# Patient Record
Sex: MALE | Race: BLACK OR AFRICAN AMERICAN | NOT HISPANIC OR LATINO | Employment: UNEMPLOYED | URBAN - METROPOLITAN AREA
[De-identification: names, ages, dates, MRNs, and addresses within clinical notes are randomized per-mention and may not be internally consistent; named-entity substitution may affect disease eponyms.]

---

## 2017-04-17 ENCOUNTER — OFFICE VISIT (OUTPATIENT)
Dept: FAMILY MEDICINE | Facility: CLINIC | Age: 37
End: 2017-04-17
Payer: COMMERCIAL

## 2017-04-17 VITALS
BODY MASS INDEX: 29.57 KG/M2 | HEIGHT: 71 IN | HEART RATE: 90 BPM | TEMPERATURE: 98.2 F | DIASTOLIC BLOOD PRESSURE: 72 MMHG | SYSTOLIC BLOOD PRESSURE: 116 MMHG | WEIGHT: 211.25 LBS

## 2017-04-17 DIAGNOSIS — G89.29 CHRONIC BILATERAL LOW BACK PAIN WITH LEFT-SIDED SCIATICA: ICD-10-CM

## 2017-04-17 DIAGNOSIS — Z00.00 ROUTINE GENERAL MEDICAL EXAMINATION AT A HEALTH CARE FACILITY: Primary | ICD-10-CM

## 2017-04-17 DIAGNOSIS — M54.6 MIDLINE THORACIC BACK PAIN, UNSPECIFIED CHRONICITY: ICD-10-CM

## 2017-04-17 DIAGNOSIS — M54.42 CHRONIC BILATERAL LOW BACK PAIN WITH LEFT-SIDED SCIATICA: ICD-10-CM

## 2017-04-17 LAB
ERYTHROCYTE [DISTWIDTH] IN BLOOD BY AUTOMATED COUNT: 16.5 % (ref 10–15)
ERYTHROCYTE [SEDIMENTATION RATE] IN BLOOD BY WESTERGREN METHOD: 4 MM/H (ref 0–15)
HCT VFR BLD AUTO: 42.8 % (ref 40–53)
HGB BLD-MCNC: 14.5 G/DL (ref 13.3–17.7)
MCH RBC QN AUTO: 25.4 PG (ref 26.5–33)
MCHC RBC AUTO-ENTMCNC: 33.9 G/DL (ref 31.5–36.5)
MCV RBC AUTO: 75 FL (ref 78–100)
PLATELET # BLD AUTO: 226 10E9/L (ref 150–450)
RBC # BLD AUTO: 5.7 10E12/L (ref 4.4–5.9)
WBC # BLD AUTO: 6.2 10E9/L (ref 4–11)

## 2017-04-17 PROCEDURE — 82306 VITAMIN D 25 HYDROXY: CPT | Performed by: FAMILY MEDICINE

## 2017-04-17 PROCEDURE — 36415 COLL VENOUS BLD VENIPUNCTURE: CPT | Performed by: FAMILY MEDICINE

## 2017-04-17 PROCEDURE — 99213 OFFICE O/P EST LOW 20 MIN: CPT | Mod: 25 | Performed by: FAMILY MEDICINE

## 2017-04-17 PROCEDURE — 85652 RBC SED RATE AUTOMATED: CPT | Performed by: FAMILY MEDICINE

## 2017-04-17 PROCEDURE — 99395 PREV VISIT EST AGE 18-39: CPT | Performed by: FAMILY MEDICINE

## 2017-04-17 PROCEDURE — 85027 COMPLETE CBC AUTOMATED: CPT | Performed by: FAMILY MEDICINE

## 2017-04-17 PROCEDURE — 80053 COMPREHEN METABOLIC PANEL: CPT | Performed by: FAMILY MEDICINE

## 2017-04-17 RX ORDER — DICLOFENAC SODIUM 75 MG/1
75 TABLET, DELAYED RELEASE ORAL 2 TIMES DAILY PRN
Qty: 60 TABLET | Refills: 1 | Status: SHIPPED | OUTPATIENT
Start: 2017-04-17 | End: 2017-05-01

## 2017-04-17 NOTE — NURSING NOTE
"Chief Complaint   Patient presents with     Physical       Initial /72 (BP Location: Right arm, Cuff Size: Adult Large)  Pulse 90  Temp 98.2  F (36.8  C) (Oral)  Ht 5' 10.5\" (1.791 m)  Wt 211 lb 4 oz (95.8 kg)  BMI 29.88 kg/m2 Estimated body mass index is 29.88 kg/(m^2) as calculated from the following:    Height as of this encounter: 5' 10.5\" (1.791 m).    Weight as of this encounter: 211 lb 4 oz (95.8 kg).  Medication Reconciliation: monica NIX, Certified Medical Assistant (AAMA)April 17, 2017 1:15 PM      "

## 2017-04-17 NOTE — PATIENT INSTRUCTIONS
Preventive Health Recommendations  Male Ages 26 - 39    Yearly exam:             See your health care provider every year in order to  o   Review health changes.   o   Discuss preventive care.    o   Review your medicines if your doctor has prescribed any.    You should be tested each year for STDs (sexually transmitted diseases), if you re at risk.     After age 35, talk to your provider about cholesterol testing. If you are at risk for heart disease, have your cholesterol tested at least every 5 years.     If you are at risk for diabetes, you should have a diabetes test (fasting glucose).  Shots: Get a flu shot each year. Get a tetanus shot every 10 years.     Nutrition:    Eat at least 5 servings of fruits and vegetables daily.     Eat whole-grain bread, whole-wheat pasta and brown rice instead of white grains and rice.     Talk to your provider about Calcium and Vitamin D.     Lifestyle    Exercise for at least 150 minutes a week (30 minutes a day, 5 days a week). This will help you control your weight and prevent disease.     Limit alcohol to one drink per day.     No smoking.     Wear sunscreen to prevent skin cancer.     See your dentist every six months for an exam and cleaning.     [facility]   Discharged by : Megha WOLFF MA  If you have any questions regarding your visit please contact your care team:     [facility] Clinic Hours Telephone Number   [address] Dr., Dr.   7am-7pm Monday - Thursday   7am-5pm Fridays  [phone]   (Appointment scheduling available 24/7)   RN Line   [phone] option 2       For a Price Quote for your services, please call our Consumer Price Line at [phone].     What options do I have for visits at the clinic other than the traditional office visit?     To expand how we care for you, many of our providers are utilizing electronic visits (e-visits) and telephone visits,  when medically appropriate, for interactions with their patients rather than a visit in the clinic. We also offer nurse visits for many medical concerns. Just like any other service, we will bill your insurance company for this type of visit based on time spent on the phone with your provider. Not all insurance companies cover these visits. Please check with your medical insurance if this type of visit is covered. You will be responsible for any charges that are not paid by your insurance.   E-visits via Convozinet: generally incur a $35.00 fee.     Telephone visits:   Time spent on the phone: *charged based on time that is spent on the phone in increments of 10 minutes. Estimated cost:   5-10 mins $30.00   11-20 mins. $59.00   21-30 mins. $85.00     Use ViaCLIX (secure email communication and access to your chart) to send your primary care provider a message or make an appointment. Ask someone on your Team how to sign up for ViaCLIX.     As always, Thank you for trusting us with your health care needs!      [facility]:    Scheduling Appointments  [place]  Call: [phone]    [facility]  Call: [phone]    [facility]  Call: [phone]      WHERE TO GO FOR CARE?    Clinic    Make an appointment if you:       Are sick (cold, cough, flu, sore throat, earache or in pain).       Have a small injury (sprain, small cut, burn or broken bone).       Need a physical exam, Pap smear, vaccine or prescription refill.       Have questions about your health or medicines.    To reach us:      Call [phone] ([phone]). Open 24 hours every day. (For counseling services, call [phone].)    Log into ViaCLIX at [employer].org. (Visit "[email or web address] to create an account.) Hospital emergency room    An emergency is a serious or life- threatening problem that must be treated right away.    Call 150 or get to  the hospital if you have:      Very bad or sudden:            - Chest pain or pressure         - Bleeding         - Head or belly pain         - Dizziness or trouble seeing, walking or                          Speaking      Problems breathing      Blood in your vomit or you are coughing up blood      A major injury (knocked out, loss of a finger or limb, rape, broken bone protruding from skin)    A mental health crisis. (Or call the Mental Health Crisis line at 1-544.753.5754 or Suicide Prevention Hotline at 1-960.823.4020.)    Open 24 hours every day. You don't need an appointment.     Urgent care    Visit urgent care for sickness or small injuries when the clinic is closed. You don't need an appointment. To check hours or find an urgent care near you, visit [email or web address]. Online care    Get online care from [employer] for more than 70 common problems, like colds, allergies and infections. Open 24 hours every day at: [email or web address]   Need help deciding?    For advice about where to be seen, you may call your clinic and ask to speak with a nurse. We're here for you 24 hours every day.         If you are deaf or hard of hearing, please let us know. We provide many free services including sign language interpreters, oral interpreters, TTYs, telephone amplifiers, note takers and written materials.

## 2017-04-17 NOTE — MR AVS SNAPSHOT
After Visit Summary   4/17/2017    Tonio Alarcon    MRN: [id number]           Patient Information     Date Of Birth          1980        Visit Information        Provider Department      4/17/2017 1:00 PM Christiano Palacios MD [facility]        Today's Diagnoses     Routine general medical examination at a health care facility    -  1    Midline thoracic back pain, unspecified chronicity        Chronic bilateral low back pain with left-sided sciatica          Care Instructions      Preventive Health Recommendations  Male Ages 26 - 39    Yearly exam:             See your health care provider every year in order to  o   Review health changes.   o   Discuss preventive care.    o   Review your medicines if your doctor has prescribed any.    You should be tested each year for STDs (sexually transmitted diseases), if you re at risk.     After age 35, talk to your provider about cholesterol testing. If you are at risk for heart disease, have your cholesterol tested at least every 5 years.     If you are at risk for diabetes, you should have a diabetes test (fasting glucose).  Shots: Get a flu shot each year. Get a tetanus shot every 10 years.     Nutrition:    Eat at least 5 servings of fruits and vegetables daily.     Eat whole-grain bread, whole-wheat pasta and brown rice instead of white grains and rice.     Talk to your provider about Calcium and Vitamin D.     Lifestyle    Exercise for at least 150 minutes a week (30 minutes a day, 5 days a week). This will help you control your weight and prevent disease.     Limit alcohol to one drink per day.     No smoking.     Wear sunscreen to prevent skin cancer.     See your dentist every six months for an exam and cleaning.     [facility]   Discharged by : Megha WOLFF MA  If you have any questions regarding your visit please contact your care team:     [facility] Clinic Hours Telephone Number   Dr. Mcpherson  [address] Dr., Dr.   7am-7pm Monday - Thursday   7am-5pm Fridays  [phone]   (Appointment scheduling available 24/7)   RN Line   [phone] option 2       For a Price Quote for your services, please call our Consumer Price Line at [phone].     What options do I have for visits at the clinic other than the traditional office visit?     To expand how we care for you, many of our providers are utilizing electronic visits (e-visits) and telephone visits, when medically appropriate, for interactions with their patients rather than a visit in the clinic. We also offer nurse visits for many medical concerns. Just like any other service, we will bill your insurance company for this type of visit based on time spent on the phone with your provider. Not all insurance companies cover these visits. Please check with your medical insurance if this type of visit is covered. You will be responsible for any charges that are not paid by your insurance.   E-visits via Harry's: generally incur a $35.00 fee.     Telephone visits:   Time spent on the phone: *charged based on time that is spent on the phone in increments of 10 minutes. Estimated cost:   5-10 mins $30.00   11-20 mins. $59.00   21-30 mins. $85.00     Use Itarohart (secure email communication and access to your chart) to send your primary care provider a message or make an appointment. Ask someone on your Team how to sign up for Promocot.     As always, Thank you for trusting us with your health care needs!      [facility]:    Scheduling Appointments  [facility]  Call: [phone]    [facility]  Call: [phone]    [facility]  Call: [phone]      WHERE TO GO FOR CARE?    Clinic    Make an appointment if you:       Are sick (cold, cough, flu, sore throat, earache or in pain).       Have a  small injury (sprain, small cut, burn or broken bone).       Need a physical exam, Pap smear, vaccine or prescription refill.       Have questions about your health or medicines.    To reach us:      Call [phone] ([phone]). Open 24 hours every day. (For counseling services, call [phone].)    Log into [employer] at P[employer]Duty. (Visit [email or web address] to create an account.) Hospital emergency room    An emergency is a serious or life- threatening problem that must be treated right away.    Call 911 or get to the hospital if you have:      Very bad or sudden:            - Chest pain or pressure         - Bleeding         - Head or belly pain         - Dizziness or trouble seeing, walking or                          Speaking      Problems breathing      Blood in your vomit or you are coughing up blood      A major injury (knocked out, loss of a finger or limb, rape, broken bone protruding from skin)    A mental health crisis. (Or call the Mental Health Crisis line at 1-944.544.2216 or Suicide Prevention Hotline at 1-150.428.1412.)    Open 24 hours every day. You don't need an appointment.     Urgent care    Visit urgent care for sickness or small injuries when the clinic is closed. You don't need an appointment. To check hours or find an urgent care near you, visit [email or web address]. Online care    Get online care from Cl[employer] for more than 70 common problems, like colds, allergies and infections. Open 24 hours every day at: [email or web address]   Need help deciding?    For advice about where to be seen, you may call your clinic and ask to speak with a nurse. We're here for you 24 hours every day.         If you are deaf or hard of hearing, please let us know. We provide many free services including sign language interpreters, oral interpreters, TTYs, telephone amplifiers, note takers and written materials.                       Follow-ups after your visit        Future tests  "that were ordered for you today     Open Future Orders        Priority Expected Expires Ordered    CT Abdomen Pelvis w Contrast Routine  2018    CT Chest w Contrast Routine  2018            Who to contact     If you have questions or need follow up information about today's clinic visit or your schedule please contact [facility] directly at [phone].  Normal or non-critical lab and imaging results will be communicated to you by [email or web address], letter or phone within 4 business days after the clinic has received the results. If you do not hear from us within 7 days, please contact the clinic through [email or web address] or phone. If you have a critical or abnormal lab result, we will notify you by phone as soon as possible.  Submit refill requests through Correlor or call your pharmacy and they will forward the refill request to us. Please allow 3 business days for your refill to be completed.          Additional Information About Your Visit        [email or web address] Climate Information     Correlor lets you send messages to your doctor, view your test results, renew your prescriptions, schedule appointments and more. To sign up, go to [email or web address] . Click on \"Log in\" on the left side of the screen, which will take you to the Welcome page. Then click on \"Sign up Now\" on the right side of the page.     You will be asked to enter the access code listed below, as well as some personal information. Please follow the directions to create your username and password.     Your access code is: [email or web address]  Expires: 2017  1:51 PM     Your access code will  in 90 days. If you need help or a new code, please call your [facility] or [phone].        Care EveryWhere ID     This is your Care EveryWhere ID. This could be used by other organizations to access your [place] medical records  [id number]        Your Vitals Were     Pulse Temperature Height BMI (Body Mass Index)          90 " "98.2  F (36.8  C) (Oral) 5' 10.5\" (1.791 m) 29.88 kg/m2         Blood Pressure from Last 3 Encounters:   04/17/17 116/72   04/08/16 142/87   07/10/15 112/60    Weight from Last 3 Encounters:   04/17/17 211 lb 4 oz (95.8 kg)   04/08/16 199 lb (90.3 kg)   07/10/15 197 lb (89.4 kg)              We Performed the Following     CBC with platelets     Comprehensive metabolic panel (BMP + Alb, Alk Phos, ALT, AST, Total. Bili, TP)     ESR: Erythrocyte sedimentation rate     Vitamin D Deficiency          Today's Medication Changes          These changes are accurate as of: 4/17/17  1:51 PM.  If you have any questions, ask your nurse or doctor.               Start taking these medicines.        Dose/Directions    diclofenac 75 MG EC tablet   Commonly known as:  VOLTAREN   Used for:  Midline thoracic back pain, unspecified chronicity   Started by:  Christiano Palacios MD        Dose:  75 mg   Take 1 tablet (75 mg) by mouth 2 times daily as needed for moderate pain   Quantity:  60 tablet   Refills:  1         Stop taking these medicines if you haven't already. Please contact your care team if you have questions.     naproxen 500 MG tablet   Commonly known as:  NAPROSYN   Stopped by:  Christiano Palacios MD                Where to get your medicines      These medications were sent to [facility] [address] [facility] - [place], [place] - [address]  [address] [address]     Phone:  [phone]     diclofenac 75 MG EC tablet                Primary Care Provider Office Phone # Fax #    [facility] [phone]       [address] [address]        Thank you!     Thank you for choosing [facility]  for your care. Our goal is always to provide you with excellent care. Hearing back from our patients is one way we can continue to improve our services. Please take a few minutes to complete the written survey that you may receive in " the mail after your visit with us. Thank you!             Your Updated Medication List - Protect others around you: Learn how to safely use, store and throw away your medicines at www.disposemymeds.org.          This list is accurate as of: 4/17/17  1:51 PM.  Always use your most recent med list.                   Brand Name Dispense Instructions for use    diclofenac 75 MG EC tablet    VOLTAREN    60 tablet    Take 1 tablet (75 mg) by mouth 2 times daily as needed for moderate pain

## 2017-04-17 NOTE — PROGRESS NOTES
SUBJECTIVE:     CC: Tonio Alarcon is an 36 year old male who presents for preventative health visit.     Healthy Habits:    Do you get at least three servings of calcium containing foods daily (dairy, green leafy vegetables, etc.)? no    Amount of exercise or daily activities, outside of work: 0    Problems taking medications regularly not applicable    Medication side effects: No    Have you had an eye exam in the past two years? yes    Do you see a dentist twice per year? no    Do you have sleep apnea, excessive snoring or daytime drowsiness?no        Pain in back (he thinks in lung area) for last 4 months. Non injury related.  He is here with his wife today.  Has more chronic low back pain that is stable.  No specific injury recalled.  When bad, has symptoms down the left leg.  Previous MRI results from 2015 report were reviewed.  Has taken naproxen in the past and not helpful.  Admits to daily marijuana use for the pain.  The mid back pain has been present for a few months without injury.  Feels it is separate from the low back pain.  He is concerned about his lungs due to his heavy smoking.  Pain does not wake him up.  No rash, no change with eating or drinking.  No change with urination or bowel movements.  Has occasional LLQ and noted some mild abnormalities on CT that were not followed up but these symptoms have largely improved.  He has not really used any meds, done any type of physical therapy or chiro for the mid back pain.  He wonders if he can get some type of medication for it today.    Today's PHQ-2 Score: 0  PHQ-2 ( 1999 [employer]) 4/17/2017 4/6/2015   Q1: Little interest or pleasure in doing things 0 1   Q2: Feeling down, depressed or hopeless 0 0   PHQ-2 Score 0 1       Abuse: Current or Past(Physical, Sexual or Emotional)- No  Do you feel safe in your environment - Yes    Social History   Substance Use Topics     Smoking status: Current Every Day Smoker     Packs/day: 1.00     Smokeless  tobacco: Never Used     Alcohol use Yes      Comment: variable use on weekends     The patient does not drink >3 drinks per day nor >7 drinks per week.    Last PSA: No results found for: PSA    Recent Labs   Lab Test  04/06/15   1156   CHOL  170   HDL  46   LDL  105   TRIG  95   CHOLHDLRATIO  3.7       Reviewed orders with patient. Reviewed health maintenance and updated orders accordingly - Yes    Reviewed and updated as needed this visit by clinical staff  Tobacco  Allergies  Problems  Med Hx  Surg Hx  Fam Hx  Soc Hx        Reviewed and updated as needed this visit by Provider  Tobacco  Problems  Surg Hx  Fam Hx  Soc Hx         History reviewed. No pertinent past medical history.   Past Surgical History:   Procedure Laterality Date     HEMORRHOID SURGERY  2015       ROS:  C: NEGATIVE for fever, chills, change in weight  I: NEGATIVE for worrisome rashes, moles or lesions  E: NEGATIVE for vision changes or irritation  ENT: NEGATIVE for ear, mouth and throat problems  R: NEGATIVE for significant cough or SOB  CV: NEGATIVE for chest pain, palpitations or peripheral edema  GI: NEGATIVE for nausea, abdominal pain, heartburn, or change in bowel habits   male: negative for dysuria, hematuria, decreased urinary stream, erectile dysfunction, urethral discharge  M: NEGATIVE for significant arthralgias or myalgia  N: NEGATIVE for weakness, dizziness or paresthesias  E: NEGATIVE for temperature intolerance, skin/hair changes  H: NEGATIVE for bleeding problems  P: NEGATIVE for changes in mood or affect    Problem list, Medication list, Allergies, and Medical/Social/Surgical histories reviewed in EPIC and updated as appropriate.  Patient Active Problem List   Diagnosis     CARDIOVASCULAR SCREENING; LDL GOAL LESS THAN 160     Chronic low back pain     Past Surgical History:   Procedure Laterality Date     HEMORRHOID SURGERY  2015       Social History   Substance Use Topics     Smoking status: Current Every Day Smoker  "    Packs/day: 1.00     Smokeless tobacco: Never Used     Alcohol use Yes      Comment: variable use on weekends     Family History   Problem Relation Age of Onset     DIABETES Mother      Breast Cancer Mother      Dx at 61 years aol     CANCER Father      Stomach ca - Dx at 57 years old and passed away from this     Cancer - colorectal Maternal Grandmother      DIABETES Brother          Current Outpatient Prescriptions   Medication Sig Dispense Refill     diclofenac (VOLTAREN) 75 MG EC tablet Take 1 tablet (75 mg) by mouth 2 times daily as needed for moderate pain 60 tablet 1     No Known Allergies  OBJECTIVE:     /72 (BP Location: Right arm, Cuff Size: Adult Large)  Pulse 90  Temp 98.2  F (36.8  C) (Oral)  Ht 5' 10.5\" (1.791 m)  Wt 211 lb 4 oz (95.8 kg)  BMI 29.88 kg/m2  EXAM:  GENERAL: healthy, alert and no distress  EYES: Eyes grossly normal to inspection, PERRL and conjunctivae and sclerae normal  HENT: ear canals and TM's normal, nose and mouth without ulcers or lesions  NECK: no adenopathy, no asymmetry, masses, or scars and thyroid normal to palpation  RESP: lungs clear to auscultation - no rales, rhonchi or wheezes  CV: regular rate and rhythm, normal S1 S2, no S3 or S4, no murmur, click or rub, no peripheral edema and peripheral pulses strong  ABDOMEN: soft, nontender, no hepatosplenomegaly, no masses and bowel sounds normal   (male): normal male genitalia without lesions or urethral discharge, no hernia  MS: no gross musculoskeletal defects noted, no edema  MS: normal muscle tone, normal range of motion, peripheral pulses normal, RLE exam shows normal strength and muscle mass, no evidence of joint effusion and ROM of all joints is normal, LLE exam shows normal strength and muscle mass, no evidence of joint effusion and ROM of all joints is normal and spine exam shows no scoliosis and ROM is normal  SKIN: no suspicious lesions or rashes  NEURO: Normal strength and tone, mentation intact and " "speech normal  NEURO: Normal strength and tone, sensory exam grossly normal, mentation intact, DTR's normal and symmetric 2+ and gait normal   PSYCH: mentation appears normal, affect normal/bright  LYMPH: no cervical, supraclavicular, axillary, or inguinal adenopathy    ASSESSMENT/PLAN:     1. Routine general medical examination at a health care facility  Routine health issues were reviewed at this time appropriate for age.  He was not fasting today.  Follow up age 39.    2. Midline thoracic back pain, unspecified chronicity  Etiology not clear, he is concerned about a possible source of referred pain from the lungs or abdomen.  Will check labs and imaging today for possible secondary cause.  voltaren for pain in the short term.  Follow up after completion of tests to review results.  - ESR: Erythrocyte sedimentation rate  - CBC with platelets  - Comprehensive metabolic panel (BMP + Alb, Alk Phos, ALT, AST, Total. Bili, TP)  - Vitamin D Deficiency  - CT Abdomen Pelvis w Contrast; Future  - CT Chest w Contrast; Future  - diclofenac (VOLTAREN) 75 MG EC tablet; Take 1 tablet (75 mg) by mouth 2 times daily as needed for moderate pain  Dispense: 60 tablet; Refill: 1    3. Chronic bilateral low back pain with left-sided sciatica  As above.  - CT Abdomen Pelvis w Contrast; Future    COUNSELING:  Reviewed preventive health counseling, as reflected in patient instructions       Regular exercise       Healthy diet/nutrition         reports that he has been smoking.  He has been smoking about 1.00 pack per day. He has never used smokeless tobacco.  Tobacco Cessation Action Plan: Self help information given to patient  Estimated body mass index is 29.88 kg/(m^2) as calculated from the following:    Height as of this encounter: 5' 10.5\" (1.791 m).    Weight as of this encounter: 211 lb 4 oz (95.8 kg).   Weight management plan: Discussed healthy diet and exercise guidelines and patient will follow up in 3 months in clinic to " re-evaluate.    Counseling Resources:  ATP IV Guidelines  Pooled Cohorts Equation Calculator  FRAX Risk Assessment  ICSI Preventive Guidelines  Dietary Guidelines for Americans, 2010  USDA's MyPlate  ASA Prophylaxis  Lung CA Screening    Christiano Palacios MD  [facility]

## 2017-04-18 LAB
ALBUMIN SERPL-MCNC: 3.9 G/DL (ref 3.4–5)
ALP SERPL-CCNC: 64 U/L (ref 40–150)
ALT SERPL W P-5'-P-CCNC: 41 U/L (ref 0–70)
ANION GAP SERPL CALCULATED.3IONS-SCNC: 7 MMOL/L (ref 3–14)
AST SERPL W P-5'-P-CCNC: 22 U/L (ref 0–45)
BILIRUB SERPL-MCNC: 0.4 MG/DL (ref 0.2–1.3)
BUN SERPL-MCNC: 16 MG/DL (ref 7–30)
CALCIUM SERPL-MCNC: 9.3 MG/DL (ref 8.5–10.1)
CHLORIDE SERPL-SCNC: 108 MMOL/L (ref 94–109)
CO2 SERPL-SCNC: 26 MMOL/L (ref 20–32)
CREAT SERPL-MCNC: 1.07 MG/DL (ref 0.66–1.25)
DEPRECATED CALCIDIOL+CALCIFEROL SERPL-MC: 5 UG/L (ref 20–75)
GFR SERPL CREATININE-BSD FRML MDRD: 78 ML/MIN/1.7M2
GLUCOSE SERPL-MCNC: 96 MG/DL (ref 70–99)
POTASSIUM SERPL-SCNC: 4.1 MMOL/L (ref 3.4–5.3)
PROT SERPL-MCNC: 7.1 G/DL (ref 6.8–8.8)
SODIUM SERPL-SCNC: 141 MMOL/L (ref 133–144)

## 2017-04-18 ASSESSMENT — ANXIETY QUESTIONNAIRES
5. BEING SO RESTLESS THAT IT IS HARD TO SIT STILL: NOT AT ALL
3. WORRYING TOO MUCH ABOUT DIFFERENT THINGS: SEVERAL DAYS
6. BECOMING EASILY ANNOYED OR IRRITABLE: SEVERAL DAYS
7. FEELING AFRAID AS IF SOMETHING AWFUL MIGHT HAPPEN: SEVERAL DAYS
GAD7 TOTAL SCORE: 4
2. NOT BEING ABLE TO STOP OR CONTROL WORRYING: NOT AT ALL
IF YOU CHECKED OFF ANY PROBLEMS ON THIS QUESTIONNAIRE, HOW DIFFICULT HAVE THESE PROBLEMS MADE IT FOR YOU TO DO YOUR WORK, TAKE CARE OF THINGS AT HOME, OR GET ALONG WITH OTHER PEOPLE: NOT DIFFICULT AT ALL
1. FEELING NERVOUS, ANXIOUS, OR ON EDGE: SEVERAL DAYS

## 2017-04-18 ASSESSMENT — PATIENT HEALTH QUESTIONNAIRE - PHQ9: 5. POOR APPETITE OR OVEREATING: NOT AT ALL

## 2017-04-19 ASSESSMENT — PATIENT HEALTH QUESTIONNAIRE - PHQ9: SUM OF ALL RESPONSES TO PHQ QUESTIONS 1-9: 3

## 2017-04-19 ASSESSMENT — ANXIETY QUESTIONNAIRES: GAD7 TOTAL SCORE: 4

## 2017-04-20 ENCOUNTER — TELEPHONE (OUTPATIENT)
Dept: FAMILY MEDICINE | Facility: CLINIC | Age: 37
End: 2017-04-20

## 2017-04-20 DIAGNOSIS — E55.9 VITAMIN D DEFICIENCY: Primary | ICD-10-CM

## 2017-04-20 RX ORDER — ERGOCALCIFEROL 1.25 MG/1
50000 CAPSULE, LIQUID FILLED ORAL
Qty: 8 CAPSULE | Refills: 0 | Status: SHIPPED | OUTPATIENT
Start: 2017-04-20 | End: 2017-06-09

## 2017-04-20 NOTE — TELEPHONE ENCOUNTER
Please call patient and let him know all the blood tests are good except he has a really low vitamin D level of 5, normal is 30-80.  This might play a role in some of his symptoms and he needs replacement at this time.  I will send a script to the pharmacy and the levels should be rechecked in 2 months.  Please remind him to follow up in the office after he completes the imaging tests that were ordered.  Thanks.    Christiano Palacios MD

## 2017-04-21 ENCOUNTER — HOSPITAL ENCOUNTER (OUTPATIENT)
Facility: CLINIC | Age: 37
Setting detail: OBSERVATION
Discharge: HOME | End: 2017-04-22
Attending: FAMILY MEDICINE | Admitting: INTERNAL MEDICINE
Payer: COMMERCIAL

## 2017-04-21 ENCOUNTER — APPOINTMENT (OUTPATIENT)
Dept: CT IMAGING | Facility: CLINIC | Age: 37
End: 2017-04-21
Attending: FAMILY MEDICINE
Payer: COMMERCIAL

## 2017-04-21 DIAGNOSIS — R07.81 CHEST PAIN, PLEURITIC: ICD-10-CM

## 2017-04-21 LAB
ALBUMIN SERPL-MCNC: 4.4 G/DL (ref 3.4–5)
ALP SERPL-CCNC: 77 U/L (ref 40–150)
ALT SERPL W P-5'-P-CCNC: 52 U/L (ref 0–70)
ANION GAP SERPL CALCULATED.3IONS-SCNC: 11 MMOL/L (ref 3–14)
AST SERPL W P-5'-P-CCNC: 43 U/L (ref 0–45)
BASOPHILS # BLD AUTO: 0 10E9/L (ref 0–0.2)
BASOPHILS NFR BLD AUTO: 0.3 %
BILIRUB SERPL-MCNC: 0.7 MG/DL (ref 0.2–1.3)
BUN SERPL-MCNC: 11 MG/DL (ref 7–30)
CALCIUM SERPL-MCNC: 9.1 MG/DL (ref 8.5–10.1)
CHLORIDE SERPL-SCNC: 105 MMOL/L (ref 94–109)
CO2 BLDCOV-SCNC: 25 MMOL/L (ref 21–28)
CO2 SERPL-SCNC: 27 MMOL/L (ref 20–32)
CREAT BLD-MCNC: 1.2 MG/DL (ref 0.66–1.25)
CREAT SERPL-MCNC: 1.09 MG/DL (ref 0.66–1.25)
D DIMER PPP FEU-MCNC: 0.4 UG/ML FEU (ref 0–0.5)
DIFFERENTIAL METHOD BLD: ABNORMAL
EOSINOPHIL # BLD AUTO: 0.1 10E9/L (ref 0–0.7)
EOSINOPHIL NFR BLD AUTO: 0.7 %
ERYTHROCYTE [DISTWIDTH] IN BLOOD BY AUTOMATED COUNT: 15.6 % (ref 10–15)
GFR SERPL CREATININE-BSD FRML MDRD: 68 ML/MIN/1.7M2
GFR SERPL CREATININE-BSD FRML MDRD: 76 ML/MIN/1.7M2
GLUCOSE SERPL-MCNC: 82 MG/DL (ref 70–99)
HCT VFR BLD AUTO: 46.4 % (ref 40–53)
HGB BLD-MCNC: 16 G/DL (ref 13.3–17.7)
IMM GRANULOCYTES # BLD: 0 10E9/L (ref 0–0.4)
IMM GRANULOCYTES NFR BLD: 0.1 %
LACTATE BLD-SCNC: 0.8 MMOL/L (ref 0.7–2.1)
LACTATE BLD-SCNC: 3 MMOL/L (ref 0.7–2.1)
LIPASE SERPL-CCNC: 136 U/L (ref 73–393)
LYMPHOCYTES # BLD AUTO: 2.6 10E9/L (ref 0.8–5.3)
LYMPHOCYTES NFR BLD AUTO: 38.1 %
MCH RBC QN AUTO: 26.1 PG (ref 26.5–33)
MCHC RBC AUTO-ENTMCNC: 34.5 G/DL (ref 31.5–36.5)
MCV RBC AUTO: 76 FL (ref 78–100)
MONOCYTES # BLD AUTO: 0.7 10E9/L (ref 0–1.3)
MONOCYTES NFR BLD AUTO: 10.8 %
NEUTROPHILS # BLD AUTO: 3.4 10E9/L (ref 1.6–8.3)
NEUTROPHILS NFR BLD AUTO: 50 %
NRBC # BLD AUTO: 0 10*3/UL
NRBC BLD AUTO-RTO: 0 /100
NT-PROBNP SERPL-MCNC: 10 PG/ML (ref 0–450)
PCO2 BLDV: 39 MM HG (ref 40–50)
PH BLDV: 7.41 PH (ref 7.32–7.43)
PLATELET # BLD AUTO: 248 10E9/L (ref 150–450)
PO2 BLDV: 23 MM HG (ref 25–47)
POTASSIUM SERPL-SCNC: 3.6 MMOL/L (ref 3.4–5.3)
PROT SERPL-MCNC: 8.1 G/DL (ref 6.8–8.8)
RBC # BLD AUTO: 6.14 10E12/L (ref 4.4–5.9)
SAO2 % BLDV FROM PO2: 41 %
SODIUM SERPL-SCNC: 143 MMOL/L (ref 133–144)
TROPONIN I BLD-MCNC: 0 UG/L (ref 0–0.1)
TROPONIN I SERPL-MCNC: NORMAL UG/L (ref 0–0.04)
WBC # BLD AUTO: 6.9 10E9/L (ref 4–11)

## 2017-04-21 PROCEDURE — 83880 ASSAY OF NATRIURETIC PEPTIDE: CPT | Performed by: FAMILY MEDICINE

## 2017-04-21 PROCEDURE — 99285 EMERGENCY DEPT VISIT HI MDM: CPT | Mod: 25 | Performed by: FAMILY MEDICINE

## 2017-04-21 PROCEDURE — 80053 COMPREHEN METABOLIC PANEL: CPT | Performed by: FAMILY MEDICINE

## 2017-04-21 PROCEDURE — 25800025 ZZH RX 258: Performed by: FAMILY MEDICINE

## 2017-04-21 PROCEDURE — 99220 ZZC INITIAL OBSERVATION CARE,LEVL III: CPT | Mod: Z6 | Performed by: PHYSICIAN ASSISTANT

## 2017-04-21 PROCEDURE — 25500064 ZZH RX 255 OP 636: Performed by: FAMILY MEDICINE

## 2017-04-21 PROCEDURE — 83605 ASSAY OF LACTIC ACID: CPT | Performed by: FAMILY MEDICINE

## 2017-04-21 PROCEDURE — 85379 FIBRIN DEGRADATION QUANT: CPT | Performed by: FAMILY MEDICINE

## 2017-04-21 PROCEDURE — 83690 ASSAY OF LIPASE: CPT | Performed by: FAMILY MEDICINE

## 2017-04-21 PROCEDURE — 84484 ASSAY OF TROPONIN QUANT: CPT

## 2017-04-21 PROCEDURE — 82565 ASSAY OF CREATININE: CPT

## 2017-04-21 PROCEDURE — 99207 ZZC APP CREDIT; MD BILLING SHARED VISIT: CPT | Mod: Z6 | Performed by: FAMILY MEDICINE

## 2017-04-21 PROCEDURE — 82803 BLOOD GASES ANY COMBINATION: CPT

## 2017-04-21 PROCEDURE — 93010 ELECTROCARDIOGRAM REPORT: CPT | Mod: Z6 | Performed by: FAMILY MEDICINE

## 2017-04-21 PROCEDURE — 93005 ELECTROCARDIOGRAM TRACING: CPT | Performed by: FAMILY MEDICINE

## 2017-04-21 PROCEDURE — 84484 ASSAY OF TROPONIN QUANT: CPT | Performed by: FAMILY MEDICINE

## 2017-04-21 PROCEDURE — 71260 CT THORAX DX C+: CPT

## 2017-04-21 PROCEDURE — 96360 HYDRATION IV INFUSION INIT: CPT

## 2017-04-21 PROCEDURE — 25000128 H RX IP 250 OP 636: Performed by: FAMILY MEDICINE

## 2017-04-21 PROCEDURE — 83605 ASSAY OF LACTIC ACID: CPT | Mod: 91

## 2017-04-21 PROCEDURE — 85025 COMPLETE CBC W/AUTO DIFF WBC: CPT | Performed by: FAMILY MEDICINE

## 2017-04-21 PROCEDURE — 25000125 ZZHC RX 250: Performed by: FAMILY MEDICINE

## 2017-04-21 RX ORDER — IOPAMIDOL 755 MG/ML
100 INJECTION, SOLUTION INTRAVASCULAR ONCE
Status: COMPLETED | OUTPATIENT
Start: 2017-04-21 | End: 2017-04-21

## 2017-04-21 RX ADMIN — SODIUM CHLORIDE, POTASSIUM CHLORIDE, SODIUM LACTATE AND CALCIUM CHLORIDE 1000 ML: 600; 310; 30; 20 INJECTION, SOLUTION INTRAVENOUS at 21:11

## 2017-04-21 RX ADMIN — IOPAMIDOL 64 ML: 755 INJECTION, SOLUTION INTRAVENOUS at 20:57

## 2017-04-21 RX ADMIN — SODIUM CHLORIDE 84 ML: 9 INJECTION, SOLUTION INTRAVENOUS at 20:58

## 2017-04-21 RX ADMIN — SODIUM CHLORIDE 1000 ML: 9 INJECTION, SOLUTION INTRAVENOUS at 20:07

## 2017-04-21 ASSESSMENT — ENCOUNTER SYMPTOMS
NECK STIFFNESS: 0
ARTHRALGIAS: 0
COLOR CHANGE: 0
HEADACHES: 0
BACK PAIN: 1
DIFFICULTY URINATING: 0
FEVER: 0
COUGH: 1
SHORTNESS OF BREATH: 1
ABDOMINAL PAIN: 0
CONFUSION: 0
EYE REDNESS: 0

## 2017-04-21 NOTE — IP AVS SNAPSHOT
[facility] [address] [facility] [address] [address]    Phone:  [phone]    Fax:  [phone]                                       After Visit Summary   4/21/2017    Tonio Alarcon    MRN: [id number]           After Visit Summary Signature Page     I have received my discharge instructions, and my questions have been answered. I have discussed any challenges I see with this plan with the nurse or doctor.    ..........................................................................................................................................  Patient/Patient Representative Signature      ..........................................................................................................................................  Patient Representative Print Name and Relationship to Patient    ..................................................               ................................................  Date                                            Time    ..........................................................................................................................................  Reviewed by Signature/Title    ...................................................              ..............................................  Date                                                            Time

## 2017-04-21 NOTE — IP AVS SNAPSHOT
MRN:[id number]                      After Visit Summary   4/21/2017    Tonio Alarcon    MRN: [id number]           Thank you!     Thank you for choosing [facility] for your care. Our goal is always to provide you with excellent care. Hearing back from our patients is one way we can continue to improve our services. Please take a few minutes to complete the written survey that you may receive in the mail after you visit with us. Thank you!        Patient Information     Date Of Birth          1980        Designated Caregiver       Most Recent Value    Caregiver    Will someone help with your care after discharge? yes    Name of designated caregiver Destiny Alarcon    Phone number of caregiver [phone]    Caregiver address [address] [place] [address]      About your hospital stay     You were admitted on:  April 22, 2017 You last received care in the:  Unit 6D Observation [facility]    You were discharged on:  April 22, 2017        Reason for your hospital stay       You were admitted to the observation unit for evaluation of your chest pain.  Your EKG was normal, labs checking for heart damage were negative, chest x-ray was normal.  You underwent a stress test and this was normal.    I recommend you work with your primary care doctor to quit smoking as well as drinking alcohol and smoking marijuana. I also recommend your follow-up with your primary care doctor to discuss possible pulmonary function testing given your shortness of breath.                  Who to Call     For medical emergencies, please call 911.  For non-urgent questions about your medical care, please call your primary care provider or clinic, [phone]          Attending Provider     Provider Specialty    Gregorio Raymundo MD Emergency Medicine    [facility]       Primary Care Provider Office Phone # Fax #    [facility] [phone]  [phone]       [address] [facility] [address]         When to contact your care team       Return to the ED with fever, uncontrolled nausea, vomiting, unrelieved pain, bleeding not relieved with pressure, dizziness, chest pain, shortness of breath, loss of consciousness, and any new or concerning symptoms.      Please go to your nearest emergency room If you were to have a change in the type of chest pain i.e more severe, lasting longer or radiating to your shoulder, arm, neck, jaw or back, shortness of breath or increased pain with breathing, coughing up blood, feel dizzy or lightheaded, or notice swelling in one leg.                  After Care Instructions     Activity       Your activity upon discharge: activity as tolerated and no driving for today            Diet       Follow this diet upon discharge: Orders Placed This Encounter      Regular Diet Adult                  Follow-up Appointments     Adult [facility] Follow-up and recommended labs and tests       Follow up with primary care provider, [facility], within 7 days for hospital follow- up.      Appointments on [place] and/or [facility] (with [facility] or [facility] provider or service). Call [phone] if you haven't heard regarding these appointments within 7 days of discharge.                  Pending Results     Date and Time Order Name Status Description    4/21/2017 1915 EKG 12-lead, tracing only Preliminary             Statement of Approval     Ordered          04/22/17 1334  I have reviewed and agree with all the recommendations and orders detailed in this document.  EFFECTIVE NOW     Approved and electronically signed by:  Madhuri Estrella APRN CNP             Admission Information     Date & Time Provider Department Dept. Phone    4/21/2017 Rudy Bhat MD Unit 6D Observation [facility] [place] [phone]      Your Vitals Were     Blood Pressure Pulse Temperature Respirations Weight Pulse Oximetry  "   132/93 (BP Location: Left arm) 109 98.1  F (36.7  C) (Oral) 16 92.2 kg (203 lb 4.8 oz) 100%    BMI (Body Mass Index)                   28.76 kg/m2           TrendKite Information     TrendKite lets you send messages to your doctor, view your test results, renew your prescriptions, schedule appointments and more. To sign up, go to [email or web address] . Click on \"Log in\" on the left side of the screen, which will take you to the Welcome page. Then click on \"Sign up Now\" on the right side of the page.     You will be asked to enter the access code listed below, as well as some personal information. Please follow the directions to create your username and password.     Your access code is: [email or web address]  Expires: 2017  1:51 PM     Your access code will  in 90 days. If you need help or a new code, please call your [place] clinic or [phone].        Care EveryWhere ID     This is your Care EveryWhere ID. This could be used by other organizations to access your [place] medical records  [id number]           Review of your medicines      CONTINUE these medicines which have NOT CHANGED        Dose / Directions    diclofenac 75 MG EC tablet   Commonly known as:  VOLTAREN   Used for:  Midline thoracic back pain, unspecified chronicity        Dose:  75 mg   Take 1 tablet (75 mg) by mouth 2 times daily as needed for moderate pain   Quantity:  60 tablet   Refills:  1       vitamin D 01040 UNIT capsule   Commonly known as:  ERGOCALCIFEROL   Used for:  Vitamin D deficiency        Dose:  50357 Units   Take 1 capsule (50,000 Units) by mouth every 7 days for 8 doses   Quantity:  8 capsule   Refills:  0                Protect others around you: Learn how to safely use, store and throw away your medicines at www.disposemymeds.org.             Medication List: This is a list of all your medications and when to take them. Check marks below indicate your daily home schedule. Keep this list as a reference.    "   Medications           Morning Afternoon Evening Bedtime As Needed    diclofenac 75 MG EC tablet   Commonly known as:  VOLTAREN   Take 1 tablet (75 mg) by mouth 2 times daily as needed for moderate pain                                vitamin D 64176 UNIT capsule   Commonly known as:  ERGOCALCIFEROL   Take 1 capsule (50,000 Units) by mouth every 7 days for 8 doses                                          More Information        How to Quit Smoking  Smoking is one of the hardest habits to break. About half of all people who have ever smoked have been able to quit. Most people who still smoke want to quit. Here are some of the best ways to stop smoking.    Keep trying  It takes most smokers about eight tries before they can quit entirely. It s important not to give up.  Go cold turkey  Most former smokers quit cold turkey (all at once). Trying to cut back gradually doesn't seem to work as well, perhaps because it continues the smoking habit. Also, it is possible to inhale more while smoking fewer cigarettes. This results in the same amount of nicotine in your body!  Get support  Support programs can be a big help, especially for heavy smokers. These groups offer lectures, ways to change behavior, and peer support. Here are some ways to find a support program:    Free national quitline: 800-QUIT-NOW (676-838-9515).    Hospital quit-smoking programs.    American Lung Association: (725.255.6359).    American Cancer Society (952-088-1730).  Support at home is important too. Nonsmokers can offer praise and encouragement. If the smoker in your life finds it hard to quit, encourage them to keep trying!  Over-the-counter medicines  Nicotine replacement therapy may make quitting easier. Certain aids, such as the nicotine patch, gum, and lozenges, are available without a prescription. It is best to use these under a doctor s care, though. The skin patch provides a steady supply of nicotine. Nicotine gum and lozenges  give temporary bursts of low levels of nicotine. Both methods reduce the craving for cigarettes. Warning: If you have nausea, vomiting, dizziness, weakness, or a fast heartbeat, stop using these products and see your doctor.  Prescription medicines  After reviewing your smoking patterns and prior attempts to quit, your doctor may offer a prescription medicine such as bupropion, varenicline, a nicotine inhaler, or nasal spray. Each has advantages and side effects. Your doctor can review these with you.  Health benefits of quitting  The benefits of quitting start right away and keep improving the longer you go without smoking. These benefits occur at any age.  So whether you are 17 or 70, quitting is a good decision. Some of the benefits include:    20 minutes: Blood pressure and pulse return to normal.    8 hours: Oxygen levels return to normal.    2 days: Ability to smell and taste begin to improve as damaged nerves regrow.    2 to 3 weeks: Circulation and lung function improve.    1 to 9 months: Coughing, congestion, and shortness of breath decrease; tiredness decreases.    1 year: Risk of heart attack decreases by half.    5 years: Risk of lung cancer decreases by half; risk of stroke becomes the same as a nonsmoker s.  For more on how to quit smoking, try these online resources:     Smokefree.gov http://smokefree.gov/    Clearing the Air  booklet from the National Cancer [place] http://smokefree.gov/sites/default/files/pdf/clearing-the-air-accessible.pdf    9519-7567 [employer] [address] [place] [address]. All rights reserved. This information is not intended as a substitute for professional medical care. Always follow your healthcare professional's instructions.                Alcohol Abuse  Alcoholic drinks are harmful when you have too many of them. There is no set number of drinks that defines too much. Drinking that disrupts your life or your health is called alcohol abuse.  "Alcohol abuse can hurt your relationships with others. You may lose friends, a spouse, or even your job. You may be abusing alcohol if any of the following are true for you:    Duties at home or with  suffer because of drinking.    Duties at work or in school suffer because of drinking.    You have missed work or school because of drinking.    You use alcohol while driving or operating machinery.    You have legal problems such as arrests due to drinking.    You keep drinking even though it causes serious problems in your life.  Health effects  Alcohol abuse causes health problems. Sometimes this can happen after only drinking a  little.\" There is no set number of drinks or amount of alcohol that defines too much. The more you drink at one time, and the more often you drink determine both the short-term and long-term health effects. It affects all parts of your body and your health, including your:    Brain. Alcohol is a central nervous system depressant. It can damage parts of the brain that affect your balance, memory, thinking, and emotions. It can cause memory loss, blackouts, depression, agitation, sleep cycle changes, and seizures. These changes may or may not be reversible.    Heart and vascular system. Alcohol affects multiple areas. It can damage heart muscle causing cardiomyopathy, which is a weakening and stretching of the heart muscle. This can lead to trouble breathing, an irregular heartbeat, atrial fibrillation, leg swelling, and heart failure. Alcohol use makes the blood vessels stiffen causing hypertension (high blood pressure). All of these problems increase your risk of having heart attacks or strokes.    Liver. Alcohol causes fat to build up in the liver, affecting its normal function. This increases the risk for hepatitis, leading to abdominal pain, appetite loss, jaundice, bleeding problems, liver fibrosis, and cirrhosis. This, in turn, can affect your ability to fight off infections, " and can cause diabetes. The liver changes prevent it from removing toxins in your blood that can cause encephalopathy which may show with confusion, altered level of consciousness, personality changes, memory loss, seizures, coma, and death.    Pancreas. Alcohol can cause inflammation of the pancreas, or pancreatitis. This can cause abdominal pain, fever, and diabetes.    Immune system. Alcohol weakens your immune system in a number of ways. It suppresses your immune system making it harder to fight infections and colds. It also increases the chance of getting pneumonia and tuberculosis.    Cancer. Alcohol is a risk factor for developing cancer of the mouth, esophagus, pharynx, larynx, liver, and breast.    Sexual function. Alcohol can lead to sexual problems.  Home care  The following guidelines will help you deal with alcohol abuse:    Admit you have a problem with alcohol.    Ask for help from your health care provider and trusted family members or close friends.    Get help from people trained in dealing with alcohol abuse. This may be individual counseling or group therapy, or it may be a supervised alcohol treatment program.    Join a self-help group for alcohol abuse such as Alcoholics Anonymous (AA).    Avoid people who abuse alcohol or tempt you to drink.  Follow-up care  Follow up as advised by the doctor or our staff. Contact these groups to get help:    Alcoholics Anonymous (AA): Go to www.aa.org or check the phone book for meetings near you.    National Alcohol and Substance Abuse Information Center (NASAIC): 748.335.6713 www.addictioncareoptSavor.teextee    National [place] on Alcoholism and Drug Dependence (NCADD): 533-IAJ-YPIM (838-4921) www.ncadd.org    Al-Anon: 894-4ZM-RYYC (888-7743) www.al-anon.org  Call 911  Call 911 if any of these occur:    Trouble breathing or slow irregular breathing    Chest pain    Sudden weakness on one side of your body or sudden trouble speaking    Heavy bleeding or vomiting  blood    Very drowsy or trouble awakening    Fainting or loss of consciousness    Rapid heart rate    Seizure  When to seek medical care  Get prompt medical attention if you have:    Confusion    Hallucinations (seeing, hearing, or feeling things that aren t there)    Pain in your upper abdomen that gets worse    Repeated vomiting or black or tarry stools    Severe shakiness    3515-3940 [employer]. [address] [place] [address]. All rights reserved. This information is not intended as a substitute for professional medical care. Always follow your healthcare professional's instructions.                Marijuana Abuse  Marijuana is the most widely used illegal drug in the [place]. It is called by various names such as pot, weed, blunts, grass, reefer, ganja, hash, or hashish. It is usually smoked but can be mixed with foods or brewed as a tea. It is sometimes sold with PCP (panchito dust) or amphetamine mixed in it. These drugs can cause other harmful side effects.  Marijuana can cause the following effects:    Changes in mood (stimulated, happy, drowsy, depressed, paranoid)    Hallucinations    Increased heart rate and blood pressure    Increased appetite    Time distortion, difficulty concentrating, impaired memory    Lung damage (similar to cigarettes with chronic cough, wheezing, frequent colds, and bronchitis)    Decreased sperm count    Dizziness, vertigo  You can become psychologically dependent on marijuana. That means the craving to use the drug is emotional or psychological rather than due to physical withdrawal.  Is marijuana running your life?  Here are some of the signs:    Relying on marijuana to feel good, forget problems, deal with stress or to relax    Wanting to be alone most of the time or only with others who use drugs    Losing interest in things that used to be important    Changes in school or job performance or attendance    Spending a lot of time thinking about  how to get marijuana    Stealing or selling your things so you can buy marijuana    Unable to stop using even though you may want to quit    Increasing anxiety, anger, or depression    Sleeping too much, changes in eating habits (weight loss or gain)    Needing to use more to get the same effect  Home care  The following suggestions will help you manage marijuana abuse:    Once you have become addicted to any drug, quitting is hard to do. Most people find they can't quit without help. So, don t try to do this alone. Talk to someone you trust who can support you. Seek professional help.    Avoid people and places where drugs are used. That only increases the temptation to use.  Follow-up care  Follow up with your doctor or as advised by our staff.  For more information or a referral to a treatment center in your area, contact:    Your local mental health center or the National Alcohol and Substance Abuse Information Center (328)-018-7388  www.addictioncareKaye Groupions.com    National [place] on Alcoholism and Drug Dependence 800-475-HOPE  www.ncadd.org    Marijuana Anonymous 118-600-7295  www.marijuana-anonymous.org  When to seek medical care  Get prompt medical attention if any of the following occur:    You feel extreme depression, fear, anxiety, or anger toward yourself or others.    You feel out of control.    You feel that you may try to harm yourself or another.    You experience chest pain and/or shortness of breath.    1616-5238 [employer]. [address] [place] [address], [place] [address]. All rights reserved. This information is not intended as a substitute for professional medical care. Always follow your healthcare professional's instructions.

## 2017-04-22 ENCOUNTER — APPOINTMENT (OUTPATIENT)
Dept: CARDIOLOGY | Facility: CLINIC | Age: 37
End: 2017-04-22
Attending: INTERNAL MEDICINE
Payer: COMMERCIAL

## 2017-04-22 VITALS
OXYGEN SATURATION: 100 % | SYSTOLIC BLOOD PRESSURE: 132 MMHG | RESPIRATION RATE: 16 BRPM | TEMPERATURE: 98.1 F | BODY MASS INDEX: 28.76 KG/M2 | HEART RATE: 109 BPM | DIASTOLIC BLOOD PRESSURE: 93 MMHG | WEIGHT: 203.3 LBS

## 2017-04-22 LAB
AMPHETAMINES UR QL SCN: ABNORMAL
ANION GAP SERPL CALCULATED.3IONS-SCNC: 8 MMOL/L (ref 3–14)
BARBITURATES UR QL: ABNORMAL
BASOPHILS # BLD AUTO: 0 10E9/L (ref 0–0.2)
BASOPHILS NFR BLD AUTO: 0.3 %
BENZODIAZ UR QL: ABNORMAL
BUN SERPL-MCNC: 13 MG/DL (ref 7–30)
CALCIUM SERPL-MCNC: 8.8 MG/DL (ref 8.5–10.1)
CANNABINOIDS UR QL SCN: ABNORMAL
CHLORIDE SERPL-SCNC: 106 MMOL/L (ref 94–109)
CHOLEST SERPL-MCNC: 189 MG/DL
CO2 SERPL-SCNC: 25 MMOL/L (ref 20–32)
COCAINE UR QL: ABNORMAL
CREAT SERPL-MCNC: 1.14 MG/DL (ref 0.66–1.25)
CRP SERPL-MCNC: <2.9 MG/L (ref 0–8)
DIFFERENTIAL METHOD BLD: ABNORMAL
EOSINOPHIL # BLD AUTO: 0.1 10E9/L (ref 0–0.7)
EOSINOPHIL NFR BLD AUTO: 2.4 %
ERYTHROCYTE [DISTWIDTH] IN BLOOD BY AUTOMATED COUNT: 16 % (ref 10–15)
ETHANOL UR QL SCN: ABNORMAL
GFR SERPL CREATININE-BSD FRML MDRD: 72 ML/MIN/1.7M2
GLUCOSE SERPL-MCNC: 84 MG/DL (ref 70–99)
HCT VFR BLD AUTO: 41.7 % (ref 40–53)
HDLC SERPL-MCNC: 55 MG/DL
HGB BLD-MCNC: 13.8 G/DL (ref 13.3–17.7)
IMM GRANULOCYTES # BLD: 0 10E9/L (ref 0–0.4)
IMM GRANULOCYTES NFR BLD: 0.2 %
INTERPRETATION ECG - MUSE: NORMAL
LACTATE BLD-SCNC: 0.9 MMOL/L (ref 0.7–2.1)
LDLC SERPL CALC-MCNC: 110 MG/DL
LYMPHOCYTES # BLD AUTO: 2.2 10E9/L (ref 0.8–5.3)
LYMPHOCYTES NFR BLD AUTO: 36.4 %
MAGNESIUM SERPL-MCNC: 2.1 MG/DL (ref 1.6–2.3)
MCH RBC QN AUTO: 25.7 PG (ref 26.5–33)
MCHC RBC AUTO-ENTMCNC: 33.1 G/DL (ref 31.5–36.5)
MCV RBC AUTO: 78 FL (ref 78–100)
MONOCYTES # BLD AUTO: 0.7 10E9/L (ref 0–1.3)
MONOCYTES NFR BLD AUTO: 12.2 %
NEUTROPHILS # BLD AUTO: 2.9 10E9/L (ref 1.6–8.3)
NEUTROPHILS NFR BLD AUTO: 48.5 %
NONHDLC SERPL-MCNC: 134 MG/DL
NRBC # BLD AUTO: 0 10*3/UL
NRBC BLD AUTO-RTO: 0 /100
OPIATES UR QL SCN: ABNORMAL
PHOSPHATE SERPL-MCNC: 3.1 MG/DL (ref 2.5–4.5)
PLATELET # BLD AUTO: 214 10E9/L (ref 150–450)
POTASSIUM SERPL-SCNC: 3.9 MMOL/L (ref 3.4–5.3)
RBC # BLD AUTO: 5.36 10E12/L (ref 4.4–5.9)
SODIUM SERPL-SCNC: 140 MMOL/L (ref 133–144)
TRIGL SERPL-MCNC: 120 MG/DL
TROPONIN I SERPL-MCNC: NORMAL UG/L (ref 0–0.04)
TROPONIN I SERPL-MCNC: NORMAL UG/L (ref 0–0.04)
TSH SERPL DL<=0.005 MIU/L-ACNC: 0.81 MU/L (ref 0.4–4)
WBC # BLD AUTO: 5.9 10E9/L (ref 4–11)

## 2017-04-22 PROCEDURE — 84100 ASSAY OF PHOSPHORUS: CPT | Performed by: PHYSICIAN ASSISTANT

## 2017-04-22 PROCEDURE — 99217 ZZC OBSERVATION CARE DISCHARGE: CPT | Mod: Z6 | Performed by: NURSE PRACTITIONER

## 2017-04-22 PROCEDURE — 25500064 ZZH RX 255 OP 636: Performed by: INTERNAL MEDICINE

## 2017-04-22 PROCEDURE — 93018 CV STRESS TEST I&R ONLY: CPT | Performed by: INTERNAL MEDICINE

## 2017-04-22 PROCEDURE — 80320 DRUG SCREEN QUANTALCOHOLS: CPT | Performed by: PHYSICIAN ASSISTANT

## 2017-04-22 PROCEDURE — 80307 DRUG TEST PRSMV CHEM ANLYZR: CPT | Performed by: PHYSICIAN ASSISTANT

## 2017-04-22 PROCEDURE — 80048 BASIC METABOLIC PNL TOTAL CA: CPT | Performed by: PHYSICIAN ASSISTANT

## 2017-04-22 PROCEDURE — 83605 ASSAY OF LACTIC ACID: CPT | Performed by: PHYSICIAN ASSISTANT

## 2017-04-22 PROCEDURE — G0378 HOSPITAL OBSERVATION PER HR: HCPCS

## 2017-04-22 PROCEDURE — 83735 ASSAY OF MAGNESIUM: CPT | Performed by: PHYSICIAN ASSISTANT

## 2017-04-22 PROCEDURE — 96361 HYDRATE IV INFUSION ADD-ON: CPT

## 2017-04-22 PROCEDURE — 93321 DOPPLER ECHO F-UP/LMTD STD: CPT | Mod: 26 | Performed by: INTERNAL MEDICINE

## 2017-04-22 PROCEDURE — 85025 COMPLETE CBC W/AUTO DIFF WBC: CPT | Performed by: PHYSICIAN ASSISTANT

## 2017-04-22 PROCEDURE — 93350 STRESS TTE ONLY: CPT | Mod: 26 | Performed by: INTERNAL MEDICINE

## 2017-04-22 PROCEDURE — 93016 CV STRESS TEST SUPVJ ONLY: CPT | Performed by: INTERNAL MEDICINE

## 2017-04-22 PROCEDURE — 84484 ASSAY OF TROPONIN QUANT: CPT | Performed by: PHYSICIAN ASSISTANT

## 2017-04-22 PROCEDURE — 80061 LIPID PANEL: CPT | Performed by: PHYSICIAN ASSISTANT

## 2017-04-22 PROCEDURE — 25000128 H RX IP 250 OP 636: Performed by: INTERNAL MEDICINE

## 2017-04-22 PROCEDURE — 40000264 ECHO DOBUTAMINE STRESS TEST WITH DEFINITY

## 2017-04-22 PROCEDURE — 36415 COLL VENOUS BLD VENIPUNCTURE: CPT | Performed by: PHYSICIAN ASSISTANT

## 2017-04-22 PROCEDURE — 25000125 ZZHC RX 250: Performed by: INTERNAL MEDICINE

## 2017-04-22 PROCEDURE — 84443 ASSAY THYROID STIM HORMONE: CPT | Performed by: PHYSICIAN ASSISTANT

## 2017-04-22 PROCEDURE — 86140 C-REACTIVE PROTEIN: CPT | Performed by: PHYSICIAN ASSISTANT

## 2017-04-22 PROCEDURE — 93325 DOPPLER ECHO COLOR FLOW MAPG: CPT | Mod: 26 | Performed by: INTERNAL MEDICINE

## 2017-04-22 PROCEDURE — 25000128 H RX IP 250 OP 636: Performed by: PHYSICIAN ASSISTANT

## 2017-04-22 RX ORDER — DOBUTAMINE HYDROCHLORIDE 200 MG/100ML
5-50 INJECTION INTRAVENOUS CONTINUOUS
Status: DISCONTINUED | OUTPATIENT
Start: 2017-04-22 | End: 2017-04-22 | Stop reason: HOSPADM

## 2017-04-22 RX ORDER — METOPROLOL TARTRATE 1 MG/ML
5-15 INJECTION, SOLUTION INTRAVENOUS ONCE
Status: COMPLETED | OUTPATIENT
Start: 2017-04-22 | End: 2017-04-22

## 2017-04-22 RX ORDER — ACETAMINOPHEN 500 MG
1000 TABLET ORAL EVERY 6 HOURS PRN
Status: DISCONTINUED | OUTPATIENT
Start: 2017-04-22 | End: 2017-04-22 | Stop reason: HOSPADM

## 2017-04-22 RX ORDER — ACETAMINOPHEN 650 MG/1
650 SUPPOSITORY RECTAL EVERY 4 HOURS PRN
Status: DISCONTINUED | OUTPATIENT
Start: 2017-04-22 | End: 2017-04-22

## 2017-04-22 RX ORDER — NITROGLYCERIN 0.4 MG/1
0.4 TABLET SUBLINGUAL EVERY 5 MIN PRN
Status: DISCONTINUED | OUTPATIENT
Start: 2017-04-22 | End: 2017-04-22 | Stop reason: HOSPADM

## 2017-04-22 RX ORDER — LORAZEPAM 1 MG/1
1-4 TABLET ORAL EVERY 30 MIN PRN
Status: DISCONTINUED | OUTPATIENT
Start: 2017-04-22 | End: 2017-04-22 | Stop reason: HOSPADM

## 2017-04-22 RX ORDER — NALOXONE HYDROCHLORIDE 0.4 MG/ML
.1-.4 INJECTION, SOLUTION INTRAMUSCULAR; INTRAVENOUS; SUBCUTANEOUS
Status: DISCONTINUED | OUTPATIENT
Start: 2017-04-22 | End: 2017-04-22 | Stop reason: HOSPADM

## 2017-04-22 RX ORDER — ASPIRIN 81 MG/1
81 TABLET ORAL DAILY
Status: DISCONTINUED | OUTPATIENT
Start: 2017-04-23 | End: 2017-04-22 | Stop reason: HOSPADM

## 2017-04-22 RX ORDER — ACETAMINOPHEN 325 MG/1
650 TABLET ORAL EVERY 4 HOURS PRN
Status: DISCONTINUED | OUTPATIENT
Start: 2017-04-22 | End: 2017-04-22

## 2017-04-22 RX ORDER — ALUMINA, MAGNESIA, AND SIMETHICONE 2400; 2400; 240 MG/30ML; MG/30ML; MG/30ML
15-30 SUSPENSION ORAL EVERY 4 HOURS PRN
Status: DISCONTINUED | OUTPATIENT
Start: 2017-04-22 | End: 2017-04-22 | Stop reason: HOSPADM

## 2017-04-22 RX ORDER — SODIUM CHLORIDE 9 MG/ML
INJECTION, SOLUTION INTRAVENOUS CONTINUOUS
Status: DISCONTINUED | OUTPATIENT
Start: 2017-04-22 | End: 2017-04-22 | Stop reason: HOSPADM

## 2017-04-22 RX ORDER — LIDOCAINE 40 MG/G
CREAM TOPICAL
Status: DISCONTINUED | OUTPATIENT
Start: 2017-04-22 | End: 2017-04-22 | Stop reason: HOSPADM

## 2017-04-22 RX ADMIN — PERFLUTREN 7 ML: 6.52 INJECTION, SUSPENSION INTRAVENOUS at 10:26

## 2017-04-22 RX ADMIN — METOPROLOL TARTRATE 4 MG: 5 INJECTION INTRAVENOUS at 10:25

## 2017-04-22 RX ADMIN — SODIUM CHLORIDE: 9 INJECTION, SOLUTION INTRAVENOUS at 06:39

## 2017-04-22 RX ADMIN — DOBUTAMINE HYDROCHLORIDE 30 MCG/KG/MIN: 200 INJECTION INTRAVENOUS at 10:16

## 2017-04-22 ASSESSMENT — ENCOUNTER SYMPTOMS
ACTIVITY IMPAIRMENT: NORMAL
NO PATIENT REPORTED PAIN: 1
DIETARY ISSUES: NPO

## 2017-04-22 NOTE — PLAN OF CARE
Problem: Goal Outcome Summary  Goal: Goal Outcome Summary  OT/6D:  Defer - Smoking cessation orders received and acknowledged.  Given pt's observation status, will defer smoking cessation evaluation for utilization purposes as this service will have a limited impact on pt's discharge plan.

## 2017-04-22 NOTE — PLAN OF CARE
Problem: Goal Outcome Summary  Goal: Goal Outcome Summary  Outpatient/Observation goals to be met before discharge home:     PRIMARY DIAGNOSIS: ACS r/o  OUTPATIENT/OBSERVATION GOALS TO BE MET BEFORE DISCHARGE:  Serial troponins and stress test complete: NO-both trops neg; stress text to be completed in AM.  Seen and cleared by consultant if applicable: NO-smoking cess consult to be competed in AM.  Adequate pain control on oral analgesia: YES-pt has not asked for any PRN pain medication; will continue to monitor.  Vital signs normal or at patient baseline: YES-vitals WNL of patients baseline.  Safe disposition plan has been identified: YES-safe to return home after tests complete.  *Nurse to notify MD when observation goals have been met and patient is ready for discharge.

## 2017-04-22 NOTE — ED NOTES
.  ED to Floor Handoff      S:  Tonio Alarcon is a 36 year old male who speaks English and lives with family members,  in a home  They arrived in the ED by car from home with a complaint of Chest Pain    Initial vitals were:   BP: 151/83  Pulse: 109  Heart Rate: 112  Temp: 99.2  F (37.3  C)  Resp: 16  Weight: 92.2 kg (203 lb 4.8 oz)  SpO2: 98 %  Allergies: No Known Allergies.  The meds given in the ED and their home medications are:   No current facility-administered medications for this encounter.      Current Outpatient Prescriptions   Medication     vitamin D (ERGOCALCIFEROL) 97664 UNIT capsule     diclofenac (VOLTAREN) 75 MG EC tablet     Social demographics are   Social History     Social History     Marital status:      Spouse name: N/A     Number of children: 6     Years of education: N/A     Occupational History     unemployed      Social History Main Topics     Smoking status: Current Every Day Smoker     Packs/day: 1.00     Smokeless tobacco: Never Used     Alcohol use Yes      Comment: variable use on weekends     Drug use: Yes     Special: Marijuana      Comment: daily     Sexual activity: Yes     Partners: Female     Birth control/ protection: Pill     Other Topics Concern     Parent/Sibling W/ Cabg, Mi Or Angioplasty Before 65f 55m? No     Social History Narrative    Live at home with spouse and 6 kids.       B:   The patient has been ill for 4 month(s) and during this time the symptoms have increased.  In the ED was diagnosed with   Final diagnoses:   None    Infection/sepsis suspected:No Isolation type; No active isolations   A:   In the ED these meds were given:   Medications   0.9% sodium chloride BOLUS (1,000 mLs Intravenous New Bag 4/21/17 2007)   iopamidol (ISOVUE-370) solution 100 mL (64 mLs Intravenous Given 4/21/17 2057)   sodium chloride 0.9 % for CT scan flush dose 64 mL (84 mLs As instructed Given 4/21/17 2058)   lactated ringers BOLUS 1,000 mL (0 mLs Intravenous Stopped  4/21/17 2200)     Drips running?  No  Labs results   Labs Ordered and Resulted from Time of ED Arrival Up to the Time of Departure from the ED   CBC WITH PLATELETS DIFFERENTIAL - Abnormal; Notable for the following:        Result Value    RBC Count 6.14 (*)     MCV 76 (*)     MCH 26.1 (*)     RDW 15.6 (*)     All other components within normal limits   LACTIC ACID WHOLE BLOOD - Abnormal; Notable for the following:     Lactic Acid 3.0 (*)     All other components within normal limits   ISTAT  GASES LACTATE TIGRE POCT - Abnormal; Notable for the following:     PCO2 Venous 39 (*)     PO2 Venous 23 (*)     All other components within normal limits   COMPREHENSIVE METABOLIC PANEL   TROPONIN I   NT PROBNP INPATIENT   D DIMER QUANTITATIVE   LIPASE   CREATININE POCT   ISTAT TROPONIN NURSING POCT   ISTAT CREATININE NURSING POCT   ISTAT CG4 GASES LACTATE TIGRE NURSING POCT   TROPONIN POCT   ISTAT gases   2225 - Lactic acid 0.8 at   Imaging Studies:   Recent Results (from the past 24 hour(s))   Chest CT, IV contrast only - PE protocol    Narrative    CT CHEST AND PULMONARY EMBOLISM ANGIOGRAM  4/21/2017 9:11 PM     HISTORY: Pain shortness of breath.      COMPARISON: None.    TECHNIQUE: Volumetric helical acquisition of CT images of the chest  from the lung apices to the kidneys were acquired after the  administration of 64mL, Isovue-370 IV contrast. Radiation dose for  this scan was reduced using automated exposure control, adjustment of  the mA and/or kV according to patient size, or iterative  reconstruction technique.    FINDINGS: There is no pulmonary embolism. Lungs are clear of acute  infiltrates. The heart is not enlarged. Thoracic aorta is unremarkable  without evidence of dissection or aneurysm. There is no pleural or  pericardial effusion.  There is no pneumothorax. Adrenal glands are  normal. Severe fatty infiltration of the liver seen in the upper  abdomen. Remainder of the visualized upper abdomen is unremarkable.       Impression    IMPRESSION:   1. No pulmonary embolism demonstrated.  2. No thoracic aortic dissection or aneurysm.   3. Severe fatty change of the liver.    BHUPENDRA SINGH MD     Recent vital signs BP (!) 150/93  Pulse 109  Temp 99.2  F (37.3  C) (Oral)  Resp 24  Wt 92.2 kg (203 lb 4.8 oz)  SpO2 100%  BMI 28.76 kg/m2  Cardiac Rhythm: ,      Abnormal labs/tests/findings requiring intervention:---  Pain control: good  Nausea control: good  R:   Transfer assistance needed: Independent  Family present during ED course? Yes   Family currently present? Yes  Pt needs tele? Yes  Code Status: Full Code  Tasks needing to be completed:---  Tomorrow - plan for cardiac stress echo    Hannah Meyers/ Lesia WISE   ascom-- [phone] [place] [facility]  [phone] [facility] ED

## 2017-04-22 NOTE — ED PROVIDER NOTES
History     Chief Complaint   Patient presents with     Chest Pain     HPI  Tonio Alarcon is a 36 year old otherwise healthy male who presents for evaluation of chest pain and shortness of breath. Patient complains of progressively worsening chest pain and shortness of breath over the past four months. He described his chest pain as sharp, shooting, and heavy pain radiating to his mid-back. He has had an associated cough. He states his pain and difficulty breathing have been worsening to the point where he began shaking today. He denies abdominal pain. He reports he was scheduled to be seen and evaluated by his primary care provider for these symptoms yesterday including getting a CT scan, however, he missed this appointment. Patient is a smoker, smokes one pack per day. He denies a history of diabetes or hypertension. No leg pain or swelling. No recent travel.     I have reviewed the Medications, Allergies, Past Medical and Surgical History, and Social History in the [employer] system.  History reviewed. No pertinent past medical history.    Past Surgical History:   Procedure Laterality Date     HEMORRHOID SURGERY  2015       Family History   Problem Relation Age of Onset     DIABETES Mother      Breast Cancer Mother      Dx at 61 years aol     CANCER Father      Stomach ca - Dx at 57 years old and passed away from this     Cancer - colorectal Maternal Grandmother      DIABETES Brother        Social History   Substance Use Topics     Smoking status: Current Every Day Smoker     Packs/day: 1.00     Smokeless tobacco: Never Used     Alcohol use Yes      Comment: variable use on weekends     No current facility-administered medications for this encounter.      Current Outpatient Prescriptions   Medication     vitamin D (ERGOCALCIFEROL) 32319 UNIT capsule     diclofenac (VOLTAREN) 75 MG EC tablet      No Known Allergies    Review of Systems   Constitutional: Negative for fever.   HENT: Negative for congestion.     Eyes: Negative for redness.   Respiratory: Positive for cough and shortness of breath.    Cardiovascular: Positive for chest pain (sharp, shooting, heavy). Negative for leg swelling.   Gastrointestinal: Negative for abdominal pain.   Genitourinary: Negative for difficulty urinating.   Musculoskeletal: Positive for back pain (middle). Negative for arthralgias and neck stiffness.   Skin: Negative for color change.   Neurological: Negative for headaches.   Psychiatric/Behavioral: Negative for confusion.   All other systems reviewed and are negative.      Physical Exam   BP: 151/83  Pulse: 109  Temp: 99.2  F (37.3  C)  Resp: 16  Weight: 92.2 kg (203 lb 4.8 oz)  SpO2: 98 %  Physical Exam    ED Course     ED Course     Procedures   7:24 PM  The patient was seen and examined by Dr. Raymundo in Room 19.          EKG Interpretation:      Interpreted by Gregorio Raymundo  Time reviewed: 1922  Symptoms at time of EKG: pleuritic chest pain   Rhythm: normal sinus   Rate: tachy 110  Axis: rightward  Ectopy: none  Conduction: normal  ST Segments/ T Waves: No ST-T wave changes  Q Waves: none  Comparison to prior: Unchanged    Clinical Impression: sinus tach          Critical Care time:  none     Lactate is greater than 2 due to likely dehydration, at this time there is no sign of sepsis.         Labs/Imaging:    Results for orders placed or performed during the hospital encounter of 04/21/17 (from the past 24 hour(s))   CBC with platelets differential   Result Value Ref Range    WBC 6.9 4.0 - 11.0 10e9/L    RBC Count 6.14 (H) 4.4 - 5.9 10e12/L    Hemoglobin 16.0 13.3 - 17.7 g/dL    Hematocrit 46.4 40.0 - 53.0 %    MCV 76 (L) 78 - 100 fl    MCH 26.1 (L) 26.5 - 33.0 pg    MCHC 34.5 31.5 - 36.5 g/dL    RDW 15.6 (H) 10.0 - 15.0 %    Platelet Count 248 150 - 450 10e9/L    Diff Method Automated Method     % Neutrophils 50.0 %    % Lymphocytes 38.1 %    % Monocytes 10.8 %    % Eosinophils 0.7 %    % Basophils 0.3 %    %  Immature Granulocytes 0.1 %    Nucleated RBCs 0 0 /100    Absolute Neutrophil 3.4 1.6 - 8.3 10e9/L    Absolute Lymphocytes 2.6 0.8 - 5.3 10e9/L    Absolute Monocytes 0.7 0.0 - 1.3 10e9/L    Absolute Eosinophils 0.1 0.0 - 0.7 10e9/L    Absolute Basophils 0.0 0.0 - 0.2 10e9/L    Abs Immature Granulocytes 0.0 0 - 0.4 10e9/L    Absolute Nucleated RBC 0.0    Comprehensive metabolic panel   Result Value Ref Range    Sodium 143 133 - 144 mmol/L    Potassium 3.6 3.4 - 5.3 mmol/L    Chloride 105 94 - 109 mmol/L    Carbon Dioxide 27 20 - 32 mmol/L    Anion Gap 11 3 - 14 mmol/L    Glucose 82 70 - 99 mg/dL    Urea Nitrogen 11 7 - 30 mg/dL    Creatinine 1.09 0.66 - 1.25 mg/dL    GFR Estimate 76 >60 mL/min/1.7m2    GFR Estimate If Black >90   GFR Calc   >60 mL/min/1.7m2    Calcium 9.1 8.5 - 10.1 mg/dL    Bilirubin Total 0.7 0.2 - 1.3 mg/dL    Albumin 4.4 3.4 - 5.0 g/dL    Protein Total 8.1 6.8 - 8.8 g/dL    Alkaline Phosphatase 77 40 - 150 U/L    ALT 52 0 - 70 U/L    AST 43 0 - 45 U/L   Troponin I   Result Value Ref Range    Troponin I ES  0.000 - 0.045 ug/L     <0.015  The 99th percentile for upper reference range is 0.045 ug/L.  Troponin values in   the range of 0.045 - 0.120 ug/L may be associated with risks of adverse   clinical events.     BNP   Result Value Ref Range    N-Terminal Pro BNP Inpatient 10 0 - 450 pg/mL   D dimer quantitative   Result Value Ref Range    D Dimer 0.4 0.0 - 0.50 ug/ml FEU   Lactic acid   Result Value Ref Range    Lactic Acid 3.0 (H) 0.7 - 2.1 mmol/L   Lipase   Result Value Ref Range    Lipase 136 73 - 393 U/L   Creatinine POCT   Result Value Ref Range    Creatinine 1.2 0.66 - 1.25 mg/dL    GFR Estimate 68 >60 mL/min/1.7m2    GFR Estimate If Black 82 >60 mL/min/1.7m2   Troponin POCT   Result Value Ref Range    Troponin I 0.00 0.00 - 0.10 ug/L   Chest CT, IV contrast only - PE protocol    Narrative    CT CHEST AND PULMONARY EMBOLISM ANGIOGRAM  4/21/2017 9:11 PM     HISTORY: Pain  shortness of breath.      COMPARISON: None.    TECHNIQUE: Volumetric helical acquisition of CT images of the chest  from the lung apices to the kidneys were acquired after the  administration of 64mL, Isovue-370 IV contrast. Radiation dose for  this scan was reduced using automated exposure control, adjustment of  the mA and/or kV according to patient size, or iterative  reconstruction technique.    FINDINGS: There is no pulmonary embolism. Lungs are clear of acute  infiltrates. The heart is not enlarged. Thoracic aorta is unremarkable  without evidence of dissection or aneurysm. There is no pleural or  pericardial effusion.  There is no pneumothorax. Adrenal glands are  normal. Severe fatty infiltration of the liver seen in the upper  abdomen. Remainder of the visualized upper abdomen is unremarkable.      Impression    IMPRESSION:   1. No pulmonary embolism demonstrated.  2. No thoracic aortic dissection or aneurysm.   3. Severe fatty change of the liver.    BHUPENDRA SINGH MD   ISTAT gases lactate yasir POCT   Result Value Ref Range    Ph Venous 7.41 7.32 - 7.43 pH    PCO2 Venous 39 (L) 40 - 50 mm Hg    PO2 Venous 23 (L) 25 - 47 mm Hg    Bicarbonate Venous 25 21 - 28 mmol/L    O2 Sat Venous 41 %    Lactic Acid 0.8 0.7 - 2.1 mmol/L           Assessments & Plan (with Medical Decision Making)         I have reviewed the nursing notes.    I have reviewed the findings, diagnosis, plan and need for follow up with the patient.  Patient presents with 4 week history of chest pain worse with deep inspiration likely pleuritic in nature at this time is chest pain-free he did initially present with a lactic of 3 although on further history I believe that he is likely dehydrated and has not been drinking fluids for the last day and a half.  Patient's evaluation here including a chest CT which did not reveal any PE or infiltrate was negative.  EKG was negative for any acute ST-T wave changes and initial troponin was also negative  patient essentially pain-free at the moment however I am concerned that this pain does seem to increase with any sort of injury or exertion.  Patient does have significant risk factor with long-term smoking.  I have discussed the case with the admitting staff on the ED observation unit and patient will be transferred to  for ED observation for chest pain observation with likely stress echo in the morning.    New Prescriptions    No medications on file       Final diagnoses:   Chest pain, pleuritic   IEstrellita, am serving as a trained medical scribe to document services personally performed by Gregorio Raymundo MD, based on the provider's statements to me.   IGregorio MD, was physically present and have reviewed and verified the accuracy of this note documented by Estrellita Gamble.      4/21/2017   [facility], [facility], EMERGENCY DEPARTMENT     Gregorio Raymundo MD  04/21/17 [id number]

## 2017-04-22 NOTE — PROGRESS NOTES
Tonio Alarcon is a 36 year old male patient.  1. Chest pain, pleuritic      History reviewed. No pertinent past medical history.  No current outpatient prescriptions on file.     No Known Allergies  Active Problems:    * No active hospital problems. *    Blood pressure 122/74, pulse 109, temperature 98.8  F (37.1  C), temperature source Oral, resp. rate 16, weight 92.2 kg (203 lb 4.8 oz), SpO2 100 %.    Subjective:  Symptoms:  Resolved.  He reports chest pain.    Diet:  NPO.    Activity level: Normal.    Pain:  He reports no pain.      Objective  Assessment:    Condition: In stable condition.  Improving.   (36 yom with tobacco and marijuana abuse, ETOH abuse presents with CP, mid Back pain. EKG and trop, lipase neg. Just left for stress test. Not able to examine.).     Plan:   (Awaiting stress test. If neg, plan to DC to home, poss GI etiology.).       Alana Thomas MD  4/22/2017    The pt was not able to be seen and examined by myself since he left for stress test. The case was reviewed and the plan was discussed with the [facility].

## 2017-04-22 NOTE — ED NOTES
Pt states he has been having chest pain for about 4 months progressively worse.  He was supposed to have a chest CT yesterday but missed his appointment.

## 2017-04-22 NOTE — H&P
[facility] ED Observation Admission Note    Chief Complaint   Patient presents with     Chest Pain       Assessment/Plan:  1. Chest Pain: progressively worsening chest pain and SOB x 4 months. Chest pain described as intermittent, sharp, shooting from the back to the left chest with radiation to left neck. Associated SOB. No associated diaphoresis, paresthesias, heartburn/reflux, abdominal pain, bloatedness, constipation, LE edema, recent travel. Admits to poor oral hydration. Admits to marijuana usage daily ~ 2gm. No other illicit drug use. Drinks alcohol about twice a week and when he does he drinks a lot; cannot quantify but his wife states he may drink all afternoon into the night. In ED, -115, -151/80-95, RR 15-24, SaO2 100% on RA, Temp 99.2. Labs show normal CMP with BUN/Creat 11/0.9 otherwise normal. Lactic acid 3 with repeat 0.8 after IV hydration. CRP normal. Normal CBC. Normal BNP. D-dimer 0.4. Troponin I negative x 1. EKG sinus tach 110. CT chest negative for PE, aortic dissection or aneurysm; severe fatty change of the liver. In the ED the patient was given 1L LR. On exam has chest wall tenderness. Lipid panel in 2015 was normal. Risk Factors include tobacco use, family history of CAD. DDx: MSK, ACS  - Serial troponins q4h x 2 more  - Continuous telemetry  - Exercise Stress Test in the morning  - Nitro PRN  - ASA 81mg daily  - Clear liquid diet  - Lipid panel, TSH in AM  - Send UDS  - Voltaren as prescribed by PCP on 4/17/17 for chest wall pain    2. Lactic Acidosis: Lactic acid 3 initially on presentation and after 1L LR was down to 0.8. BUN/Creat 11/1.09 so not significant for dehydration. Patient though reports poor oral hydration in context of increased alcohol intake.  - Continue IVF with NS at 125ml/hr  - Repeat Lactic acid in AM    3. Tobacco Dependence: Interested in help quitting  - Smoking cessation consult    4. Alcohol Dependence: - Encourage cessation  - [facility] protocol    5. Vitamin D  Deficiency: Found to have a Vit D level of 5 on 4/17/17 with PCP follow up. Prescription for Vit D given but not picked up.  - Encourage patient to start taking Vit D immediately       HPI:    Tonio Alarcon is a 36 year old male with a history of herniated lumbar disc, tobacco dependence, alcohol dependence who presented to the ED with ongoing chest pain and shortness of breath. He reports progressively worsening chest pain and shortness of breath over the last 4 months however was worse today to the point that he was shaking. Chest pain is described as intermittent, sharp, shooting from the back to the left chest with radiation to left neck. Associated SOB. No associated diaphoresis, paresthesias, heartburn/reflux, abdominal pain, bloatedness, constipation, LE edema, recent travel. He states that while in the ED he had 2 episodes of isolated jaw pain. Reports a lot of stress recently. He admits that he has not been keeping hydrated. He was seen by his PCP 2 days ago and was scheduled to get a CT scan, however, he missed this appointment. He has been a is a smoker, smokes one pack per day. He denies a history of diabetes or hypertension. He admits to marijuana usage daily ~ 2gm. No other illicit drug use. Drinks alcohol about twice a week and when he does he drinks a lot; cannot quantify but his wife states he may drink all afternoon into the night. He denies any history of withdrawals.    In the ED, -115, -151/80-95, RR 15-24, SaO2 100% on RA, Temp 99.2. Labs show normal CMP with BUN/Creat 11/0.9 otherwise normal. Lactic acid 3 with repeat 0.8 after IV hydration. CRP and ESR normal. Normal CBC. Normal BNP. D-dimer 0.4. Troponin I negative x 1. EKG sinus tach 110. CT chest negative for PE, aortic dissection or aneurysm; severe fatty change of the liver. In the ED the patient was given 1L LR. He is being admitted to the Observation Unit for ACS r/o.    On admission to the observation unit the  patient was stable with improved SOB.     History:    History reviewed. No pertinent past medical history.    Past Surgical History:   Procedure Laterality Date     HEMORRHOID SURGERY  2015       Family History   Problem Relation Age of Onset     DIABETES Mother      Breast Cancer Mother      Dx at 61 years aol     CANCER Father      Stomach ca - Dx at 57 years old and passed away from this     Cancer - colorectal Maternal Grandmother      DIABETES Brother        Social History     Social History     Marital status:      Spouse name: N/A     Number of children: 6     Years of education: N/A     Occupational History     unemployed      Social History Main Topics     Smoking status: Current Every Day Smoker     Packs/day: 1.00     Smokeless tobacco: Never Used     Alcohol use Yes      Comment: variable use on weekends     Drug use: Yes     Special: Marijuana      Comment: daily     Sexual activity: Yes     Partners: Female     Birth control/ protection: Pill     Other Topics Concern     Parent/Sibling W/ Cabg, Mi Or Angioplasty Before 65f 55m? No     Social History Narrative    Live at home with spouse and 6 kids.         No current facility-administered medications on file prior to encounter.   Current Outpatient Prescriptions on File Prior to Encounter:  vitamin D (ERGOCALCIFEROL) 97806 UNIT capsule Take 1 capsule (50,000 Units) by mouth every 7 days for 8 doses   diclofenac (VOLTAREN) 75 MG EC tablet Take 1 tablet (75 mg) by mouth 2 times daily as needed for moderate pain       Data:    Results for orders placed or performed during the hospital encounter of 04/21/17   Chest CT, IV contrast only - PE protocol    Narrative    CT CHEST AND PULMONARY EMBOLISM ANGIOGRAM  4/21/2017 9:11 PM     HISTORY: Pain shortness of breath.      COMPARISON: None.    TECHNIQUE: Volumetric helical acquisition of CT images of the chest  from the lung apices to the kidneys were acquired after the  administration of 64mL,  Isovue-370 IV contrast. Radiation dose for  this scan was reduced using automated exposure control, adjustment of  the mA and/or kV according to patient size, or iterative  reconstruction technique.    FINDINGS: There is no pulmonary embolism. Lungs are clear of acute  infiltrates. The heart is not enlarged. Thoracic aorta is unremarkable  without evidence of dissection or aneurysm. There is no pleural or  pericardial effusion.  There is no pneumothorax. Adrenal glands are  normal. Severe fatty infiltration of the liver seen in the upper  abdomen. Remainder of the visualized upper abdomen is unremarkable.      Impression    IMPRESSION:   1. No pulmonary embolism demonstrated.  2. No thoracic aortic dissection or aneurysm.   3. Severe fatty change of the liver.    BHUPENDRA SINGH MD   CBC with platelets differential   Result Value Ref Range    WBC 6.9 4.0 - 11.0 10e9/L    RBC Count 6.14 (H) 4.4 - 5.9 10e12/L    Hemoglobin 16.0 13.3 - 17.7 g/dL    Hematocrit 46.4 40.0 - 53.0 %    MCV 76 (L) 78 - 100 fl    MCH 26.1 (L) 26.5 - 33.0 pg    MCHC 34.5 31.5 - 36.5 g/dL    RDW 15.6 (H) 10.0 - 15.0 %    Platelet Count 248 150 - 450 10e9/L    Diff Method Automated Method     % Neutrophils 50.0 %    % Lymphocytes 38.1 %    % Monocytes 10.8 %    % Eosinophils 0.7 %    % Basophils 0.3 %    % Immature Granulocytes 0.1 %    Nucleated RBCs 0 0 /100    Absolute Neutrophil 3.4 1.6 - 8.3 10e9/L    Absolute Lymphocytes 2.6 0.8 - 5.3 10e9/L    Absolute Monocytes 0.7 0.0 - 1.3 10e9/L    Absolute Eosinophils 0.1 0.0 - 0.7 10e9/L    Absolute Basophils 0.0 0.0 - 0.2 10e9/L    Abs Immature Granulocytes 0.0 0 - 0.4 10e9/L    Absolute Nucleated RBC 0.0    Comprehensive metabolic panel   Result Value Ref Range    Sodium 143 133 - 144 mmol/L    Potassium 3.6 3.4 - 5.3 mmol/L    Chloride 105 94 - 109 mmol/L    Carbon Dioxide 27 20 - 32 mmol/L    Anion Gap 11 3 - 14 mmol/L    Glucose 82 70 - 99 mg/dL    Urea Nitrogen 11 7 - 30 mg/dL    Creatinine 1.09  0.66 - 1.25 mg/dL    GFR Estimate 76 >60 mL/min/1.7m2    GFR Estimate If Black >90   GFR Calc   >60 mL/min/1.7m2    Calcium 9.1 8.5 - 10.1 mg/dL    Bilirubin Total 0.7 0.2 - 1.3 mg/dL    Albumin 4.4 3.4 - 5.0 g/dL    Protein Total 8.1 6.8 - 8.8 g/dL    Alkaline Phosphatase 77 40 - 150 U/L    ALT 52 0 - 70 U/L    AST 43 0 - 45 U/L   Troponin I   Result Value Ref Range    Troponin I ES  0.000 - 0.045 ug/L     <0.015  The 99th percentile for upper reference range is 0.045 ug/L.  Troponin values in   the range of 0.045 - 0.120 ug/L may be associated with risks of adverse   clinical events.     BNP   Result Value Ref Range    N-Terminal Pro BNP Inpatient 10 0 - 450 pg/mL   D dimer quantitative   Result Value Ref Range    D Dimer 0.4 0.0 - 0.50 ug/ml FEU   Lactic acid   Result Value Ref Range    Lactic Acid 3.0 (H) 0.7 - 2.1 mmol/L   Lipase   Result Value Ref Range    Lipase 136 73 - 393 U/L   Creatinine POCT   Result Value Ref Range    Creatinine 1.2 0.66 - 1.25 mg/dL    GFR Estimate 68 >60 mL/min/1.7m2    GFR Estimate If Black 82 >60 mL/min/1.7m2   Troponin POCT   Result Value Ref Range    Troponin I 0.00 0.00 - 0.10 ug/L   ISTAT gases lactate yasir POCT   Result Value Ref Range    Ph Venous 7.41 7.32 - 7.43 pH    PCO2 Venous 39 (L) 40 - 50 mm Hg    PO2 Venous 23 (L) 25 - 47 mm Hg    Bicarbonate Venous 25 21 - 28 mmol/L    O2 Sat Venous 41 %    Lactic Acid 0.8 0.7 - 2.1 mmol/L             EKG Interpretation:      EKG Number: 1  Interpreted by Kika Lamas PA-C   Symptoms at time of EKG: chest pain   Rhythm: Sinus tachycardia  Rate: Tachycardia  Axis: Normal  Ectopy: None  Conduction: Normal  ST Segments/ T Waves: No ST-T wave changes and No acute ischemic changes  Q Waves: None  Comparison to prior: Unchanged    Clinical Impression: no acute changes    ROS:    Review Of Systems  Skin: negative  Eyes: negative  Ears/Nose/Throat: negative  Respiratory: Shortness of breath- x 4 months, Cough- non  productive and No hemoptysis  Cardiovascular: positive for palpitations and chest pain, negative for, paroxysmal nocturnal dyspnea, orthopnea, lower extremity edema and syncope or near-syncope  Gastrointestinal: negative  Genitourinary: negative  Musculoskeletal: negative  Neurologic: negative  Psychiatric: negative  Hematologic/Lymphatic/Immunologic: negative for  Endocrine: negative  PCP: Nghia  CARDIAC RISK: tobacco use, family history of CAD    10 point ROS negative other than the symptoms noted above.      Exam:  Vitals:  B/P: 148/94, T: 99.2, P: 109, R: 21    Constitutional: healthy, alert and no distress  Head: Normocephalic. No masses, lesions, tenderness or abnormalities  Neck: Neck supple. No adenopathy. Thyroid symmetric, normal size,, Carotids without bruits.  ENT: ENT exam normal, no neck nodes or sinus tenderness  Cardiovascular: negative, PMI normal. No lifts, heaves, or thrills. RRR. No murmurs, clicks gallops or rub. Left chest wall tenderness  Respiratory: negative, Percussion normal. Good diaphragmatic excursion. Lungs clear  Gastrointestinal: Abdomen soft, non-tender. BS normal. No masses, organomegaly  : Deferred  Musculoskeletal: extremities normal- no gross deformities noted, gait normal and normal muscle tone  Skin: no suspicious lesions or rashes  Neurologic: Gait normal. Reflexes normal and symmetric. Sensation grossly WNL.  Psychiatric: mentation appears normal and affect normal/bright  Hematologic/Lymphatic/Immunologic: normal ant/post cervical, axillary, supraclavicular and inguinal nodes    Consults: none  FEN: clear liquid diet  DVT prophylaxis: encourage ambulation; expect short stay  GI prophylaxis: protonix  BM prophylaxis: none  Code Status: full code  Disposition: home once ACS w/u negative, stable labs and vitals    Signed:  Kika Lamas PA-C   April 21, 2017 at 11:29 PM

## 2017-04-22 NOTE — DISCHARGE SUMMARY
"ED Observation Discharge Summary    Tonio Alarcon   MRN# [id number]  Age: 36 year old   YOB: 1980            Date of Admission:  4/21/2017    Date of Discharge:  4/22/2017  Admitting Physician:  Rudy Bhat MD  Discharge Physician: Dr. William MD  NP/PA: Madhuri Estrella CNP        DISCHARGE DIAGNOSIS:   1. Atypical Chest Pain; resolved.       INTERVAL HISTORY: VSS, afebrile. Denies anterior chest pain. Reports some lower back pain. No upper back pain. Denies SOB. However, does have some chronic SOB. Denies fevers, chills, headaches, dizziness, cough, wheezing, chest pain/pressure, abdominal pain, N/V, constipation, melena/hematochezia, diarrhea, extremity swelling/weakness/numbness/tingling.           PHYSICAL EXAM:   Vital signs:  Temp: 98.8  F (37.1  C) Temp src: Oral BP: 122/74 Pulse: 109 Heart Rate: 82 Resp: 16 SpO2: 100 % O2 Device: None (Room air) Oxygen Delivery: 2 LPM   Weight: 92.2 kg (203 lb 4.8 oz)  Estimated body mass index is 28.76 kg/(m^2) as calculated from the following:    Height as of 4/17/17: 1.791 m (5' 10.5\").    Weight as of this encounter: 92.2 kg (203 lb 4.8 oz).    GENERAL APPEARENCE:  A/O x4. NAD.  SKIN: Clean, dry, and intact   HEENT/NECK: NCAT w/out masses, lesions, or abnormalities. Sclera anicteric, PERRLA, EOMI.  Oral mucosa pink and moist without erythema, exudate, lesions, ulcerations, or thrush. Teeth and gums normal. Neck supple, no masses. No jugular venous distention.   CARDIOVASCULAR: S1, S2 RRR. No murmurs, rubs, or gallops.   RESPIRATORY: Respiratory effort WNL. CTA  bilaterally without crackles/rales/wheeze   GI: Active BS in all 4 quadrants. Abdomen soft and non-tender. No masses or hepatosplenomegaly.  : Deferred  MUSCULOSKELETAL: Extremities normal, no gross deformities noted, non-tender to palpation.   PV: 2+ bilateral radial and pedal pulses. No edema noted.   NEURO: CN II-XII grossly intact. Speech normal. Appropriate throughout " interview.   HEME/LYMPH: No visible bleeding.   PSYCHIATRIC: Mentation and affect appear normal  VASCULAR ACCESS: CDI without erythema or discharge. Non-tender.    PROCEDURES AND IMAGING:   Results for orders placed or performed during the hospital encounter of 04/21/17 (from the past 24 hour(s))   EKG 12-lead, tracing only   Result Value Ref Range    Interpretation ECG Click View Image link to view waveform and result    CBC with platelets differential   Result Value Ref Range    WBC 6.9 4.0 - 11.0 10e9/L    RBC Count 6.14 (H) 4.4 - 5.9 10e12/L    Hemoglobin 16.0 13.3 - 17.7 g/dL    Hematocrit 46.4 40.0 - 53.0 %    MCV 76 (L) 78 - 100 fl    MCH 26.1 (L) 26.5 - 33.0 pg    MCHC 34.5 31.5 - 36.5 g/dL    RDW 15.6 (H) 10.0 - 15.0 %    Platelet Count 248 150 - 450 10e9/L    Diff Method Automated Method     % Neutrophils 50.0 %    % Lymphocytes 38.1 %    % Monocytes 10.8 %    % Eosinophils 0.7 %    % Basophils 0.3 %    % Immature Granulocytes 0.1 %    Nucleated RBCs 0 0 /100    Absolute Neutrophil 3.4 1.6 - 8.3 10e9/L    Absolute Lymphocytes 2.6 0.8 - 5.3 10e9/L    Absolute Monocytes 0.7 0.0 - 1.3 10e9/L    Absolute Eosinophils 0.1 0.0 - 0.7 10e9/L    Absolute Basophils 0.0 0.0 - 0.2 10e9/L    Abs Immature Granulocytes 0.0 0 - 0.4 10e9/L    Absolute Nucleated RBC 0.0    Comprehensive metabolic panel   Result Value Ref Range    Sodium 143 133 - 144 mmol/L    Potassium 3.6 3.4 - 5.3 mmol/L    Chloride 105 94 - 109 mmol/L    Carbon Dioxide 27 20 - 32 mmol/L    Anion Gap 11 3 - 14 mmol/L    Glucose 82 70 - 99 mg/dL    Urea Nitrogen 11 7 - 30 mg/dL    Creatinine 1.09 0.66 - 1.25 mg/dL    GFR Estimate 76 >60 mL/min/1.7m2    GFR Estimate If Black >90   GFR Calc   >60 mL/min/1.7m2    Calcium 9.1 8.5 - 10.1 mg/dL    Bilirubin Total 0.7 0.2 - 1.3 mg/dL    Albumin 4.4 3.4 - 5.0 g/dL    Protein Total 8.1 6.8 - 8.8 g/dL    Alkaline Phosphatase 77 40 - 150 U/L    ALT 52 0 - 70 U/L    AST 43 0 - 45 U/L   Troponin I    Result Value Ref Range    Troponin I ES  0.000 - 0.045 ug/L     <0.015  The 99th percentile for upper reference range is 0.045 ug/L.  Troponin values in   the range of 0.045 - 0.120 ug/L may be associated with risks of adverse   clinical events.     BNP   Result Value Ref Range    N-Terminal Pro BNP Inpatient 10 0 - 450 pg/mL   D dimer quantitative   Result Value Ref Range    D Dimer 0.4 0.0 - 0.50 ug/ml FEU   Lactic acid   Result Value Ref Range    Lactic Acid 3.0 (H) 0.7 - 2.1 mmol/L   Lipase   Result Value Ref Range    Lipase 136 73 - 393 U/L   Creatinine POCT   Result Value Ref Range    Creatinine 1.2 0.66 - 1.25 mg/dL    GFR Estimate 68 >60 mL/min/1.7m2    GFR Estimate If Black 82 >60 mL/min/1.7m2   Troponin POCT   Result Value Ref Range    Troponin I 0.00 0.00 - 0.10 ug/L   Chest CT, IV contrast only - PE protocol    Narrative    CT CHEST AND PULMONARY EMBOLISM ANGIOGRAM  4/21/2017 9:11 PM     HISTORY: Pain shortness of breath.      COMPARISON: None.    TECHNIQUE: Volumetric helical acquisition of CT images of the chest  from the lung apices to the kidneys were acquired after the  administration of 64mL, Isovue-370 IV contrast. Radiation dose for  this scan was reduced using automated exposure control, adjustment of  the mA and/or kV according to patient size, or iterative  reconstruction technique.    FINDINGS: There is no pulmonary embolism. Lungs are clear of acute  infiltrates. The heart is not enlarged. Thoracic aorta is unremarkable  without evidence of dissection or aneurysm. There is no pleural or  pericardial effusion.  There is no pneumothorax. Adrenal glands are  normal. Severe fatty infiltration of the liver seen in the upper  abdomen. Remainder of the visualized upper abdomen is unremarkable.      Impression    IMPRESSION:   1. No pulmonary embolism demonstrated.  2. No thoracic aortic dissection or aneurysm.   3. Severe fatty change of the liver.    BHUPENDRA SINGH MD   ISTAT gases lactate yasir  POCT   Result Value Ref Range    Ph Venous 7.41 7.32 - 7.43 pH    PCO2 Venous 39 (L) 40 - 50 mm Hg    PO2 Venous 23 (L) 25 - 47 mm Hg    Bicarbonate Venous 25 21 - 28 mmol/L    O2 Sat Venous 41 %    Lactic Acid 0.8 0.7 - 2.1 mmol/L   Troponin I - Now then in 4 hours x 2    Result Value Ref Range    Troponin I ES  0.000 - 0.045 ug/L     <0.015  The 99th percentile for upper reference range is 0.045 ug/L.  Troponin values in   the range of 0.045 - 0.120 ug/L may be associated with risks of adverse   clinical events.     CRP inflammation   Result Value Ref Range    CRP Inflammation <2.9 0.0 - 8.0 mg/L   Magnesium   Result Value Ref Range    Magnesium 2.1 1.6 - 2.3 mg/dL   Phosphorus   Result Value Ref Range    Phosphorus 3.1 2.5 - 4.5 mg/dL   Drug abuse screen 6 urine (chem dep) ([facility])   Result Value Ref Range    Amphetamine Qual Urine  NEG     Negative   Cutoff for a negative amphetamine is 500 ng/mL or less.      Barbiturates Qual Urine  NEG     Negative   Cutoff for a negative barbiturate is 200 ng/mL or less.      Benzodiazepine Qual Urine  NEG     Negative   Cutoff for a negative benzodiazepine is 200 ng/mL or less.      Cannabinoids Qual Urine (A) NEG     Positive   Cutoff for a positive cannabinoid is greater than 50 ng/mL. This is an   unconfirmed screening result to be used for medical purposes only.      Cocaine Qual Urine  NEG     Negative   Cutoff for a negative cocaine is 300 ng/mL or less.      Ethanol Qual Urine  NEG     Negative   Cutoff for a negative urine ethanol is 0.05 g/dL or less      Opiates Qualitative Urine  NEG     Negative   Cutoff for a negative opiate is 300 ng/mL or less.     Troponin I - Now then in 4 hours x 2    Result Value Ref Range    Troponin I ES  0.000 - 0.045 ug/L     <0.015  The 99th percentile for upper reference range is 0.045 ug/L.  Troponin values in   the range of 0.045 - 0.120 ug/L may be associated with risks of adverse   clinical events.     Lactic acid whole blood    Result Value Ref Range    Lactic Acid 0.9 0.7 - 2.1 mmol/L   CBC with platelets differential   Result Value Ref Range    WBC 5.9 4.0 - 11.0 10e9/L    RBC Count 5.36 4.4 - 5.9 10e12/L    Hemoglobin 13.8 13.3 - 17.7 g/dL    Hematocrit 41.7 40.0 - 53.0 %    MCV 78 78 - 100 fl    MCH 25.7 (L) 26.5 - 33.0 pg    MCHC 33.1 31.5 - 36.5 g/dL    RDW 16.0 (H) 10.0 - 15.0 %    Platelet Count 214 150 - 450 10e9/L    Diff Method Automated Method     % Neutrophils 48.5 %    % Lymphocytes 36.4 %    % Monocytes 12.2 %    % Eosinophils 2.4 %    % Basophils 0.3 %    % Immature Granulocytes 0.2 %    Nucleated RBCs 0 0 /100    Absolute Neutrophil 2.9 1.6 - 8.3 10e9/L    Absolute Lymphocytes 2.2 0.8 - 5.3 10e9/L    Absolute Monocytes 0.7 0.0 - 1.3 10e9/L    Absolute Eosinophils 0.1 0.0 - 0.7 10e9/L    Absolute Basophils 0.0 0.0 - 0.2 10e9/L    Abs Immature Granulocytes 0.0 0 - 0.4 10e9/L    Absolute Nucleated RBC 0.0    TSH with free T4 reflex   Result Value Ref Range    TSH 0.81 0.40 - 4.00 mU/L   Lipid panel reflex to direct LDL   Result Value Ref Range    Cholesterol 189 <200 mg/dL    Triglycerides 120 <150 mg/dL    HDL Cholesterol 55 >39 mg/dL    LDL Cholesterol Calculated 110 (H) <100 mg/dL    Non HDL Cholesterol 134 (H) <130 mg/dL   Basic metabolic panel   Result Value Ref Range    Sodium 140 133 - 144 mmol/L    Potassium 3.9 3.4 - 5.3 mmol/L    Chloride 106 94 - 109 mmol/L    Carbon Dioxide 25 20 - 32 mmol/L    Anion Gap 8 3 - 14 mmol/L    Glucose 84 70 - 99 mg/dL    Urea Nitrogen 13 7 - 30 mg/dL    Creatinine 1.14 0.66 - 1.25 mg/dL    GFR Estimate 72 >60 mL/min/1.7m2    GFR Estimate If Black 87 >60 mL/min/1.7m2    Calcium 8.8 8.5 - 10.1 mg/dL   Echo  stress test with definity    Narrative    [id number]  [id number]  [id number]  [id number]^POLY^LETICIA^RUDY           [facility]  Echocardiography Laboratory  [address] [place] [id number]     Name: BREANA HAY  MRN:  [id number]  : 1980  Study Date: 2017 09:14 AM  Age: 36 yrs  Gender: Male  Patient Location: [facility]  Reason For Study: Chest Pain  Ordering Physician: LETICIA PANG  Performed By: JAYCEE Canales     BSA: 2.1 m2  Height: 70 in  Weight: 210 lb  HR: 85  BP: 142/82 mmHg  _____________________________________________________________________________  __        Procedure  Stress Echo Dobutamine Adult. Contrast Definity.  _____________________________________________________________________________  __        Interpretation Summary  This was a normal stress echocardiogram with no evidence of stress-induced  ischemia. This was a normal stress EKG with no evidence of stress-induced  ischemia. Normal resting LV function with EF of approximately 60-65%; normal  response to exercise with increase to approximately 70-75%. No stress induced  regional wall motion abnormalities. No ECG evidence of ischemia. No  significant valvular disease noted on routine screening color flow Doppler and  pulsed Doppler examination.  _____________________________________________________________________________  __     Stress  Definity (NDC #42821-808-09) given intravenously.  Patient was given 7.0ml mixture of 1.5ml Definity and 8.5ml saline.  3.0 ml wasted.  There was no new ST segment depression.  The drug infusion was stopped due to target heart rate achieved.  The maximum dose of dobutamine was 50mcg/kg/min.  The maximum dose of metoprolol was 4.0mg.  The patient did not exhibit any symptoms during drug infusion.  Target Heart Rate was achieved.  Limiting Sympton: None.     Rest  The baseline ECG displays normal sinus rhythm.  Limiting Symptom (s) : none.     Stress Results        Protocol:  . Stress Echo        Maximum Predicted HR:   183 bpm        Target HR: 156 bpm                 % Maximum Predicted HR: 87 %                             StageDurationHeart Rate  BP                                (mm:ss)   (bpm)                            BASE  0:00      85    142/82                           PEAK  5:09      160   149/68                            Stress Duration:   5:09 mm:ss *                      Maximum Stress HR: 160 bpm *     Left Ventricle  Left ventricular systolic function is normal. Ejection Fraction = >55%. The  left ventricle is normal in size. There is normal left ventricular wall  thickness. No regional wall motion abnormalities noted.     Aortic Valve  The aortic valve is normal in structure and function. The aortic valve is  trileaflet. The aortic valve opens well.     Mitral Valve  The mitral valve is normal in structure and function. There is trace mitral  regurgitation.        Atria  The left atrial size is normal. Right atrial size is normal.     Tricuspid Valve  The tricuspid valve is normal in structure and function. There is trace  tricuspid regurgitation. Right ventricular systolic pressure is normal.     Right Ventricle  The right ventricular systolic function is normal. The right ventricle is  normal size.     Vessels  The aortic root is normal in size.     Pericardium  There is no pericardial effusion.     _____________________________________________________________________________  __                             Report approved by: Helio Samaniego 04/22/2017 11:57 AM                       _____________________________________________________________________________  __        DISCHARGE MEDICATIONS:   Current Discharge Medication List      CONTINUE these medications which have NOT CHANGED    Details   vitamin D (ERGOCALCIFEROL) 04412 UNIT capsule Take 1 capsule (50,000 Units) by mouth every 7 days for 8 doses  Qty: 8 capsule, Refills: 0    Associated Diagnoses: Vitamin D deficiency      diclofenac (VOLTAREN) 75 MG EC tablet Take 1 tablet (75 mg) by mouth 2 times daily as needed for moderate pain  Qty: 60 tablet, Refills: 1    Associated Diagnoses: Midline thoracic back pain, unspecified  "chronicity               CONSULTATIONS:   Consultation during this admission received from:  N/A    BRIEF HISTORY OF PRESENT ILLNESS:   (Adopted from admission H&P).    \"Tonio Alarcon is a 36 year old male with a history of herniated lumbar disc, tobacco dependence, alcohol dependence who presented to the ED with ongoing chest pain and shortness of breath. He reports progressively worsening chest pain and shortness of breath over the last 4 months however was worse today to the point that he was shaking. Chest pain is described as intermittent, sharp, shooting from the back to the left chest with radiation to left neck. Associated SOB. No associated diaphoresis, paresthesias, heartburn/reflux, abdominal pain, bloatedness, constipation, LE edema, recent travel. He states that while in the ED he had 2 episodes of isolated jaw pain. Reports a lot of stress recently. He admits that he has not been keeping hydrated. He was seen by his PCP 2 days ago and was scheduled to get a CT scan, however, he missed this appointment. He has been a is a smoker, smokes one pack per day. He denies a history of diabetes or hypertension. He admits to marijuana usage daily ~ 2gm. No other illicit drug use. Drinks alcohol about twice a week and when he does he drinks a lot; cannot quantify but his wife states he may drink all afternoon into the night. He denies any history of withdrawals.     In the ED, -115, -151/80-95, RR 15-24, SaO2 100% on RA, Temp 99.2. Labs show normal CMP with BUN/Creat 11/0.9 otherwise normal. Lactic acid 3 with repeat 0.8 after IV hydration. CRP and ESR normal. Normal CBC. Normal BNP. D-dimer 0.4. Troponin I negative x 1. EKG sinus tach 110. CT chest negative for PE, aortic dissection or aneurysm; severe fatty change of the liver. In the ED the patient was given 1L LR. He is being admitted to the Observation Unit for ACS r/o.     On admission to the observation unit the patient was stable with " "improved SOB.\"    ED OBSERVATION COURSE:  1. Chest Pain: No chest pain this am. Notes lower back pain. He presented to the ED with progressively worsening chest pain and SOB x 4 months. Chest pain described as intermittent, sharp, shooting from the back to the left chest with radiation to left neck. Associated SOB. No associated diaphoresis, paresthesias, heartburn/reflux, abdominal pain, bloatedness, constipation, LE edema, recent travel. Admits to poor oral hydration. Admits to marijuana usage daily ~ 2gm. No other illicit drug use. Drinks alcohol about twice a week and when he does he drinks a lot; cannot quantify but his wife states he may drink all afternoon into the night. In ED, -115, -151/80-95, RR 15-24, SaO2 100% on RA, Temp 99.2. Labs show normal CMP with BUN/Creat 11/0.9 otherwise normal. Lactic acid 3 with repeat 0.8 after IV hydration. CRP normal. Normal CBC. Normal BNP. D-dimer 0.4. Troponin I negative x 3. EKG sinus tach 110. CT chest negative for PE, aortic dissection or aneurysm; severe fatty change of the liver. In the ED the patient was given 1L LR. On exam has chest wall tenderness.  Risk Factors include tobacco use, family history of CAD. DDx: MSK, ACS. Dobutamine stress test this am and was normal. He was instructed to return to the ED if chest pain returns and is more severe, as discussed below.      2. Lactic Acidosis: Lactic acid 3 initially on presentation and after 1L LR was down to 0.8. BUN/Creat 11/1.09 so not significant for dehydration. Patient though reports poor oral hydration in context of increased alcohol intake.     3. Tobacco Dependence: Interested in help quitting. Smoking cessation consult. Recommend he follow-up with his PCP to discuss.      4. Alcohol Dependence:  Encourage cessation. [facility] protocol while in observation.      5. Vitamin D Deficiency: Found to have a Vit D level of 5 on 4/17/17 with PCP follow up. Prescription for Vit D given but not picked up. " Encourage patient to start taking Vit D immediately       DISCHARGE DISPOSITION:   Discharged to home.     DISCHARGE INSTRUCTIONS AND FOLLOW-UP:    Discharge Procedure Orders  Adult [facility] Follow-up and recommended labs and tests   Order Comments: Follow up with primary care provider, [facility], within 7 days for hospital follow- up.      Appointments on [place] and[facility]or [facility] (with [facility] provider or service). Call [phone] if you haven't heard regarding these appointments within 7 days of discharge.     Activity   Order Comments: Your activity upon discharge: activity as tolerated and no driving for today   Order Specific Question Answer Comments   Is discharge order? Yes      When to contact your care team   Order Comments: Return to the ED with fever, uncontrolled nausea, vomiting, unrelieved pain, bleeding not relieved with pressure, dizziness, chest pain, shortness of breath, loss of consciousness, and any new or concerning symptoms.     Please go to your nearest emergency room If you were to have a change in the type of chest pain i.e more severe, lasting longer or radiating to your shoulder, arm, neck, jaw or back, shortness of breath or increased pain with breathing, coughing up blood, feel dizzy or lightheaded, or notice swelling in one leg.     Reason for your hospital stay   Order Comments: You were admitted to the observation unit for evaluation of your chest pain.  Your EKG was normal, labs checking for heart damage were negative, chest x-ray was normal.  You underwent a stress test and this was normal.    I recommend you work with your primary care doctor to quit smoking as well as drinking alcohol and smoking marijuana. I also recommend your follow-up with your primary care doctor to discuss possible pulmonary function testing given your shortness of breath.     Diet   Order Comments: Follow this diet upon discharge: Orders Placed This Encounter     Regular  Diet Adult   Order Specific Question Answer Comments   Is discharge order? Yes         Attestation:   I have reviewed today's vital signs, notes, medications, labs and imaging.      ISABEL Lorenz, CNP  Emergency Department Observation Unit

## 2017-04-22 NOTE — PLAN OF CARE
Problem: Goal Outcome Summary  Goal: Goal Outcome Summary  Outpatient/Observation goals to be met before discharge home:     PRIMARY DIAGNOSIS: ACS r/o  OUTPATIENT/OBSERVATION GOALS TO BE MET BEFORE DISCHARGE:  Serial troponins and stress test complete: NO-first trop neg and one more to be drawn; stress text to be completed in AM.  Seen and cleared by consultant if applicable: NO-smoking cess consult to be competed in AM.  Adequate pain control on oral analgesia: YES-pt has not asked for any PRN pain medication; will continue to monitor.  Vital signs normal or at patient baseline: YES-vitals WNL of patients baseline.  Safe disposition plan has been identified: YES-safe to return home after tests complete.  *Nurse to notify MD when observation goals have been met and patient is ready for discharge.

## 2017-04-22 NOTE — PROGRESS NOTES
Pt here for exercise stress test, but unable to reach target heart rate. Test changed to dobutamine stress test. Test, meds and side effects reviewed with patient. Achieved target HR at 50 mcg/kg/min Dobutamine.  Gave a total of 4mg IV Metoprolol to bring HR back to baseline. Post monitoring complete and VSS. Pt transferred back to  via EKG staff.

## 2017-04-22 NOTE — PROGRESS NOTES
Reviewed and discussed d/c instructions with patient.  All questions were answered and PIV was removed.

## 2017-04-24 ENCOUNTER — TELEPHONE (OUTPATIENT)
Dept: FAMILY MEDICINE | Facility: CLINIC | Age: 37
End: 2017-04-24

## 2017-04-24 NOTE — TELEPHONE ENCOUNTER
This patient was discharged from [facility] on 4/22/17.    Discharge Diagnosis: Chest Pain, Pleuritic    Follow-up instructions: Follow up with primary care provider, [facility], within 7 days for hospital follow- up.     A follow-up visit has not been scheduled.      Number of ED/ER visits in the last 12 months:  1     Please follow-up with patient.    Lalita Whipple,

## 2017-04-25 NOTE — TELEPHONE ENCOUNTER
Phone doesn't accept incoming calls. I don't see a XAVIER for spouse but will consider trying calling her if no response on next attempt. Please see 4/20 encounter.   Saritha Clark RN

## 2017-05-01 ENCOUNTER — OFFICE VISIT (OUTPATIENT)
Dept: FAMILY MEDICINE | Facility: CLINIC | Age: 37
End: 2017-05-01
Payer: COMMERCIAL

## 2017-05-01 VITALS
HEIGHT: 71 IN | WEIGHT: 214.13 LBS | HEART RATE: 74 BPM | TEMPERATURE: 98.2 F | SYSTOLIC BLOOD PRESSURE: 114 MMHG | DIASTOLIC BLOOD PRESSURE: 76 MMHG | BODY MASS INDEX: 29.98 KG/M2

## 2017-05-01 DIAGNOSIS — M54.6 CHRONIC MIDLINE THORACIC BACK PAIN: ICD-10-CM

## 2017-05-01 DIAGNOSIS — G89.29 CHRONIC MIDLINE THORACIC BACK PAIN: ICD-10-CM

## 2017-05-01 DIAGNOSIS — G89.29 CHRONIC BILATERAL LOW BACK PAIN WITH LEFT-SIDED SCIATICA: Primary | ICD-10-CM

## 2017-05-01 DIAGNOSIS — E55.9 VITAMIN D DEFICIENCY: ICD-10-CM

## 2017-05-01 DIAGNOSIS — M54.42 CHRONIC BILATERAL LOW BACK PAIN WITH LEFT-SIDED SCIATICA: Primary | ICD-10-CM

## 2017-05-01 PROCEDURE — 99214 OFFICE O/P EST MOD 30 MIN: CPT | Performed by: FAMILY MEDICINE

## 2017-05-01 RX ORDER — DEXAMETHASONE 2 MG/1
2 TABLET ORAL 2 TIMES DAILY WITH MEALS
Qty: 14 TABLET | Refills: 0 | Status: SHIPPED | OUTPATIENT
Start: 2017-05-01 | End: 2017-07-15

## 2017-05-01 NOTE — PATIENT INSTRUCTIONS
[facility]   Discharged by : destinee  Paper scripts provided to patient : none     If you have any questions regarding your visit please contact your care team:     [facility] Clinic Hours Telephone Number   JAREN [address] Dr., Dr.   7am-7pm Monday - Thursday   7am-5pm Fridays  [phone]   (Appointment scheduling available 24/7)   RN Line   [phone] option 2       For a Price Quote for your services, please call our Consumer Price Line at [phone].     What options do I have for visits at the clinic other than the traditional office visit?     To expand how we care for you, many of our providers are utilizing electronic visits (e-visits) and telephone visits, when medically appropriate, for interactions with their patients rather than a visit in the clinic. We also offer nurse visits for many medical concerns. Just like any other service, we will bill your insurance company for this type of visit based on time spent on the phone with your provider. Not all insurance companies cover these visits. Please check with your medical insurance if this type of visit is covered. You will be responsible for any charges that are not paid by your insurance.   E-visits via Backplane: generally incur a $35.00 fee.     Telephone visits:   Time spent on the phone: *charged based on time that is spent on the phone in increments of 10 minutes. Estimated cost:   5-10 mins $30.00   11-20 mins. $59.00   21-30 mins. $85.00     Use KIHEITAIt (secure email communication and access to your chart) to send your primary care provider a message or make an appointment. Ask someone on your Team how to sign up for KIHEITAIt.     As always, Thank you for trusting us with your health care needs!      [facility]:    Scheduling Appointments  [facility]  Call: [phone]    [facility]  [facility]  Call: [phone]    [facility]  Call: [phone]      WHERE TO GO FOR CARE?    Clinic    Make an appointment if you:       Are sick (cold, cough, flu, sore throat, earache or in pain).       Have a small injury (sprain, small cut, burn or broken bone).       Need a physical exam, Pap smear, vaccine or prescription refill.       Have questions about your health or medicines.    To reach us:      Call [phone] ([phone]). Open 24 hours every day. (For counseling services, call [phone].)    Log into Allylix at EcoVadis. (Visit [email or web address].[email or web address] to create an account.) Hospital emergency room    An emergency is a serious or life- threatening problem that must be treated right away.    Call [phone] or get to the hospital if you have:      Very bad or sudden:            - Chest pain or pressure         - Bleeding         - Head or belly pain         - Dizziness or trouble seeing, walking or                          Speaking      Problems breathing      Blood in your vomit or you are coughing up blood      A major injury (knocked out, loss of a finger or limb, rape, broken bone protruding from skin)    A mental health crisis. (Or call the Mental Health Crisis line at 1-475.973.3497 or Suicide Prevention Hotline at 1-978.946.1294.)    Open 24 hours every day. You don't need an appointment.     Urgent care    Visit urgent care for sickness or small injuries when the clinic is closed. You don't need an appointment. To check hours or find an urgent care near you, visit [email or web address]. Online care    Get online care from [employer] for more than 70 common problems, like colds, allergies and infections. Open 24 hours every day at: [email or web address]   Need help deciding?    For advice about where to be seen, you may call your clinic and ask to speak with a nurse. We're here for you 24 hours every day.         If you are deaf or hard of  hearing, please let us know. We provide many free services including sign language interpreters, oral interpreters, TTYs, telephone amplifiers, note takers and written materials.

## 2017-05-01 NOTE — MR AVS SNAPSHOT
After Visit Summary   5/1/2017    Tonio Alarcon    MRN: [id number]           Patient Information     Date Of Birth          1980        Visit Information        Provider Department      5/1/2017 10:00 AM Christiano Palacios MD [facility]        Today's Diagnoses     Chronic bilateral low back pain with left-sided sciatica    -  1      Care Instructions    [facility]   Discharged by : destinee  Paper scripts provided to patient : none     If you have any questions regarding your visit please contact your care team:     [facility] Clinic Hours Telephone Number   JAREN [address] Dr., Dr., Dr.   7am-7pm Monday - Thursday   7am-5pm Fridays  [phone]   (Appointment scheduling available 24/7)   RN Line   [phone] option 2       For a Price Quote for your services, please call our [employer] Price Line at [phone].     What options do I have for visits at the clinic other than the traditional office visit?     To expand how we care for you, many of our providers are utilizing electronic visits (e-visits) and telephone visits, when medically appropriate, for interactions with their patients rather than a visit in the clinic. We also offer nurse visits for many medical concerns. Just like any other service, we will bill your insurance company for this type of visit based on time spent on the phone with your provider. Not all insurance companies cover these visits. Please check with your medical insurance if this type of visit is covered. You will be responsible for any charges that are not paid by your insurance.   E-visits via SWK Technologies: generally incur a $35.00 fee.     Telephone visits:   Time spent on the phone: *charged based on time that is spent on the phone in increments of 10 minutes. Estimated cost:   5-10 mins $30.00   11-20 mins. $59.00   21-30 mins. $85.00      Use XM Radio (secure email communication and access to your chart) to send your primary care provider a message or make an appointment. Ask someone on your Team how to sign up for XM Radio.     As always, Thank you for trusting us with your health care needs!      [facility]:    Scheduling Appointments  [facility] Schre[facility]  Call: [phone]    [facility]  Call: [phone]    [facility]  Call: [phone]      WHERE TO GO FOR CARE?    Clinic    Make an appointment if you:       Are sick (cold, cough, flu, sore throat, earache or in pain).       Have a small injury (sprain, small cut, burn or broken bone).       Need a physical exam, Pap smear, vaccine or prescription refill.       Have questions about your health or medicines.    To reach us:      Call [phone] ([phone]). Open 24 hours every day. (For counseling services, call [phone].)    Log into XM Radio at [email or web address]. (Visit [email or web address] to create an account.) Hospital emergency room    An emergency is a serious or life- threatening problem that must be treated right away.    Call [phone] or get to the hospital if you have:      Very bad or sudden:            - Chest pain or pressure         - Bleeding         - Head or belly pain         - Dizziness or trouble seeing, walking or                          Speaking      Problems breathing      Blood in your vomit or you are coughing up blood      A major injury (knocked out, loss of a finger or limb, rape, broken bone protruding from skin)    A mental health crisis. (Or call the Mental Health Crisis line at 1-842.723.6010 or Suicide Prevention Hotline at 1-188.808.7655.)    Open 24 hours every day. You don't need an appointment.     Urgent care    Visit urgent care for sickness or small injuries when the clinic is closed. You don't need an appointment. To check hours or find an urgent care  "near you, visit [email or web address]. Online care    Get online care from [facility] for more than 70 common problems, like colds, allergies and infections. Open 24 hours every day at: [email or web address]   Need help deciding?    For advice about where to be seen, you may call your clinic and ask to speak with a nurse. We're here for you 24 hours every day.         If you are deaf or hard of hearing, please let us know. We provide many free services including sign language interpreters, oral interpreters, TTYs, telephone amplifiers, note takers and written materials.                       Follow-ups after your visit        Future tests that were ordered for you today     Open Future Orders        Priority Expected Expires Ordered    MR Lumbar Spine w/o Contrast Routine  5/1/2018 5/1/2017            Who to contact     If you have questions or need follow up information about today's clinic visit or your schedule please contact [facility] directly at [phone].  Normal or non-critical lab and imaging results will be communicated to you by WemoLabhart, letter or phone within 4 business days after the clinic has received the results. If you do not hear from us within 7 days, please contact the clinic through Rumblet or phone. If you have a critical or abnormal lab result, we will notify you by phone as soon as possible.  Submit refill requests through ADENTS HTI or call your pharmacy and they will forward the refill request to us. Please allow 3 business days for your refill to be completed.          Additional Information About Your Visit        ADENTS HTI Information     ADENTS HTI lets you send messages to your doctor, view your test results, renew your prescriptions, schedule appointments and more. To sign up, go to [email or web address] . Click on \"Log in\" on the left side of the screen, which will take you to the Welcome page. Then click on \"Sign up Now\" on the right side of the page.     You " "will be asked to enter the access code listed below, as well as some personal information. Please follow the directions to create your username and password.     Your access code is: [email or web address]  Expires: 2017  1:51 PM     Your access code will  in 90 days. If you need help or a new code, please call your [facility] clinic or [phone].        Care EveryWhere ID     This is your Care EveryWhere ID. This could be used by other organizations to access your [facility] medical records  [id number]        Your Vitals Were     Pulse Temperature Height BMI (Body Mass Index)          74 98.2  F (36.8  C) (Oral) 5' 10.5\" (1.791 m) 30.29 kg/m2         Blood Pressure from Last 3 Encounters:   17 114/76   17 (!) 132/93   17 116/72    Weight from Last 3 Encounters:   17 214 lb 2 oz (97.1 kg)   17 203 lb 4.8 oz (92.2 kg)   17 211 lb 4 oz (95.8 kg)                 Today's Medication Changes          These changes are accurate as of: 17 10:38 AM.  If you have any questions, ask your nurse or doctor.               Start taking these medicines.        Dose/Directions    dexamethasone 2 MG tablet   Commonly known as:  DECADRON   Used for:  Chronic bilateral low back pain with left-sided sciatica   Started by:  Christiano Palacios MD        Dose:  2 mg   Take 1 tablet (2 mg) by mouth 2 times daily (with meals) for 7 days   Quantity:  14 tablet   Refills:  0       piroxicam 20 MG capsule   Commonly known as:  FELDENE   Used for:  Chronic bilateral low back pain with left-sided sciatica   Started by:  Christiano Palacios MD        Dose:  20 mg   Take 1 capsule (20 mg) by mouth daily (with breakfast)   Quantity:  30 capsule   Refills:  1         Stop taking these medicines if you haven't already. Please contact your care team if you have questions.     diclofenac 75 MG EC tablet   Commonly known as:  VOLTAREN   Stopped by:  Christiano Palacios MD                Where " to get your medicines      These medications were sent to [facility] [address] [facility] - [place], [place] - [address]  [address] [address]     Phone:  [phone]     dexamethasone 2 MG tablet    piroxicam 20 MG capsule                Primary Care Provider Office Phone # Fax #    [facility] [phone]       [address] [address]        Thank you!     Thank you for choosing [facility]  for your care. Our goal is always to provide you with excellent care. Hearing back from our patients is one way we can continue to improve our services. Please take a few minutes to complete the written survey that you may receive in the mail after your visit with us. Thank you!             Your Updated Medication List - Protect others around you: Learn how to safely use, store and throw away your medicines at www.disposemymeds.org.          This list is accurate as of: 5/1/17 10:38 AM.  Always use your most recent med list.                   Brand Name Dispense Instructions for use    dexamethasone 2 MG tablet    DECADRON    14 tablet    Take 1 tablet (2 mg) by mouth 2 times daily (with meals) for 7 days       piroxicam 20 MG capsule    FELDENE    30 capsule    Take 1 capsule (20 mg) by mouth daily (with breakfast)       vitamin D 63209 UNIT capsule    ERGOCALCIFEROL    8 capsule    Take 1 capsule (50,000 Units) by mouth every 7 days for 8 doses

## 2017-05-01 NOTE — PROGRESS NOTES
SUBJECTIVE:                                                    Tonio Alarcon is a 36 year old male who presents to clinic today for the following health issues:          Hospital Follow-up Visit:    Hospital/Nursing Home/IP Rehab Facility: [facility]  Date of Admission: 4/21/17  Date of Discharge: 4/22/17  Reason(s) for Admission: Chest pain            Problems taking medications regularly:  Not taking the Voltaren as he says it doesn't work and doesn't want to take it.       Medication changes since discharge: None       Problems adhering to non-medication therapy:  None    Summary of hospitalization:  [facility] discharge summary reviewed  Diagnostic Tests/Treatments reviewed.  Follow up needed: none  Other Healthcare Providers Involved in Patient s Care:         None  Update since discharge: fluctuating course. Patient reports he was in for the mid back pain and not so much chest pain.  Got work up for cardiac condition but was unable to complete the physical portion of the stress echo due to left leg sciatic type pain.    Post Discharge Medication Reconciliation: discharge medications reconciled, continue medications without change.  Plan of care communicated with patient and wife     Coding guidelines for this visit:  Type of Medical   Decision Making Face-to-Face Visit       within 7 Days of discharge Face-to-Face Visit        within 14 days of discharge   Moderate Complexity 69391 [id number]   High Complexity 71051 74556          Patient is here for follow up today.  His main concern continues to be the midline mid back pain and the lower back pain with sciatica type symptoms down the left leg.  No changes in bowel or bladder function.  Went into the hospital and has not yet completed the CT scans of the chest and abdomen that were ordered.  Had CT of chest with PE protocol in hospital and noted fatty liver but otherwise unremarkable per the report.      Problem list and  "histories reviewed & adjusted, as indicated.  Additional history: as documented    Patient Active Problem List   Diagnosis     CARDIOVASCULAR SCREENING; LDL GOAL LESS THAN 160     Chronic low back pain     Past Surgical History:   Procedure Laterality Date     HEMORRHOID SURGERY  2015       Social History   Substance Use Topics     Smoking status: Current Every Day Smoker     Packs/day: 1.00     Years: 25.00     Smokeless tobacco: Never Used     Alcohol use Yes      Comment: heavy drinking about 2 x per week     Family History   Problem Relation Age of Onset     DIABETES Mother      Breast Cancer Mother      Dx at 61 years aol     Coronary Artery Disease Mother      3v CABG     CANCER Father      Stomach ca - Dx at 57 years old and passed away from this     Cancer - colorectal Maternal Grandmother      DIABETES Brother          Current Outpatient Prescriptions   Medication Sig Dispense Refill     piroxicam (FELDENE) 20 MG capsule Take 1 capsule (20 mg) by mouth daily (with breakfast) 30 capsule 1     dexamethasone (DECADRON) 2 MG tablet Take 1 tablet (2 mg) by mouth 2 times daily (with meals) for 7 days 14 tablet 0     vitamin D (ERGOCALCIFEROL) 03549 UNIT capsule Take 1 capsule (50,000 Units) by mouth every 7 days for 8 doses 8 capsule 0     No Known Allergies    Reviewed and updated as needed this visit by clinical staff  Tobacco  Allergies  Meds  Problems  Med Hx  Surg Hx  Fam Hx  Soc Hx        Reviewed and updated as needed this visit by Provider  Allergies  Meds  Problems         ROS:  Constitutional, HEENT, cardiovascular, pulmonary, gi and gu systems are negative, except as otherwise noted.    OBJECTIVE:                                                    /76 (BP Location: Right arm, Cuff Size: Adult Large)  Pulse 74  Temp 98.2  F (36.8  C) (Oral)  Ht 5' 10.5\" (1.791 m)  Wt 214 lb 2 oz (97.1 kg)  BMI 30.29 kg/m2  Body mass index is 30.29 kg/(m^2).  GENERAL: healthy, alert and no " distress  EYES: Eyes grossly normal to inspection, PERRL and conjunctivae and sclerae normal  MS: no gross musculoskeletal defects noted, no edema  NEURO: Normal strength and tone, mentation intact and speech normal  PSYCH: mentation appears normal, affect normal/bright    Diagnostic Test Results:  none      ASSESSMENT/PLAN:                                                            1. Chronic bilateral low back pain with left-sided sciatica  Will order MRI of lumbar spine to compare to last study.  Consider directed therapy after results and possible pain clinic referral.  Has had epidural steroid injections in the past, second shot may have helped for a few months.  Feels there was a med that helped in the past and review of old notes shows mobic and percocet.  Issues script today for feldene as similar to mobic and only once daily.  Due to daily MJ use (2 grams), feel that percocet not a good option at this time and not really any clear endpoint of therapy present either.  Reviewed all hospital test results with patient and wife and answered questions today.  Requested follow up after completion of mri and CT of abdomen and pelvis.  Stop voltaren at this time and decadron for current sciatic type symptoms.  - MR Lumbar Spine w/o Contrast; Future  - piroxicam (FELDENE) 20 MG capsule; Take 1 capsule (20 mg) by mouth daily (with breakfast)  Dispense: 30 capsule; Refill: 1  - dexamethasone (DECADRON) 2 MG tablet; Take 1 tablet (2 mg) by mouth 2 times daily (with meals) for 7 days  Dispense: 14 tablet; Refill: 0    2. Chronic midline thoracic back pain  As above.  No clear etiology on chest CT.  Patient reports he has stopped drinking alcohol with finding of the fatty liver.    3. Vitamin D deficiency  Reports taking supplement at this time and will recheck level in about 6+ weeks.      See Patient Instructions    Christiano Palacios MD  [facility]

## 2017-05-01 NOTE — NURSING NOTE
"Chief Complaint   Patient presents with     Hospital F/U       Initial /76 (BP Location: Right arm, Cuff Size: Adult Large)  Pulse 74  Temp 98.2  F (36.8  C) (Oral)  Ht 5' 10.5\" (1.791 m)  Wt 214 lb 2 oz (97.1 kg)  BMI 30.29 kg/m2 Estimated body mass index is 30.29 kg/(m^2) as calculated from the following:    Height as of this encounter: 5' 10.5\" (1.791 m).    Weight as of this encounter: 214 lb 2 oz (97.1 kg).  Medication Reconciliation: monica NIX, Certified Medical Assistant (AAMA)May 1, 2017 10:10 AM      "

## 2017-07-15 ENCOUNTER — APPOINTMENT (OUTPATIENT)
Dept: MRI IMAGING | Facility: CLINIC | Age: 37
End: 2017-07-15
Attending: EMERGENCY MEDICINE
Payer: COMMERCIAL

## 2017-07-15 ENCOUNTER — HOSPITAL ENCOUNTER (EMERGENCY)
Facility: CLINIC | Age: 37
Discharge: HOME | End: 2017-07-15
Attending: EMERGENCY MEDICINE | Admitting: EMERGENCY MEDICINE
Payer: COMMERCIAL

## 2017-07-15 VITALS
SYSTOLIC BLOOD PRESSURE: 132 MMHG | TEMPERATURE: 98.3 F | BODY MASS INDEX: 30.27 KG/M2 | DIASTOLIC BLOOD PRESSURE: 95 MMHG | RESPIRATION RATE: 18 BRPM | WEIGHT: 214 LBS | OXYGEN SATURATION: 100 %

## 2017-07-15 DIAGNOSIS — M51.379 DEGENERATION OF LUMBOSACRAL INTERVERTEBRAL DISC: ICD-10-CM

## 2017-07-15 LAB
ANION GAP SERPL CALCULATED.3IONS-SCNC: 14 MMOL/L (ref 3–14)
BASOPHILS # BLD AUTO: 0 10E9/L (ref 0–0.2)
BASOPHILS NFR BLD AUTO: 0.3 %
BUN SERPL-MCNC: 13 MG/DL (ref 7–30)
CALCIUM SERPL-MCNC: 8.4 MG/DL (ref 8.5–10.1)
CHLORIDE SERPL-SCNC: 108 MMOL/L (ref 94–109)
CO2 SERPL-SCNC: 23 MMOL/L (ref 20–32)
CREAT SERPL-MCNC: 0.98 MG/DL (ref 0.66–1.25)
DIFFERENTIAL METHOD BLD: ABNORMAL
EOSINOPHIL # BLD AUTO: 0.2 10E9/L (ref 0–0.7)
EOSINOPHIL NFR BLD AUTO: 3.6 %
ERYTHROCYTE [DISTWIDTH] IN BLOOD BY AUTOMATED COUNT: 15.6 % (ref 10–15)
GFR SERPL CREATININE-BSD FRML MDRD: 85 ML/MIN/1.7M2
GLUCOSE SERPL-MCNC: 104 MG/DL (ref 70–99)
HCT VFR BLD AUTO: 44.3 % (ref 40–53)
HGB BLD-MCNC: 15.3 G/DL (ref 13.3–17.7)
IMM GRANULOCYTES # BLD: 0 10E9/L (ref 0–0.4)
IMM GRANULOCYTES NFR BLD: 0.1 %
LYMPHOCYTES # BLD AUTO: 3.1 10E9/L (ref 0.8–5.3)
LYMPHOCYTES NFR BLD AUTO: 45.6 %
MCH RBC QN AUTO: 26.4 PG (ref 26.5–33)
MCHC RBC AUTO-ENTMCNC: 34.5 G/DL (ref 31.5–36.5)
MCV RBC AUTO: 77 FL (ref 78–100)
MONOCYTES # BLD AUTO: 0.5 10E9/L (ref 0–1.3)
MONOCYTES NFR BLD AUTO: 7.1 %
NEUTROPHILS # BLD AUTO: 2.9 10E9/L (ref 1.6–8.3)
NEUTROPHILS NFR BLD AUTO: 43.3 %
NRBC # BLD AUTO: 0 10*3/UL
NRBC BLD AUTO-RTO: 0 /100
PLATELET # BLD AUTO: 233 10E9/L (ref 150–450)
POTASSIUM SERPL-SCNC: 3.7 MMOL/L (ref 3.4–5.3)
RBC # BLD AUTO: 5.79 10E12/L (ref 4.4–5.9)
SODIUM SERPL-SCNC: 145 MMOL/L (ref 133–144)
WBC # BLD AUTO: 6.8 10E9/L (ref 4–11)

## 2017-07-15 PROCEDURE — 25000128 H RX IP 250 OP 636: Performed by: EMERGENCY MEDICINE

## 2017-07-15 PROCEDURE — 99285 EMERGENCY DEPT VISIT HI MDM: CPT | Mod: Z6 | Performed by: EMERGENCY MEDICINE

## 2017-07-15 PROCEDURE — 96361 HYDRATE IV INFUSION ADD-ON: CPT | Performed by: EMERGENCY MEDICINE

## 2017-07-15 PROCEDURE — 96376 TX/PRO/DX INJ SAME DRUG ADON: CPT | Performed by: EMERGENCY MEDICINE

## 2017-07-15 PROCEDURE — 96374 THER/PROPH/DIAG INJ IV PUSH: CPT | Performed by: EMERGENCY MEDICINE

## 2017-07-15 PROCEDURE — 96375 TX/PRO/DX INJ NEW DRUG ADDON: CPT | Performed by: EMERGENCY MEDICINE

## 2017-07-15 PROCEDURE — 85025 COMPLETE CBC W/AUTO DIFF WBC: CPT | Performed by: EMERGENCY MEDICINE

## 2017-07-15 PROCEDURE — 99285 EMERGENCY DEPT VISIT HI MDM: CPT | Mod: 25 | Performed by: EMERGENCY MEDICINE

## 2017-07-15 PROCEDURE — 80048 BASIC METABOLIC PNL TOTAL CA: CPT | Performed by: EMERGENCY MEDICINE

## 2017-07-15 PROCEDURE — 72148 MRI LUMBAR SPINE W/O DYE: CPT

## 2017-07-15 RX ORDER — METHYLPREDNISOLONE 4 MG
TABLET, DOSE PACK ORAL
Qty: 21 TABLET | Refills: 0 | Status: SHIPPED | OUTPATIENT
Start: 2017-07-15 | End: 2017-10-11

## 2017-07-15 RX ORDER — OXYCODONE HYDROCHLORIDE 5 MG/1
5 TABLET ORAL EVERY 6 HOURS PRN
Qty: 20 TABLET | Refills: 0 | Status: SHIPPED | OUTPATIENT
Start: 2017-07-15 | End: 2017-10-11

## 2017-07-15 RX ORDER — HYDROMORPHONE HYDROCHLORIDE 1 MG/ML
0.5 INJECTION, SOLUTION INTRAMUSCULAR; INTRAVENOUS; SUBCUTANEOUS
Status: DISCONTINUED | OUTPATIENT
Start: 2017-07-15 | End: 2017-07-15 | Stop reason: HOSPADM

## 2017-07-15 RX ORDER — KETOROLAC TROMETHAMINE 30 MG/ML
30 INJECTION, SOLUTION INTRAMUSCULAR; INTRAVENOUS ONCE
Status: COMPLETED | OUTPATIENT
Start: 2017-07-15 | End: 2017-07-15

## 2017-07-15 RX ORDER — LIDOCAINE 40 MG/G
CREAM TOPICAL
Status: DISCONTINUED | OUTPATIENT
Start: 2017-07-15 | End: 2017-07-15 | Stop reason: HOSPADM

## 2017-07-15 RX ORDER — IBUPROFEN 600 MG/1
600 TABLET, FILM COATED ORAL EVERY 6 HOURS PRN
Qty: 30 TABLET | Refills: 1 | Status: ON HOLD | OUTPATIENT
Start: 2017-07-15 | End: 2017-10-12

## 2017-07-15 RX ORDER — SODIUM CHLORIDE 9 MG/ML
1000 INJECTION, SOLUTION INTRAVENOUS CONTINUOUS
Status: DISCONTINUED | OUTPATIENT
Start: 2017-07-15 | End: 2017-07-15 | Stop reason: HOSPADM

## 2017-07-15 RX ADMIN — SODIUM CHLORIDE 1000 ML: 9 INJECTION, SOLUTION INTRAVENOUS at 03:41

## 2017-07-15 RX ADMIN — HYDROMORPHONE HYDROCHLORIDE 0.5 MG: 1 INJECTION, SOLUTION INTRAMUSCULAR; INTRAVENOUS; SUBCUTANEOUS at 03:41

## 2017-07-15 RX ADMIN — HYDROMORPHONE HYDROCHLORIDE 0.5 MG: 1 INJECTION, SOLUTION INTRAMUSCULAR; INTRAVENOUS; SUBCUTANEOUS at 04:58

## 2017-07-15 RX ADMIN — KETOROLAC TROMETHAMINE 30 MG: 30 INJECTION, SOLUTION INTRAMUSCULAR; INTRAVENOUS at 03:41

## 2017-07-15 ASSESSMENT — ENCOUNTER SYMPTOMS
BACK PAIN: 1
SHORTNESS OF BREATH: 0
FEVER: 0
ABDOMINAL PAIN: 0
WEAKNESS: 1
NUMBNESS: 1

## 2017-07-15 NOTE — ED PROVIDER NOTES
History     Chief Complaint   Patient presents with     Back Pain     HPI  Tonio Alarcon is a 36 year old male with history of chronic disc disease in the back who presents with worsening back pain and weakness in his left lower extremity.  Has been having this for months but over the last week or so is gotten worse.  He states he felt a pop proximal to week and a half ago.  Now has worsening pain into his left lower extremity.  States his left extremity is weak.  This is weaker than it had been in the past.  Also having more pain going into his right extremity.  This is new for him.  No fevers or chills.  No bowel or bladder incontinence.  Has had difficulty getting around.  Essentially just been laying in bed.  Has not taken any medicines for this.  Did see his primary in May.  An MRI was scheduled but never done.  He was supposed to be taking dexamethasone but never took it.  Has had epidural injections in the past.  The last of which was years ago.  No history of back surgery.  Denies any specific trauma.    I have reviewed the Medications, Allergies, Past Medical and Surgical History, and Social History in the [facility] [employer] system.  History reviewed. No pertinent past medical history.    Past Surgical History:   Procedure Laterality Date     GI SURGERY      Hemorrhoids      HEMORRHOID SURGERY  2015       Family History   Problem Relation Age of Onset     DIABETES Mother      Breast Cancer Mother      Dx at 61 years aol     Coronary Artery Disease Mother      3v CABG     CANCER Father      Stomach ca - Dx at 57 years old and passed away from this     Cancer - colorectal Maternal Grandmother      DIABETES Brother        Social History   Substance Use Topics     Smoking status: Current Every Day Smoker     Packs/day: 1.00     Years: 25.00     Smokeless tobacco: Never Used     Alcohol use Yes      Comment: heavy drinking about 2 x per week       Review of Systems   Constitutional: Negative for fever.    Respiratory: Negative for shortness of breath.    Cardiovascular: Negative for chest pain.   Gastrointestinal: Negative for abdominal pain.   Musculoskeletal: Positive for back pain.   Neurological: Positive for weakness and numbness.   All other systems reviewed and are negative.      Physical Exam   BP: (!) 132/95  Heart Rate: 82  Temp: 98.3  F (36.8  C)  Resp: 18  Weight: 97.1 kg (214 lb)  SpO2: 100 %  Physical Exam   Vital Signs and Nursing Notes Reviewed.  General:  NAD  HEENT: Oropharynx clear.  No obvious signs of trauma.  PERRL.  EOMI  Neck: Supple.  No lymphadenopathy.  Cardiac: RRR.  No murmurs, rubs or gallops  Lungs: Clear bilaterally.  Normal respiratory rate.    Abdomen:  Soft, Nontender, no rebound or guarding.  Back: No CVA tenderness.  Skin:  No rash.  No diaphoresis  Extremities:  No cyanosis, clubbing, or edema.  Neurological:  CN II-XII intact, 3 out of 5 weakness in left lower extremity to plantar and dorsiflexion.  Also with hip flexion and extension.  Patient did not tolerate further exam.  Sensation decreased in left lower extremity.  Right lower extremity is normal.  DTRs are symmetric.  Toes are downgoing bilaterally.  Straight leg raise is positive.  Pulse:  Symmetric in bilateral upper and lower extremities.      ED Course     ED Course     Procedures             Critical Care time:  none               Labs Ordered and Resulted from Time of ED Arrival Up to the Time of Departure from the ED   CBC WITH PLATELETS DIFFERENTIAL - Abnormal; Notable for the following:        Result Value    MCV 77 (*)     MCH 26.4 (*)     RDW 15.6 (*)     All other components within normal limits   BASIC METABOLIC PANEL - Abnormal; Notable for the following:     Sodium 145 (*)     Glucose 104 (*)     Calcium 8.4 (*)     All other components within normal limits   PERIPHERAL IV CATHETER            Assessments & Plan (with Medical Decision Making)   36 year old who presents with back pain and left extremity  weakness.  Difficult to determine on initial exam as this is due to pain or jw nerve damage.  Given his history to get an MRI of his back.  Preliminary read does show no interval change since previous MRI dated 09/03/2015.  There is degenerative disc disease and disc bulges at L3 4, L4 5 and L5-S1.  This is likely the cause of his pain.  No need for acute neurosurgical intervention.  Patient received IV Dilaudid, IV Toradol and IV fluids.  Feeling mildly better.  We'll start him on a Medrol Dosepak.  We'll also have him start ibuprofen and is given oxycodone for breakthrough pain.  He is advised to follow-up with his primary.  He likely will need PT OT and may need further interventions.  May need a pain clinic if this continues to be a chronic issue.  No signs of infection.  No indication for further testing here.  As discussed plan with patient and significant other.  He is discharged home.  He will return for worsening symptoms.    I have reviewed the nursing notes.    I have reviewed the findings, diagnosis, plan and need for follow up with the patient.    New Prescriptions    IBUPROFEN (ADVIL/MOTRIN) 600 MG TABLET    Take 1 tablet (600 mg) by mouth every 6 hours as needed for moderate pain    METHYLPREDNISOLONE (MEDROL DOSEPAK) 4 MG TABLET    Follow package instructions    OXYCODONE (ROXICODONE) 5 MG IR TABLET    Take 1 tablet (5 mg) by mouth every 6 hours as needed for pain       Final diagnoses:   Degeneration of lumbosacral intervertebral disc       7/15/2017   [facility], [place], EMERGENCY DEPARTMENT     Catarino Laird MD  07/15/17 0453

## 2017-07-15 NOTE — ED AVS SNAPSHOT
[facility], Emergency Department    [address]    [place] [address]    Phone:  [phone]    Fax:  [phone]                                       Tonio Alarcon   MRN: [id number]    Department:  [facility], Emergency Department   Date of Visit:  7/15/2017           Patient Information     Date Of Birth          1980        Your diagnoses for this visit were:     Degeneration of lumbosacral intervertebral disc        You were seen by Catarino Laird MD.        Discharge Instructions       Please make an appointment to follow up with Your Primary Care Provider in 5-7 days         Discharge References/Attachments     LUMBAR RADICULOPATHY, UNDERSTANDING (ENGLISH)      24 Hour Appointment Hotline       To make an appointment at any [place] clinic, call [phone] ([phone]). If you don't have a family doctor or clinic, we will help you find one. [place] clinics are conveniently located to serve the needs of you and your family.             Review of your medicines      START taking        Dose / Directions Last dose taken    ibuprofen 600 MG tablet   Commonly known as:  ADVIL/MOTRIN   Dose:  600 mg   Quantity:  30 tablet        Take 1 tablet (600 mg) by mouth every 6 hours as needed for moderate pain   Refills:  1        methylPREDNISolone 4 MG tablet   Commonly known as:  MEDROL DOSEPAK   Quantity:  21 tablet        Follow package instructions   Refills:  0        oxyCODONE 5 MG IR tablet   Commonly known as:  ROXICODONE   Dose:  5 mg   Quantity:  20 tablet        Take 1 tablet (5 mg) by mouth every 6 hours as needed for pain   Refills:  0                Prescriptions were sent or printed at these locations (3 Prescriptions)                   Other Prescriptions                Printed at Department/Unit printer (3 of 3)         methylPREDNISolone (MEDROL DOSEPAK) 4 MG tablet               oxyCODONE (ROXICODONE) 5 MG IR tablet               ibuprofen (ADVIL/MOTRIN) 600  "MG tablet                Procedures and tests performed during your visit     Basic metabolic panel    CBC with platelets differential    MR Lumbar Spine w/o Contrast    Peripheral IV catheter      Orders Needing Specimen Collection     None      Pending Results     Date and Time Order Name Status Description    7/15/2017 0322 MR Lumbar Spine w/o Contrast In process             Pending Culture Results     No orders found from 2017 to 2017.            Pending Results Instructions     If you had any lab results that were not finalized at the time of your Discharge, you can call the ED Lab Result RN at [phone]. You will be contacted by this team for any positive Lab results or changes in treatment. The nurses are available 7 days a week from 10A to 6:30P.  You can leave a message 24 hours per day and they will return your call.        Thank you for choosing [facility]       Thank you for choosing [facility] for your care. Our goal is always to provide you with excellent care. Hearing back from our patients is one way we can continue to improve our services. Please take a few minutes to complete the written survey that you may receive in the mail after you visit with us. Thank you!        [employer] Information     [employer] lets you send messages to your doctor, view your test results, renew your prescriptions, schedule appointments and more. To sign up, go to [email or web address] . Click on \"Log in\" on the left side of the screen, which will take you to the Welcome page. Then click on \"Sign up Now\" on the right side of the page.     You will be asked to enter the access code listed below, as well as some personal information. Please follow the directions to create your username and password.     Your access code is: [email or web address]  Expires: 2017  1:51 PM     Your access code will  in 90 days. If you need help or a new code, please call your [facility] clinic or [phone].        Care EveryWhere ID  "    This is your Care EveryWhere ID. This could be used by other organizations to access your [facility] medical records  [id number]        Equal Access to Services     TONY SHAW : Alyssa Arcos, ligia peguero, kacie garcia, carlyn ryan. So [facility] [phone].    ATENCIÓN: Si habla español, tiene a velazco disposición servicios gratuitos de asistencia lingüística. Llame al [phone].    We comply with applicable federal civil rights laws and [place] laws. We do not discriminate on the basis of race, color, national origin, age, disability sex, sexual orientation or gender identity.            After Visit Summary       This is your record. Keep this with you and show to your community pharmacist(s) and doctor(s) at your next visit.

## 2017-07-15 NOTE — ED AVS SNAPSHOT
[facility], Emergency Department    [address]    [facility] [address]    Phone:  [phone]    Fax:  [phone]                                       Tonio Alarcon   MRN: [id number]    Department:  [facility], Emergency Department   Date of Visit:  7/15/2017           After Visit Summary Signature Page     I have received my discharge instructions, and my questions have been answered. I have discussed any challenges I see with this plan with the nurse or doctor.    ..........................................................................................................................................  Patient/Patient Representative Signature      ..........................................................................................................................................  Patient Representative Print Name and Relationship to Patient    ..................................................               ................................................  Date                                            Time    ..........................................................................................................................................  Reviewed by Signature/Title    ...................................................              ..............................................  Date                                                            Time

## 2017-10-03 ENCOUNTER — HOSPITAL ENCOUNTER (EMERGENCY)
Facility: CLINIC | Age: 37
Discharge: HOME | End: 2017-10-03
Attending: EMERGENCY MEDICINE | Admitting: EMERGENCY MEDICINE
Payer: COMMERCIAL

## 2017-10-03 ENCOUNTER — APPOINTMENT (OUTPATIENT)
Dept: GENERAL RADIOLOGY | Facility: CLINIC | Age: 37
End: 2017-10-03
Attending: EMERGENCY MEDICINE
Payer: COMMERCIAL

## 2017-10-03 VITALS
OXYGEN SATURATION: 100 % | RESPIRATION RATE: 29 BRPM | BODY MASS INDEX: 27.34 KG/M2 | SYSTOLIC BLOOD PRESSURE: 141 MMHG | WEIGHT: 193.3 LBS | TEMPERATURE: 98.6 F | DIASTOLIC BLOOD PRESSURE: 80 MMHG

## 2017-10-03 DIAGNOSIS — R10.13 ABDOMINAL PAIN, EPIGASTRIC: ICD-10-CM

## 2017-10-03 LAB
ALBUMIN SERPL-MCNC: 3.5 G/DL (ref 3.4–5)
ALP SERPL-CCNC: 69 U/L (ref 40–150)
ALT SERPL W P-5'-P-CCNC: 19 U/L (ref 0–70)
ANION GAP SERPL CALCULATED.3IONS-SCNC: 7 MMOL/L (ref 3–14)
AST SERPL W P-5'-P-CCNC: 14 U/L (ref 0–45)
BASOPHILS # BLD AUTO: 0 10E9/L (ref 0–0.2)
BASOPHILS NFR BLD AUTO: 0.2 %
BILIRUB SERPL-MCNC: 0.6 MG/DL (ref 0.2–1.3)
BUN SERPL-MCNC: 12 MG/DL (ref 7–30)
CALCIUM SERPL-MCNC: 8.7 MG/DL (ref 8.5–10.1)
CHLORIDE SERPL-SCNC: 107 MMOL/L (ref 94–109)
CO2 SERPL-SCNC: 25 MMOL/L (ref 20–32)
CREAT SERPL-MCNC: 0.99 MG/DL (ref 0.66–1.25)
D DIMER PPP FEU-MCNC: 0.4 UG/ML FEU (ref 0–0.5)
DIFFERENTIAL METHOD BLD: ABNORMAL
EOSINOPHIL # BLD AUTO: 0.1 10E9/L (ref 0–0.7)
EOSINOPHIL NFR BLD AUTO: 1.3 %
ERYTHROCYTE [DISTWIDTH] IN BLOOD BY AUTOMATED COUNT: 15.2 % (ref 10–15)
GFR SERPL CREATININE-BSD FRML MDRD: 84 ML/MIN/1.7M2
GLUCOSE SERPL-MCNC: 101 MG/DL (ref 70–99)
HCT VFR BLD AUTO: 44.2 % (ref 40–53)
HGB BLD-MCNC: 14.6 G/DL (ref 13.3–17.7)
IMM GRANULOCYTES # BLD: 0 10E9/L (ref 0–0.4)
IMM GRANULOCYTES NFR BLD: 0.2 %
LIPASE SERPL-CCNC: 91 U/L (ref 73–393)
LYMPHOCYTES # BLD AUTO: 1.2 10E9/L (ref 0.8–5.3)
LYMPHOCYTES NFR BLD AUTO: 12 %
MCH RBC QN AUTO: 25.7 PG (ref 26.5–33)
MCHC RBC AUTO-ENTMCNC: 33 G/DL (ref 31.5–36.5)
MCV RBC AUTO: 78 FL (ref 78–100)
MONOCYTES # BLD AUTO: 0.7 10E9/L (ref 0–1.3)
MONOCYTES NFR BLD AUTO: 7.1 %
NEUTROPHILS # BLD AUTO: 8 10E9/L (ref 1.6–8.3)
NEUTROPHILS NFR BLD AUTO: 79.2 %
NRBC # BLD AUTO: 0 10*3/UL
NRBC BLD AUTO-RTO: 0 /100
PLATELET # BLD AUTO: 249 10E9/L (ref 150–450)
POTASSIUM SERPL-SCNC: 4 MMOL/L (ref 3.4–5.3)
PROT SERPL-MCNC: 7.2 G/DL (ref 6.8–8.8)
RBC # BLD AUTO: 5.67 10E12/L (ref 4.4–5.9)
SODIUM SERPL-SCNC: 139 MMOL/L (ref 133–144)
TROPONIN I SERPL-MCNC: <0.015 UG/L (ref 0–0.04)
WBC # BLD AUTO: 10.1 10E9/L (ref 4–11)

## 2017-10-03 PROCEDURE — 83690 ASSAY OF LIPASE: CPT | Performed by: EMERGENCY MEDICINE

## 2017-10-03 PROCEDURE — 71020 XR CHEST 2 VW: CPT

## 2017-10-03 PROCEDURE — 96374 THER/PROPH/DIAG INJ IV PUSH: CPT | Performed by: EMERGENCY MEDICINE

## 2017-10-03 PROCEDURE — 85025 COMPLETE CBC W/AUTO DIFF WBC: CPT | Performed by: EMERGENCY MEDICINE

## 2017-10-03 PROCEDURE — 99285 EMERGENCY DEPT VISIT HI MDM: CPT | Mod: 25 | Performed by: EMERGENCY MEDICINE

## 2017-10-03 PROCEDURE — 25000128 H RX IP 250 OP 636: Performed by: EMERGENCY MEDICINE

## 2017-10-03 PROCEDURE — 93010 ELECTROCARDIOGRAM REPORT: CPT | Mod: Z6 | Performed by: EMERGENCY MEDICINE

## 2017-10-03 PROCEDURE — 80053 COMPREHEN METABOLIC PANEL: CPT | Performed by: EMERGENCY MEDICINE

## 2017-10-03 PROCEDURE — 85379 FIBRIN DEGRADATION QUANT: CPT | Performed by: EMERGENCY MEDICINE

## 2017-10-03 PROCEDURE — 84484 ASSAY OF TROPONIN QUANT: CPT | Performed by: EMERGENCY MEDICINE

## 2017-10-03 PROCEDURE — 25000125 ZZHC RX 250: Performed by: EMERGENCY MEDICINE

## 2017-10-03 PROCEDURE — 25000132 ZZH RX MED GY IP 250 OP 250 PS 637: Performed by: EMERGENCY MEDICINE

## 2017-10-03 PROCEDURE — 93005 ELECTROCARDIOGRAM TRACING: CPT | Performed by: EMERGENCY MEDICINE

## 2017-10-03 PROCEDURE — 99284 EMERGENCY DEPT VISIT MOD MDM: CPT | Mod: 25 | Performed by: EMERGENCY MEDICINE

## 2017-10-03 PROCEDURE — 96361 HYDRATE IV INFUSION ADD-ON: CPT | Performed by: EMERGENCY MEDICINE

## 2017-10-03 RX ORDER — KETOROLAC TROMETHAMINE 30 MG/ML
30 INJECTION, SOLUTION INTRAMUSCULAR; INTRAVENOUS ONCE
Status: COMPLETED | OUTPATIENT
Start: 2017-10-03 | End: 2017-10-03

## 2017-10-03 RX ORDER — SIMETHICONE 80 MG
80 TABLET,CHEWABLE ORAL ONCE
Status: COMPLETED | OUTPATIENT
Start: 2017-10-03 | End: 2017-10-03

## 2017-10-03 RX ORDER — SODIUM CHLORIDE 9 MG/ML
1000 INJECTION, SOLUTION INTRAVENOUS CONTINUOUS
Status: DISCONTINUED | OUTPATIENT
Start: 2017-10-03 | End: 2017-10-03 | Stop reason: HOSPADM

## 2017-10-03 RX ADMIN — LIDOCAINE HYDROCHLORIDE 30 ML: 20 SOLUTION ORAL; TOPICAL at 12:05

## 2017-10-03 RX ADMIN — KETOROLAC TROMETHAMINE 30 MG: 30 INJECTION, SOLUTION INTRAMUSCULAR at 12:01

## 2017-10-03 RX ADMIN — SODIUM CHLORIDE 1000 ML: 9 INJECTION, SOLUTION INTRAVENOUS at 12:00

## 2017-10-03 RX ADMIN — SIMETHICONE CHEW TAB 80 MG 80 MG: 80 TABLET ORAL at 12:45

## 2017-10-03 ASSESSMENT — ENCOUNTER SYMPTOMS
NAUSEA: 0
SHORTNESS OF BREATH: 1
VOMITING: 0
COUGH: 0

## 2017-10-03 NOTE — DISCHARGE INSTRUCTIONS
Your blood work and your xray is normal.   Take over the counter maalox or myalanta for discomfort.   Please make an appointment to follow up with Your Primary Care Provider and [facility] (phone: [phone]) in 3-5 days even if entirely better.      Epigastric Pain (Uncertain Cause)     Epigastric pain can be a sign of disease in the upper abdomen. Common causes include:    Acid reflux (stomach acid flowing up into the esophagus)    Gastritis (irritation of the stomach lining)    Peptic Ulcer Disease    Inflammation of the pancreas    Gallstone    Infection in the gallbladder  Pain may be dull or burning. It may spread upward to the chest or to the back. There may be other symptoms such as belching, bloating, cramps or hunger pains. There may be weight loss or poor appetite, nausea or vomiting.  Since the diagnosis of your pain is not certain yet, further tests may sometimes be needed. Sometimes the doctor will treat you for the most likely condition to see if there is improvement before doing further tests.  Home care  Medicines    Antacids help neutralize the normal acids in your stomach. Examples are Maalox, Mylanta, Rolaids, and Tums. If you don t like the liquid, you can also try a chewable one. You may find one works better than another for you. Overuse can cause diarrhea or constipation.    Acid blockers (H2 blockers) decrease acid production. Examples are cimetidine (Tagamet), famotidine (Pepcid) and ranitidine (Zantac).    Acid inhibitors (PPIs) decrease acid production in a different way than the blockers. You may find they work better, but can take a little longer to take effect.  Examples are omeprazole (Prilosec), lansoprazole (Prevacid), pantoprazole (Protonix), rabeprazole (Aciphex), and esomeprazole (Nexium).    Take an antacid 30-60 minutes after eating and at bedtime, but not at the same time as an acid blocker.    Try not to take NSAIDs. Aspirin may also cause  problems, but if taking it for your heart or other medical reasons, talk to your doctor before stopping it; you do not want to cause a worse problem, like a heart attack or stroke.  Diet    If certain foods seem to cause your spasm, try to avoid them.     Eat slowly and chew food well before swallowing. Symptoms of gastritis can be worsened by certain foods. Limit or avoid fatty, fried, and spicy foods, as well as coffee, chocolate, mint, and foods with high acid content such as tomatoes and citrus fruit and juices (orange, grapefruit, lemon).    Avoid alcohol, caffeine, and tobacco, which can delay healing and worsen your problem.    Try eating smaller meals with snacks in between  Follow-up care  Follow up with your healthcare provider or as advised.  When to seek medical advice  Call your healthcare provider right away if any of the following occur:    Stomach pain worsens or moves to the right lower part of the abdomen    Chest pain appears, or if it worsens or spreads to the chest, back, neck, shoulder, or arm    Frequent vomiting (can t keep down liquids)    Blood in the stool or vomit (red or black color)    Feeling weak or dizzy, fainting, or having trouble breathing    Fever of 100.4 F (38 C) or higher, or as directed by your healthcare provider    Abdominal swelling  Date Last Reviewed: 9/25/2015 2000-2017 [employer]. [address] [place] [address], [place] [address]. All rights reserved. This information is not intended as a substitute for professional medical care. Always follow your healthcare professional's instructions.

## 2017-10-03 NOTE — ED AVS SNAPSHOT
[facility], [place], Emergency Department    [address]    [facility] [address]    Phone:  [phone]    Fax:  [phone]                                       Tonio Alarcon   MRN: [id number]    Department:  [facility], Emergency Department   Date of Visit:  10/3/2017           After Visit Summary Signature Page     I have received my discharge instructions, and my questions have been answered. I have discussed any challenges I see with this plan with the nurse or doctor.    ..........................................................................................................................................  Patient/Patient Representative Signature      ..........................................................................................................................................  Patient Representative Print Name and Relationship to Patient    ..................................................               ................................................  Date                                            Time    ..........................................................................................................................................  Reviewed by Signature/Title    ...................................................              ..............................................  Date                                                            Time

## 2017-10-03 NOTE — ED PROVIDER NOTES
[facility] EMERGENCY DEPARTMENT ([facility])    10/03/17       History     Chief Complaint   Patient presents with     Chest Pain     Intermittent chest pain for months, started getting worse yesterday, midsternal pain     The history is provided by the patient and medical records.     Tonio Alarcon is a 36 year old male who presents to the Emergency Department for evaluation of chest pain. He has a history of hemorrhoid surgery. Patient reports intermittent chest pain for the past couple months, worsening since yesterday. He notes that the pain starts in the middle of his chest and radiates across the chest. He denies nausea or vomiting. He denies recent cough or cold symptoms. He has been eating normally. Patient reports no bowel movements since yesterday. He has some shortness of breath here in the ED, which is worse when laying on his back. He has pain when sitting up. Patient is a current every day smoker (1 pack/day for past 25 years). Patient admits to alcohol use.     No past medical history on file.    Past Surgical History:   Procedure Laterality Date     GI SURGERY      Hemorrhoids      HEMORRHOID SURGERY  2015       Family History   Problem Relation Age of Onset     DIABETES Mother      Breast Cancer Mother      Dx at 61 years aol     Coronary Artery Disease Mother      3v CABG     CANCER Father      Stomach ca - Dx at 57 years old and passed away from this     Cancer - colorectal Maternal Grandmother      DIABETES Brother        Social History   Substance Use Topics     Smoking status: Current Every Day Smoker     Packs/day: 1.00     Years: 25.00     Smokeless tobacco: Never Used     Alcohol use Yes      Comment: heavy drinking about 2 x per week       No current facility-administered medications for this encounter.      Current Outpatient Prescriptions   Medication     VITAMIN D, CHOLECALCIFEROL, PO     methylPREDNISolone (MEDROL DOSEPAK) 4 MG tablet     oxyCODONE (ROXICODONE) 5 MG IR  tablet     ibuprofen (ADVIL/MOTRIN) 600 MG tablet      No Known Allergies      I have reviewed the Medications, Allergies, Past Medical and Surgical History, and Social History in the Epic system.    Review of Systems   Respiratory: Positive for shortness of breath. Negative for cough.    Cardiovascular: Positive for chest pain.   Gastrointestinal: Negative for nausea and vomiting.   All other systems reviewed and are negative.      Physical Exam   BP: 118/74  Heart Rate: 89  Temp: 98.6  F (37  C)  Resp: 16  Weight: 87.7 kg (193 lb 4.8 oz)  SpO2: 100 %  Physical Exam   Constitutional: He is oriented to person, place, and time. He appears well-developed and well-nourished.   HENT:   Head: Normocephalic and atraumatic.   Neck: Normal range of motion. Neck supple.   Cardiovascular: Normal rate, regular rhythm and normal heart sounds.    Pulmonary/Chest: Effort normal. No respiratory distress. He has no wheezes.   Abdominal: Soft. He exhibits no distension. There is no tenderness. There is no rebound.   Neurological: He is alert and oriented to person, place, and time. No cranial nerve deficit. Coordination normal.   Skin: Skin is warm.   Psychiatric: He has a normal mood and affect. His behavior is normal. Thought content normal.       ED Course   10:49 AM  The patient was seen and examined by Merna Mao MD in Room 19.     ED Course     Procedures             EKG Interpretation:      Interpreted by Merna James  Time reviewed: 1022  Symptoms at time of EKG: pain   Rhythm: normal sinus   Rate: normal  Axis: normal  Ectopy: none  Conduction: normal  ST Segments/ T Waves: No ST-T wave changes  Q Waves: none  Comparison to prior: Unchanged    Clinical Impression: normal EKG          Results for orders placed or performed during the hospital encounter of 10/03/17 (from the past 24 hour(s))   CBC with platelets differential   Result Value Ref Range    WBC 10.1 4.0 - 11.0 10e9/L    RBC Count 5.67 4.4 - 5.9 10e12/L     Hemoglobin 14.6 13.3 - 17.7 g/dL    Hematocrit 44.2 40.0 - 53.0 %    MCV 78 78 - 100 fl    MCH 25.7 (L) 26.5 - 33.0 pg    MCHC 33.0 31.5 - 36.5 g/dL    RDW 15.2 (H) 10.0 - 15.0 %    Platelet Count 249 150 - 450 10e9/L    Diff Method Automated Method     % Neutrophils 79.2 %    % Lymphocytes 12.0 %    % Monocytes 7.1 %    % Eosinophils 1.3 %    % Basophils 0.2 %    % Immature Granulocytes 0.2 %    Nucleated RBCs 0 0 /100    Absolute Neutrophil 8.0 1.6 - 8.3 10e9/L    Absolute Lymphocytes 1.2 0.8 - 5.3 10e9/L    Absolute Monocytes 0.7 0.0 - 1.3 10e9/L    Absolute Eosinophils 0.1 0.0 - 0.7 10e9/L    Absolute Basophils 0.0 0.0 - 0.2 10e9/L    Abs Immature Granulocytes 0.0 0 - 0.4 10e9/L    Absolute Nucleated RBC 0.0    Comprehensive metabolic panel   Result Value Ref Range    Sodium 139 133 - 144 mmol/L    Potassium 4.0 3.4 - 5.3 mmol/L    Chloride 107 94 - 109 mmol/L    Carbon Dioxide 25 20 - 32 mmol/L    Anion Gap 7 3 - 14 mmol/L    Glucose 101 (H) 70 - 99 mg/dL    Urea Nitrogen 12 7 - 30 mg/dL    Creatinine 0.99 0.66 - 1.25 mg/dL    GFR Estimate 84 >60 mL/min/1.7m2    GFR Estimate If Black >90 >60 mL/min/1.7m2    Calcium 8.7 8.5 - 10.1 mg/dL    Bilirubin Total 0.6 0.2 - 1.3 mg/dL    Albumin 3.5 3.4 - 5.0 g/dL    Protein Total 7.2 6.8 - 8.8 g/dL    Alkaline Phosphatase 69 40 - 150 U/L    ALT 19 0 - 70 U/L    AST 14 0 - 45 U/L   Lipase   Result Value Ref Range    Lipase 91 73 - 393 U/L   Troponin I   Result Value Ref Range    Troponin I ES <0.015 0.000 - 0.045 ug/L   D dimer quantitative   Result Value Ref Range    D Dimer 0.4 0.0 - 0.50 ug/ml FEU   XR Chest 2 Views    Narrative    XR CHEST 2 VW  10/3/2017 11:27 AM    HISTORY:  chest pain    COMPARISON:  None.      Impression    IMPRESSION:  Negative.     MELISSA TANG MD     Medications   0.9% sodium chloride BOLUS (0 mLs Intravenous Stopped 10/3/17 1248)   ketorolac (TORADOL) injection 30 mg (30 mg Intravenous Given 10/3/17 1201)   lidocaine (viscous) (XYLOCAINE)  2 % 15 mL, alum & mag hydroxide-simethicone (MYLANTA ES/MAALOX  ES) 15 mL GI Cocktail (30 mLs Oral Given 10/3/17 1205)   simethicone (MYLICON) chewable tablet 80 mg (80 mg Oral Given 10/3/17 1245)         Assessments & Plan (with Medical Decision Making)   36-year-old male who presented to the ER complaining about pain all over the front of his body. Patient states it started from the base of his neck and went all the way to his epigastric area.  Patient states he has had pain similar and has felt like gas, but he is unable to make the symptoms go away. Patient had an EKG that was stable.  I obtained labs including a CBC, troponin, and D-dimer that were all negative.  Patient s chest x-ray was negative.  I feel that the patient s pain is likely from gastritis versus gas discomfort.  It does not seem like he has pain from ACS.  Patient was given a GI cocktail and felt somewhat better.  Patient will be discharged home in stable condition.    This part of the document was transcribed by Nhung Cooper, Medical Scribe.      I have reviewed the nursing notes.    I have reviewed the findings, diagnosis, plan and need for follow up with the patient.    Discharge Medication List as of 10/3/2017 12:49 PM          Final diagnoses:   Abdominal pain, epigastric   INilda, am serving as a trained medical scribe to document services personally performed by Merna James MD, based on the provider's statements to me.      I, Merna James MD, was physically present and have reviewed and verified the accuracy of this note documented by Nilda Peraza.         10/3/2017   [facility], EMERGENCY DEPARTMENT     Merna James MD  10/03/17 4830

## 2017-10-03 NOTE — ED AVS SNAPSHOT
[facility], Emergency Department    [address]    [place] [address]    Phone:  [phone]    Fax:  [phone]                                       Tonio Alarcon   MRN: [id number]    Department:  [facility], Emergency Department   Date of Visit:  10/3/2017           Patient Information     Date Of Birth          1980        Your diagnoses for this visit were:     Abdominal pain, epigastric        You were seen by Merna James MD.        Discharge Instructions           Your blood work and your xray is normal.   Take over the counter maalox or myalanta for discomfort.   Please make an appointment to follow up with Your Primary Care Provider and [facility] (phone: [phone]) in 3-5 days even if entirely better.      Epigastric Pain (Uncertain Cause)     Epigastric pain can be a sign of disease in the upper abdomen. Common causes include:    Acid reflux (stomach acid flowing up into the esophagus)    Gastritis (irritation of the stomach lining)    Peptic Ulcer Disease    Inflammation of the pancreas    Gallstone    Infection in the gallbladder  Pain may be dull or burning. It may spread upward to the chest or to the back. There may be other symptoms such as belching, bloating, cramps or hunger pains. There may be weight loss or poor appetite, nausea or vomiting.  Since the diagnosis of your pain is not certain yet, further tests may sometimes be needed. Sometimes the doctor will treat you for the most likely condition to see if there is improvement before doing further tests.  Home care  Medicines    Antacids help neutralize the normal acids in your stomach. Examples are Maalox, Mylanta, Rolaids, and Tums. If you don t like the liquid, you can also try a chewable one. You may find one works better than another for you. Overuse can cause diarrhea or constipation.    Acid blockers (H2 blockers) decrease acid production. Examples are cimetidine  (Tagamet), famotidine (Pepcid) and ranitidine (Zantac).    Acid inhibitors (PPIs) decrease acid production in a different way than the blockers. You may find they work better, but can take a little longer to take effect.  Examples are omeprazole (Prilosec), lansoprazole (Prevacid), pantoprazole (Protonix), rabeprazole (Aciphex), and esomeprazole (Nexium).    Take an antacid 30-60 minutes after eating and at bedtime, but not at the same time as an acid blocker.    Try not to take NSAIDs. Aspirin may also cause problems, but if taking it for your heart or other medical reasons, talk to your doctor before stopping it; you do not want to cause a worse problem, like a heart attack or stroke.  Diet    If certain foods seem to cause your spasm, try to avoid them.     Eat slowly and chew food well before swallowing. Symptoms of gastritis can be worsened by certain foods. Limit or avoid fatty, fried, and spicy foods, as well as coffee, chocolate, mint, and foods with high acid content such as tomatoes and citrus fruit and juices (orange, grapefruit, lemon).    Avoid alcohol, caffeine, and tobacco, which can delay healing and worsen your problem.    Try eating smaller meals with snacks in between  Follow-up care  Follow up with your healthcare provider or as advised.  When to seek medical advice  Call your healthcare provider right away if any of the following occur:    Stomach pain worsens or moves to the right lower part of the abdomen    Chest pain appears, or if it worsens or spreads to the chest, back, neck, shoulder, or arm    Frequent vomiting (can t keep down liquids)    Blood in the stool or vomit (red or black color)    Feeling weak or dizzy, fainting, or having trouble breathing    Fever of 100.4 F (38 C) or higher, or as directed by your healthcare provider    Abdominal swelling  Date Last Reviewed: 9/25/2015 2000-2017 [employer]. [address] [place] [address], [place] [address]. All rights reserved.  This information is not intended as a substitute for professional medical care. Always follow your healthcare professional's instructions.          24 Hour Appointment Hotline       To make an appointment at any [facility], call [phone] ([phone]). If you don't have a family doctor or clinic, we will help you find one. [facility] clinics are conveniently located to serve the needs of you and your family.             Review of your medicines      Our records show that you are taking the medicines listed below. If these are incorrect, please call your family doctor or clinic.        Dose / Directions Last dose taken    ibuprofen 600 MG tablet   Commonly known as:  ADVIL/MOTRIN   Dose:  600 mg   Quantity:  30 tablet        Take 1 tablet (600 mg) by mouth every 6 hours as needed for moderate pain   Refills:  1        methylPREDNISolone 4 MG tablet   Commonly known as:  MEDROL DOSEPAK   Quantity:  21 tablet        Follow package instructions   Refills:  0        oxyCODONE 5 MG IR tablet   Commonly known as:  ROXICODONE   Dose:  5 mg   Quantity:  20 tablet        Take 1 tablet (5 mg) by mouth every 6 hours as needed for pain   Refills:  0        VITAMIN D (CHOLECALCIFEROL) PO        Take by mouth once a week   Refills:  0                Procedures and tests performed during your visit     CBC with platelets differential    Comprehensive metabolic panel    D dimer quantitative    EKG 12 lead    Lipase    Troponin I    XR Chest 2 Views      Orders Needing Specimen Collection     Ordered          10/03/17 1059  UA with Microscopic - STAT, [facility]: STAT, Needs to be Collected     Scheduled Task Status   10/03/17 1059 Collect UA with Microscopic Open   Order Class:  PCU Collect                  Pending Results     No orders found from 10/1/2017 to 10/4/2017.            Pending Culture Results     No orders found from 10/1/2017 to 10/4/2017.            Pending Results Instructions     If you had any lab results  "that were not finalized at the time of your Discharge, you can call the ED Lab Result RN at [phone]. You will be contacted by this team for any positive Lab results or changes in treatment. The nurses are available 7 days a week from 10A to 6:30P.  You can leave a message 24 hours per day and they will return your call.        Thank you for choosing [facility]       Thank you for choosing [facility] for your care. Our goal is always to provide you with excellent care. Hearing back from our patients is one way we can continue to improve our services. Please take a few minutes to complete the written survey that you may receive in the mail after you visit with us. Thank you!        [email or web address] Information     [facility] lets you send messages to your doctor, view your test results, renew your prescriptions, schedule appointments and more. To sign up, go to [email or web address] . Click on \"Log in\" on the left side of the screen, which will take you to the Welcome page. Then click on \"Sign up Now\" on the right side of the page.     You will be asked to enter the access code listed below, as well as some personal information. Please follow the directions to create your username and password.     Your access code is: [email or web address]  Expires: 2018 12:48 PM     Your access code will  in 90 days. If you need help or a new code, please call your [facility] clinic or [phone].        Care EveryWhere ID     This is your Care EveryWhere ID. This could be used by other organizations to access your [facility] medical records  [id number]        Equal Access to Services     DAFNE SHAW : Hadii balta philippe Somaynor, waaxda luqadaha, qaybta kaalmacarlyn mason . So [facility] [phone].    ATENCIÓN: Si habla español, tiene a velazco disposición servicios gratuitos de asistencia lingüística. Llame al [phone].    We comply with applicable federal civil rights laws and [place] laws. We " do not discriminate on the basis of race, color, national origin, age, disability, sex, sexual orientation, or gender identity.            After Visit Summary       This is your record. Keep this with you and show to your community pharmacist(s) and doctor(s) at your next visit.

## 2017-10-04 LAB — INTERPRETATION ECG - MUSE: NORMAL

## 2017-10-11 ENCOUNTER — HOSPITAL ENCOUNTER (INPATIENT)
Facility: CLINIC | Age: 37
LOS: 3 days | Discharge: HOME | DRG: 331 | End: 2017-10-15
Attending: EMERGENCY MEDICINE | Admitting: SURGERY
Payer: COMMERCIAL

## 2017-10-11 ENCOUNTER — ANESTHESIA EVENT (OUTPATIENT)
Dept: SURGERY | Facility: CLINIC | Age: 37
DRG: 331 | End: 2017-10-11
Payer: COMMERCIAL

## 2017-10-11 ENCOUNTER — APPOINTMENT (OUTPATIENT)
Dept: CT IMAGING | Facility: CLINIC | Age: 37
DRG: 331 | End: 2017-10-11
Attending: EMERGENCY MEDICINE
Payer: COMMERCIAL

## 2017-10-11 ENCOUNTER — ANESTHESIA (OUTPATIENT)
Dept: SURGERY | Facility: CLINIC | Age: 37
DRG: 331 | End: 2017-10-11
Payer: COMMERCIAL

## 2017-10-11 DIAGNOSIS — R10.31 ABDOMINAL PAIN, RIGHT LOWER QUADRANT: ICD-10-CM

## 2017-10-11 DIAGNOSIS — K27.5 PERFORATED PEPTIC ULCER (H): ICD-10-CM

## 2017-10-11 LAB
ALBUMIN SERPL-MCNC: 3.6 G/DL (ref 3.4–5)
ALBUMIN UR-MCNC: 10 MG/DL
ALP SERPL-CCNC: 76 U/L (ref 40–150)
ALT SERPL W P-5'-P-CCNC: 23 U/L (ref 0–70)
ANION GAP SERPL CALCULATED.3IONS-SCNC: 8 MMOL/L (ref 3–14)
APPEARANCE UR: CLEAR
AST SERPL W P-5'-P-CCNC: 21 U/L (ref 0–45)
BACTERIA #/AREA URNS HPF: ABNORMAL /HPF
BASOPHILS # BLD AUTO: 0 10E9/L (ref 0–0.2)
BASOPHILS NFR BLD AUTO: 0.1 %
BILIRUB SERPL-MCNC: 0.7 MG/DL (ref 0.2–1.3)
BILIRUB UR QL STRIP: NEGATIVE
BUN SERPL-MCNC: 12 MG/DL (ref 7–30)
CALCIUM SERPL-MCNC: 8.9 MG/DL (ref 8.5–10.1)
CHLORIDE SERPL-SCNC: 106 MMOL/L (ref 94–109)
CO2 SERPL-SCNC: 24 MMOL/L (ref 20–32)
COLOR UR AUTO: YELLOW
CREAT SERPL-MCNC: 0.92 MG/DL (ref 0.66–1.25)
DIFFERENTIAL METHOD BLD: ABNORMAL
EOSINOPHIL # BLD AUTO: 0.1 10E9/L (ref 0–0.7)
EOSINOPHIL NFR BLD AUTO: 0.7 %
ERYTHROCYTE [DISTWIDTH] IN BLOOD BY AUTOMATED COUNT: 14.5 % (ref 10–15)
GFR SERPL CREATININE-BSD FRML MDRD: >90 ML/MIN/1.7M2
GLUCOSE SERPL-MCNC: 101 MG/DL (ref 70–99)
GLUCOSE UR STRIP-MCNC: NEGATIVE MG/DL
HCT VFR BLD AUTO: 43.6 % (ref 40–53)
HGB BLD-MCNC: 14.6 G/DL (ref 13.3–17.7)
HGB UR QL STRIP: NEGATIVE
IMM GRANULOCYTES # BLD: 0 10E9/L (ref 0–0.4)
IMM GRANULOCYTES NFR BLD: 0.3 %
KETONES UR STRIP-MCNC: NEGATIVE MG/DL
LEUKOCYTE ESTERASE UR QL STRIP: ABNORMAL
LIPASE SERPL-CCNC: 327 U/L (ref 73–393)
LYMPHOCYTES # BLD AUTO: 1.8 10E9/L (ref 0.8–5.3)
LYMPHOCYTES NFR BLD AUTO: 13.5 %
MCH RBC QN AUTO: 25.7 PG (ref 26.5–33)
MCHC RBC AUTO-ENTMCNC: 33.5 G/DL (ref 31.5–36.5)
MCV RBC AUTO: 77 FL (ref 78–100)
MONOCYTES # BLD AUTO: 0.8 10E9/L (ref 0–1.3)
MONOCYTES NFR BLD AUTO: 5.8 %
MUCOUS THREADS #/AREA URNS LPF: PRESENT /LPF
NEUTROPHILS # BLD AUTO: 10.8 10E9/L (ref 1.6–8.3)
NEUTROPHILS NFR BLD AUTO: 79.6 %
NITRATE UR QL: NEGATIVE
NRBC # BLD AUTO: 0 10*3/UL
NRBC BLD AUTO-RTO: 0 /100
PH UR STRIP: 7 PH (ref 5–7)
PLATELET # BLD AUTO: 298 10E9/L (ref 150–450)
POTASSIUM SERPL-SCNC: 3.8 MMOL/L (ref 3.4–5.3)
PROT SERPL-MCNC: 7.8 G/DL (ref 6.8–8.8)
RADIOLOGIST FLAGS: ABNORMAL
RBC # BLD AUTO: 5.67 10E12/L (ref 4.4–5.9)
RBC #/AREA URNS AUTO: 1 /HPF (ref 0–2)
SODIUM SERPL-SCNC: 138 MMOL/L (ref 133–144)
SOURCE: ABNORMAL
SP GR UR STRIP: 1.04 (ref 1–1.03)
SQUAMOUS #/AREA URNS AUTO: 2 /HPF (ref 0–1)
UROBILINOGEN UR STRIP-MCNC: 2 MG/DL (ref 0–2)
WBC # BLD AUTO: 13.6 10E9/L (ref 4–11)
WBC #/AREA URNS AUTO: 13 /HPF (ref 0–2)

## 2017-10-11 PROCEDURE — 87040 BLOOD CULTURE FOR BACTERIA: CPT | Performed by: EMERGENCY MEDICINE

## 2017-10-11 PROCEDURE — 25000566 ZZH SEVOFLURANE, EA 15 MIN: Performed by: SURGERY

## 2017-10-11 PROCEDURE — 87086 URINE CULTURE/COLONY COUNT: CPT | Performed by: EMERGENCY MEDICINE

## 2017-10-11 PROCEDURE — 86901 BLOOD TYPING SEROLOGIC RH(D): CPT | Performed by: ANESTHESIOLOGY

## 2017-10-11 PROCEDURE — 37000008 ZZH ANESTHESIA TECHNICAL FEE, 1ST 30 MIN: Performed by: SURGERY

## 2017-10-11 PROCEDURE — 86850 RBC ANTIBODY SCREEN: CPT | Performed by: ANESTHESIOLOGY

## 2017-10-11 PROCEDURE — 25000125 ZZHC RX 250: Performed by: EMERGENCY MEDICINE

## 2017-10-11 PROCEDURE — 85025 COMPLETE CBC W/AUTO DIFF WBC: CPT | Performed by: EMERGENCY MEDICINE

## 2017-10-11 PROCEDURE — 99285 EMERGENCY DEPT VISIT HI MDM: CPT | Mod: 25

## 2017-10-11 PROCEDURE — 83690 ASSAY OF LIPASE: CPT | Performed by: EMERGENCY MEDICINE

## 2017-10-11 PROCEDURE — 86900 BLOOD TYPING SEROLOGIC ABO: CPT | Performed by: ANESTHESIOLOGY

## 2017-10-11 PROCEDURE — 96361 HYDRATE IV INFUSION ADD-ON: CPT

## 2017-10-11 PROCEDURE — 25000128 H RX IP 250 OP 636: Performed by: EMERGENCY MEDICINE

## 2017-10-11 PROCEDURE — 96375 TX/PRO/DX INJ NEW DRUG ADDON: CPT | Mod: 59

## 2017-10-11 PROCEDURE — 0DU907Z SUPPLEMENT DUODENUM WITH AUTOLOGOUS TISSUE SUBSTITUTE, OPEN APPROACH: ICD-10-PCS | Performed by: SURGERY

## 2017-10-11 PROCEDURE — 74177 CT ABD & PELVIS W/CONTRAST: CPT

## 2017-10-11 PROCEDURE — 96374 THER/PROPH/DIAG INJ IV PUSH: CPT | Mod: 59

## 2017-10-11 PROCEDURE — 99285 EMERGENCY DEPT VISIT HI MDM: CPT | Mod: Z6 | Performed by: EMERGENCY MEDICINE

## 2017-10-11 PROCEDURE — 25000128 H RX IP 250 OP 636

## 2017-10-11 PROCEDURE — 71000015 ZZH RECOVERY PHASE 1 LEVEL 2 EA ADDTL HR: Performed by: SURGERY

## 2017-10-11 PROCEDURE — 81001 URINALYSIS AUTO W/SCOPE: CPT | Performed by: EMERGENCY MEDICINE

## 2017-10-11 PROCEDURE — 96376 TX/PRO/DX INJ SAME DRUG ADON: CPT

## 2017-10-11 PROCEDURE — 40000170 ZZH STATISTIC PRE-PROCEDURE ASSESSMENT II: Performed by: SURGERY

## 2017-10-11 PROCEDURE — 37000009 ZZH ANESTHESIA TECHNICAL FEE, EACH ADDTL 15 MIN: Performed by: SURGERY

## 2017-10-11 PROCEDURE — 80053 COMPREHEN METABOLIC PANEL: CPT | Performed by: EMERGENCY MEDICINE

## 2017-10-11 PROCEDURE — 36000057 ZZH SURGERY LEVEL 3 1ST 30 MIN - UMMC: Performed by: SURGERY

## 2017-10-11 PROCEDURE — 36000059 ZZH SURGERY LEVEL 3 EA 15 ADDTL MIN UMMC: Performed by: SURGERY

## 2017-10-11 PROCEDURE — 27210794 ZZH OR GENERAL SUPPLY STERILE: Performed by: SURGERY

## 2017-10-11 PROCEDURE — 71000014 ZZH RECOVERY PHASE 1 LEVEL 2 FIRST HR: Performed by: SURGERY

## 2017-10-11 PROCEDURE — 0DJ08ZZ INSPECTION OF UPPER INTESTINAL TRACT, VIA NATURAL OR ARTIFICIAL OPENING ENDOSCOPIC: ICD-10-PCS | Performed by: SURGERY

## 2017-10-11 RX ORDER — ONDANSETRON 2 MG/ML
4 INJECTION INTRAMUSCULAR; INTRAVENOUS EVERY 30 MIN PRN
Status: DISCONTINUED | OUTPATIENT
Start: 2017-10-11 | End: 2017-10-12

## 2017-10-11 RX ORDER — PIPERACILLIN SODIUM, TAZOBACTAM SODIUM 3; .375 G/15ML; G/15ML
3.38 INJECTION, POWDER, LYOPHILIZED, FOR SOLUTION INTRAVENOUS ONCE
Status: COMPLETED | OUTPATIENT
Start: 2017-10-11 | End: 2017-10-12

## 2017-10-11 RX ORDER — SODIUM CHLORIDE 9 MG/ML
INJECTION, SOLUTION INTRAVENOUS
Status: COMPLETED
Start: 2017-10-11 | End: 2017-10-11

## 2017-10-11 RX ORDER — HYDROMORPHONE HYDROCHLORIDE 1 MG/ML
0.5 INJECTION, SOLUTION INTRAMUSCULAR; INTRAVENOUS; SUBCUTANEOUS
Status: DISCONTINUED | OUTPATIENT
Start: 2017-10-11 | End: 2017-10-13

## 2017-10-11 RX ORDER — SODIUM CHLORIDE 9 MG/ML
1000 INJECTION, SOLUTION INTRAVENOUS CONTINUOUS
Status: DISCONTINUED | OUTPATIENT
Start: 2017-10-11 | End: 2017-10-13

## 2017-10-11 RX ORDER — KETOROLAC TROMETHAMINE 30 MG/ML
30 INJECTION, SOLUTION INTRAMUSCULAR; INTRAVENOUS ONCE
Status: DISCONTINUED | OUTPATIENT
Start: 2017-10-11 | End: 2017-10-11

## 2017-10-11 RX ORDER — IOPAMIDOL 755 MG/ML
100 INJECTION, SOLUTION INTRAVASCULAR ONCE
Status: COMPLETED | OUTPATIENT
Start: 2017-10-11 | End: 2017-10-11

## 2017-10-11 RX ADMIN — HYDROMORPHONE HYDROCHLORIDE 0.5 MG: 1 INJECTION, SOLUTION INTRAMUSCULAR; INTRAVENOUS; SUBCUTANEOUS at 21:33

## 2017-10-11 RX ADMIN — SODIUM CHLORIDE 62 ML: 9 INJECTION, SOLUTION INTRAVENOUS at 22:14

## 2017-10-11 RX ADMIN — PIPERACILLIN SODIUM,TAZOBACTAM SODIUM 3.38 G: 3; .375 INJECTION, POWDER, FOR SOLUTION INTRAVENOUS at 23:36

## 2017-10-11 RX ADMIN — SODIUM CHLORIDE 1000 ML: 9 INJECTION, SOLUTION INTRAVENOUS at 21:28

## 2017-10-11 RX ADMIN — IOPAMIDOL 88 ML: 755 INJECTION, SOLUTION INTRAVENOUS at 22:13

## 2017-10-11 RX ADMIN — SODIUM CHLORIDE 1000 ML: 9 INJECTION, SOLUTION INTRAVENOUS at 22:11

## 2017-10-11 RX ADMIN — Medication 1000 ML: at 21:28

## 2017-10-11 RX ADMIN — HYDROMORPHONE HYDROCHLORIDE 0.5 MG: 1 INJECTION, SOLUTION INTRAMUSCULAR; INTRAVENOUS; SUBCUTANEOUS at 23:20

## 2017-10-11 ASSESSMENT — ENCOUNTER SYMPTOMS
SHORTNESS OF BREATH: 0
APPETITE CHANGE: 1
FEVER: 0
ABDOMINAL PAIN: 1
NAUSEA: 0
VOMITING: 0

## 2017-10-11 NOTE — IP AVS SNAPSHOT
[address] [address]    Phone:  [phone]                                       After Visit Summary   10/11/2017    Tonio Alarcon    MRN: [id number]           After Visit Summary Signature Page     I have received my discharge instructions, and my questions have been answered. I have discussed any challenges I see with this plan with the nurse or doctor.    ..........................................................................................................................................  Patient/Patient Representative Signature      ..........................................................................................................................................  Patient Representative Print Name and Relationship to Patient    ..................................................               ................................................  Date                                            Time    ..........................................................................................................................................  Reviewed by Signature/Title    ...................................................              ..............................................  Date                                                            Time

## 2017-10-11 NOTE — IP AVS SNAPSHOT
MRN:[id number]                      After Visit Summary   10/11/2017    Tonio Alarcon    MRN: [id number]           Thank you!     Thank you for choosing [facility] for your care. Our goal is always to provide you with excellent care. Hearing back from our patients is one way we can continue to improve our services. Please take a few minutes to complete the written survey that you may receive in the mail after you visit with us. Thank you!        Patient Information     Date Of Birth          1980        Designated Caregiver       Most Recent Value    Caregiver    Will someone help with your care after discharge? yes    Name of designated caregiver Destiny Alarcon    Phone number of caregiver [phone]    Caregiver address [address], [place]      About your hospital stay     You were admitted on:  October 12, 2017 You last received care in the:  [address] [facility]    You were discharged on:  October 15, 2017        Reason for your hospital stay       Perforated peptic ulcer                  Who to Call     For medical emergencies, please call 911.  For non-urgent questions about your medical care, please call your primary care provider or clinic, [phone]  For questions related to your surgery, please call your surgery clinic        Attending Provider     Provider Specialty    Durga Crane MD Emergency Medicine    KeyesCelio MD General Surgery       Primary Care Provider Office Phone # Fax #    Christiano Palacios [phone]      After Care Instructions     Activity       Your activity upon discharge: no driving while on analgesics.            Diet       Follow this diet upon discharge: Regular            Discharge Instructions       Discharge Instructions  Activity  - No strenuous exercise for 3 weeks.  - No lifting, pushing, pulling more than 15-20 pounds for 3 weeks.   - Do not strain with bowel movements.  - Do not drive  until you can press the brake pedal quickly and fully without pain.   - Do not operate a motor vehicle while taking narcotic pain medications.     Incisions  - You may shower and get incisions wet starting 24 hrs after surgery.  - Do not scrub incisions or submerge wounds (e.g. bath, pool, hot tub, etc.) for 2 weeks or until the glue or steri-strips have come off.  - You may remove wound dressing 24 hours after surgery.   - If purple Dermabond glue was used, avoid applying any lotions or ointments.   - If steri-strips were used, they will fall off on their own.   - The type of wound closure used for your incision:  Staples  - Leave incision open to air.  Cover with gauze only if needed for comfort or to protect clothing from drainage.        Drain Care  - You do not have a drain.  Specific care/instructions:  Not Applicable    Medications  - Do not take any additional Tylenol (acetaminophen) while using a narcotic pain medication which includes acetaminophen.  - Do not take more than 4,000mg of acetaminophen in any 24 hour period, as this can cause liver damage.  - Take stool softeners such as Senna or Miralax while you are using narcotics, but stop if you develop diarrhea.   - Wean yourself off of narcotic pain medications.     Follow-Up:  - Call your primary care provider to touch base regarding your recent admission.     - Call or return sooner than your regularly scheduled visit if you develop any of the following: fever >101.5, uncontrolled pain, uncontrolled nausea or vomiting, as well as increased redness, swelling, or drainage from your wound.   -  A nurse from the General Surgery Clinic will contact you 24 hours, or next business day, after your discharge from the hospital.  If you have questions or to schedule a follow up appointment please call the General Surgery Clinic at [phone].  Call [phone] and ask to speak with the Surgery resident on call if you are having troubles in the evenings, at  "night, or on the weekend.  -  If you are receiving home care please inform your home care nurse of our contact number.                  Follow-up Appointments     Adult [facility] Follow-up and recommended labs and tests       Follow up with primary care provider, Christiano Palacios, within 7 days to evaluate after surgery.  No follow up labs or test are needed, but you will need to continue taking acid-reducing medication for the long-term, and you will need to get your H.pylori testing results (either at Surgery Clinic follow up or at Primary Care follow up).       Appointments on [place] and/or [facility] (with [facility] or [facility] provider or service). Call [phone] if you haven't heard regarding these appointments within 7 days of discharge.                  Pending Results     Date and Time Order Name Status Description    10/12/2017 0701 H Pylori antigen stool In process     10/11/2017 2305 Blood culture Preliminary     10/11/2017 2305 Blood culture Preliminary             Statement of Approval     Ordered          10/15/17 9452  I have reviewed and agree with all the recommendations and orders detailed in this document.  EFFECTIVE NOW     Approved and electronically signed by:  Ely Krishna MD             Admission Information     Date & Time Provider Department Dept. Phone    10/11/2017 Celio Keyes MD [address] [facility] [place] [phone]      Your Vitals Were     Blood Pressure Pulse Temperature Respirations Height Weight    146/84 107 96  F (35.6  C) (Axillary) 16 1.803 m (5' 11\") 86.1 kg (189 lb 14.4 oz)    Pulse Oximetry BMI (Body Mass Index)                99% 26.49 kg/m2          [employer] Information     [employer] lets you send messages to your doctor, view your test results, renew your prescriptions, schedule appointments and more. To sign up, go to [email or web address] . Click on \"Log in\" on the left side of the screen, which will take you to the Welcome page. Then click on \"Sign " "up Now\" on the right side of the page.     You will be asked to enter the access code listed below, as well as some personal information. Please follow the directions to create your username and password.     Your access code is: [email or web address]  Expires: 2018 12:48 PM     Your access code will  in 90 days. If you need help or a new code, please call your [facility] clinic or [phone].        Care EveryWhere ID     This is your Care EveryWhere ID. This could be used by other organizations to access your [facility] medical records  [id number]        Equal Access to Services     [facility]: Hadchantell Arcos, ligia peguero, kacie garcia, carlyn osman . So [facility] [phone].    ATENCIÓN: Si habla español, tiene a velazco disposición servicios gratuitos de asistencia lingüística. Llame al [phone].    We comply with applicable federal civil rights laws and [place] laws. We do not discriminate on the basis of race, color, national origin, age, disability, sex, sexual orientation, or gender identity.               Review of your medicines      START taking        Dose / Directions    amoxicillin-clavulanate 500-125 MG per tablet   Commonly known as:  AUGMENTIN   Used for:  Perforated peptic ulcer (H)        Dose:  1 tablet   Take 1 tablet by mouth 2 times daily for 7 days   Quantity:  14 tablet   Refills:  0       ondansetron 4 MG ODT tab   Commonly known as:  ZOFRAN-ODT   Used for:  Perforated peptic ulcer (H)        Dose:  4 mg   Take 1 tablet (4 mg) by mouth every 6 hours as needed for nausea or vomiting   Quantity:  120 tablet   Refills:  1       pantoprazole 40 MG EC tablet   Commonly known as:  PROTONIX   Used for:  Perforated peptic ulcer (H)        Dose:  40 mg   Take 1 tablet (40 mg) by mouth daily Take 30-60 minutes before a meal.   Quantity:  90 tablet   Refills:  3         CONTINUE these medicines which have NOT CHANGED        Dose / Directions "    vitamin D3 20654 UNITS capsule   Commonly known as:  CHOLECALCIFEROL        Dose:  19716 Units   Take 50,000 Units by mouth every 7 days   Refills:  0            Where to get your medicines      These medications were sent to [facility] - [place], [place] - [address]  [address], [facility] [address]     Phone:  [phone]     amoxicillin-clavulanate 500-125 MG per tablet    ondansetron 4 MG ODT tab    pantoprazole 40 MG EC tablet               ANTIBIOTIC INSTRUCTION     You've Been Prescribed an Antibiotic - Now What?  Your healthcare team thinks that you or your loved one might have an infection. Some infections can be treated with antibiotics, which are powerful, life-saving drugs. Like all medications, antibiotics have side effects and should only be used when necessary. There are some important things you should know about your antibiotic treatment.      Your healthcare team may run tests before you start taking an antibiotic.    Your team may take samples (e.g., from your blood, urine or other areas) to run tests to look for bacteria. These test can be important to determine if you need an antibiotic at all and, if you do, which antibiotic will work best.      Within a few days, your healthcare team might change or even stop your antibiotic.    Your team may start you on an antibiotic while they are working to find out what is making you sick.    Your team might change your antibiotic because test results show that a different antibiotic would be better to treat your infection.    In some cases, once your team has more information, they learn that you do not need an antibiotic at all. They may find out that you don't have an infection, or that the antibiotic you're taking won't work against your infection. For example, an infection caused by a virus can't be treated with antibiotics. Staying on an antibiotic when you don't need it is more likely to be harmful than  helpful.      You may experience side effects from your antibiotic.    Like all medications, antibiotics have side effects. Some of these can be serious.    Let you healthcare team know if you have any known allergies when you are admitted to the hospital.    One significant side effect of nearly all antibiotics is the risk of severe and sometimes deadly diarrhea caused by Clostridium difficile (C. Difficile). This occurs when a person takes antibiotics because some good germs are destroyed. Antibiotic use allows C. diificile to take over, putting patients at high risk for this serious infection.    As a patient or caregiver, it is important to understand your or your loved one's antibiotic treatment. It is especially important for caregivers to speak up when patients can't speak for themselves. Here are some important questions to ask your healthcare team.    What infection is this antibiotic treating and how do you know I have that infection?    What side effects might occur from this antibiotic?    How long will I need to take this antibiotic?    Is it safe to take this antibiotic with other medications or supplements (e.g., vitamins) that I am taking?     Are there any special directions I need to know about taking this antibiotic? For example, should I take it with food?    How will I be monitored to know whether my infection is responding to the antibiotic?    What tests may help to make sure the right antibiotic is prescribed for me?      Information provided by:  www.cdc.gov/getsmart  U.S. Department of Health and Human Services  Centers for disease Control and Prevention  National Center for Emerging and Zoonotic Infectious Diseases  Division of Healthcare Quality Promotion         Protect others around you: Learn how to safely use, store and throw away your medicines at www.disposemymeds.org.             Medication List: This is a list of all your medications and when to take them. Check marks below  indicate your daily home schedule. Keep this list as a reference.      Medications           Morning Afternoon Evening Bedtime As Needed    amoxicillin-clavulanate 500-125 MG per tablet   Commonly known as:  AUGMENTIN   Take 1 tablet by mouth 2 times daily for 7 days                                ondansetron 4 MG ODT tab   Commonly known as:  ZOFRAN-ODT   Take 1 tablet (4 mg) by mouth every 6 hours as needed for nausea or vomiting                                pantoprazole 40 MG EC tablet   Commonly known as:  PROTONIX   Take 1 tablet (40 mg) by mouth daily Take 30-60 minutes before a meal.                                vitamin D3 20835 UNITS capsule   Commonly known as:  CHOLECALCIFEROL   Take 50,000 Units by mouth every 7 days

## 2017-10-12 PROBLEM — K66.8 PNEUMOPERITONEUM: Status: ACTIVE | Noted: 2017-10-12

## 2017-10-12 LAB
ABO + RH BLD: NORMAL
ABO + RH BLD: NORMAL
ANION GAP SERPL CALCULATED.3IONS-SCNC: 9 MMOL/L (ref 3–14)
BASOPHILS # BLD AUTO: 0 10E9/L (ref 0–0.2)
BASOPHILS NFR BLD AUTO: 0.1 %
BLD GP AB SCN SERPL QL: NORMAL
BLOOD BANK CMNT PATIENT-IMP: NORMAL
BUN SERPL-MCNC: 9 MG/DL (ref 7–30)
CALCIUM SERPL-MCNC: 7.8 MG/DL (ref 8.5–10.1)
CHLORIDE SERPL-SCNC: 108 MMOL/L (ref 94–109)
CO2 SERPL-SCNC: 23 MMOL/L (ref 20–32)
CREAT SERPL-MCNC: 0.9 MG/DL (ref 0.66–1.25)
DIFFERENTIAL METHOD BLD: ABNORMAL
EOSINOPHIL # BLD AUTO: 0 10E9/L (ref 0–0.7)
EOSINOPHIL NFR BLD AUTO: 0 %
ERYTHROCYTE [DISTWIDTH] IN BLOOD BY AUTOMATED COUNT: 15.3 % (ref 10–15)
GFR SERPL CREATININE-BSD FRML MDRD: >90 ML/MIN/1.7M2
GLUCOSE SERPL-MCNC: 178 MG/DL (ref 70–99)
HCT VFR BLD AUTO: 39.5 % (ref 40–53)
HGB BLD-MCNC: 13.2 G/DL (ref 13.3–17.7)
IMM GRANULOCYTES # BLD: 0 10E9/L (ref 0–0.4)
IMM GRANULOCYTES NFR BLD: 0.1 %
LYMPHOCYTES # BLD AUTO: 0.7 10E9/L (ref 0.8–5.3)
LYMPHOCYTES NFR BLD AUTO: 5.7 %
MCH RBC QN AUTO: 26 PG (ref 26.5–33)
MCHC RBC AUTO-ENTMCNC: 33.4 G/DL (ref 31.5–36.5)
MCV RBC AUTO: 78 FL (ref 78–100)
MONOCYTES # BLD AUTO: 0.6 10E9/L (ref 0–1.3)
MONOCYTES NFR BLD AUTO: 4.7 %
NEUTROPHILS # BLD AUTO: 10.7 10E9/L (ref 1.6–8.3)
NEUTROPHILS NFR BLD AUTO: 89.4 %
NRBC # BLD AUTO: 0 10*3/UL
NRBC BLD AUTO-RTO: 0 /100
PLATELET # BLD AUTO: 278 10E9/L (ref 150–450)
POTASSIUM SERPL-SCNC: 3.8 MMOL/L (ref 3.4–5.3)
RBC # BLD AUTO: 5.07 10E12/L (ref 4.4–5.9)
SODIUM SERPL-SCNC: 139 MMOL/L (ref 133–144)
SPECIMEN EXP DATE BLD: NORMAL
WBC # BLD AUTO: 11.9 10E9/L (ref 4–11)

## 2017-10-12 PROCEDURE — 12000008 ZZH R&B INTERMEDIATE UMMC

## 2017-10-12 PROCEDURE — 85049 AUTOMATED PLATELET COUNT: CPT | Performed by: SURGERY

## 2017-10-12 PROCEDURE — C9399 UNCLASSIFIED DRUGS OR BIOLOG: HCPCS | Performed by: ANESTHESIOLOGY

## 2017-10-12 PROCEDURE — 25000125 ZZHC RX 250: Performed by: ANESTHESIOLOGY

## 2017-10-12 PROCEDURE — 36415 COLL VENOUS BLD VENIPUNCTURE: CPT | Performed by: SURGERY

## 2017-10-12 PROCEDURE — 25000128 H RX IP 250 OP 636: Performed by: ANESTHESIOLOGY

## 2017-10-12 PROCEDURE — 25000128 H RX IP 250 OP 636: Performed by: SURGERY

## 2017-10-12 PROCEDURE — 85025 COMPLETE CBC W/AUTO DIFF WBC: CPT | Performed by: SURGERY

## 2017-10-12 PROCEDURE — 25000125 ZZHC RX 250: Performed by: SURGERY

## 2017-10-12 PROCEDURE — 80048 BASIC METABOLIC PNL TOTAL CA: CPT | Performed by: SURGERY

## 2017-10-12 PROCEDURE — 25000128 H RX IP 250 OP 636: Performed by: STUDENT IN AN ORGANIZED HEALTH CARE EDUCATION/TRAINING PROGRAM

## 2017-10-12 PROCEDURE — 25000132 ZZH RX MED GY IP 250 OP 250 PS 637: Performed by: STUDENT IN AN ORGANIZED HEALTH CARE EDUCATION/TRAINING PROGRAM

## 2017-10-12 RX ORDER — PIPERACILLIN SODIUM, TAZOBACTAM SODIUM 3; .375 G/15ML; G/15ML
3.38 INJECTION, POWDER, LYOPHILIZED, FOR SOLUTION INTRAVENOUS EVERY 8 HOURS SCHEDULED
Status: DISCONTINUED | OUTPATIENT
Start: 2017-10-12 | End: 2017-10-12 | Stop reason: HOSPADM

## 2017-10-12 RX ORDER — FENTANYL CITRATE 50 UG/ML
25-50 INJECTION, SOLUTION INTRAMUSCULAR; INTRAVENOUS EVERY 5 MIN PRN
Status: DISCONTINUED | OUTPATIENT
Start: 2017-10-12 | End: 2017-10-12 | Stop reason: HOSPADM

## 2017-10-12 RX ORDER — SODIUM CHLORIDE, SODIUM LACTATE, POTASSIUM CHLORIDE, CALCIUM CHLORIDE 600; 310; 30; 20 MG/100ML; MG/100ML; MG/100ML; MG/100ML
INJECTION, SOLUTION INTRAVENOUS CONTINUOUS PRN
Status: DISCONTINUED | OUTPATIENT
Start: 2017-10-12 | End: 2017-10-12

## 2017-10-12 RX ORDER — LIDOCAINE HYDROCHLORIDE 20 MG/ML
INJECTION, SOLUTION INFILTRATION; PERINEURAL PRN
Status: DISCONTINUED | OUTPATIENT
Start: 2017-10-12 | End: 2017-10-12

## 2017-10-12 RX ORDER — ONDANSETRON 2 MG/ML
4 INJECTION INTRAMUSCULAR; INTRAVENOUS EVERY 30 MIN PRN
Status: DISCONTINUED | OUTPATIENT
Start: 2017-10-12 | End: 2017-10-12 | Stop reason: HOSPADM

## 2017-10-12 RX ORDER — ONDANSETRON 4 MG/1
4 TABLET, ORALLY DISINTEGRATING ORAL EVERY 6 HOURS PRN
Status: DISCONTINUED | OUTPATIENT
Start: 2017-10-12 | End: 2017-10-15 | Stop reason: HOSPADM

## 2017-10-12 RX ORDER — ESMOLOL HYDROCHLORIDE 10 MG/ML
INJECTION INTRAVENOUS PRN
Status: DISCONTINUED | OUTPATIENT
Start: 2017-10-12 | End: 2017-10-12

## 2017-10-12 RX ORDER — PIPERACILLIN SODIUM, TAZOBACTAM SODIUM 3; .375 G/15ML; G/15ML
3.38 INJECTION, POWDER, LYOPHILIZED, FOR SOLUTION INTRAVENOUS EVERY 6 HOURS
Status: DISCONTINUED | OUTPATIENT
Start: 2017-10-12 | End: 2017-10-15 | Stop reason: HOSPADM

## 2017-10-12 RX ORDER — HYDROMORPHONE HYDROCHLORIDE 1 MG/ML
.3-.5 INJECTION, SOLUTION INTRAMUSCULAR; INTRAVENOUS; SUBCUTANEOUS ONCE
Status: COMPLETED | OUTPATIENT
Start: 2017-10-12 | End: 2017-10-12

## 2017-10-12 RX ORDER — ONDANSETRON 2 MG/ML
4 INJECTION INTRAMUSCULAR; INTRAVENOUS EVERY 6 HOURS PRN
Status: DISCONTINUED | OUTPATIENT
Start: 2017-10-12 | End: 2017-10-15 | Stop reason: HOSPADM

## 2017-10-12 RX ORDER — FLUCONAZOLE 2 MG/ML
400 INJECTION, SOLUTION INTRAVENOUS EVERY 24 HOURS
Status: DISCONTINUED | OUTPATIENT
Start: 2017-10-12 | End: 2017-10-15 | Stop reason: HOSPADM

## 2017-10-12 RX ORDER — SODIUM CHLORIDE, SODIUM LACTATE, POTASSIUM CHLORIDE, CALCIUM CHLORIDE 600; 310; 30; 20 MG/100ML; MG/100ML; MG/100ML; MG/100ML
INJECTION, SOLUTION INTRAVENOUS CONTINUOUS
Status: DISCONTINUED | OUTPATIENT
Start: 2017-10-12 | End: 2017-10-15 | Stop reason: HOSPADM

## 2017-10-12 RX ORDER — LORAZEPAM 1 MG/1
1-4 TABLET ORAL EVERY 30 MIN PRN
Status: DISCONTINUED | OUTPATIENT
Start: 2017-10-12 | End: 2017-10-15 | Stop reason: HOSPADM

## 2017-10-12 RX ORDER — ONDANSETRON 2 MG/ML
INJECTION INTRAMUSCULAR; INTRAVENOUS PRN
Status: DISCONTINUED | OUTPATIENT
Start: 2017-10-12 | End: 2017-10-12

## 2017-10-12 RX ORDER — PROCHLORPERAZINE MALEATE 5 MG
5-10 TABLET ORAL EVERY 6 HOURS PRN
Status: DISCONTINUED | OUTPATIENT
Start: 2017-10-12 | End: 2017-10-15 | Stop reason: HOSPADM

## 2017-10-12 RX ORDER — ONDANSETRON 4 MG/1
4 TABLET, ORALLY DISINTEGRATING ORAL EVERY 30 MIN PRN
Status: DISCONTINUED | OUTPATIENT
Start: 2017-10-12 | End: 2017-10-12 | Stop reason: HOSPADM

## 2017-10-12 RX ORDER — NALOXONE HYDROCHLORIDE 0.4 MG/ML
.1-.4 INJECTION, SOLUTION INTRAMUSCULAR; INTRAVENOUS; SUBCUTANEOUS
Status: DISCONTINUED | OUTPATIENT
Start: 2017-10-12 | End: 2017-10-15 | Stop reason: HOSPADM

## 2017-10-12 RX ORDER — FENTANYL CITRATE 50 UG/ML
INJECTION, SOLUTION INTRAMUSCULAR; INTRAVENOUS PRN
Status: DISCONTINUED | OUTPATIENT
Start: 2017-10-12 | End: 2017-10-12

## 2017-10-12 RX ORDER — HYDROMORPHONE HYDROCHLORIDE 1 MG/ML
.3-.5 INJECTION, SOLUTION INTRAMUSCULAR; INTRAVENOUS; SUBCUTANEOUS EVERY 5 MIN PRN
Status: DISCONTINUED | OUTPATIENT
Start: 2017-10-12 | End: 2017-10-12 | Stop reason: HOSPADM

## 2017-10-12 RX ORDER — PROPOFOL 10 MG/ML
INJECTION, EMULSION INTRAVENOUS PRN
Status: DISCONTINUED | OUTPATIENT
Start: 2017-10-12 | End: 2017-10-12

## 2017-10-12 RX ORDER — CHOLECALCIFEROL (VITAMIN D3) 1250 MCG
50000 CAPSULE ORAL
COMMUNITY
End: 2017-11-08

## 2017-10-12 RX ORDER — ACETAMINOPHEN 10 MG/ML
1000 INJECTION, SOLUTION INTRAVENOUS EVERY 8 HOURS
Status: DISCONTINUED | OUTPATIENT
Start: 2017-10-12 | End: 2017-10-15

## 2017-10-12 RX ORDER — FLUCONAZOLE 2 MG/ML
200 INJECTION, SOLUTION INTRAVENOUS EVERY 24 HOURS
Status: DISCONTINUED | OUTPATIENT
Start: 2017-10-12 | End: 2017-10-12 | Stop reason: HOSPADM

## 2017-10-12 RX ORDER — LIDOCAINE 40 MG/G
CREAM TOPICAL
Status: DISCONTINUED | OUTPATIENT
Start: 2017-10-12 | End: 2017-10-15 | Stop reason: HOSPADM

## 2017-10-12 RX ORDER — PROCHLORPERAZINE 25 MG
25 SUPPOSITORY, RECTAL RECTAL EVERY 12 HOURS PRN
Status: DISCONTINUED | OUTPATIENT
Start: 2017-10-12 | End: 2017-10-15 | Stop reason: HOSPADM

## 2017-10-12 RX ORDER — MEPERIDINE HYDROCHLORIDE 25 MG/ML
12.5 INJECTION INTRAMUSCULAR; INTRAVENOUS; SUBCUTANEOUS
Status: DISCONTINUED | OUTPATIENT
Start: 2017-10-12 | End: 2017-10-12 | Stop reason: HOSPADM

## 2017-10-12 RX ORDER — SODIUM CHLORIDE 9 MG/ML
INJECTION, SOLUTION INTRAVENOUS CONTINUOUS PRN
Status: DISCONTINUED | OUTPATIENT
Start: 2017-10-12 | End: 2017-10-12

## 2017-10-12 RX ORDER — SODIUM CHLORIDE, SODIUM LACTATE, POTASSIUM CHLORIDE, CALCIUM CHLORIDE 600; 310; 30; 20 MG/100ML; MG/100ML; MG/100ML; MG/100ML
INJECTION, SOLUTION INTRAVENOUS CONTINUOUS
Status: DISCONTINUED | OUTPATIENT
Start: 2017-10-12 | End: 2017-10-12 | Stop reason: HOSPADM

## 2017-10-12 RX ORDER — FLUORIDE TOOTHPASTE
10 TOOTHPASTE DENTAL 4 TIMES DAILY PRN
Status: DISCONTINUED | OUTPATIENT
Start: 2017-10-12 | End: 2017-10-15 | Stop reason: HOSPADM

## 2017-10-12 RX ADMIN — ESMOLOL HYDROCHLORIDE 20 MG: 10 INJECTION, SOLUTION INTRAVENOUS at 02:18

## 2017-10-12 RX ADMIN — SUGAMMADEX 200 MG: 100 INJECTION, SOLUTION INTRAVENOUS at 04:47

## 2017-10-12 RX ADMIN — PANTOPRAZOLE SODIUM 40 MG: 40 INJECTION, POWDER, FOR SOLUTION INTRAVENOUS at 19:30

## 2017-10-12 RX ADMIN — CLOTRIMAZOLE 1 TROCHE: 10 LOZENGE ORAL at 19:31

## 2017-10-12 RX ADMIN — SODIUM CHLORIDE: 9 INJECTION, SOLUTION INTRAVENOUS at 01:11

## 2017-10-12 RX ADMIN — FENTANYL CITRATE 50 MCG: 50 INJECTION, SOLUTION INTRAMUSCULAR; INTRAVENOUS at 04:30

## 2017-10-12 RX ADMIN — ONDANSETRON 4 MG: 2 INJECTION INTRAMUSCULAR; INTRAVENOUS at 04:17

## 2017-10-12 RX ADMIN — PIPERACILLIN SODIUM AND TAZOBACTAM SODIUM 3.38 G: 3; .375 INJECTION, POWDER, LYOPHILIZED, FOR SOLUTION INTRAVENOUS at 16:15

## 2017-10-12 RX ADMIN — HYDROMORPHONE HYDROCHLORIDE 1 MG: 1 INJECTION, SOLUTION INTRAMUSCULAR; INTRAVENOUS; SUBCUTANEOUS at 02:11

## 2017-10-12 RX ADMIN — FLUCONAZOLE 400 MG: 2 INJECTION INTRAVENOUS at 19:31

## 2017-10-12 RX ADMIN — SODIUM CHLORIDE, POTASSIUM CHLORIDE, SODIUM LACTATE AND CALCIUM CHLORIDE: 600; 310; 30; 20 INJECTION, SOLUTION INTRAVENOUS at 05:20

## 2017-10-12 RX ADMIN — PROPOFOL 200 MG: 10 INJECTION, EMULSION INTRAVENOUS at 01:24

## 2017-10-12 RX ADMIN — LIDOCAINE HYDROCHLORIDE 100 MG: 20 INJECTION, SOLUTION INFILTRATION; PERINEURAL at 01:25

## 2017-10-12 RX ADMIN — Medication 100 MG: at 01:24

## 2017-10-12 RX ADMIN — PIPERACILLIN SODIUM AND TAZOBACTAM SODIUM 3.38 G: .375; 3 INJECTION, POWDER, LYOPHILIZED, FOR SOLUTION INTRAVENOUS at 01:48

## 2017-10-12 RX ADMIN — ROCURONIUM BROMIDE 10 MG: 10 INJECTION INTRAVENOUS at 01:59

## 2017-10-12 RX ADMIN — HYDROMORPHONE HYDROCHLORIDE 0.5 MG: 1 INJECTION, SOLUTION INTRAMUSCULAR; INTRAVENOUS; SUBCUTANEOUS at 00:37

## 2017-10-12 RX ADMIN — MIDAZOLAM HYDROCHLORIDE 2 MG: 1 INJECTION, SOLUTION INTRAMUSCULAR; INTRAVENOUS at 01:07

## 2017-10-12 RX ADMIN — METHYLENE BLUE 5 ML: 10 INJECTION INTRAVENOUS at 03:59

## 2017-10-12 RX ADMIN — ROCURONIUM BROMIDE 10 MG: 10 INJECTION INTRAVENOUS at 02:22

## 2017-10-12 RX ADMIN — PIPERACILLIN SODIUM AND TAZOBACTAM SODIUM 3.38 G: 3; .375 INJECTION, POWDER, LYOPHILIZED, FOR SOLUTION INTRAVENOUS at 21:11

## 2017-10-12 RX ADMIN — SODIUM CHLORIDE, POTASSIUM CHLORIDE, SODIUM LACTATE AND CALCIUM CHLORIDE: 600; 310; 30; 20 INJECTION, SOLUTION INTRAVENOUS at 01:30

## 2017-10-12 RX ADMIN — HYDROMORPHONE HYDROCHLORIDE: 10 INJECTION, SOLUTION INTRAMUSCULAR; INTRAVENOUS; SUBCUTANEOUS at 05:18

## 2017-10-12 RX ADMIN — PROPOFOL 20 MG: 10 INJECTION, EMULSION INTRAVENOUS at 04:34

## 2017-10-12 RX ADMIN — HYDROMORPHONE HYDROCHLORIDE 0.5 MG: 1 INJECTION, SOLUTION INTRAMUSCULAR; INTRAVENOUS; SUBCUTANEOUS at 05:55

## 2017-10-12 RX ADMIN — PIPERACILLIN SODIUM AND TAZOBACTAM SODIUM 3.38 G: 3; .375 INJECTION, POWDER, LYOPHILIZED, FOR SOLUTION INTRAVENOUS at 10:28

## 2017-10-12 RX ADMIN — ROCURONIUM BROMIDE 10 MG: 10 INJECTION INTRAVENOUS at 03:36

## 2017-10-12 RX ADMIN — PIPERACILLIN SODIUM AND TAZOBACTAM SODIUM 2.25 G: .375; 3 INJECTION, POWDER, LYOPHILIZED, FOR SOLUTION INTRAVENOUS at 03:48

## 2017-10-12 RX ADMIN — ACETAMINOPHEN 1000 MG: 10 INJECTION, SOLUTION INTRAVENOUS at 21:56

## 2017-10-12 RX ADMIN — ACETAMINOPHEN 1000 MG: 10 INJECTION, SOLUTION INTRAVENOUS at 05:49

## 2017-10-12 RX ADMIN — FENTANYL CITRATE 50 MCG: 50 INJECTION, SOLUTION INTRAMUSCULAR; INTRAVENOUS at 04:47

## 2017-10-12 RX ADMIN — FENTANYL CITRATE 25 MCG: 50 INJECTION, SOLUTION INTRAMUSCULAR; INTRAVENOUS at 05:24

## 2017-10-12 RX ADMIN — HYDROMORPHONE HYDROCHLORIDE 0.5 MG: 1 INJECTION, SOLUTION INTRAMUSCULAR; INTRAVENOUS; SUBCUTANEOUS at 06:07

## 2017-10-12 RX ADMIN — FENTANYL CITRATE 50 MCG: 50 INJECTION, SOLUTION INTRAMUSCULAR; INTRAVENOUS at 02:03

## 2017-10-12 RX ADMIN — ROCURONIUM BROMIDE 10 MG: 10 INJECTION INTRAVENOUS at 02:44

## 2017-10-12 RX ADMIN — FENTANYL CITRATE 50 MCG: 50 INJECTION, SOLUTION INTRAMUSCULAR; INTRAVENOUS at 05:31

## 2017-10-12 RX ADMIN — SODIUM CHLORIDE, POTASSIUM CHLORIDE, SODIUM LACTATE AND CALCIUM CHLORIDE: 600; 310; 30; 20 INJECTION, SOLUTION INTRAVENOUS at 03:05

## 2017-10-12 RX ADMIN — FENTANYL CITRATE 50 MCG: 50 INJECTION, SOLUTION INTRAMUSCULAR; INTRAVENOUS at 01:24

## 2017-10-12 RX ADMIN — ROCURONIUM BROMIDE 50 MG: 10 INJECTION INTRAVENOUS at 01:25

## 2017-10-12 RX ADMIN — ROCURONIUM BROMIDE 10 MG: 10 INJECTION INTRAVENOUS at 03:14

## 2017-10-12 RX ADMIN — Medication 10 ML: at 18:04

## 2017-10-12 RX ADMIN — FLUCONAZOLE 200 MG: 2 INJECTION INTRAVENOUS at 01:48

## 2017-10-12 RX ADMIN — ENOXAPARIN SODIUM 40 MG: 40 INJECTION SUBCUTANEOUS at 18:04

## 2017-10-12 RX ADMIN — FENTANYL CITRATE 25 MCG: 50 INJECTION, SOLUTION INTRAMUSCULAR; INTRAVENOUS at 05:46

## 2017-10-12 RX ADMIN — ACETAMINOPHEN 1000 MG: 10 INJECTION, SOLUTION INTRAVENOUS at 14:18

## 2017-10-12 RX ADMIN — ROCURONIUM BROMIDE 5 MG: 10 INJECTION INTRAVENOUS at 04:34

## 2017-10-12 RX ADMIN — PANTOPRAZOLE SODIUM 40 MG: 40 INJECTION, POWDER, FOR SOLUTION INTRAVENOUS at 08:32

## 2017-10-12 ASSESSMENT — LIFESTYLE VARIABLES: TOBACCO_USE: 1

## 2017-10-12 NOTE — ED PROVIDER NOTES
History     Chief Complaint   Patient presents with     Abdominal Pain     Abdominal pain started last week. Was seen here 10/3/2017. Increasing pain on lower right side.      HPI  Tonio Alarcon is a 36 year old male with a history of tobacco use who presents to the Emergency Department for evaluation of abdominal pain. The patient was seen in the ED by Dr. James on 10/3/17 for similar complaints of abdominal pain. Laboratory testing and chest x-ray were unremarkable. He was given a GI cocktail and discharged home with mild relief. Today, he says his abdominal discomfort never resolved completely and he is experiencing worsening RLQ pain with associated decreased appetite and mild fever (100.6F on arrival). He denies any nausea, vomiting, or diarrhea. He states his last BM was earlier today.    History reviewed. No pertinent past medical history.    Past Surgical History:   Procedure Laterality Date     GI SURGERY      Hemorrhoids      HEMORRHOID SURGERY  2015     LAPAROSCOPIC HERNIORRHAPHY EPIGASTRIC N/A 10/11/2017    Procedure: LAPAROSCOPIC HERNIORRHAPHY EPIGASTRIC;  Exploratory Laparoscopy, Exploratory Laparotomy, EGD, Omental Patch, Upper Endoscopy;  Surgeon: Celio Keyes MD;  Location:  OR       Family History   Problem Relation Age of Onset     DIABETES Mother      Breast Cancer Mother      Dx at 61 years aol     Coronary Artery Disease Mother      3v CABG     CANCER Father      Stomach ca - Dx at 57 years old and passed away from this     Cancer - colorectal Maternal Grandmother      DIABETES Brother        Social History   Substance Use Topics     Smoking status: Current Every Day Smoker     Packs/day: 1.00     Years: 25.00     Smokeless tobacco: Never Used     Alcohol use Yes      Comment: heavy drinking about 2 x per week       No current facility-administered medications for this encounter.      Current Outpatient Prescriptions   Medication     ondansetron (ZOFRAN-ODT) 4 MG ODT tab      "amoxicillin-clavulanate (AUGMENTIN) 500-125 MG per tablet     pantoprazole (PROTONIX) 40 MG EC tablet     vitamin D3 (CHOLECALCIFEROL) 04975 UNITS capsule     oxyCODONE-acetaminophen (PERCOCET) 5-325 MG per tablet      No Known Allergies      I have reviewed the Medications, Allergies, Past Medical and Surgical History, and Social History in the Epic system.    Review of Systems   Constitutional: Positive for appetite change. Negative for fever.   Respiratory: Negative for shortness of breath.    Cardiovascular: Negative for chest pain.   Gastrointestinal: Positive for abdominal pain (RLQ). Negative for nausea and vomiting.   All other systems reviewed and are negative.      Physical Exam   BP: 117/73  Pulse: 96  Heart Rate: 98  Temp: 100.6  F (38.1  C)  Resp: 16  Height: 180.3 cm (5' 11\")  Weight: 86.1 kg (189 lb 14.4 oz)  SpO2: 100 %       Physical Exam Exam:  Constitutional: healthy, alert and mild distress  Head: Normocephalic. No masses, lesions, tenderness or abnormalities  Neck: Neck supple. No adenopathy. Thyroid symmetric, normal size,, Carotids without bruits.  ENT: ENT exam normal, no neck nodes or sinus tenderness  Cardiovascular: negative, PMI normal. No lifts, heaves, or thrills. RRR. No murmurs, clicks gallops or rub  Respiratory: negative, Percussion normal. Good diaphragmatic excursion. Lungs clear  Gastrointestinal: Abdomen RLQ tender BS normal. No masses, organomegaly  : Deferred  Musculoskeletal: extremities normal- no gross deformities noted, gait normal and normal muscle tone  Skin: no suspicious lesions or rashes  Neurologic: Gait normal. Reflexes normal and symmetric. Sensation grossly WNL.  Psychiatric: mentation appears normal and affect normal/bright  Hematologic/Lymphatic/Immunologic: Normal cervical lymph nodes          ED Course     ED Course     Procedures          Final result by Frantz Burris MD (10/11/17 23:09:39)     Impression:     IMPRESSION:  1. Ascites and a small " amount of free air in the upper abdomen  worrisome for bowel perforation.  2. Definite cause not identified, but suspect perforated peptic ulcer  as the most likely etiology.    [Critical Result: Free air suspicious for perforated peptic ulcer or  other bowel perforation.]    Finding was identified on 10/11/2017 10:45 PM.     Dr. Milton working with Dr. Crane was contacted by me on 10/11/2017  10:53 PM and verbalized understanding of the critical result.     OLIVIER DE LUNA MD     Narrative:     CT ABDOMEN AND PELVIS WITH CONTRAST 10/11/2017 10:25 PM     HISTORY: Right lower quadrant abdominal pain, possible appendicitis.    TECHNIQUE: Volumetric acquisition through abdomen and pelvis with 88  mL Isovue-370 IV contrast. Radiation dose for this scan was reduced  using automated exposure control, adjustment of the mA and/or kV  according to patient size, or iterative reconstruction technique.    COMPARISON: 5/4/2015.    FINDINGS: Small-moderate ascites which is most prominent in the upper  abdomen including surrounding the gallbladder. There are multiple tiny  bubbles of free air in the upper abdomen. These are somewhat prominent  along the lesser curvature of the stomach and findings may be due to a  perforated peptic ulcer. No calcified gallstones demonstrated. Liver,  spleen, pancreas, adrenal glands and kidneys are negative.    Appendix is within normal limits. Small amount of free fluid in the  pelvis. Pelvic structures otherwise negative. No bowel obstruction.                     Labs Ordered and Resulted from Time of ED Arrival Up to the Time of Departure from the ED   CBC WITH PLATELETS DIFFERENTIAL - Abnormal; Notable for the following:        Result Value    WBC 13.6 (*)     MCV 77 (*)     MCH 25.7 (*)     Absolute Neutrophil 10.8 (*)     All other components within normal limits   COMPREHENSIVE METABOLIC PANEL - Abnormal; Notable for the following:     Glucose 101 (*)     All other components within  normal limits   UA MACROSCOPIC WITH REFLEX TO MICRO AND CULTURE - Abnormal; Notable for the following:     Specific Gravity Urine 1.042 (*)     Protein Albumin Urine 10 (*)     Leukocyte Esterase Urine Small (*)     WBC Urine 13 (*)     Bacteria Urine Few (*)     Squamous Epithelial /HPF Urine 2 (*)     Mucous Urine Present (*)     All other components within normal limits   LIPASE            Assessments & Plan (with Medical Decision Making)   MDM  This is a 36-year-old male who is here with right lower quadrant abdominal pain.  Patient was recently here approximately one week ago for epigastric pain and workup at that time did not reveal any acute findings and was discharged home.  Patient states that since then he's continued to have abdominal pain now is localized to the right lower quadrant and some nausea but no vomiting.  He is has had decrease in oral intake.  He was told that he has a fever this evening.  Patient denies any trauma.  Physical exam reveals a right lower quadrant tenderness and some rebound tenderness.  Differential diagnosis includes Appendicitis versus diverticulitis versus cholecystitis versus hepatitis.  Patient will undergo CT of the abdomen for further evaluation as well as some blood work.  Patient will also be treated with IV fluids and pain medicine while awaiting final disposition.    CT showed possible perforation; spoke with surgery who agrees that patient will need to undergo eval in [facility]. Pt sent to the OR in stable condition.    I have reviewed the nursing notes.    I have reviewed the findings, diagnosis, plan and need for follow up with the patient.    Discharge Medication List as of 10/15/2017  6:07 PM      START taking these medications    Details   ondansetron (ZOFRAN-ODT) 4 MG ODT tab Take 1 tablet (4 mg) by mouth every 6 hours as needed for nausea or vomiting, Disp-120 tablet, R-1, E-Prescribe      amoxicillin-clavulanate (AUGMENTIN) 500-125 MG per tablet Take 1  tablet by mouth 2 times daily for 7 days, Disp-14 tablet, R-0, E-Prescribe      pantoprazole (PROTONIX) 40 MG EC tablet Take 1 tablet (40 mg) by mouth daily Take 30-60 minutes before a meal., Disp-90 tablet, R-3, E-Prescribe             Final diagnoses:   Perforated peptic ulcer (H)   IVictoriano, am serving as a trained medical scribe to document services personally performed by Durga Crane MD, based on the provider's statements to me.      IDurga MD, was physically present and have reviewed and verified the accuracy of this note documented by Victoriano Kimball.       10/11/2017   [facility], [place], EMERGENCY DEPARTMENT     Durga Crane MD  10/18/17 1257

## 2017-10-12 NOTE — ANESTHESIA POSTPROCEDURE EVALUATION
Patient: Tonio Alarcon    Procedure(s):  Exploratory Laparoscopy, Exploratory Laparotomy, EGD, Omental Patch, Upper Endoscopy - Wound Class: I-Clean    Diagnosis:perforated peptic ulcer  Diagnosis Additional Information: No value filed.    Anesthesia Type:  General, RSI, ETT    Note:  Anesthesia Post Evaluation    Patient location during evaluation: PACU  Patient participation: Able to fully participate in evaluation  Level of consciousness: awake and alert  Pain management: adequate  Airway patency: patent  Cardiovascular status: acceptable  Respiratory status: acceptable  Hydration status: acceptable  PONV: none     Anesthetic complications: None          Last vitals:  Vitals:    10/12/17 0700 10/12/17 0712 10/12/17 0724   BP: 132/82 146/82 137/80   Pulse:      Resp: 15 15 15   Temp: 35.6  C (96.1  F)     SpO2: 100% 100% 100%         Electronically Signed By: Catarino Hathaway MD  October 12, 2017  7:31 AM

## 2017-10-12 NOTE — ANESTHESIA PREPROCEDURE EVALUATION
Anesthesia Evaluation     . Pt has had prior anesthetic.     No history of anesthetic complications          ROS/MED HX    ENT/Pulmonary:     (+)tobacco use, Current use 1 packs/day  , . .    Neurologic:  - neg neurologic ROS     Cardiovascular:     (+) ----. : . . . :. . Previous cardiac testing date:results:Stress Testdate: results: date: results: date: results:          METS/Exercise Tolerance:     Hematologic:  - neg hematologic  ROS       Musculoskeletal:  - neg musculoskeletal ROS       GI/Hepatic:     (+) Other GI/Hepatic Free air in abdomen. likely peptic ulcer rupture      Renal/Genitourinary:  - ROS Renal section negative       Endo:  - neg endo ROS       Psychiatric: Comment: Alcohol use        Infectious Disease:  - neg infectious disease ROS       Malignancy:      - no malignancy   Other:    - neg other ROS               Procedure: Procedure(s):  Laparoscopic repair of peptic ulcer - Wound Class:     HPI: Tonio Alarcon is a 36 year old male with PMH as noted above presenting for urgent lap repair of likely peptic ulcer rupture.    PMHx/PSHx:  History reviewed. No pertinent past medical history.    Past Surgical History:   Procedure Laterality Date     GI SURGERY      Hemorrhoids      HEMORRHOID SURGERY  2015         No current facility-administered medications on file prior to encounter.   Current Outpatient Prescriptions on File Prior to Encounter:  VITAMIN D, CHOLECALCIFEROL, PO Take by mouth once a week   ibuprofen (ADVIL/MOTRIN) 600 MG tablet Take 1 tablet (600 mg) by mouth every 6 hours as needed for moderate pain       Social Hx:   Social History   Substance Use Topics     Smoking status: Current Every Day Smoker     Packs/day: 1.00     Years: 25.00     Smokeless tobacco: Never Used     Alcohol use Yes      Comment: heavy drinking about 2 x per week       Allergies: No Known Allergies      NPO Status: Per ASA Guidelines    Labs:    Blood Bank:  No results found for: ABO, RH,  AS  BMP:  Recent Labs   Lab Test  10/11/17   2129   NA  138   POTASSIUM  3.8   CHLORIDE  106   CO2  24   BUN  12   CR  0.92   GLC  101*   MIMI  8.9     CBC:   Recent Labs   Lab Test  10/11/17   2129   WBC  13.6*   RBC  5.67   HGB  14.6   HCT  43.6   MCV  77*   MCH  25.7*   MCHC  33.5   RDW  14.5   PLT  298     Coags:  No results for input(s): INR, PTT, FIBR in the last 41058 hours.        Physical Exam  Normal systems: cardiovascular and dental    Airway   Mallampati: II  TM distance: >3 FB  Neck ROM: full    Dental     Cardiovascular   Rhythm and rate: regular and normal      Pulmonary   PE comment: Minor course lungs sounds diffusely                    Anesthesia Plan      History & Physical Review  History and physical reviewed and following examination; no interval change.    ASA Status:  2 emergent.        Plan for General, RSI and ETT with Intravenous and Propofol induction. Maintenance will be Balanced.    PONV prophylaxis:  Ondansetron (or other 5HT-3)  Additional equipment: 2nd IV     - Standard ASA monitors  - Abx per surgical team    Final anesthetic plan per staff anesthesiologist      Postoperative Care  Postoperative pain management:  IV analgesics.      Consents  Anesthetic plan, risks, benefits and alternatives discussed with:  Patient.  Use of blood products discussed: Yes.   Use of blood products discussed with Patient.  Consented to blood products.  .      Jeffy Sotelo MD  Anesthesiology Resident CA2, PGY3        History and physical assessed; Patient examined. I have reviewed and agree with this pre-op assessment and anesthetic plan with addendums as necessary.     Risks and alternatives presented and discussed. Patient and family agree. All questions answered.      Catarino Hathaway MD  Staff Anesthesiologist  *[phone]

## 2017-10-12 NOTE — OR NURSING
Dr. Akbar informed at 0550, that patient was ready for transfer from PACU.  Stated he would get to sign-out when able.

## 2017-10-12 NOTE — H&P
General Surgery H&P  Staff: Dr. Keyes  Date: 10/12/2017   Consulted for: Abdominal pain by ED    Assessment/Plan:  36 year old male with no PMH or PSH who presents with multiple days of epigastric pain. He appears to have a hollow viscus perforation of the upper abdomen, possibly the posterior stomach. He is a smoker and occasional drinker and has a family history of stomach cancer in his father, thus this could also represent a malignant process.   - Admit to general surgery  - NPO  - IVF  - To OR tonight for exploration and repair of luminal performation    Discussed with Dr. Wali Minor MD  General Surgery PGY-3    ------------------------------------------  HPI:   Tonio Alarcon is a 36 year old male with no PMH or PSH who presents with multiple days of epigastric pain. He was seen in the ED 1 week ago for similar complaints and work up did not reveal an etiology. Pain seemed to get a little better but then worsened again. It wakes him up at night and is shooting/sharp. He doesn't have a history of similar pain and does not take NSAIDS or PPIs. He does smoke and occasionally drink. His father  from stomach cancer at age 57. CT at [facility] ED demonstrated free air and fluid in the upper abdomen and he was transferred to [facility] for further work up and management.  ?  PMH:  None    PSHx:  Hemorrhoidectomy in      Medications:  VITAMIN D, CHOLECALCIFEROL, PO Take by mouth once a week     Allergies:   Review of patient's allergies indicates no known allergies.     SocHx:  Social History     Marital status:      Spouse name: N/A     Number of children: 6     Years of education: N/A     Social History Main Topics     Smoking status: Current Every Day Smoker     Packs/day: 1.00     Years: 25.00     Smokeless tobacco: Never Used     Alcohol use Yes      Comment: heavy drinking about 2 x per week     Drug use: Yes     Special: Marijuana      Comment: 2 gms daily     Sexual activity: Yes  "    Partners: Female     Birth control/ protection: Pill     Other Topics Concern     Parent/Sibling W/ Cabg, Mi Or Angioplasty Before 65f 55m? No     Social History Narrative    Live at home with spouse and 6 kids.       FamHx:  Family History   Problem Relation Age of Onset     DIABETES Mother      Breast Cancer Mother      Dx at 61 years aol     Coronary Artery Disease Mother      3v CABG     CANCER Father      Stomach ca - Dx at 57 years old and passed away from this     Cancer - colorectal Maternal Grandmother      DIABETES Brother       Negative for bleeding disorders, clotting disorders, or problems with anesthesia    Review of Systems:  ROS: 10 point ROS neg other than the symptoms noted above in the HPI.    Physical Examination   /68  Pulse 96  Temp 99.1  F (37.3  C) (Oral)  Resp 18  Ht 1.803 m (5' 11\")  Wt 86.1 kg (189 lb 14.4 oz)  SpO2 100%  BMI 26.49 kg/m2  General: Awake and alert. NAD  Pulm: Non labored breathing, no tachypnea   CV: RRR  Abdomen: soft, tender in epigstrium, no guarding, no masses palpable, no peritoneal signs, no surgical scars  Musculoskel/Extremities: no edema, erythema, tenderness   Skin: no rashes, no diaphoresis and skin color normal     Labs: Reviewed  Blood cultures pending    WBC 13.5    Imaging: Reviewed  CT Abd/pelvis  1. Ascites and a small amount of free air in the upper abdomen worrisome for bowel perforation.  2. Definite cause not identified, but suspect perforated peptic ulcer as the most likely etiology.  "

## 2017-10-12 NOTE — PHARMACY-ADMISSION MEDICATION HISTORY
Admission medication history interview status for the 10/11/2017 admission is complete. See Epic admission navigator for allergy information, pharmacy, prior to admission medications and immunization status.     Medication history interview sources:  patient and spouse     Changes made to PTA medication list (reason)  Added: NA  Deleted: Ibuprofen 600 mg PO q6h prn pain  Changed: vitamin D q week -> vitamin D 50,000 units q week    Additional medication history information (including reliability of information, actions taken by pharmacist):patient was very drowsy, but spouse was able to answer questions.   Patient usually takes nothing for pain.  Patient dose not get flu vaccine.      Prior to Admission medications    Medication Sig Last Dose Taking? Auth Provider   vitamin D3 (CHOLECALCIFEROL) 56182 UNITS capsule Take 50,000 Units by mouth every 7 days Past Month at Unknown time Yes Unknown, Entered By History         Medication history completed by: Lyle Trinidad, Pharmacy Student

## 2017-10-12 NOTE — PLAN OF CARE
Problem: Patient Care Overview  Goal: Plan of Care/Patient Progress Review  Outcome: No Change  Vital signs:  Temp: 96.1  F (35.6  C) Temp src: Axillary BP: 137/80 Pulse: 96 Heart Rate: 96 Resp: 15 SpO2: 100 % O2 Device: Nasal cannula Oxygen Delivery: 3 LPM      Pt arrived to  at 0700. VSS. A&O. O2 sat 100% on 3L O2. Capnography on. PCA dilaudid .2mg q10min for pain control. PIV in L hand infusing LR and dilaudid. PIV in L arm and R hand saline locked. Midline incision c/d/i, dressing marked. BLANCHE to suction with 0 mL, dressing c/d/i. Powell intact. NG at 74 cm and to low continuous suction. Pt currently sleeping.

## 2017-10-12 NOTE — PROGRESS NOTES
CLINICAL NUTRITION SERVICES - ASSESSMENT NOTE     Nutrition Prescription    RECOMMENDATIONS FOR MDs/PROVIDERS TO ORDER:  Diet adv v nutrition support within 3-4 days    Malnutrition Status:    Severe malnutrition in the context of acute illness    Recommendations already ordered by Registered Dietitian (RD):  None at this time     Future/Additional Recommendations:  1. Once/if diet advances, offer supplements/snacks if PO intakes poor.  Encourage patient to consume at least 75% of meals TID or the equivalent.  If consuming less than this consider ordering calorie counts to assess PO intake adequacy.  Consider multivitamin with minerals if PO intake inadequate.     2. If unable to adv diet and/or PO intakes poor, consider need to start nutrition support (recommend TF ideally, if contraindicated consider TPN)  A) If TF appropriate, would recommend goal Impact Peptide (immune-modulating, semi-elemental, fiber free, higher protein) @ goal 60 ml/hr (1440 ml/day) to provide 2160 kcals (25 kcal/kg/day) and 135 g PRO (1.6 g/kg/day).    B) If PN appropriate, recommend the following via central line:  --Use dosing weight 86 kg  --Begin CPN, goal volume 1440 ml/day (or per provider pending fluid status) with initial 185g Dex daily (629kcal, GIR1.5), 129g AA daily (516 kcal), and 250 ml 20% IV lipids daily.    --ONLY once pt receives ~100% of initial continuous PN volume with K+/Mg++/Phos WNL, advance PN dex by 50 g every 1-2 days (pending lytes/Glu and Pharm D/RD discretion) to initial goal of 335g Dex (1139 kcal) to increase provisions to 2155 kcals (25 kcal/kg/day), 1.5 g PRO/kg/day, GIR 2.7 with 23% kcals from Fat.     REASON FOR ASSESSMENT  Tonio Alarcon is a/an 36 year old male assessed by the dietitian for Admission Nutrition Risk Screen for unintentional loss of 10# or more in the past two months    NUTRITION HISTORY  Obtained information from patient and chart.  Pt on a regular diet at home, denies any food  "allergies/intolerances.  Has had very poor appetite over the past week or more 2/2 abdominal pain (per chart began 10/3).  Prior to that was eating well/at his baseline of 2 good sized meals per day.      Abdominal CT on 10/11:   \"1. Ascites and a small amount of free air in the upper abdomen worrisome for bowel perforation.  2. Definite cause not identified, but suspect perforated peptic ulcer as the most likely etiology.\"    On 10/12 went to OR for exploratory laparoscopy, ex laparotomy, EGD, and omental patch.    CURRENT NUTRITION ORDERS  Diet: NPO  Intake/Tolerance: NPO x 1 day since admit     LABS  Labs reviewed    MEDICATIONS  Medications reviewed    ANTHROPOMETRICS  Height: 180.3 cm (5' 11\")  Most Recent Weight: 86.1 kg (189 lb 14.4 oz)    IBW: 78.2 kg   BMI: Overweight BMI 25-29.9  Weight History: Wt is down 25# over the past 3 months (12% wt loss), per pt this has been unintentional despite only recent poor PO intakes over the past week or so.    Wt Readings from Last 10 Encounters:   10/11/17 86.1 kg (189 lb 14.4 oz)   10/03/17 87.7 kg (193 lb 4.8 oz)   07/15/17 97.1 kg (214 lb)   05/01/17 97.1 kg (214 lb 2 oz)   04/21/17 92.2 kg (203 lb 4.8 oz)   04/17/17 95.8 kg (211 lb 4 oz)   04/08/16 90.3 kg (199 lb)   07/10/15 89.4 kg (197 lb)   05/04/15 88.5 kg (195 lb)   04/06/15 90.3 kg (199 lb)      Dosing Weight: 86 kg (adjusted based on admit/lowest recent wt 86.1 kg    ASSESSED NUTRITION NEEDS  Estimated Energy Needs: 9325-7444 kcals/day (25 - 30 kcals/kg)  Justification: Maintenance  Estimated Protein Needs: 103-129 grams protein/day (1.2 - 1.5 grams of pro/kg)  Justification: Hypercatabolism with acute illness, Post-op and Repletion  Estimated Fluid Needs: (1 mL/kcal)   Justification: Maintenance or per provider pending fluid status    PHYSICAL FINDINGS  See malnutrition section below.    MALNUTRITION  % Intake: </= 50% for >/= 5 days (severe)  % Weight Loss: > 7.5% in 3 months (severe)  Subcutaneous Fat " Loss: None observed  Muscle Loss: None observed  Fluid Accumulation/Edema: None noted per provider note  Malnutrition Diagnosis: Severe malnutrition in the context of acute illness    NUTRITION DIAGNOSIS  Inadequate protein-energy intake related to poor PO intake 2/2 abdominal pain PTA and currently NPO as evidenced by pt reports minimal PO intakes x 1 week       INTERVENTIONS  Implementation  Nutrition Education: Provided education on role of RD and nutrition POC    Goals  Diet adv v nutrition support within 3-4 days.     Monitoring/Evaluation  Progress toward goals will be monitored and evaluated per protocol.     Abbey Pop RD, LD   [address] RD Pager: [phone]

## 2017-10-12 NOTE — ANESTHESIA CARE TRANSFER NOTE
Patient: Tonio Alarcon    Procedure(s):  Exploratory Laparoscopy, Exploratory Laparotomy, EGD, Omental Patch, Upper Endoscopy - Wound Class: I-Clean    Diagnosis: perforated peptic ulcer  Diagnosis Additional Information: No value filed.    Anesthesia Type:   General, RSI, ETT     Note:  Airway :Nasal Cannula  Patient transferred to:PACU  Comments: Pt transported to PACU on nasal cannula at 6L, breathing easily and noting minimal pain and no nausea. VSS on arrival. Report given to PACU nurses on arrival. Handoff Report: Identifed the Patient, Identified the Reponsible Provider, Reviewed the pertinent medical history, Discussed the surgical course, Reviewed Intra-OP anesthesia mangement and issues during anesthesia, Set expectations for post-procedure period and Allowed opportunity for questions and acknowledgement of understanding      Vitals: (Last set prior to Anesthesia Care Transfer)    CRNA VITALS  10/12/2017 0431 - 10/12/2017 0511      10/12/2017             Pulse: 111    SpO2: 98 %                Electronically Signed By: Jeffy Sotelo MD  October 12, 2017  5:11 AM

## 2017-10-12 NOTE — PLAN OF CARE
Vitals:    10/12/17 0944 10/12/17 1044 10/12/17 1144 10/12/17 1244   BP: 123/75 144/83 132/81 134/77   BP Location:    Left arm   Pulse:       Resp: 15 15 15 15   Temp:    98.6  F (37  C)   TempSrc:    Oral   SpO2: 100% 100% 100% 100%   Weight:       Height:         Afebrile, 100% on 2 L, other VSS.  PCA dilaudid for pain control, pt educated on use. Denies nausea. Midline with dressing, marked-no change. Remains NPO. BLANCHE with scant drainage, dressing CDI. NG to low Continuous suction. Powell with good UO, to be removed POD1. Stool sample to be collected. MIVF infusing 100 ml/hr. Continue plan of care.

## 2017-10-12 NOTE — BRIEF OP NOTE
[facility], [place]    Brief Operative Note    Pre-operative diagnosis: Perforated peptic ulcer  Post-operative diagnosis Likely a perforation in the first portion of the duodenum which sealed over. Negative EGD, Leak test, and methylene blue test.   Procedure:  Exploratory Laparoscopy, Exploratory Laparotomy, EGD, Omental Patch  Surgeon:     * Celio Keeys MD - Primary     * Aleshia Somers MD PGY-6 - Assisting     * Mervin Minor MD - Resident - Assisting     * Danelle Valadez MD - Resident - Assisting  Anesthesia: General   Estimated blood loss: 50 ml  Drains:  Allan-Hardin  Specimens: None  Findings:   Some indurated tissue adhered to first portion of duodenum, but no perforation on inspection, EGD, negative leak test, or methylene blue test. .  Complications: None.  Implants: None.

## 2017-10-13 LAB
BACTERIA SPEC CULT: NORMAL
Lab: NORMAL
SPECIMEN SOURCE: NORMAL

## 2017-10-13 PROCEDURE — 25000132 ZZH RX MED GY IP 250 OP 250 PS 637: Performed by: STUDENT IN AN ORGANIZED HEALTH CARE EDUCATION/TRAINING PROGRAM

## 2017-10-13 PROCEDURE — 25000128 H RX IP 250 OP 636: Performed by: STUDENT IN AN ORGANIZED HEALTH CARE EDUCATION/TRAINING PROGRAM

## 2017-10-13 PROCEDURE — 25000128 H RX IP 250 OP 636: Performed by: SURGERY

## 2017-10-13 PROCEDURE — 12000008 ZZH R&B INTERMEDIATE UMMC

## 2017-10-13 PROCEDURE — 25000125 ZZHC RX 250: Performed by: SURGERY

## 2017-10-13 RX ADMIN — CLOTRIMAZOLE 1 TROCHE: 10 LOZENGE ORAL at 16:37

## 2017-10-13 RX ADMIN — PIPERACILLIN SODIUM AND TAZOBACTAM SODIUM 3.38 G: 3; .375 INJECTION, POWDER, LYOPHILIZED, FOR SOLUTION INTRAVENOUS at 16:39

## 2017-10-13 RX ADMIN — PANTOPRAZOLE SODIUM 40 MG: 40 INJECTION, POWDER, FOR SOLUTION INTRAVENOUS at 08:47

## 2017-10-13 RX ADMIN — CLOTRIMAZOLE 1 TROCHE: 10 LOZENGE ORAL at 08:47

## 2017-10-13 RX ADMIN — ACETAMINOPHEN 1000 MG: 10 INJECTION, SOLUTION INTRAVENOUS at 05:43

## 2017-10-13 RX ADMIN — ACETAMINOPHEN 1000 MG: 10 INJECTION, SOLUTION INTRAVENOUS at 15:01

## 2017-10-13 RX ADMIN — ACETAMINOPHEN 1000 MG: 10 INJECTION, SOLUTION INTRAVENOUS at 22:00

## 2017-10-13 RX ADMIN — HYDROMORPHONE HYDROCHLORIDE: 10 INJECTION, SOLUTION INTRAMUSCULAR; INTRAVENOUS; SUBCUTANEOUS at 15:12

## 2017-10-13 RX ADMIN — FLUCONAZOLE 400 MG: 2 INJECTION INTRAVENOUS at 19:53

## 2017-10-13 RX ADMIN — SODIUM CHLORIDE, POTASSIUM CHLORIDE, SODIUM LACTATE AND CALCIUM CHLORIDE: 600; 310; 30; 20 INJECTION, SOLUTION INTRAVENOUS at 08:54

## 2017-10-13 RX ADMIN — PIPERACILLIN SODIUM AND TAZOBACTAM SODIUM 3.38 G: 3; .375 INJECTION, POWDER, LYOPHILIZED, FOR SOLUTION INTRAVENOUS at 03:40

## 2017-10-13 RX ADMIN — PANTOPRAZOLE SODIUM 40 MG: 40 INJECTION, POWDER, FOR SOLUTION INTRAVENOUS at 19:53

## 2017-10-13 RX ADMIN — PIPERACILLIN SODIUM AND TAZOBACTAM SODIUM 3.38 G: 3; .375 INJECTION, POWDER, LYOPHILIZED, FOR SOLUTION INTRAVENOUS at 09:48

## 2017-10-13 RX ADMIN — ENOXAPARIN SODIUM 40 MG: 40 INJECTION SUBCUTANEOUS at 19:03

## 2017-10-13 RX ADMIN — PIPERACILLIN SODIUM AND TAZOBACTAM SODIUM 3.38 G: 3; .375 INJECTION, POWDER, LYOPHILIZED, FOR SOLUTION INTRAVENOUS at 22:00

## 2017-10-13 NOTE — PLAN OF CARE
Problem: Patient Care Overview  Goal: Plan of Care/Patient Progress Review  AVSS. Abdominal pain managed with dilaudid pca and scheduled tylenol. Pt up ad domingo. Walked in halls x 1. Voiding spont. No bm this shift. Denies flatus. Need to collect stool sample for H. Pylori. Pt aware. Midline incision covered. Old drainage noted and marked. NG to low continuous suction. Tolerating small amount of ice chips for comfort. MSSA protocol. Continue with poc.

## 2017-10-13 NOTE — PLAN OF CARE
VSS. Incisional pain controlled with PCA Dilaudid at 0.2 mg and scheduled IV tylenol. Midline dressing marked, drainage slightly extended past marked boarders. NPO with ice chips. NG to low continous suction with 200 cc output. Powell intact with good output. Ambulated around the unit with SBA. NG to LR infusing at 100 ml/hr. IV abx given. Continue with poc.

## 2017-10-13 NOTE — PLAN OF CARE
"Problem: Patient Care Overview  Goal: Plan of Care/Patient Progress Review  Outcome: No Change  /85 (BP Location: Left arm)  Pulse 97  Temp 99.3  F (37.4  C) (Oral)  Resp 18  Ht 1.803 m (5' 11\")  Wt 86.1 kg (189 lb 14.4 oz)  SpO2 99%  BMI 26.49 kg/m2     Afebrile, VSS.  Pain well managed with dilaudid PCA and IV tylenol.  MSSA = 4.  Powell was removed today and patient voided without saving.  Motivated to walk, but dangled at side of bed and did not get up d/t increasing abd pain.  Wants to try again this afternoon.  IS use with encouragement is at 500, reinforced education.  Lungs dim throughout.  No BM this shift, needs stool sample for H pylori.  Continues with PCA, LR @100/hr, NG to LCS.  Abd dressing in place with old drainage.  Continue POC.      "

## 2017-10-13 NOTE — PROGRESS NOTES
GENERAL SURGERY PROGRESS NOTE    Patient: Tonio Alarcon  MRN: [id number]    Subjective  No acute overnight events. Pain well controlled, some nausea. Voiding.    Objective  Temp:  [96.1  F (35.6  C)-99.3  F (37.4  C)] 97.9  F (36.6  C)  Pulse:  [94] 94  Heart Rate:  [] 92  Resp:  [15-20] 18  BP: (123-152)/(75-95) 128/87  SpO2:  [100 %] 100 %    I/O last 3 completed shifts:  In: 3455 [P.O.:55; I.V.:3300; IV Piggyback:100]  Out: 2295 [Urine:1700; Emesis/NG output:500; Drains:95]    Well appearing young man in nad  nlb on ra  Abd is soft, non-tender, non-distended, midline wound covered without drainage     Labs:   On 10/12 significant for  CBC: WBC 11.9 (13.6 10/11)    Hgb 13.2 (14.6 10/11)    Imaging: Reviewed.    Assessment & Plan  37 y/o M w/ questionable duodenal perf s/p ex lap w/ findings consistent with healed over peptic ulcer POD #1, recovering well. Bowel rest for now.     - Pain control: dilaudid PCA and scheduled tylenol   - Diet: NPO w/ ice chips, NGT to continuous low suction   - Powell: In place, remove today   - Abx: zosyn   - Wound Cares: Keep covered today, no drainage  - Drains: Closed suction R abdomen 95 serosanguinous output, keep in place  - DVT prophylaxis: lovenox  - Anticipated discharge: 3-5 days pending clinical progression     Scribed by Sachi Lopez, MS3     Discussed with staff.    Monisha Cho DO     [facility]  [phone]  [facility]

## 2017-10-13 NOTE — PLAN OF CARE
Problem: Patient Care Overview  Goal: Plan of Care/Patient Progress Review  Outcome: No Change  Vitals:     10/12/17 2027 10/12/17 2114 10/12/17 2258 10/13/17 0329   BP: 152/86 132/86 136/80 128/87   BP Location: Left arm Left arm Left arm     Pulse:     94     Resp: 18   18 18   Temp: 98.8  F (37.1  C)   99.3  F (37.4  C) 97.9  F (36.6  C)   TempSrc: Oral   Oral     SpO2: 100%   100%     Weight:           Height:           Patient with old drainage on abd midline dressing.   No bowel sounds, NG to LCS and has pulled 100cc blue/green drainage.  Adequate UV per billingsley, urine light green in color.  Mark with scant.  Lungs clear, diminished, using IS to 500.  Adequate pain control with pca dilaudid and IV tylenol.  MSSA score of 4.   NPO except ice chips.  Continue with POC

## 2017-10-14 ENCOUNTER — APPOINTMENT (OUTPATIENT)
Dept: GENERAL RADIOLOGY | Facility: CLINIC | Age: 37
DRG: 331 | End: 2017-10-14
Payer: COMMERCIAL

## 2017-10-14 LAB
ANION GAP SERPL CALCULATED.3IONS-SCNC: 11 MMOL/L (ref 3–14)
BUN SERPL-MCNC: 7 MG/DL (ref 7–30)
CALCIUM SERPL-MCNC: 8.6 MG/DL (ref 8.5–10.1)
CHLORIDE SERPL-SCNC: 104 MMOL/L (ref 94–109)
CO2 SERPL-SCNC: 22 MMOL/L (ref 20–32)
CREAT SERPL-MCNC: 0.88 MG/DL (ref 0.66–1.25)
ERYTHROCYTE [DISTWIDTH] IN BLOOD BY AUTOMATED COUNT: 14.9 % (ref 10–15)
GFR SERPL CREATININE-BSD FRML MDRD: >90 ML/MIN/1.7M2
GLUCOSE SERPL-MCNC: 73 MG/DL (ref 70–99)
HCT VFR BLD AUTO: 37.9 % (ref 40–53)
HGB BLD-MCNC: 12.2 G/DL (ref 13.3–17.7)
MCH RBC QN AUTO: 25.8 PG (ref 26.5–33)
MCHC RBC AUTO-ENTMCNC: 32.2 G/DL (ref 31.5–36.5)
MCV RBC AUTO: 80 FL (ref 78–100)
PLATELET # BLD AUTO: 267 10E9/L (ref 150–450)
POTASSIUM SERPL-SCNC: 3.6 MMOL/L (ref 3.4–5.3)
RBC # BLD AUTO: 4.72 10E12/L (ref 4.4–5.9)
SODIUM SERPL-SCNC: 137 MMOL/L (ref 133–144)
WBC # BLD AUTO: 7.9 10E9/L (ref 4–11)

## 2017-10-14 PROCEDURE — 12000008 ZZH R&B INTERMEDIATE UMMC

## 2017-10-14 PROCEDURE — 25000128 H RX IP 250 OP 636: Performed by: STUDENT IN AN ORGANIZED HEALTH CARE EDUCATION/TRAINING PROGRAM

## 2017-10-14 PROCEDURE — 36415 COLL VENOUS BLD VENIPUNCTURE: CPT | Performed by: SURGERY

## 2017-10-14 PROCEDURE — 25000132 ZZH RX MED GY IP 250 OP 250 PS 637: Performed by: STUDENT IN AN ORGANIZED HEALTH CARE EDUCATION/TRAINING PROGRAM

## 2017-10-14 PROCEDURE — 80048 BASIC METABOLIC PNL TOTAL CA: CPT | Performed by: SURGERY

## 2017-10-14 PROCEDURE — 40000556 ZZH STATISTIC PERIPHERAL IV START W US GUIDANCE

## 2017-10-14 PROCEDURE — 25000125 ZZHC RX 250: Performed by: SURGERY

## 2017-10-14 PROCEDURE — 25000128 H RX IP 250 OP 636: Performed by: SURGERY

## 2017-10-14 PROCEDURE — 85027 COMPLETE CBC AUTOMATED: CPT | Performed by: SURGERY

## 2017-10-14 PROCEDURE — 74240 X-RAY XM UPR GI TRC 1CNTRST: CPT

## 2017-10-14 RX ADMIN — PANTOPRAZOLE SODIUM 40 MG: 40 INJECTION, POWDER, FOR SOLUTION INTRAVENOUS at 08:20

## 2017-10-14 RX ADMIN — ACETAMINOPHEN 1000 MG: 10 INJECTION, SOLUTION INTRAVENOUS at 22:10

## 2017-10-14 RX ADMIN — PIPERACILLIN SODIUM AND TAZOBACTAM SODIUM 3.38 G: 3; .375 INJECTION, POWDER, LYOPHILIZED, FOR SOLUTION INTRAVENOUS at 23:05

## 2017-10-14 RX ADMIN — PIPERACILLIN SODIUM AND TAZOBACTAM SODIUM 3.38 G: 3; .375 INJECTION, POWDER, LYOPHILIZED, FOR SOLUTION INTRAVENOUS at 09:42

## 2017-10-14 RX ADMIN — CLOTRIMAZOLE 1 TROCHE: 10 LOZENGE ORAL at 08:20

## 2017-10-14 RX ADMIN — PIPERACILLIN SODIUM AND TAZOBACTAM SODIUM 3.38 G: 3; .375 INJECTION, POWDER, LYOPHILIZED, FOR SOLUTION INTRAVENOUS at 04:58

## 2017-10-14 RX ADMIN — DIATRIZOATE MEGLUMINE AND DIATRIZOATE SODIUM 120 ML: 660; 100 SOLUTION ORAL; RECTAL at 16:01

## 2017-10-14 RX ADMIN — PANTOPRAZOLE SODIUM 40 MG: 40 INJECTION, POWDER, FOR SOLUTION INTRAVENOUS at 20:29

## 2017-10-14 RX ADMIN — FLUCONAZOLE 400 MG: 2 INJECTION INTRAVENOUS at 20:29

## 2017-10-14 RX ADMIN — PIPERACILLIN SODIUM AND TAZOBACTAM SODIUM 3.38 G: 3; .375 INJECTION, POWDER, LYOPHILIZED, FOR SOLUTION INTRAVENOUS at 16:31

## 2017-10-14 RX ADMIN — ACETAMINOPHEN 1000 MG: 10 INJECTION, SOLUTION INTRAVENOUS at 13:41

## 2017-10-14 RX ADMIN — CLOTRIMAZOLE 1 TROCHE: 10 LOZENGE ORAL at 13:40

## 2017-10-14 RX ADMIN — ENOXAPARIN SODIUM 40 MG: 40 INJECTION SUBCUTANEOUS at 17:59

## 2017-10-14 RX ADMIN — ACETAMINOPHEN 1000 MG: 10 INJECTION, SOLUTION INTRAVENOUS at 04:58

## 2017-10-14 RX ADMIN — SODIUM CHLORIDE, POTASSIUM CHLORIDE, SODIUM LACTATE AND CALCIUM CHLORIDE: 600; 310; 30; 20 INJECTION, SOLUTION INTRAVENOUS at 20:43

## 2017-10-14 NOTE — PLAN OF CARE
"Problem: Patient Care Overview  Goal: Individualization & Mutuality  Outcome: No Change  ./82 (BP Location: Left arm)  Pulse 89  Temp 98.5  F (36.9  C) (Oral)  Resp 18  Ht 1.803 m (5' 11\")  Wt 86.1 kg (189 lb 14.4 oz)  SpO2 100%  BMI 26.49 kg/m2      Patient using PCA dilaudid rarely for pain; midline incision covered and marked, right BLANCHE CDI and output is minimal; NG low continuous suction with 350 mls of brown output in 12 hours, no nausea, taking in a few ice chips; up to the bathroom urinating and passing gas; appeared to sleep well throughout the night      "

## 2017-10-14 NOTE — PROGRESS NOTES
GENERAL SURGERY PROGRESS NOTE    Patient: Tonio Alarcon  MRN: [id number]    Subjective  No acute overnight events. Pain well controlled, some nausea. Voiding.     Objective  Temp:  [97.5  F (36.4  C)-100.1  F (37.8  C)] 98.5  F (36.9  C)  Pulse:  [89-97] 89  Heart Rate:  [85] 85  Resp:  [16-18] 18  BP: (125-151)/(78-87) 141/82  SpO2:  [99 %-100 %] 100 %    I/O last 3 completed shifts:  In: 2213 [P.O.:150; I.V.:2063]  Out: 1005 [Urine:500; Emesis/NG output:450; Drains:55]    Well appearing man in NAD  NLB on RA  Abd is soft, non-tender, non-distended, midline wound with staples covered without drainage.  Moves all extremities, WWP    Labs: Reviewed.  BMP unremarkable.  CBC notable for slight anemia (Hgb 12.2 and Hct 37.9)    Micro: Reviewed.  10/11 UA shows normal cuauhtemoc.  10/11 Blood cultures x 2 show no growth at 2 days.    Imaging: Reviewed.    Assessment & Plan  35 y/o M w/ questionable duodenal perf s/p ex lap w/ findings consistent with healed over peptic ulcer on 10/12, recovering well.     - XR upper GI today to assess for containment of gastric perforation. If no extravasation or leakage of contrast, will advance to clear liquids and DC NGT today.  - Pain control: dilaudid PCA and scheduled tylenol   - Diet: NPO w/ ice chips, NGT to continuous low suction   - Powell: removed, voiding spontaneously.   - Abx: zosyn   - Wound Cares: Keep covered today, no drainage  - Drains: Closed suction R abdomen 45ml serosanguinous output yesterday, 25 ml today.  - DVT prophylaxis: lovenox  - Order leak test today to assess peptic ulcer perforation site prior to advancing diet   - Anticipated discharge: 3-5 days pending clinical progression     Scribed by Megan Castaneda, MS4     Monisha Cho DO     [facility]  [phone]  [facility]      Edits by:  Donald Castrejon MD  PGY-2 General Surgery

## 2017-10-14 NOTE — PLAN OF CARE
Problem: Patient Care Overview  Goal: Plan of Care/Patient Progress Review  Outcome: Improving  [id number] Patient is alert and oriented. VSS on room air. Denies any nausea. C/o abdominal pain at incision site on a PCA. NG removed around 1730. Started on clear liquid diet. Patient up independently. Had upper GI xray done this afternoon. Midline incision with small amount of drainage. BLANCHE to right side. Will continue to monitor and plan of care.

## 2017-10-15 VITALS
WEIGHT: 189.9 LBS | HEIGHT: 71 IN | SYSTOLIC BLOOD PRESSURE: 146 MMHG | TEMPERATURE: 96 F | OXYGEN SATURATION: 99 % | BODY MASS INDEX: 26.59 KG/M2 | HEART RATE: 107 BPM | RESPIRATION RATE: 16 BRPM | DIASTOLIC BLOOD PRESSURE: 84 MMHG

## 2017-10-15 LAB — PLATELET # BLD AUTO: 300 10E9/L (ref 150–450)

## 2017-10-15 PROCEDURE — 25000125 ZZHC RX 250: Performed by: SURGERY

## 2017-10-15 PROCEDURE — 25000132 ZZH RX MED GY IP 250 OP 250 PS 637: Performed by: STUDENT IN AN ORGANIZED HEALTH CARE EDUCATION/TRAINING PROGRAM

## 2017-10-15 PROCEDURE — 87338 HPYLORI STOOL AG IA: CPT | Performed by: STUDENT IN AN ORGANIZED HEALTH CARE EDUCATION/TRAINING PROGRAM

## 2017-10-15 PROCEDURE — 85049 AUTOMATED PLATELET COUNT: CPT | Performed by: SURGERY

## 2017-10-15 PROCEDURE — 25000128 H RX IP 250 OP 636: Performed by: SURGERY

## 2017-10-15 PROCEDURE — 36415 COLL VENOUS BLD VENIPUNCTURE: CPT | Performed by: SURGERY

## 2017-10-15 PROCEDURE — 25000132 ZZH RX MED GY IP 250 OP 250 PS 637: Performed by: SURGERY

## 2017-10-15 RX ORDER — AMOXICILLIN AND CLAVULANATE POTASSIUM 500; 125 MG/1; MG/1
1 TABLET, FILM COATED ORAL 2 TIMES DAILY
Qty: 14 TABLET | Refills: 0 | Status: SHIPPED | OUTPATIENT
Start: 2017-10-15 | End: 2017-10-22

## 2017-10-15 RX ORDER — ACETAMINOPHEN 500 MG
1000 TABLET ORAL EVERY 8 HOURS
Status: DISCONTINUED | OUTPATIENT
Start: 2017-10-15 | End: 2017-10-15 | Stop reason: HOSPADM

## 2017-10-15 RX ORDER — ONDANSETRON 4 MG/1
4 TABLET, ORALLY DISINTEGRATING ORAL EVERY 6 HOURS PRN
Qty: 120 TABLET | Refills: 1 | Status: SHIPPED | OUTPATIENT
Start: 2017-10-15 | End: 2017-11-08

## 2017-10-15 RX ORDER — PANTOPRAZOLE SODIUM 40 MG/1
40 TABLET, DELAYED RELEASE ORAL DAILY
Qty: 90 TABLET | Refills: 3 | Status: SHIPPED | OUTPATIENT
Start: 2017-10-15 | End: 2018-06-08

## 2017-10-15 RX ADMIN — HYDROMORPHONE HYDROCHLORIDE: 10 INJECTION, SOLUTION INTRAMUSCULAR; INTRAVENOUS; SUBCUTANEOUS at 13:12

## 2017-10-15 RX ADMIN — PIPERACILLIN SODIUM AND TAZOBACTAM SODIUM 3.38 G: 3; .375 INJECTION, POWDER, LYOPHILIZED, FOR SOLUTION INTRAVENOUS at 10:36

## 2017-10-15 RX ADMIN — ACETAMINOPHEN 1000 MG: 10 INJECTION, SOLUTION INTRAVENOUS at 05:01

## 2017-10-15 RX ADMIN — PANTOPRAZOLE SODIUM 40 MG: 40 INJECTION, POWDER, FOR SOLUTION INTRAVENOUS at 08:49

## 2017-10-15 RX ADMIN — ACETAMINOPHEN 1000 MG: 500 TABLET, FILM COATED ORAL at 13:11

## 2017-10-15 RX ADMIN — PIPERACILLIN SODIUM AND TAZOBACTAM SODIUM 3.38 G: 3; .375 INJECTION, POWDER, LYOPHILIZED, FOR SOLUTION INTRAVENOUS at 05:01

## 2017-10-15 NOTE — PLAN OF CARE
Problem: Patient Care Overview  Goal: Individualization & Mutuality  Outcome: Improving  Alert and oriented x 4. Vital signs stable. On room air.Offered but patient declined flu shot. Pain fully managed. PCA and Tylenol  in use.Tolerating regular diet. Voiding and passing gas. Ambulating indepedently. Hoping to be  discharged this afternoon.Plan: Continue care.

## 2017-10-15 NOTE — PLAN OF CARE
"Problem: Pain, Acute (Adult)  Goal: Identify Related Risk Factors and Signs and Symptoms  Related risk factors and signs and symptoms are identified upon initiation of Human Response Clinical Practice Guideline (CPG).   Outcome: Improving  BP (!) 140/94 (BP Location: Left arm)  Pulse 79  Temp 97.8  F (36.6  C) (Oral)  Resp 18  Ht 1.803 m (5' 11\")  Wt 86.1 kg (189 lb 14.4 oz)  SpO2 100%  BMI 26.49 kg/m2      Patient using PCA with good pain control; midline incision covered with scant drainage; right BLANCHE with small serosanguinous output; up independently voiding and had 1 medium loose stool -sample sent for H. Pylori; appeared to sleep well with spouse at the bedside       "

## 2017-10-15 NOTE — PROGRESS NOTES
Problem: Patient Care Overview  Goal: Plan of Care/Patient Progress Review  0088-1501 shift  AVSS. Abdominal pain managed with dilaudid pca and scheduled tylenol. Pt up ad domingo.  Voiding spont. No bm this shift. Passing flatus. Need to collect stool sample for H. Pylori. Pt aware. Midline incision covered. Old drainage.  BLANCHE with small amount of serosang drainage.  Clear liquid diet, tolerating small amount of ice chips and sips of orange juice. Wife at bedside, attentive.  Continue with poc.

## 2017-10-15 NOTE — PLAN OF CARE
Problem: Patient Care Overview  Goal: Individualization & Mutuality  Outcome: Adequate for Discharge Date Met:  10/15/17  Alert and oriented x 4. Vital signs stable. On room air. Patient reported pain is well managed. Good oral intake. Voiding and had BM, passing quinn. Ambulating independently. I/A: Removed iv line. reviewed discharge instruction. J.P. removed by MD/Ely ([id number]). Discharged to home accompanied by spouse

## 2017-10-16 ENCOUNTER — CARE COORDINATION (OUTPATIENT)
Dept: SURGERY | Facility: CLINIC | Age: 37
End: 2017-10-16

## 2017-10-16 ENCOUNTER — CARE COORDINATION (OUTPATIENT)
Dept: CARE COORDINATION | Facility: CLINIC | Age: 37
End: 2017-10-16

## 2017-10-16 ENCOUNTER — TELEPHONE (OUTPATIENT)
Dept: FAMILY MEDICINE | Facility: CLINIC | Age: 37
End: 2017-10-16

## 2017-10-16 DIAGNOSIS — L76.82 PAIN AT SURGICAL INCISION: Primary | ICD-10-CM

## 2017-10-16 LAB
H PYLORI AG STL QL IA: NORMAL
SPECIMEN SOURCE: NORMAL

## 2017-10-16 RX ORDER — OXYCODONE AND ACETAMINOPHEN 5; 325 MG/1; MG/1
TABLET ORAL
Qty: 30 TABLET | Refills: 0 | Status: SHIPPED | OUTPATIENT
Start: 2017-10-16 | End: 2017-11-08

## 2017-10-16 NOTE — TELEPHONE ENCOUNTER
Route to surgeon's office, seen 10/12. Called wife & instructed her to call the phone number listed in the care coordinator's note, she verbalized understanding.  Saritha Clark RN

## 2017-10-16 NOTE — TELEPHONE ENCOUNTER
Reason for Call:  Medication or medication refill:    Do you use a [facility]?  Name of the pharmacy and phone number for the current request:  Will  at     Name of the medication requested: pain medication    Other request: Patient had an emergency surgery for a perforated ulcer, and was released without any pain medication.    Can we leave a detailed message on this number? YES    Phone number patient can be reached at: Other phone number:  [phone]*    Best Time: today    Call taken on 10/16/2017 at 8:24 AM by Prudencio Samson

## 2017-10-16 NOTE — PROGRESS NOTES
This patient was seen at or by General Surgery Department and Care Coordinators from that department will handle this patient's post discharge follow up              -  A nurse from the General Surgery Clinic will contact you 24 hours, or next business day, after your discharge from the hospital.  If you have questions or to schedule a follow up appointment please call the General Surgery Clinic at [phone].  Call [phone] and ask to speak with the Surgery resident on call if you are having troubles in the evenings, at night, or on the weekend.

## 2017-10-16 NOTE — PROGRESS NOTES
RN Post-Op/Post-Discharge Care Coordination Note    Mr. Tonio Alarcon is a 36 year old male who underwent open abdominal surgery on 10/11 with  Dr. Celio Keyes and was recently discharged from the hospital .  Spoke with Patient.    Support  Patient able to care for self independently. Wife available for assistance.     Health Status  Fevers/chills: Patient denies any fever or chills.  Nausea/Vomiting: Patient denies nausea/vomiting.  Eating/drinking: Patient is able to eat and drink without any complaints.  Decreased appetite.  Bowel habits: Patient reports having a normal bowel movement.  Discussed possible constipation related to narcotic usage- may use OTC laxative per package instructions if needed.  Urinary: Voiding normally  Drains (BLANCHE): N/A  Incisions: Patient denies any signs and symptoms of infection.  Staples intact.  Pain: Poorly managed.  Patient was inadvertently discharged from the hospital without pain medication.  May use Tyelnol now.  Rx for Percocet to be brought to the [facility] for his wife to pick-up.  Once he has Rx may not take additional Tylenol with Percocet- discussed with both patient and his spouse.   New Medications:  Xofran (listed but does not have per patient- does not need), Augmentin- taking BID, Protonix (patient aware that he will need to take life-long.  Future Rx to be obtained from PCP).    Activity/Restrictions  No lifting in excess of 15-20 pounds for 3-4 weeks. Further direction will be given at time of po appointment    Equipment  None    All of his questions were answered.  He will call this office if he has any further questions and/or concerns.      PO appt with Dr. Keyes 10/26 at 5:30 PM.  Staples will be removed at that time.  Patient will contact his employer and forward any [place] paperwork if needed for completion.  Patient aware that he should meet with his PCP in 1-2 weeks.    Whom and When to Call  Patient acknowledges understanding of how to manage  any medication changes and   when to seek medical care.     Patient advised that if after hour medical concerns arise to please call [phone] and choose option 4 to speak to the physician on call.     A copy of this note was routed to the primary surgeon.

## 2017-10-18 LAB
BACTERIA SPEC CULT: NO GROWTH
BACTERIA SPEC CULT: NO GROWTH
Lab: NORMAL
Lab: NORMAL
SPECIMEN SOURCE: NORMAL
SPECIMEN SOURCE: NORMAL

## 2017-10-25 ENCOUNTER — CARE COORDINATION (OUTPATIENT)
Dept: SURGERY | Facility: CLINIC | Age: 37
End: 2017-10-25

## 2017-10-25 NOTE — PROGRESS NOTES
Pre Visit Call and Assessment    Date of call:  10/25/2017    Phone numbers:  Cell number on file:    Telephone Information:   Mobile [phone]       Reached patient/confirmed appointment:  Yes  Patient care team/Primary provider:  Christiano Palacios  Preferred outpatient pharmacy:    [facility] [id number] [facility] - [place], [place] - [address]  [address] [address]  Phone: [phone] Fax: [phone]    [facility] [id number] - [facility] [address] [facility] [address]  [address] [facility] [address]  Phone: [phone] Fax: [phone]    Referred to:  Dr. Celio Keyes    Reason for visit:  return

## 2017-10-26 ENCOUNTER — OFFICE VISIT (OUTPATIENT)
Dept: SURGERY | Facility: CLINIC | Age: 37
End: 2017-10-26

## 2017-10-26 DIAGNOSIS — G89.18 POSTOPERATIVE PAIN: Primary | ICD-10-CM

## 2017-10-26 RX ORDER — OXYCODONE HYDROCHLORIDE 5 MG/1
5 TABLET ORAL EVERY 8 HOURS PRN
Qty: 18 TABLET | Refills: 0 | Status: SHIPPED | OUTPATIENT
Start: 2017-10-26 | End: 2017-11-08

## 2017-10-26 NOTE — LETTER
10/26/2017       RE: Tonio Alarcon  [address] [address]     Dear Colleague,    Thank you for referring your patient, Tonio Alarcon, to the [facility] GENERAL SURGERY at [facility]. Please see a copy of my visit note below.    [facility] Staff:    The patient is doing very well after surgery.  No new problems.  Follow up after open abdominal exploration and Garret patch for perforated peptic ulcer.    Exam:  GEN: the patient appears completely non-toxic and in N.A.D.  ABD: There abdomen is soft and non tender.  The incision is healing without issues.  The surgical clips were removed and steri-strips applied.  EXT: warm and well perfused.    A/P:  The patient is doing well following abdominal exploration and Garret patch for perforated peptic ulcer; he has completed his surgical follow up. Patient  follow up with GI Medicine to evaluate for peptic ulcer is recommended.      Again, thank you for allowing me to participate in the care of your patient.      Sincerely,    Celio Keyes MD

## 2017-10-26 NOTE — LETTER
10/26/2017      RE: Tonio Alarcon  [address] [address]       [facility] Staff:    The patient is doing very well after surgery.  No new problems.  Follow up after open abdominal exploration and Garret patch for perforated peptic ulcer.    Exam:  GEN: the patient appears completely non-toxic and in N.A.D.  ABD: There abdomen is soft and non tender.  The incision is healing without issues.  The surgical clips were removed and steri-strips applied.  EXT: warm and well perfused.    A/P:  The patient is doing well following abdominal exploration and Garret patch for perforated peptic ulcer; he has completed his surgical follow up. Patient  follow up with GI Medicine to evaluate for peptic ulcer is recommended.      Celio Keyes MD

## 2017-10-31 RX ORDER — ERGOCALCIFEROL 1.25 MG/1
CAPSULE, LIQUID FILLED ORAL
Refills: 0 | COMMUNITY
Start: 2017-09-24 | End: 2018-06-08

## 2017-10-31 RX ORDER — IBUPROFEN 600 MG/1
TABLET, FILM COATED ORAL
Refills: 1 | COMMUNITY
Start: 2017-09-15 | End: 2017-11-08

## 2017-10-31 RX ORDER — HYDROCODONE BITARTRATE AND ACETAMINOPHEN 5; 325 MG/1; MG/1
1-2 TABLET ORAL
COMMUNITY
Start: 2015-07-16 | End: 2017-11-08

## 2017-10-31 RX ORDER — METHYLPREDNISOLONE 4 MG
TABLET, DOSE PACK ORAL
Refills: 0 | COMMUNITY
Start: 2017-07-15 | End: 2017-11-08

## 2017-10-31 NOTE — PROCEDURES
[facility] Staff:    The patient is doing very well after surgery.  No new problems.  Follow up after open abdominal exploration and Garret patch for perforated peptic ulcer.    Exam:  GEN: the patient appears completely non-toxic and in N.A.D.  ABD: There abdomen is soft and non tender.  The incision is healing without issues.  The surgical clips were removed and steri-strips applied.  EXT: warm and well perfused.    A/P:  The patient is doing well following abdominal exploration and Garret patch for perforated peptic ulcer; he has completed his surgical follow up. Patient  follow up with GI Medicine to evaluate for peptic ulcer is recommended.    Celio Keyes MD, PhD

## 2017-11-08 ENCOUNTER — OFFICE VISIT (OUTPATIENT)
Dept: FAMILY MEDICINE | Facility: CLINIC | Age: 37
End: 2017-11-08
Payer: COMMERCIAL

## 2017-11-08 VITALS
WEIGHT: 191.5 LBS | TEMPERATURE: 98.6 F | HEIGHT: 71 IN | HEART RATE: 102 BPM | DIASTOLIC BLOOD PRESSURE: 54 MMHG | SYSTOLIC BLOOD PRESSURE: 90 MMHG | BODY MASS INDEX: 26.81 KG/M2

## 2017-11-08 DIAGNOSIS — K27.5 PERFORATED PEPTIC ULCER (H): Primary | ICD-10-CM

## 2017-11-08 DIAGNOSIS — G89.18 POSTOPERATIVE PAIN: ICD-10-CM

## 2017-11-08 PROBLEM — K66.8 PNEUMOPERITONEUM: Status: RESOLVED | Noted: 2017-10-12 | Resolved: 2017-11-08

## 2017-11-08 PROCEDURE — 99214 OFFICE O/P EST MOD 30 MIN: CPT | Performed by: FAMILY MEDICINE

## 2017-11-08 RX ORDER — OXYCODONE HYDROCHLORIDE 5 MG/1
5 TABLET ORAL EVERY 8 HOURS PRN
Qty: 18 TABLET | Refills: 0 | Status: SHIPPED | OUTPATIENT
Start: 2017-11-08 | End: 2018-05-16

## 2017-11-08 NOTE — MR AVS SNAPSHOT
"              After Visit Summary   2017    Tonio Alarcon    MRN: [id number]           Patient Information     Date Of Birth          1980        Visit Information        Provider Department      2017 3:00 PM Christiano Palacios MD [facility]        Today's Diagnoses     Postoperative pain           Follow-ups after your visit        Follow-up notes from your care team     Return in about 4 weeks (around 2017) for follow up.      Who to contact     If you have questions or need follow up information about today's clinic visit or your schedule please contact [facility] directly at [phone].  Normal or non-critical lab and imaging results will be communicated to you by MyChart, letter or phone within 4 business days after the clinic has received the results. If you do not hear from us within 7 days, please contact the clinic through [facility] or phone. If you have a critical or abnormal lab result, we will notify you by phone as soon as possible.  Submit refill requests through [facility] or call your pharmacy and they will forward the refill request to us. Please allow 3 business days for your refill to be completed.          Additional Information About Your Visit        MyChart Information     [facility] lets you send messages to your doctor, view your test results, renew your prescriptions, schedule appointments and more. To sign up, go to [email or web address] . Click on \"Log in\" on the left side of the screen, which will take you to the Welcome page. Then click on \"Sign up Now\" on the right side of the page.     You will be asked to enter the access code listed below, as well as some personal information. Please follow the directions to create your username and password.     Your access code is: [email or web address]  Expires: 2018 11:48 AM     Your access code will  in 90 days. If you need help or a new code, please call your [place] " "clinic or [phone].        Care EveryWhere ID     This is your Care EveryWhere ID. This could be used by other organizations to access your [facility] medical records  [id number]        Your Vitals Were     Pulse Temperature Height BMI (Body Mass Index)          102 98.6  F (37  C) (Oral) 5' 11\" (1.803 m) 26.71 kg/m2         Blood Pressure from Last 3 Encounters:   11/08/17 90/54   10/15/17 146/84   10/03/17 141/80    Weight from Last 3 Encounters:   11/08/17 191 lb 8 oz (86.9 kg)   10/11/17 189 lb 14.4 oz (86.1 kg)   10/03/17 193 lb 4.8 oz (87.7 kg)              Today, you had the following     No orders found for display         Today's Medication Changes          These changes are accurate as of: 11/8/17  3:45 PM.  If you have any questions, ask your nurse or doctor.               Stop taking these medicines if you haven't already. Please contact your care team if you have questions.     HYDROcodone-acetaminophen 5-325 MG per tablet   Commonly known as:  NORCO   Stopped by:  Christiano Palacios MD           ibuprofen 600 MG tablet   Commonly known as:  ADVIL/MOTRIN   Stopped by:  Christiano Palacios MD           ondansetron 4 MG ODT tab   Commonly known as:  ZOFRAN-ODT   Stopped by:  Christiano Palacios MD           oxyCODONE-acetaminophen 5-325 MG per tablet   Commonly known as:  PERCOCET   Stopped by:  Christiano Palacios MD           vitamin D3 63993 UNITS capsule   Commonly known as:  CHOLECALCIFEROL   Stopped by:  Christiano Palacios MD                Where to get your medicines      Some of these will need a paper prescription and others can be bought over the counter.  Ask your nurse if you have questions.     Bring a paper prescription for each of these medications     oxyCODONE IR 5 MG tablet                Primary Care Provider Office Phone # Fax #    Christiano Palacios [phone] [address] [facility] [address]      "   Equal Access to Services     [facility] : Hadii aad ku hadhanygeorgiana Arcos, watomaszda lupauliekoleha, qarockta eldonfrankcarlyn mason. So [facility] [phone].    ATENCIÓN: Si habla español, tiene a velazco disposición servicios gratuitos de asistencia lingüística. Makaylaame al [phone].    We comply with applicable federal civil rights laws and [place] laws. We do not discriminate on the basis of race, color, national origin, age, disability, sex, sexual orientation, or gender identity.            Thank you!     Thank you for choosing [facility]  for your care. Our goal is always to provide you with excellent care. Hearing back from our patients is one way we can continue to improve our services. Please take a few minutes to complete the written survey that you may receive in the mail after your visit with us. Thank you!             Your Updated Medication List - Protect others around you: Learn how to safely use, store and throw away your medicines at www.disposemymeds.org.          This list is accurate as of: 11/8/17  3:45 PM.  Always use your most recent med list.                   Brand Name Dispense Instructions for use Diagnosis    oxyCODONE IR 5 MG tablet    ROXICODONE    18 tablet    Take 1 tablet (5 mg) by mouth every 8 hours as needed for breakthrough pain or pain maximum 3 tablet(s) per day    Postoperative pain       pantoprazole 40 MG EC tablet    PROTONIX    90 tablet    Take 1 tablet (40 mg) by mouth daily Take 30-60 minutes before a meal.    Perforated peptic ulcer (H)       vitamin D 48075 UNIT capsule    ERGOCALCIFEROL     TAKE 1 CAPSULE (50,000 UNITS) BY MOUTH EVERY 7 DAYS FOR 8 DOSES

## 2017-11-08 NOTE — PROGRESS NOTES
SUBJECTIVE:   Tonio Alarcon is a 36 year old male who presents to clinic today for the following health issues:      Here for follow up of suture removal.  Seems to be healing well.  He needs a note for his work to extend his absence.  Original note only stated 3 weeks.    He notes some pain in the abdomen at this time along the incision and when he uses the muscles of the abdomen.  Incision is clean dry and intact.  He has no fever, chills or night sweats.  Appetite is good and he is passing normal bowel movements and flatus at this time.  Notes some pain in the right upper abdomen/rib cage and states he noted this pain when he woke up from anesthesia but not before the surgery.  Constant and achy,  Interferes with sleep and also increased with cough, sneezing, and deep breathing.  No hemoptysis or shortness of breath.  Work requires he do some lifting and he is not sure about limitations at this time.  He has been released from surgical follow up.        Problem list and histories reviewed & adjusted, as indicated.  Additional history: as documented    Patient Active Problem List   Diagnosis     CARDIOVASCULAR SCREENING; LDL GOAL LESS THAN 160     Chronic low back pain     Past Surgical History:   Procedure Laterality Date     GI SURGERY      Hemorrhoids      HEMORRHOID SURGERY  2015     LAPAROSCOPIC HERNIORRHAPHY EPIGASTRIC N/A 10/11/2017    Procedure: LAPAROSCOPIC HERNIORRHAPHY EPIGASTRIC;  Exploratory Laparoscopy, Exploratory Laparotomy, EGD, Omental Patch, Upper Endoscopy;  Surgeon: Celio Keyes MD;  Location:  OR       Social History   Substance Use Topics     Smoking status: Current Every Day Smoker     Packs/day: 1.00     Years: 25.00     Smokeless tobacco: Never Used     Alcohol use Yes      Comment: heavy drinking about 2 x per week     Family History   Problem Relation Age of Onset     DIABETES Mother      Breast Cancer Mother      Dx at 61 years aol     Coronary Artery Disease Mother   "    3v CABG     CANCER Father      Stomach ca - Dx at 57 years old and passed away from this     Cancer - colorectal Maternal Grandmother      DIABETES Brother          Current Outpatient Prescriptions   Medication Sig Dispense Refill     oxyCODONE IR (ROXICODONE) 5 MG tablet Take 1 tablet (5 mg) by mouth every 8 hours as needed for breakthrough pain or pain maximum 3 tablet(s) per day 18 tablet 0     pantoprazole (PROTONIX) 40 MG EC tablet Take 1 tablet (40 mg) by mouth daily Take 30-60 minutes before a meal. 90 tablet 3     vitamin D (ERGOCALCIFEROL) 87518 UNIT capsule TAKE 1 CAPSULE (50,000 UNITS) BY MOUTH EVERY 7 DAYS FOR 8 DOSES  0     No Known Allergies      Reviewed and updated as needed this visit by clinical staffTobacco  Allergies  Meds  Problems  Med Hx  Surg Hx  Fam Hx  Soc Hx        Reviewed and updated as needed this visit by Provider  Tobacco  Allergies  Meds  Problems  Med Hx  Surg Hx  Fam Hx  Soc Hx          ROS:  Constitutional, HEENT, cardiovascular, pulmonary, gi and gu systems are negative, except as otherwise noted.      OBJECTIVE:   BP 90/54 (BP Location: Right arm, Patient Position: Sitting, Cuff Size: Adult Large)  Pulse 102  Temp 98.6  F (37  C) (Oral)  Ht 5' 11\" (1.803 m)  Wt 191 lb 8 oz (86.9 kg)  BMI 26.71 kg/m2  Body mass index is 26.71 kg/(m^2).  GENERAL: healthy, alert and no distress  EYES: Eyes grossly normal to inspection, PERRL and conjunctivae and sclerae normal  HENT: ear canals and TM's normal, nose and mouth without ulcers or lesions  NECK: no adenopathy, no asymmetry, masses, or scars and thyroid normal to palpation  RESP: lungs clear to auscultation - no rales, rhonchi or wheezes  RESP: tenderness to palpation is noted along the right costal margin and this corresponds the area of his pain.  CV: regular rate and rhythm, normal S1 S2, no S3 or S4, no murmur, click or rub, no peripheral edema and peripheral pulses strong  ABDOMEN: soft, nontender, without " hepatosplenomegaly or masses, bowel sounds normal, well-healed incision with drainage and he is diffusely tender over the abdominal muscles without rebound or guarding.  MS: no gross musculoskeletal defects noted, no edema  SKIN: no suspicious lesions or rashes  NEURO: Normal strength and tone, mentation intact and speech normal  PSYCH: mentation appears normal, affect normal/bright    Diagnostic Test Results:  none     ASSESSMENT/PLAN:             1. Postoperative pain  It seems the current issues with pain are probably postoperative and related to the surgical site and spreading the muscles, etc.  Feel it will likely improve over the next few weeks.  One refill issued today.  Follow up 4 weeks, note off work until follow up.  - oxyCODONE IR (ROXICODONE) 5 MG tablet; Take 1 tablet (5 mg) by mouth every 8 hours as needed for breakthrough pain or pain maximum 3 tablet(s) per day  Dispense: 18 tablet; Refill: 0    2. Perforated peptic ulcer (H)  Consider EGD at a future date given famhx of stomach cancer in his father and this perforation without clear cause.      FUTURE APPOINTMENTS:       - Follow-up visit in one month    Christiano Palacios MD  [facility]

## 2017-11-08 NOTE — LETTER
11/08/17    To Whom It May Concern:    Tonio Alarcon is to remain off work until follow up in the office in approximately 4 weeks (anticipate early December return to the office for reevaluation).    Please contact our office if any questions.        Christiano Palacios MD

## 2017-11-08 NOTE — NURSING NOTE
"No chief complaint on file.      Initial BP 90/54 (BP Location: Right arm, Patient Position: Sitting, Cuff Size: Adult Large)  Pulse 102  Temp 98.6  F (37  C) (Oral)  Ht 5' 11\" (1.803 m)  Wt 191 lb 8 oz (86.9 kg)  BMI 26.71 kg/m2 Estimated body mass index is 26.71 kg/(m^2) as calculated from the following:    Height as of this encounter: 5' 11\" (1.803 m).    Weight as of this encounter: 191 lb 8 oz (86.9 kg).  Medication Reconciliation: complete     Vida NIX, Certified Medical Assistant (AAMA)November 8, 2017 3:14 PM      "

## 2018-03-26 ENCOUNTER — HOSPITAL ENCOUNTER (OUTPATIENT)
Dept: BEHAVIORAL HEALTH | Facility: CLINIC | Age: 38
Discharge: HOME | End: 2018-03-26
Attending: SOCIAL WORKER | Admitting: SOCIAL WORKER
Payer: COMMERCIAL

## 2018-03-26 VITALS — HEIGHT: 71 IN | BODY MASS INDEX: 27.3 KG/M2 | WEIGHT: 195 LBS

## 2018-03-26 PROCEDURE — H0001 ALCOHOL AND/OR DRUG ASSESS: HCPCS

## 2018-03-26 ASSESSMENT — ANXIETY QUESTIONNAIRES
2. NOT BEING ABLE TO STOP OR CONTROL WORRYING: MORE THAN HALF THE DAYS
IF YOU CHECKED OFF ANY PROBLEMS ON THIS QUESTIONNAIRE, HOW DIFFICULT HAVE THESE PROBLEMS MADE IT FOR YOU TO DO YOUR WORK, TAKE CARE OF THINGS AT HOME, OR GET ALONG WITH OTHER PEOPLE: SOMEWHAT DIFFICULT
5. BEING SO RESTLESS THAT IT IS HARD TO SIT STILL: NOT AT ALL
1. FEELING NERVOUS, ANXIOUS, OR ON EDGE: NEARLY EVERY DAY
7. FEELING AFRAID AS IF SOMETHING AWFUL MIGHT HAPPEN: SEVERAL DAYS
GAD7 TOTAL SCORE: 13
6. BECOMING EASILY ANNOYED OR IRRITABLE: SEVERAL DAYS
3. WORRYING TOO MUCH ABOUT DIFFERENT THINGS: NEARLY EVERY DAY

## 2018-03-26 ASSESSMENT — PAIN SCALES - GENERAL: PAINLEVEL: NO PAIN (0)

## 2018-03-26 ASSESSMENT — PATIENT HEALTH QUESTIONNAIRE - PHQ9: 5. POOR APPETITE OR OVEREATING: NEARLY EVERY DAY
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
## 2018-03-27 ASSESSMENT — PATIENT HEALTH QUESTIONNAIRE - PHQ9: SUM OF ALL RESPONSES TO PHQ QUESTIONS 1-9: 12

## 2018-03-27 ASSESSMENT — ANXIETY QUESTIONNAIRES: GAD7 TOTAL SCORE: 13

## 2018-03-27 NOTE — PROGRESS NOTES
Visit Date:   03/26/2018     CHEMICAL DEPENDENCY ASSESSMENT DICTATION    EVALUATION COUNSELOR:  LATRICE Cho LADC.     PATIENT'S ADDRESS:  [address] [place] [address], [place] [address].   TELEPHONE:  [phone].   STATISTICS:  Date of Birth 1980.  Age:  37.  Gender:  Male.  Marital Status:  .  INSURANCE:  [facility].   REFERRAL SOURCE:  Self.      REASON FOR EVALUATION:  Per client, I guess I have a problem with alcohol.  Family had been really concerned over the years and done different things, did sign up for treatment place a couple years ago, no problems.  Certain things happened at home so I had to terminate program and then stayed sober for a couple months after that and then gradually drinking.      HEALTH HISTORY AND MEDICATIONS:  Herniated disk or chronic low back pain.       MEDICATIONS:  Protonix.      HISTORY OF PREVIOUS TREATMENT AND COUNSELING:  Client reported 1 past treatment in which he did not complete.  Client reported no past or current counseling.      HISTORY OF ALCOHOL AND DRUG USE:  Alcohol:  Age of first use 11, 37 years old, in the last 12 months per client.  Binge drinking.  Go without drinking, something bother me, old past, tied up thinking about things, socially go out and then drinking the whole weekend pretty heavily.  Varies, a couple times out of the month, never by myself, about 3-4 people drinking on a gallon.  Not sure how much I consume of that, but huge amount  from that point drinking, not getting sleep Friday and then wake up and go hang out and drink some more, sometimes the whole week or 2 weeks.  Date of last use 3/24/18.  Marijuana:  Age of first use 12.  37 years old, within the last 12 months per client, kind of daily, 2 grams.  Date of last use 3/25/18.  Nicotine:  Age of first use, 11, a pack per day.  Date of last use 3/26/18.        SUMMARY OF CD SYMPTOMS ACKNOWLEDGED BY THE PATIENT:  The patient is meeting 9 DSM criteria for alcohol use  "disorder, severe, cannabis use disorder, severe, tobacco use disorder, moderate, so 4 DSM criteria for tobacco use disorder, moderate.        SUMMARY OF COLLATERAL DATA:  Was the [facility] electronic medical record, EMR.  Client medical record chart was reviewed and some information was used for collateral purposes of this assessment.  See throughout the CD assessment and collateral obtained from EMR.  Also his wife.  Collateral contact filled out concerned person packet which will be uploaded into client medical record chart once received by medical record department.  Collateral contact reported on concerned person packet, Tonio will go on drinking bunches.  At times he will not even make it home, where he will leave early afternoon and will come home after midnight, having drank all the time.  Collateral contact reported on concerned person packet, she had been concerned for \"a couple years.\"  Collateral contact reported on concerned person packet \"I believe just last year where had an episode where getting help was talked about.  I had mentioned separation from our marriage.  He uses hard liquor, not sure exactly, but I do know he will drink all day and night, various times per week.\"  Collateral contact reported on concerned person packet, \"Tonio has chronic back pain, it is a little frustrating by him and just recently had an emergency surgery done on the stomach for ulcers.  I think that he is struggling with depression and there is other things other than the depression, something is going on mentally that causes him to want to drink.  He seems like he just cannot say no to the alcohol.\"      MENTAL STATUS ASSESSMENT:  Physical appearance and attire:  Appears stated age.  Neat hygiene, well groomed.  Eye contact at examiner.  Speech regular.  Speech volume regular.  Speech quality fluid.  Cognitive perceptual reality based.  Cognition, memory intact, judgment, is able to concentrate.  Orientation to time, " place, person and situation.  Thought concrete.  Hallucinations: None.  General behavioral tone:  Cooperative.  Psychomotor activity:  No problem noted.  Gait:  No problem.  Mood appropriate.  Affect congruent and appropriate.      VULNERABLE ADULT ASSESSMENT:  This person is not a functional vulnerable adult according to [place] statute.        DSM IMPRESSION/DIAGNOSES:         DIMENSION 1:  INTOXICATION/WITHDRAWAL:  Risk rating 0.  The client reported reason for the assessment was due to increase in drinking.  Client reported his last use date of alcohol as 3/24/18, marijuana is 3/25/18 and nicotine is 3/26/18.  Client reported 1 past admission to detox.  Client reported withdrawal symptoms.      DIMENSION 2/BIOMEDICAL CONDITIONS AND COMPLICATIONS:  Risk rating 1.  Client reported chronic medical conditions, but denied current acute concerns.  The client reported he has a primary care clinic.  Client reported he takes his medications as prescribed and directed.  Client reported past fights and 1 hospitalization related to use.       DIMENSION 3:  EMOTIONAL/BEHAVIORAL/COGNITIVE:  Risk rating 2.  Client reported mental health diagnosis.  Client reported prescribed medications for mental health symptoms.  Client denied past or current therapy.  Client reported past trauma.  Client reported ongoing symptoms of past trauma.  Client denied past or current abuse of any type.  Client denied past or current SI, client denied past suicide attempts. Client's PHQ 9 score was 12/27, indicating moderate depression.  Client's NATHALIE-7 score was a 13/21, indicating moderate anxiety.  Client would appear to benefit from following:  Referral information provided to him regarding mental health services (such as therapy/psychiatry) to address or rule out need for ongoing outpatient mental health services.      DIMENSION 4:  READINESS TO CHANGE:  Risk rating 2.  Client reported motivation for assessment was due to increase in drinking.   Family concerned and seeking IOP treatment services.  Client reported his wife and mother have expressed concern about his use.  Client appears to be in the contemplative stage of change evidenced by his verbal report and past behavior.      DIMENSION 5:  RELAPSE/CONTINUED USE/CONTINUED PROBLEMS:  Risk rating 3.  Client reported he will try to abstain by avoiding the crowd, staying home.  Client reported what leads back to use is stress and peer environment Client reported cravings.  Client reported 1 past treatment in his lifetime, which he did not complete.  Client denied sober support group meeting attendance.  Client appears to lack positive coping skills and relapse prevention skills.      DIMENSION 6:  RECOVERY ENVIRONMENT:  Risk rating 3.  Client reported he is not currently working.  Client reported he lives with his wife and 6 children.  Client denied use by others in the house.  Client reported using is important to his social connections.  Client denied past or current legal issues.  Client reported his wife and kids are supportive of him.  Client appears to lack structured sober support network and routine.      RECOMMENDATIONS:  Client is recommended to:   1.  Abstain from using all non-prescribed mood-altering chemicals and substances unless prescribed by licensed physicians.   2.  Attend and complete an IOP treatment program, such as [facility].   3.  Follow-through on referral information provided from physician regarding mental health services such as therapy, psychiatry to address or rule out the need for ongoing outpatient mental health services.   4.  Start attending sober support group meetings weekly, obtain a male sponsor.   5.  Follow up with primary clinic for referral information for holistic alternative treatment options for chronic pain management.      INITIAL PROBLEMS LIST:  The patient lacks relapse prevention skills.  The patient has poor coping skills.  The patient  has poor refusal skills.  The patient lacks a sober peer support network.  The patient has dual issues of MI and CD.  The patient lacks ability to effectively manage his or mental health issues.  The patient has a significant history of trauma and/or abuse issues.        RATIONALE:  Client reported his use has negatively impacted multiple areas of his life.  He currently meets criteria for substance use disorder occurring within a 12-month period.  Client would appear to benefit from gaining structured sober support network and a routine, along with education on positive coping skills and relapse prevention skills.         This information has been disclosed to you from records protected by Federal confidentiality rules (42 CFR part 2). The Federal rules prohibit you from making any further disclosure of this information unless further disclosure is expressly permitted by the written consent of the person to whom it pertains or as otherwise permitted by 42 CFR part 2. A general authorization for the release of medical or other information is NOT sufficient for this purpose. The Federal rules restrict any use of the information to criminally investigate or prosecute any alcohol or drug abuse patient.      LATRICE CORLEY, [facility]             D: 2018   T: 2018   MT: MAOHGANY      Name:     BREANA HAY   MRN:      [id number]        Account:      [id number]   :      1980           Visit Date:   2018      Document: [id number]

## 2018-05-06 ENCOUNTER — HOSPITAL ENCOUNTER (EMERGENCY)
Facility: CLINIC | Age: 38
Discharge: HOME | End: 2018-05-06
Attending: EMERGENCY MEDICINE | Admitting: EMERGENCY MEDICINE
Payer: COMMERCIAL

## 2018-05-06 VITALS
SYSTOLIC BLOOD PRESSURE: 138 MMHG | BODY MASS INDEX: 23.99 KG/M2 | TEMPERATURE: 98.2 F | HEART RATE: 82 BPM | OXYGEN SATURATION: 98 % | WEIGHT: 172 LBS | RESPIRATION RATE: 16 BRPM | DIASTOLIC BLOOD PRESSURE: 92 MMHG

## 2018-05-06 DIAGNOSIS — R10.13 EPIGASTRIC PAIN: ICD-10-CM

## 2018-05-06 LAB
ALBUMIN SERPL-MCNC: 4.1 G/DL (ref 3.4–5)
ALP SERPL-CCNC: 93 U/L (ref 40–150)
ALT SERPL W P-5'-P-CCNC: 14 U/L (ref 0–70)
ANION GAP SERPL CALCULATED.3IONS-SCNC: 13 MMOL/L (ref 3–14)
AST SERPL W P-5'-P-CCNC: 22 U/L (ref 0–45)
BASOPHILS # BLD AUTO: 0 10E9/L (ref 0–0.2)
BASOPHILS NFR BLD AUTO: 0.5 %
BILIRUB SERPL-MCNC: 0.5 MG/DL (ref 0.2–1.3)
BUN SERPL-MCNC: 11 MG/DL (ref 7–30)
CALCIUM SERPL-MCNC: 9.2 MG/DL (ref 8.5–10.1)
CHLORIDE SERPL-SCNC: 106 MMOL/L (ref 94–109)
CO2 SERPL-SCNC: 24 MMOL/L (ref 20–32)
CREAT SERPL-MCNC: 0.87 MG/DL (ref 0.66–1.25)
DIFFERENTIAL METHOD BLD: ABNORMAL
EOSINOPHIL # BLD AUTO: 0.3 10E9/L (ref 0–0.7)
EOSINOPHIL NFR BLD AUTO: 5.9 %
ERYTHROCYTE [DISTWIDTH] IN BLOOD BY AUTOMATED COUNT: 15.6 % (ref 10–15)
GFR SERPL CREATININE-BSD FRML MDRD: >90 ML/MIN/1.7M2
GLUCOSE SERPL-MCNC: 83 MG/DL (ref 70–99)
HCT VFR BLD AUTO: 43.6 % (ref 40–53)
HGB BLD-MCNC: 14.8 G/DL (ref 13.3–17.7)
IMM GRANULOCYTES # BLD: 0 10E9/L (ref 0–0.4)
IMM GRANULOCYTES NFR BLD: 0 %
LIPASE SERPL-CCNC: 110 U/L (ref 73–393)
LYMPHOCYTES # BLD AUTO: 2.9 10E9/L (ref 0.8–5.3)
LYMPHOCYTES NFR BLD AUTO: 49.8 %
MCH RBC QN AUTO: 26 PG (ref 26.5–33)
MCHC RBC AUTO-ENTMCNC: 33.9 G/DL (ref 31.5–36.5)
MCV RBC AUTO: 77 FL (ref 78–100)
MONOCYTES # BLD AUTO: 0.5 10E9/L (ref 0–1.3)
MONOCYTES NFR BLD AUTO: 9.4 %
NEUTROPHILS # BLD AUTO: 2 10E9/L (ref 1.6–8.3)
NEUTROPHILS NFR BLD AUTO: 34.4 %
NRBC # BLD AUTO: 0 10*3/UL
NRBC BLD AUTO-RTO: 0 /100
PLATELET # BLD AUTO: 260 10E9/L (ref 150–450)
POTASSIUM SERPL-SCNC: 3.6 MMOL/L (ref 3.4–5.3)
PROT SERPL-MCNC: 8.4 G/DL (ref 6.8–8.8)
RBC # BLD AUTO: 5.7 10E12/L (ref 4.4–5.9)
SODIUM SERPL-SCNC: 143 MMOL/L (ref 133–144)
TROPONIN I SERPL-MCNC: <0.015 UG/L (ref 0–0.04)
WBC # BLD AUTO: 5.7 10E9/L (ref 4–11)

## 2018-05-06 PROCEDURE — 99284 EMERGENCY DEPT VISIT MOD MDM: CPT | Mod: 25 | Performed by: EMERGENCY MEDICINE

## 2018-05-06 PROCEDURE — 25000125 ZZHC RX 250: Performed by: EMERGENCY MEDICINE

## 2018-05-06 PROCEDURE — 25000132 ZZH RX MED GY IP 250 OP 250 PS 637: Performed by: EMERGENCY MEDICINE

## 2018-05-06 PROCEDURE — 80053 COMPREHEN METABOLIC PANEL: CPT | Performed by: EMERGENCY MEDICINE

## 2018-05-06 PROCEDURE — 83690 ASSAY OF LIPASE: CPT | Performed by: EMERGENCY MEDICINE

## 2018-05-06 PROCEDURE — 85025 COMPLETE CBC W/AUTO DIFF WBC: CPT | Performed by: EMERGENCY MEDICINE

## 2018-05-06 PROCEDURE — 93005 ELECTROCARDIOGRAM TRACING: CPT | Performed by: EMERGENCY MEDICINE

## 2018-05-06 PROCEDURE — 84484 ASSAY OF TROPONIN QUANT: CPT | Performed by: EMERGENCY MEDICINE

## 2018-05-06 PROCEDURE — 93010 ELECTROCARDIOGRAM REPORT: CPT | Mod: Z6 | Performed by: EMERGENCY MEDICINE

## 2018-05-06 RX ADMIN — LIDOCAINE HYDROCHLORIDE 30 ML: 20 SOLUTION ORAL; TOPICAL at 06:50

## 2018-05-06 NOTE — DISCHARGE INSTRUCTIONS
TODAY'S VISIT:  You were seen today for epigastric pain, concern for recurring ulcer.  -     FOLLOW-UP:  Please make an appointment to follow up with:  - Your Primary Care Provider as soon as possible.  FOLLOW-UP:  Please make an appointment to follow up with:  - GI Clinic (phone: [phone]) as soon as possible.    PRESCRIPTIONS / MEDICATIONS:  -Make sure you are taking your Protonix as prescribed.  40 mg daily, 30 minutes before breakfast.  -We will add ranitidine 150 mg at night.  Can try for 2 weeks to see if this improves pain  Can try over-the-counter Tums to see if this improves symptoms.    OTHER INSTRUCTIONS:  -     RETURN TO THE EMERGENCY DEPARTMENT  Return to the Emergency Department at any time for new/worsening symptoms.  Especially if there is any signs or symptoms of perforation including sudden onset significantly worsening abdominal pain.

## 2018-05-06 NOTE — ED PROVIDER NOTES
"  History     Chief Complaint   Patient presents with     Abdominal Pain     Adominal pain for about 1 month. Had stomach surgery for an ulcer October 2017.     KO  Tonio Alarcon is a 37 year old male who has a past medical history significant for ruptured peptic ulcer who presents with epigastric abdominal pain.  He states it feels very similar to the pain he felt prior to his ulcer rupture so he is concerned that it has recurred.  He states the pain is epigastric in location \"it is not constant he reports that his \"sharp\" and radiates to his right lower quadrant.  Eating or drinking does not make the pain better or worse.  He cannot identify any precipitating or mitigating factors.  There are no associated symptoms including no nausea, no vomiting, no diarrhea, no hematemesis, no black or bloody stools.  He reports that he is compliant with his medication though reports he has not following a diet that was recommended to him.  He does endorse social alcohol use, denies daily drinking.  Denies daily ibuprofen use.    Patient denies any associated chest pain, no shortness of breath.  He has no underlying cardiac history.  No history of diabetes or hypertension.    I have reviewed the Medications, Allergies, Past Medical and Surgical History, and Social History in the Epic system.    Review of Systems    Physical Exam   BP: (!) 141/99  Pulse: 95  Temp: 98.6  F (37  C)  Resp: 16  Weight: 78 kg (172 lb)  SpO2: 100 %      Physical Exam  General: awake, alert, NAD  Head: normal cephalic  HEENT: pupils equal, conjugate gaze in tact  Neck: Supple  CV: regular rate and rhythm without murmur  Lungs: clear to ascultation  Abd: soft, non-tender, no guarding, no peritoneal signs.  Specifically no epigastric tenderness or right or lower quadrant tenderness.  EXT: lower extremities without swelling or edema  Neuro: awake, answers questions appropriately. No focal deficits noted     ED Course     ED Course "     Procedures      Patient seen by my and assessed by myself.  Labs and EKG were reviewed and interpreted by myself.  GI was consulted I spoke with GI fellow over the phone.  Patient was reassessed and informed of disposition and plan.       EKG Interpretation:      Interpreted by Clive Jay  Time reviewed: 0630  Symptoms at time of EKG: epigastric pain  Rhythm: normal sinus   Rate: normal  Axis: normal  Ectopy: none  Conduction: normal  ST Segments/ T Waves: No ST-T wave changes  Q Waves: none  Comparison to prior: Unchanged    Clinical Impression: normal EKG                 Labs Ordered and Resulted from Time of ED Arrival Up to the Time of Departure from the ED   CBC WITH PLATELETS DIFFERENTIAL - Abnormal; Notable for the following:        Result Value    MCV 77 (*)     MCH 26.0 (*)     RDW 15.6 (*)     All other components within normal limits   COMPREHENSIVE METABOLIC PANEL   LIPASE   TROPONIN I   PERIPHERAL IV CATHETER            Assessments & Plan (with Medical Decision Making)   Tonio Alarcon is a 37-year-old male who presents with epigastric pain ports pain is consistent with previous ulcer pain.  On initial evaluation he is well-appearing, he has a benign abdomen.  Primary concern is that he has recurring peptic ulcer differential would also include ACS, pancreatitis, gastritis or other.  A very low concern for acute surgical abdomen based on his HPI and physical exam.    Initial evaluation will include EKG, labs, and GI cocktail.    EKG showed no signs of acute ischemia was unchanged from previous.  Troponin is negative.  Given that patient reports epigastric pain radiating to right lower quadrant this is an atypical presentation I do not think further ACS evaluation is warranted here in the ER.  Patient's labs were unremarkable with normal white count no left shift normal electrolytes negative lipase.     Reevaluated the patient, reported that his pain had improved with GI cocktail.    I discussed  with GI they recommended ensuring patient is taking his Protonix correctly so I did go over these instructions of the patient's I also recommended adding 150 mg of ranitidine at nighttime for 2 weeks to see if this helps improve his pain along with possible our addition of Tums.  He will need to follow-up with his primary care provider and likely require GI referral as patient may require endoscopy to assess for recurrent ulcer the patient has not had any H pylori workup.    Patient knows that if he were to develop any acute signs or symptoms of worsening abdominal pain, or concern for perforation he should return to the emergency department immediately.    I have reviewed the nursing notes.    I have reviewed the findings, diagnosis, plan and need for follow up with the patient.    Discharge Medication List as of 5/6/2018  8:16 AM      START taking these medications    Details   ranitidine (ZANTAC) 150 MG tablet Take 1 tablet (150 mg) by mouth At Bedtime for 15 days, Disp-15 tablet, R-0, Local Print             Final diagnoses:   Epigastric pain       5/6/2018   [facility], [place], EMERGENCY DEPARTMENT     Clive Jay MD  05/07/18 0967

## 2018-05-06 NOTE — ED AVS SNAPSHOT
[facility], Emergency Department    [address]    [place] [address]    Phone:  [phone]    Fax:  [phone]                                       Tonio Alarcon   MRN: [id number]    Department:  [facility], Emergency Department   Date of Visit:  5/6/2018           Patient Information     Date Of Birth          1980        Your diagnoses for this visit were:     Epigastric pain        You were seen by Clive Jay MD.        Discharge Instructions       TODAY'S VISIT:  You were seen today for epigastric pain, concern for recurring ulcer.  -     FOLLOW-UP:  Please make an appointment to follow up with:  - Your Primary Care Provider as soon as possible.  FOLLOW-UP:  Please make an appointment to follow up with:  - GI Clinic (phone: [phone]) as soon as possible.    PRESCRIPTIONS / MEDICATIONS:  -Make sure you are taking your Protonix as prescribed.  40 mg daily, 30 minutes before breakfast.  -We will add ranitidine 150 mg at night.  Can try for 2 weeks to see if this improves pain  Can try over-the-counter Tums to see if this improves symptoms.    OTHER INSTRUCTIONS:  -     RETURN TO THE EMERGENCY DEPARTMENT  Return to the Emergency Department at any time for new/worsening symptoms.  Especially if there is any signs or symptoms of perforation including sudden onset significantly worsening abdominal pain.           24 Hour Appointment Hotline       To make an appointment at any [facility]), call [phone] ([phone]). If you don't have a family doctor or clinic, we will help you find one. [facility] are conveniently located to serve the needs of you and your family.             Review of your medicines      START taking        Dose / Directions Last dose taken    ranitidine 150 MG tablet   Commonly known as:  ZANTAC   Dose:  150 mg   Quantity:  15 tablet        Take 1 tablet (150 mg) by mouth At Bedtime for 15 days   Refills:  0          Our records  show that you are taking the medicines listed below. If these are incorrect, please call your family doctor or clinic.        Dose / Directions Last dose taken    oxyCODONE IR 5 MG tablet   Commonly known as:  ROXICODONE   Dose:  5 mg   Quantity:  18 tablet        Take 1 tablet (5 mg) by mouth every 8 hours as needed for breakthrough pain or pain maximum 3 tablet(s) per day   Refills:  0        pantoprazole 40 MG EC tablet   Commonly known as:  PROTONIX   Dose:  40 mg   Quantity:  90 tablet        Take 1 tablet (40 mg) by mouth daily Take 30-60 minutes before a meal.   Refills:  3        vitamin D 00488 UNIT capsule   Commonly known as:  ERGOCALCIFEROL        TAKE 1 CAPSULE (50,000 UNITS) BY MOUTH EVERY 7 DAYS FOR 8 DOSES   Refills:  0                Prescriptions were sent or printed at these locations (1 Prescription)                   Other Prescriptions                Printed at Department/Unit printer (1 of 1)         ranitidine (ZANTAC) 150 MG tablet                Procedures and tests performed during your visit     CBC with platelets differential    Comprehensive metabolic panel    EKG 12-lead, tracing only    Lipase    Peripheral IV catheter    Troponin I      Orders Needing Specimen Collection     None      Pending Results     No orders found from 5/4/2018 to 5/7/2018.            Pending Culture Results     No orders found from 5/4/2018 to 5/7/2018.            Pending Results Instructions     If you had any lab results that were not finalized at the time of your Discharge, you can call the ED Lab Result RN at [phone]. You will be contacted by this team for any positive Lab results or changes in treatment. The nurses are available 7 days a week from 10A to 6:30P.  You can leave a message 24 hours per day and they will return your call.        Thank you for choosing [facility]       Thank you for choosing [facility] for your care. Our goal is always to provide you with excellent care. Hearing back from our  "patients is one way we can continue to improve our services. Please take a few minutes to complete the written survey that you may receive in the mail after you visit with us. Thank you!        [employer] Information     "[employer] lets you send messages to your doctor, view your test results, renew your prescriptions, schedule appointments and more. To sign up, go to [email or web address] . Click on \"Log in\" on the left side of the screen, which will take you to the Welcome page. Then click on \"Sign up Now\" on the right side of the page.     You will be asked to enter the access code listed below, as well as some personal information. Please follow the directions to create your username and password.     Your access code is: [email or web address]  Expires: 2018  8:16 AM     Your access code will  in 90 days. If you need help or a new code, please call your [facility] clinic or [phone].        Care EveryWhere ID     This is your Care EveryWhere ID. This could be used by other organizations to access your [facility] medical records  [id number]        Equal Access to Services     [facility]NE [facility] : Hadchantell Arcos, ligia peguero, kacie garcia, carlyn ryan. So [facility] [phone].    ATENCIÓN: Si habla español, tiene a velazco disposición servicios gratuitos de asistencia lingüística. Tone al [phone].    We comply with applicable federal civil rights laws and [place] laws. We do not discriminate on the basis of race, color, national origin, age, disability, sex, sexual orientation, or gender identity.            After Visit Summary       This is your record. Keep this with you and show to your community pharmacist(s) and doctor(s) at your next visit.                  "

## 2018-05-06 NOTE — ED AVS SNAPSHOT
[facility], [place], Emergency Department    [address]    [facility] [address]    Phone:  [phone]    Fax:  [phone]                                       Tonio Alarcon   MRN: [id number]    Department:  [facility], Emergency Department   Date of Visit:  5/6/2018           After Visit Summary Signature Page     I have received my discharge instructions, and my questions have been answered. I have discussed any challenges I see with this plan with the nurse or doctor.    ..........................................................................................................................................  Patient/Patient Representative Signature      ..........................................................................................................................................  Patient Representative Print Name and Relationship to Patient    ..................................................               ................................................  Date                                            Time    ..........................................................................................................................................  Reviewed by Signature/Title    ...................................................              ..............................................  Date                                                            Time

## 2018-05-07 ENCOUNTER — HOSPITAL ENCOUNTER (EMERGENCY)
Facility: CLINIC | Age: 38
Discharge: HOME | End: 2018-05-07
Attending: EMERGENCY MEDICINE | Admitting: EMERGENCY MEDICINE
Payer: COMMERCIAL

## 2018-05-07 ENCOUNTER — APPOINTMENT (OUTPATIENT)
Dept: GENERAL RADIOLOGY | Facility: CLINIC | Age: 38
End: 2018-05-07
Attending: EMERGENCY MEDICINE
Payer: COMMERCIAL

## 2018-05-07 VITALS
TEMPERATURE: 98.1 F | SYSTOLIC BLOOD PRESSURE: 125 MMHG | DIASTOLIC BLOOD PRESSURE: 95 MMHG | WEIGHT: 182.2 LBS | RESPIRATION RATE: 16 BRPM | HEART RATE: 89 BPM | OXYGEN SATURATION: 100 % | BODY MASS INDEX: 25.41 KG/M2

## 2018-05-07 DIAGNOSIS — R10.31 ABDOMINAL PAIN, RIGHT LOWER QUADRANT: ICD-10-CM

## 2018-05-07 DIAGNOSIS — M54.12 CERVICAL RADICULOPATHY: ICD-10-CM

## 2018-05-07 LAB
ALBUMIN UR-MCNC: NEGATIVE MG/DL
APPEARANCE UR: CLEAR
BASOPHILS # BLD AUTO: 0 10E9/L (ref 0–0.2)
BASOPHILS NFR BLD AUTO: 0.2 %
BILIRUB UR QL STRIP: NEGATIVE
COLOR UR AUTO: YELLOW
DIFFERENTIAL METHOD BLD: ABNORMAL
EOSINOPHIL # BLD AUTO: 0.2 10E9/L (ref 0–0.7)
EOSINOPHIL NFR BLD AUTO: 3.7 %
ERYTHROCYTE [DISTWIDTH] IN BLOOD BY AUTOMATED COUNT: 15.2 % (ref 10–15)
GLUCOSE UR STRIP-MCNC: NEGATIVE MG/DL
HCT VFR BLD AUTO: 43.3 % (ref 40–53)
HGB BLD-MCNC: 14.4 G/DL (ref 13.3–17.7)
HGB UR QL STRIP: NEGATIVE
IMM GRANULOCYTES # BLD: 0 10E9/L (ref 0–0.4)
IMM GRANULOCYTES NFR BLD: 0.2 %
INTERPRETATION ECG - MUSE: NORMAL
KETONES UR STRIP-MCNC: NEGATIVE MG/DL
LEUKOCYTE ESTERASE UR QL STRIP: ABNORMAL
LYMPHOCYTES # BLD AUTO: 1.3 10E9/L (ref 0.8–5.3)
LYMPHOCYTES NFR BLD AUTO: 26.9 %
MCH RBC QN AUTO: 25.4 PG (ref 26.5–33)
MCHC RBC AUTO-ENTMCNC: 33.3 G/DL (ref 31.5–36.5)
MCV RBC AUTO: 77 FL (ref 78–100)
MONOCYTES # BLD AUTO: 0.5 10E9/L (ref 0–1.3)
MONOCYTES NFR BLD AUTO: 10.8 %
MUCOUS THREADS #/AREA URNS LPF: PRESENT /LPF
NEUTROPHILS # BLD AUTO: 2.9 10E9/L (ref 1.6–8.3)
NEUTROPHILS NFR BLD AUTO: 58.2 %
NITRATE UR QL: NEGATIVE
NRBC # BLD AUTO: 0 10*3/UL
NRBC BLD AUTO-RTO: 0 /100
PH UR STRIP: 6.5 PH (ref 5–7)
PLATELET # BLD AUTO: 249 10E9/L (ref 150–450)
RBC # BLD AUTO: 5.66 10E12/L (ref 4.4–5.9)
RBC #/AREA URNS AUTO: <1 /HPF (ref 0–2)
SOURCE: ABNORMAL
SP GR UR STRIP: 1.02 (ref 1–1.03)
SQUAMOUS #/AREA URNS AUTO: 3 /HPF (ref 0–1)
UROBILINOGEN UR STRIP-MCNC: 2 MG/DL (ref 0–2)
WBC # BLD AUTO: 4.9 10E9/L (ref 4–11)
WBC #/AREA URNS AUTO: 3 /HPF (ref 0–5)

## 2018-05-07 PROCEDURE — 74019 RADEX ABDOMEN 2 VIEWS: CPT

## 2018-05-07 PROCEDURE — 99284 EMERGENCY DEPT VISIT MOD MDM: CPT | Performed by: EMERGENCY MEDICINE

## 2018-05-07 PROCEDURE — 72040 X-RAY EXAM NECK SPINE 2-3 VW: CPT

## 2018-05-07 PROCEDURE — 81001 URINALYSIS AUTO W/SCOPE: CPT | Performed by: EMERGENCY MEDICINE

## 2018-05-07 PROCEDURE — 99284 EMERGENCY DEPT VISIT MOD MDM: CPT | Mod: Z6 | Performed by: EMERGENCY MEDICINE

## 2018-05-07 PROCEDURE — 85025 COMPLETE CBC W/AUTO DIFF WBC: CPT | Performed by: EMERGENCY MEDICINE

## 2018-05-07 RX ORDER — METHYLPREDNISOLONE 4 MG
4 TABLET, DOSE PACK ORAL SEE ADMIN INSTRUCTIONS
Qty: 21 TABLET | Refills: 0 | Status: SHIPPED | OUTPATIENT
Start: 2018-05-07 | End: 2018-05-16

## 2018-05-07 RX ORDER — METHOCARBAMOL 500 MG/1
1000 TABLET, FILM COATED ORAL 3 TIMES DAILY
Qty: 30 TABLET | Refills: 0 | Status: SHIPPED | OUTPATIENT
Start: 2018-05-07 | End: 2019-02-01

## 2018-05-07 RX ORDER — SENNOSIDES 8.6 MG
1 TABLET ORAL 2 TIMES DAILY
Qty: 60 TABLET | Refills: 0 | Status: SHIPPED | OUTPATIENT
Start: 2018-05-07 | End: 2018-06-08

## 2018-05-07 ASSESSMENT — ENCOUNTER SYMPTOMS
NECK PAIN: 1
NUMBNESS: 1
ABDOMINAL PAIN: 1

## 2018-05-07 NOTE — ED AVS SNAPSHOT
[facility], [place], Emergency Department    [address]    [facility] [address]    Phone:  [phone]    Fax:  [phone]                                       Tonio Alarcon   MRN: [id number]    Department:  [facility], Emergency Department   Date of Visit:  5/7/2018           After Visit Summary Signature Page     I have received my discharge instructions, and my questions have been answered. I have discussed any challenges I see with this plan with the nurse or doctor.    ..........................................................................................................................................  Patient/Patient Representative Signature      ..........................................................................................................................................  Patient Representative Print Name and Relationship to Patient    ..................................................               ................................................  Date                                            Time    ..........................................................................................................................................  Reviewed by Signature/Title    ...................................................              ..............................................  Date                                                            Time

## 2018-05-07 NOTE — ED AVS SNAPSHOT
[facility], Emergency Department    [address]    [place] [address]    Phone:  [phone]    Fax:  [phone]                                       Tonio Alarcon   MRN: [id number]    Department:  [facility], Emergency Department   Date of Visit:  5/7/2018           Patient Information     Date Of Birth          1980        Your diagnoses for this visit were:     Cervical radiculopathy left C5    Abdominal pain, right lower quadrant adhesions vs. stool pain       You were seen by Freddy Blake MD.        Discharge Instructions       No lifting more than 10 pounds with your left arm for 1 week    Please make an appointment to follow up with Your Primary Care Provider in one week for recheck.    Return to the ER for worsening.      Discharge References/Attachments     CERVICAL RADICULOPATHY, UNDERSTANDING (ENGLISH)    ABDOMINAL PAIN, ADHESIONS (ENGLISH)      24 Hour Appointment Hotline       To make an appointment at any [facility] clinic, call [phone] ([phone]). If you don't have a family doctor or clinic, we will help you find one. [facility] clinics are conveniently located to serve the needs of you and your family.             Review of your medicines      START taking        Dose / Directions Last dose taken    methocarbamol 500 MG tablet   Commonly known as:  ROBAXIN   Dose:  1000 mg   Quantity:  30 tablet        Take 2 tablets (1,000 mg) by mouth 3 times daily for 5 days   Refills:  0        methylPREDNISolone 4 MG tablet   Commonly known as:  MEDROL DOSEPAK   Dose:  4 mg   Quantity:  21 tablet        Take 1 tablet (4 mg) by mouth See Admin Instructions   Refills:  0        sennosides 8.6 MG tablet   Commonly known as:  SENOKOT   Dose:  1 tablet   Quantity:  60 tablet        Take 1 tablet by mouth 2 times daily   Refills:  0          Our records show that you are taking the medicines listed below. If these are incorrect, please call your family doctor or  clinic.        Dose / Directions Last dose taken    oxyCODONE IR 5 MG tablet   Commonly known as:  ROXICODONE   Dose:  5 mg   Quantity:  18 tablet        Take 1 tablet (5 mg) by mouth every 8 hours as needed for breakthrough pain or pain maximum 3 tablet(s) per day   Refills:  0        pantoprazole 40 MG EC tablet   Commonly known as:  PROTONIX   Dose:  40 mg   Quantity:  90 tablet        Take 1 tablet (40 mg) by mouth daily Take 30-60 minutes before a meal.   Refills:  3        ranitidine 150 MG tablet   Commonly known as:  ZANTAC   Dose:  150 mg   Quantity:  15 tablet        Take 1 tablet (150 mg) by mouth At Bedtime for 15 days   Refills:  0        vitamin D 68559 UNIT capsule   Commonly known as:  ERGOCALCIFEROL        TAKE 1 CAPSULE (50,000 UNITS) BY MOUTH EVERY 7 DAYS FOR 8 DOSES   Refills:  0                Prescriptions were sent or printed at these locations (3 Prescriptions)                   Other Prescriptions                Printed at Department/Unit printer (3 of 3)         methocarbamol (ROBAXIN) 500 MG tablet               methylPREDNISolone (MEDROL DOSEPAK) 4 MG tablet               sennosides (SENOKOT) 8.6 MG tablet                Procedures and tests performed during your visit     Abdomen XR, 2 vw, flat and upright    CBC with platelets differential    Cervical spine XR, 2-3 views    UA with Microscopic reflex to Culture      Orders Needing Specimen Collection     None      Pending Results     No orders found from 5/5/2018 to 5/8/2018.            Pending Culture Results     No orders found from 5/5/2018 to 5/8/2018.            Pending Results Instructions     If you had any lab results that were not finalized at the time of your Discharge, you can call the ED Lab Result RN at [phone]. You will be contacted by this team for any positive Lab results or changes in treatment. The nurses are available 7 days a week from 10A to 6:30P.  You can leave a message 24 hours per day and they will return  "your call.        Thank you for choosing [facility]       Thank you for choosing [facility] for your care. Our goal is always to provide you with excellent care. Hearing back from our patients is one way we can continue to improve our services. Please take a few minutes to complete the written survey that you may receive in the mail after you visit with us. Thank you!        [employer] Information     [employer] lets you send messages to your doctor, view your test results, renew your prescriptions, schedule appointments and more. To sign up, go to [email or web address] . Click on \"Log in\" on the left side of the screen, which will take you to the Welcome page. Then click on \"Sign up Now\" on the right side of the page.     You will be asked to enter the access code listed below, as well as some personal information. Please follow the directions to create your username and password.     Your access code is: [email or web address]  Expires: 2018  8:16 AM     Your access code will  in 90 days. If you need help or a new code, please call your [facility] clinic or [phone].        Care EveryWhere ID     This is your Care EveryWhere ID. This could be used by other organizations to access your [facility] medical records  [id number]        Equal Access to Services     TONY SHAW : Hadii balta conwayo Soruslanali, waaxda luqadaha, qaybta kaalmada adeegyada, carlyn ryan. So [facility] [phone].    ATENCIÓN: Si habla español, tiene a velazco disposición servicios gratuitos de asistencia lingüística. Llame al [phone].    We comply with applicable federal civil rights laws and [place] laws. We do not discriminate on the basis of race, color, national origin, age, disability, sex, sexual orientation, or gender identity.            After Visit Summary       This is your record. Keep this with you and show to your community pharmacist(s) and doctor(s) at your next visit.                  "

## 2018-05-07 NOTE — DISCHARGE INSTRUCTIONS
No lifting more than 10 pounds with your left arm for 1 week    Please make an appointment to follow up with Your Primary Care Provider in one week for recheck.    Return to the ER for worsening.

## 2018-05-07 NOTE — ED PROVIDER NOTES
History     Chief Complaint   Patient presents with     Abdominal Pain     right side abdominal pain ~1 month, was seen in the ED yesterday for same pain     Shoulder Pain     left shoulder pain ~a few months. Pain radates into the neck and tingling into the hand     HPI  Tonio Alarcon is a 37 year old male who presents to the ER today for complaints of some right lower quadrant abdominal pain over the last month. Patient states that he had previous surgery done for a gastric ulcer in October of this last year. Patient states that he had a drain after surgery coming out of his right lower quadrant and states that over the last month he has had increasing pain at the sight where the drain was coming out. Patient states he was seen yesterday for this problem and had some labs drawn but nothing was found. Patient had no imaging studies done at that time. Patient states that some ranitidine was added to his Protonix prescriptions but he states that has not helped. He presents to the ER for reevaluation of his abdominal pain. The patient also states that he's been having over the last six to eight months some pain in his left neck radiating into his left shoulder and down his left arm. Patient states that he feels some numbness of his left index finger. Patient states that he does have some neck pain with range of motion and denies significant trauma but he does state that he does lift heavy objects at work.       This part of the medical record was transcribed by Ingrid Calles, Medical Scribe, from a dictation done by Dr. Blake.  Past Medical History:   Diagnosis Date     Depressive disorder        Past Surgical History:   Procedure Laterality Date     GI SURGERY      Hemorrhoids      HEMORRHOID SURGERY  2015     LAPAROSCOPIC HERNIORRHAPHY EPIGASTRIC N/A 10/11/2017    Procedure: LAPAROSCOPIC HERNIORRHAPHY EPIGASTRIC;  Exploratory Laparoscopy, Exploratory Laparotomy, EGD, Omental Patch, Upper Endoscopy;   Surgeon: Celio Keyes MD;  Location:  OR       Family History   Problem Relation Age of Onset     DIABETES Mother      Breast Cancer Mother      Dx at 61 years aol     Coronary Artery Disease Mother      3v CABG     CANCER Father      Stomach ca - Dx at 57 years old and passed away from this     Cancer - colorectal Maternal Grandmother      DIABETES Brother        Social History   Substance Use Topics     Smoking status: Current Every Day Smoker     Packs/day: 1.00     Years: 25.00     Smokeless tobacco: Never Used     Alcohol use Yes      Comment: heavy drinking about 2 x per week       Previous Medications    OXYCODONE IR (ROXICODONE) 5 MG TABLET    Take 1 tablet (5 mg) by mouth every 8 hours as needed for breakthrough pain or pain maximum 3 tablet(s) per day    PANTOPRAZOLE (PROTONIX) 40 MG EC TABLET    Take 1 tablet (40 mg) by mouth daily Take 30-60 minutes before a meal.    RANITIDINE (ZANTAC) 150 MG TABLET    Take 1 tablet (150 mg) by mouth At Bedtime for 15 days    VITAMIN D (ERGOCALCIFEROL) 89551 UNIT CAPSULE    TAKE 1 CAPSULE (50,000 UNITS) BY MOUTH EVERY 7 DAYS FOR 8 DOSES        No Known Allergies    I have reviewed the Medications, Allergies, Past Medical and Surgical History, and Social History in the Epic system.    Review of Systems   Gastrointestinal: Positive for abdominal pain (RLQ).   Musculoskeletal: Positive for neck pain (left; radiating to his left shoulder down his left arm; does have w/ROM ).   Neurological: Positive for numbness (Left index finger).   All other systems reviewed and are negative.      Physical Exam   BP: (!) 125/95  Pulse: 89  Temp: 98.1  F (36.7  C)  Resp: 16  Weight: 82.6 kg (182 lb 3.2 oz)  SpO2: 100 %      Physical Exam   Constitutional: He is oriented to person, place, and time.   Alert conversant pleasant   HENT:   Head: Atraumatic.   Eyes: EOM are normal. Pupils are equal, round, and reactive to light.   Neck: Neck supple.   There is some tenderness of the  left paraspinous muscles radiating into the trapezius.  Patient does have some limitation of range of motion with rotation both to the extremes of left and right   Cardiovascular: Normal heart sounds.    Pulmonary/Chest: Breath sounds normal.   Abdominal: Soft.   Minimal tenderness in the right periumbilical region   Musculoskeletal: He exhibits no deformity.   There is no midline tenderness of the cervical thoracic or lumbar spine but there is some tenderness of the left trapezius   Neurological: He is alert and oriented to person, place, and time. No cranial nerve deficit.   Grossly intact and symmetric with strength bilaterally   Skin: Skin is warm.   Psychiatric: He has a normal mood and affect.       ED Course     ED Course     Procedures          Results for orders placed or performed during the hospital encounter of 05/07/18   Abdomen XR, 2 vw, flat and upright    Narrative    XR ABDOMEN 2 VW 5/7/2018 10:31 AM    COMPARISON: CT dated 10/11/2017    HISTORY: Pain.      Impression    IMPRESSION: Nonobstructive bowel gas pattern without evidence of free  air. Lung bases are clear. No definite radiodense renal or ureteral  calculi are seen.    JYOTI DIXON MD   Cervical spine XR, 2-3 views    Narrative    XR CERVICAL SPINE 2/3 VWS 5/7/2018 10:32 AM    COMPARISON: None.    HISTORY: Pain.      Impression    IMPRESSION: Straightening of the normal cervical lordosis, may be  positional in nature. Vertebral heights are preserved without evidence  of fracture. Anterior endplate spurring and mild disc space loss at  C5-6 and C6-7. No prevertebral soft tissue swelling.    JYOTI DIXON MD   UA with Microscopic reflex to Culture   Result Value Ref Range    Color Urine Yellow     Appearance Urine Clear     Glucose Urine Negative NEG^Negative mg/dL    Bilirubin Urine Negative NEG^Negative    Ketones Urine Negative NEG^Negative mg/dL    Specific Gravity Urine 1.017 1.003 - 1.035    Blood Urine Negative NEG^Negative    pH  Urine 6.5 5.0 - 7.0 pH    Protein Albumin Urine Negative NEG^Negative mg/dL    Urobilinogen mg/dL 2.0 0.0 - 2.0 mg/dL    Nitrite Urine Negative NEG^Negative    Leukocyte Esterase Urine Trace (A) NEG^Negative    Source Unspecified Urine     WBC Urine 3 0 - 5 /HPF    RBC Urine <1 0 - 2 /HPF    Squamous Epithelial /HPF Urine 3 (H) 0 - 1 /HPF    Mucous Urine Present (A) NEG^Negative /LPF   CBC with platelets differential   Result Value Ref Range    WBC 4.9 4.0 - 11.0 10e9/L    RBC Count 5.66 4.4 - 5.9 10e12/L    Hemoglobin 14.4 13.3 - 17.7 g/dL    Hematocrit 43.3 40.0 - 53.0 %    MCV 77 (L) 78 - 100 fl    MCH 25.4 (L) 26.5 - 33.0 pg    MCHC 33.3 31.5 - 36.5 g/dL    RDW 15.2 (H) 10.0 - 15.0 %    Platelet Count 249 150 - 450 10e9/L    Diff Method Automated Method     % Neutrophils 58.2 %    % Lymphocytes 26.9 %    % Monocytes 10.8 %    % Eosinophils 3.7 %    % Basophils 0.2 %    % Immature Granulocytes 0.2 %    Nucleated RBCs 0 0 /100    Absolute Neutrophil 2.9 1.6 - 8.3 10e9/L    Absolute Lymphocytes 1.3 0.8 - 5.3 10e9/L    Absolute Monocytes 0.5 0.0 - 1.3 10e9/L    Absolute Eosinophils 0.2 0.0 - 0.7 10e9/L    Absolute Basophils 0.0 0.0 - 0.2 10e9/L    Abs Immature Granulocytes 0.0 0 - 0.4 10e9/L    Absolute Nucleated RBC 0.0      Labs Ordered and Resulted from Time of ED Arrival Up to the Time of Departure from the ED   ROUTINE UA WITH MICROSCOPIC REFLEX TO CULTURE - Abnormal; Notable for the following:        Result Value    Leukocyte Esterase Urine Trace (*)     Squamous Epithelial /HPF Urine 3 (*)     Mucous Urine Present (*)     All other components within normal limits   CBC WITH PLATELETS DIFFERENTIAL - Abnormal; Notable for the following:     MCV 77 (*)     MCH 25.4 (*)     RDW 15.2 (*)     All other components within normal limits            Assessments & Plan (with Medical Decision Making)     I have reviewed the nursing notes.    In conjunction with his labs done yesterday, his white count has not risen and I  do not feel there is any further reason to pursue laboratory workup.  Patient's abdominal x-ray revealed no evidence of pathology but did reveal stool and gas present in a nonspecific type pattern.  Patient's cervical spine x-ray also revealed some joint space narrowing consistent with symptomatology of a radiculopathy but at this time I do not feel MRI is warranted as there are no neurologic hard findings.    I have reviewed the findings, diagnosis, plan and need for follow up with the patient.    New Prescriptions    METHOCARBAMOL (ROBAXIN) 500 MG TABLET    Take 2 tablets (1,000 mg) by mouth 3 times daily for 5 days    METHYLPREDNISOLONE (MEDROL DOSEPAK) 4 MG TABLET    Take 1 tablet (4 mg) by mouth See Admin Instructions    SENNOSIDES (SENOKOT) 8.6 MG TABLET    Take 1 tablet by mouth 2 times daily       Final diagnoses:   Cervical radiculopathy - left C5   Abdominal pain, right lower quadrant - adhesions vs. stool pain     No lifting more than 10 pounds with your left arm for 1 week - work note given    Please make an appointment to follow up with Your Primary Care Provider in one week for recheck.    Return to the ER for worsening.    Routine discharge instructions were given for the above diagnoses.    Freddy Blake MD    5/7/2018   [facility], [place], EMERGENCY DEPARTMENT     Freddy Blake MD  05/07/18 5236

## 2018-05-07 NOTE — LETTER
May 7, 2018      To Whom It May Concern:      Tonio Alarcon was seen in our Emergency Department today, 05/07/18.  I expect his condition to improve over the next week.  He may return to work/school on 5/8/18 but will be unable to lift more than 10 pounds with his left arm for 1 week.    Sincerely,        Freddy Blake MD, MD

## 2018-05-13 ENCOUNTER — HOSPITAL ENCOUNTER (EMERGENCY)
Facility: CLINIC | Age: 38
Discharge: HOME | End: 2018-05-13
Attending: EMERGENCY MEDICINE | Admitting: EMERGENCY MEDICINE
Payer: COMMERCIAL

## 2018-05-13 VITALS
RESPIRATION RATE: 18 BRPM | OXYGEN SATURATION: 99 % | SYSTOLIC BLOOD PRESSURE: 136 MMHG | HEART RATE: 104 BPM | DIASTOLIC BLOOD PRESSURE: 92 MMHG | TEMPERATURE: 97.8 F

## 2018-05-13 DIAGNOSIS — M54.2 NECK PAIN: ICD-10-CM

## 2018-05-13 PROCEDURE — 99283 EMERGENCY DEPT VISIT LOW MDM: CPT | Mod: Z6 | Performed by: EMERGENCY MEDICINE

## 2018-05-13 PROCEDURE — 25000132 ZZH RX MED GY IP 250 OP 250 PS 637: Performed by: EMERGENCY MEDICINE

## 2018-05-13 PROCEDURE — 99283 EMERGENCY DEPT VISIT LOW MDM: CPT | Performed by: EMERGENCY MEDICINE

## 2018-05-13 RX ORDER — METHOCARBAMOL 750 MG/1
1500 TABLET, FILM COATED ORAL ONCE
Status: COMPLETED | OUTPATIENT
Start: 2018-05-13 | End: 2018-05-13

## 2018-05-13 RX ADMIN — METHOCARBAMOL 1500 MG: 750 TABLET ORAL at 04:39

## 2018-05-13 ASSESSMENT — ENCOUNTER SYMPTOMS
FACIAL SWELLING: 0
SHORTNESS OF BREATH: 0
DIZZINESS: 0
SORE THROAT: 0
CHEST TIGHTNESS: 0
WEAKNESS: 0
RHINORRHEA: 0
JOINT SWELLING: 0
FEVER: 0
CONSTITUTIONAL NEGATIVE: 1
BRUISES/BLEEDS EASILY: 0
ARTHRALGIAS: 0
NUMBNESS: 0
NAUSEA: 0
CHILLS: 0
TROUBLE SWALLOWING: 0
LIGHT-HEADEDNESS: 0
PALPITATIONS: 0
NECK STIFFNESS: 0
UNEXPECTED WEIGHT CHANGE: 0
APPETITE CHANGE: 0
FATIGUE: 0
HEADACHES: 0
COUGH: 0
NECK PAIN: 1
CONFUSION: 0
ABDOMINAL PAIN: 0

## 2018-05-13 NOTE — LETTER
May 13, 2018      To Whom It May Concern:      Tonio Alarcon was seen in our Emergency Department today, 05/13/18.  I expect his condition to improve over the next 1-2 days.  He may return to work/school when improved.    Sincerely,        Domenico Gandhi MD

## 2018-05-13 NOTE — DISCHARGE INSTRUCTIONS
Start taking the medication Robaxin for pain that you were recently prescribed.  Do not drive or use machinery with muscle relaxants, pain medications, or alcohol.  Follow up tomorrow with your primary care clinic provider.

## 2018-05-13 NOTE — ED AVS SNAPSHOT
[facility], [place], Emergency Department    [address]    [facility] [address]    Phone:  [phone]    Fax:  [phone]                                       Tonoi Alarcon   MRN: [id number]    Department:  [facility], Emergency Department   Date of Visit:  5/13/2018           After Visit Summary Signature Page     I have received my discharge instructions, and my questions have been answered. I have discussed any challenges I see with this plan with the nurse or doctor.    ..........................................................................................................................................  Patient/Patient Representative Signature      ..........................................................................................................................................  Patient Representative Print Name and Relationship to Patient    ..................................................               ................................................  Date                                            Time    ..........................................................................................................................................  Reviewed by Signature/Title    ...................................................              ..............................................  Date                                                            Time

## 2018-05-13 NOTE — ED PROVIDER NOTES
History     Chief Complaint   Patient presents with     Neck Pain     HPI  Tonio Alarcon is a 37 year old male who presents with neck pain that began approximately 2 months ago. Pain is primarily on left side of neck and radiates to left trapezius muscle. No weakness. He occasionally has paresthesias in left hand. No sensation loss. He was seen here recently and diagnosed with cervical radiculopathy. He has an appointment with his primary care physician tomorrow. He was prescribed Medrol pack and methocarbamol. He only took the Medrol for 2 days and has not taken any Robaxin yet. He states he has been drinking alcohol tonight. He was also recently prescribed oxycodone but has used them all. The neck pain is aggravated by movement of the neck or left arm. No bowel or bladder dysfunction. No fever or chills. He does lifting at work but is on a weight restriction for lifting at this time. No recent trauma.     I have reviewed the Medications, Allergies, Past Medical and Surgical History, and Social History in the [employer] system.  Past Medical History:   Diagnosis Date     Depressive disorder        Review of Systems   Constitutional: Negative.  Negative for appetite change, chills, fatigue, fever and unexpected weight change.   HENT: Negative for congestion, facial swelling, rhinorrhea, sore throat and trouble swallowing.    Respiratory: Negative for cough, chest tightness and shortness of breath.    Cardiovascular: Negative for chest pain, palpitations and leg swelling.   Gastrointestinal: Negative for abdominal pain and nausea.   Musculoskeletal: Positive for neck pain. Negative for arthralgias, gait problem, joint swelling and neck stiffness.   Skin: Negative for rash.   Allergic/Immunologic: Negative for immunocompromised state.   Neurological: Negative for dizziness, syncope, weakness, light-headedness, numbness and headaches.   Hematological: Does not bruise/bleed easily.   Psychiatric/Behavioral: Negative  for confusion.       Physical Exam   BP: (!) 136/92  Pulse: 104  Temp: 97.8  F (36.6  C)  Resp: 18  SpO2: 99 %      Physical Exam   Constitutional: He appears well-developed and well-nourished. No distress.   HENT:   Head: Normocephalic and atraumatic.   Mouth/Throat: Oropharynx is clear and moist.   Eyes: Conjunctivae and EOM are normal.   Neck: Muscular tenderness present. No spinous process tenderness present. Decreased range of motion present.   Tenderness left side neck and left trapezius muscle. Neck range of motion is limited by pain.   Cardiovascular: Normal rate, regular rhythm, normal heart sounds and intact distal pulses.    Pulmonary/Chest: Effort normal and breath sounds normal.   Abdominal: Soft. There is no tenderness.   Musculoskeletal: He exhibits tenderness.        Cervical back: He exhibits decreased range of motion, tenderness and pain. He exhibits no bony tenderness, no swelling, no spasm and normal pulse.   Neurological: He is alert. He has normal strength and normal reflexes. No cranial nerve deficit or sensory deficit. Coordination and gait normal. He displays no Babinski's sign on the right side. He displays no Babinski's sign on the left side.   Skin: Skin is warm and dry. No rash noted.   Psychiatric: He has a normal mood and affect. His behavior is normal.   Nursing note and vitals reviewed.      ED Course     ED Course     Procedures             Critical Care time:  none             Labs Ordered and Resulted from Time of ED Arrival Up to the Time of Departure from the ED - No data to display         Assessments & Plan (with Medical Decision Making)   Neck pain, possible cervical radiculopathy. No evidence of acute neurologic injury. Advised taking methocarbamol; given dose in the ED. He is instructed not to drive. He is scheduled to see his primary care provider tomorrow. Emergency MRI not indicated given normal neurologic exam. Patient expressed understanding of the provisional  diagnosis and the need for follow up.    I have reviewed the nursing notes.    I have reviewed the findings, diagnosis, plan and need for follow up with the patient.    Discharge Medication List as of 5/13/2018  4:33 AM          Final diagnoses:   Neck pain       5/13/2018   [facility], [place], EMERGENCY DEPARTMENT     Domenico Avelar MD  05/13/18 8942

## 2018-05-13 NOTE — ED AVS SNAPSHOT
[facility], Emergency Department    [address]    [place] [address]    Phone:  [phone]    Fax:  [phone]                                       Tonio Alarcon   MRN: [id number]    Department:  [facility], Emergency Department   Date of Visit:  5/13/2018           Patient Information     Date Of Birth          1980        Your diagnoses for this visit were:     Neck pain        You were seen by Domenico Avelar MD.      Follow-up Information     Follow up with Christiano Palacios MD.    Specialty:  Family Practice    Contact information:    [address] [place] [id number] [phone]          Discharge Instructions       Start taking the medication Robaxin for pain that you were recently prescribed.  Do not drive or use machinery with muscle relaxants, pain medications, or alcohol.  Follow up tomorrow with your primary care clinic provider.    Your next 10 appointments already scheduled     May 14, 2018  7:40 AM CDT   Office Visit with Christiano Palacios MD   [facility] ([facility])    [address] [place] [id number] [phone]           Bring a current list of meds and any records pertaining to this visit. For Physicals, please bring immunization records and any forms needing to be filled out. Please arrive 10 minutes early to complete paperwork.              24 Hour Appointment Hotline       To make an appointment at any [facility], call [phone] ([phone]). If you don't have a family doctor or clinic, we will help you find one. [facility] are conveniently located to serve the needs of you and your family.             Review of your medicines      Our records show that you are taking the medicines listed below. If these are incorrect, please call your family doctor or clinic.        Dose / Directions Last dose taken    methylPREDNISolone 4 MG tablet   Commonly  known as:  MEDROL DOSEPAK   Dose:  4 mg   Quantity:  21 tablet        Take 1 tablet (4 mg) by mouth See Admin Instructions   Refills:  0        oxyCODONE IR 5 MG tablet   Commonly known as:  ROXICODONE   Dose:  5 mg   Quantity:  18 tablet        Take 1 tablet (5 mg) by mouth every 8 hours as needed for breakthrough pain or pain maximum 3 tablet(s) per day   Refills:  0        pantoprazole 40 MG EC tablet   Commonly known as:  PROTONIX   Dose:  40 mg   Quantity:  90 tablet        Take 1 tablet (40 mg) by mouth daily Take 30-60 minutes before a meal.   Refills:  3        ranitidine 150 MG tablet   Commonly known as:  ZANTAC   Dose:  150 mg   Quantity:  15 tablet        Take 1 tablet (150 mg) by mouth At Bedtime for 15 days   Refills:  0        sennosides 8.6 MG tablet   Commonly known as:  SENOKOT   Dose:  1 tablet   Quantity:  60 tablet        Take 1 tablet by mouth 2 times daily   Refills:  0        vitamin D 47016 UNIT capsule   Commonly known as:  ERGOCALCIFEROL        TAKE 1 CAPSULE (50,000 UNITS) BY MOUTH EVERY 7 DAYS FOR 8 DOSES   Refills:  0          ASK your doctor about these medications        Dose / Directions Last dose taken    methocarbamol 500 MG tablet   Commonly known as:  ROBAXIN   Dose:  1000 mg   Quantity:  30 tablet   Ask about: Should I take this medication?        Take 2 tablets (1,000 mg) by mouth 3 times daily for 5 days   Refills:  0                Orders Needing Specimen Collection     None      Pending Results     No orders found from 5/11/2018 to 5/14/2018.            Pending Culture Results     No orders found from 5/11/2018 to 5/14/2018.            Pending Results Instructions     If you had any lab results that were not finalized at the time of your Discharge, you can call the ED Lab Result RN at [phone]. You will be contacted by this team for any positive Lab results or changes in treatment. The nurses are available 7 days a week from 10A to 6:30P.  You can leave a message 24  "hours per day and they will return your call.        Thank you for choosing [facility]       Thank you for choosing [facility] for your care. Our goal is always to provide you with excellent care. Hearing back from our patients is one way we can continue to improve our services. Please take a few minutes to complete the written survey that you may receive in the mail after you visit with us. Thank you!        [employer] Information     [employer] lets you send messages to your doctor, view your test results, renew your prescriptions, schedule appointments and more. To sign up, go to [email or web address] . Click on \"Log in\" on the left side of the screen, which will take you to the Welcome page. Then click on \"Sign up Now\" on the right side of the page.     You will be asked to enter the access code listed below, as well as some personal information. Please follow the directions to create your username and password.     Your access code is: [email or web address]  Expires: 2018  8:16 AM     Your access code will  in 90 days. If you need help or a new code, please call your [facility] clinic or [phone].        Care EveryWhere ID     This is your Care EveryWhere ID. This could be used by other organizations to access your [facility] medical records  [id number]        Equal Access to Services     TONY SHAW : Alyssa Arcos, waaxda luqadaha, qaybta kaalmada adecaryaparag, carlyn ryan. So [facility] [phone].    ATENCIÓN: Si habla español, tiene a velazco disposición servicios gratuitos de asistencia lingüística. Llame al [phone].    We comply with applicable federal civil rights laws and [place] laws. We do not discriminate on the basis of race, color, national origin, age, disability, sex, sexual orientation, or gender identity.            After Visit Summary       This is your record. Keep this with you and show to your community pharmacist(s) and doctor(s) at your next visit.  "

## 2018-05-13 NOTE — ED TRIAGE NOTES
Patient presents to the ER with complain of neck pain that is radiating to the hands. States that pain started approximately 45 days ago. Was seen and was given steroids but doesn't think that it helps.

## 2018-05-16 ENCOUNTER — OFFICE VISIT (OUTPATIENT)
Dept: FAMILY MEDICINE | Facility: CLINIC | Age: 38
End: 2018-05-16
Payer: COMMERCIAL

## 2018-05-16 VITALS
SYSTOLIC BLOOD PRESSURE: 126 MMHG | OXYGEN SATURATION: 100 % | RESPIRATION RATE: 22 BRPM | DIASTOLIC BLOOD PRESSURE: 60 MMHG | TEMPERATURE: 98.8 F | WEIGHT: 186 LBS | HEART RATE: 92 BPM | HEIGHT: 71 IN | BODY MASS INDEX: 26.04 KG/M2

## 2018-05-16 DIAGNOSIS — M54.12 CERVICAL RADICULOPATHY: Primary | ICD-10-CM

## 2018-05-16 PROCEDURE — 99214 OFFICE O/P EST MOD 30 MIN: CPT | Performed by: FAMILY MEDICINE

## 2018-05-16 RX ORDER — OXYCODONE HYDROCHLORIDE 5 MG/1
5 TABLET ORAL EVERY 6 HOURS PRN
Qty: 12 TABLET | Refills: 0 | Status: SHIPPED | OUTPATIENT
Start: 2018-05-16 | End: 2018-06-04

## 2018-05-16 RX ORDER — GABAPENTIN 300 MG/1
300 CAPSULE ORAL 3 TIMES DAILY
Qty: 90 CAPSULE | Refills: 1 | Status: SHIPPED | OUTPATIENT
Start: 2018-05-16 | End: 2018-06-08

## 2018-05-16 RX ORDER — DEXAMETHASONE 2 MG/1
2 TABLET ORAL 3 TIMES DAILY
Qty: 21 TABLET | Refills: 0 | Status: SHIPPED | OUTPATIENT
Start: 2018-05-16 | End: 2018-06-04

## 2018-05-16 ASSESSMENT — PAIN SCALES - GENERAL: PAINLEVEL: WORST PAIN (10)

## 2018-05-16 NOTE — MR AVS SNAPSHOT
After Visit Summary   5/16/2018    Tonio Alarcon    MRN: [id number]           Patient Information     Date Of Birth          1980        Visit Information        Provider Department      5/16/2018 11:00 AM Christiano Palacios MD [facility]        Today's Diagnoses     Cervical radiculopathy    -  1      Care Instructions    [facility]   Discharged by : destinee  Paper scripts provided to patient : oxycodone #12     If you have any questions regarding your visit please contact your care team:     Team Gold                Clinic Hours Telephone Number     Dr. Vida Kelly NP 7am-7pm  Monday - Thursday   7am-5pm  Fridays  [phone]   (Appointment scheduling available 24/7)     RN Line  [phone] option 2     [facility] [place] [facility] [place] - 11am-9pm Monday-Friday Saturday-Sunday- 9am-5pm     [facility] -   5pm-9pm Monday-Friday Saturday-Sunday- 9am-5pm    [phone] - [place]    [phone] - [facility]       For a Price Quote for your services, please call our Consumer Price Line at [phone].     What options do I have for visits at the clinic other than the traditional office visit?     To expand how we care for you, many of our providers are utilizing electronic visits (e-visits) and telephone visits, when medically appropriate, for interactions with their patients rather than a visit in the clinic. We also offer nurse visits for many medical concerns. Just like any other service, we will bill your insurance company for this type of visit based on time spent on the phone with your provider. Not all insurance companies cover these visits. Please check with your medical insurance if this type of visit is covered. You will be responsible for any charges that are not paid by your insurance.   E-visits via [employer]:  generally incur a $35.00 fee.     Telephone visits:  Time spent on the phone: *charged based on time that is spent on the phone in increments of 10 minutes. Estimated cost:   5-10 mins $30.00   11-20 mins. $59.00   21-30 mins. $85.00       Use [employer] (secure email communication and access to your chart) to send your primary care provider a message or make an appointment. Ask someone on your Team how to sign up for RIWIt.     As always, Thank you for trusting us with your health care needs!      [facility]:    Scheduling Appointments  [facility]  Call: [phone]    [facility]  Call: [phone]    [facility]  Call: [phone]    For Gastroenterology referrals   [facility]   Clinics and Surgery Center, 4th Floor   [address] [place] [address]   Appointments: [phone]    WHERE TO GO FOR CARE?  Clinic    Make an appointment if you:       Are sick (cold, cough, flu, sore throat, earache or in pain).       Have a small injury (sprain, small cut, burn or broken bone).       Need a physical exam, Pap smear, vaccine or prescription refill.       Have questions about your health or medicines.    To reach us:      Call [phone] ([phone]). Open 24 hours every day. (For counseling services, call [phone].)    Log into Impressto at [email or web address]. (Visit [email or web address] to create an account.) Hospital emergency room    An emergency is a serious or life- threatening problem that must be treated right away.    Call 650 or get to the hospital if you have:      Very bad or sudden:            - Chest pain or pressure         - Bleeding         - Head or belly pain         - Dizziness or trouble seeing, walking or                          Speaking      Problems breathing      Blood in your vomit or you are coughing up blood      A major injury (knocked out, loss of a finger  or limb, rape, broken bone protruding from skin)    A mental health crisis. (Or call the Mental Health Crisis line at 1-472.965.1615 or Suicide Prevention Hotline at 1-269.899.7824.)    Open 24 hours every day. You don't need an appointment.     Urgent care    Visit urgent care for sickness or small injuries when the clinic is closed. You don't need an appointment. To check hours or find an urgent care near you, visit [email or web address]. Online care    Get online care from [facility] for more than 70 common problems, like colds, allergies and infections. Open 24 hours every day at:   [email or web address]   Need help deciding?    For advice about where to be seen, you may call your clinic and ask to speak with a nurse. We're here for you 24 hours every day.         If you are deaf or hard of hearing, please let us know. We provide many free services including sign language interpreters, oral interpreters, TTYs, telephone amplifiers, note takers and written materials.                   Follow-ups after your visit        Follow-up notes from your care team     Return in about 2 weeks (around 5/30/2018) for Routine Visit.      Future tests that were ordered for you today     Open Future Orders        Priority Expected Expires Ordered    MR Cervical Spine w/o Contrast Routine  5/16/2019 5/16/2018            Who to contact     If you have questions or need follow up information about today's clinic visit or your schedule please contact [facility] directly at [phone].  Normal or non-critical lab and imaging results will be communicated to you by MyChart, letter or phone within 4 business days after the clinic has received the results. If you do not hear from us within 7 days, please contact the clinic through [email or web address] or phone. If you have a critical or abnormal lab result, we will notify you by phone as soon as possible.  Submit refill requests through [employer] or call your pharmacy and they will forward the  "refill request to us. Please allow 3 business days for your refill to be completed.          Additional Information About Your Visit        [employer]AK [employer] Information     [employer] lets you send messages to your doctor, view your test results, renew your prescriptions, schedule appointments and more. To sign up, go to [email or web address] . Click on \"Log in\" on the left side of the screen, which will take you to the Welcome page. Then click on \"Sign up Now\" on the right side of the page.     You will be asked to enter the access code listed below, as well as some personal information. Please follow the directions to create your username and password.     Your access code is: [email or web address]  Expires: 2018  8:16 AM     Your access code will  in 90 days. If you need help or a new code, please call your [facility] clinic or [phone].        Care EveryWhere ID     This is your Care EveryWhere ID. This could be used by other organizations to access your [facility] medical records  [id number]        Your Vitals Were     Pulse Temperature Respirations Height Pulse Oximetry BMI (Body Mass Index)    92 98.8  F (37.1  C) (Oral) 22 5' 11\" (1.803 m) 100% 25.94 kg/m2       Blood Pressure from Last 3 Encounters:   18 126/60   18 (!) 136/92   18 (!) 125/95    Weight from Last 3 Encounters:   18 186 lb (84.4 kg)   18 182 lb 3.2 oz (82.6 kg)   18 172 lb (78 kg)                 Today's Medication Changes          These changes are accurate as of 18 11:12 AM.  If you have any questions, ask your nurse or doctor.               Start taking these medicines.        Dose/Directions    dexamethasone 2 MG tablet   Commonly known as:  DECADRON   Used for:  Cervical radiculopathy   Started by:  Christiano Palacios MD        Dose:  2 mg   Take 1 tablet (2 mg) by mouth 3 times daily for 7 days   Quantity:  21 tablet   Refills:  0       gabapentin 300 MG capsule   Commonly known as:  NEURONTIN "   Used for:  Cervical radiculopathy   Started by:  Christiano Palacios MD        Dose:  300 mg   Take 1 capsule (300 mg) by mouth 3 times daily   Quantity:  90 capsule   Refills:  1         These medicines have changed or have updated prescriptions.        Dose/Directions    oxyCODONE IR 5 MG tablet   Commonly known as:  ROXICODONE   This may have changed:    - when to take this  - reasons to take this  - additional instructions   Used for:  Cervical radiculopathy   Changed by:  Christiano Palacios MD        Dose:  5 mg   Take 1 tablet (5 mg) by mouth every 6 hours as needed for pain   Quantity:  12 tablet   Refills:  0         Stop taking these medicines if you haven't already. Please contact your care team if you have questions.     methylPREDNISolone 4 MG tablet   Commonly known as:  MEDROL DOSEPAK   Stopped by:  Christiano Palacios MD                Where to get your medicines      These medications were sent to [facility] [address] [place], [place] - [address]  [address] [facility] [address]     Phone:  [phone]     dexamethasone 2 MG tablet    gabapentin 300 MG capsule         Some of these will need a paper prescription and others can be bought over the counter.  Ask your nurse if you have questions.     Bring a paper prescription for each of these medications     oxyCODONE IR 5 MG tablet               Information about OPIOIDS     PRESCRIPTION OPIOIDS: WHAT YOU NEED TO KNOW   You have a prescription for an opioid (narcotic) pain medicine. Opioids can cause addiction. If you have a history of chemical dependency of any type, you are at a higher risk of becoming addicted to opioids. Only take this medicine after all other options have been tried. Take it for as short a time and as few doses as possible.     Do not:    Drive. If you drive while taking these medicines, you could be arrested for driving under the influence (DUI).    Operate heavy machinery    Do  any other dangerous activities while taking these medicines.     Drink any alcohol while taking these medicines.      Take with any other medicines that contain acetaminophen. Read all labels carefully. Look for the word  acetaminophen  or  Tylenol.  Ask your pharmacist if you have questions or are unsure.    Store your pills in a secure place, locked if possible. We will not replace any lost or stolen medicine. If you don t finish your medicine, please throw away (dispose) as directed by your pharmacist. The [place] Pollution Control Agency has more information about safe disposal: https://www.pca.[facility].[place].us/living-green/managing-unwanted-medications    All opioids tend to cause constipation. Drink plenty of water and eat foods that have a lot of fiber, such as fruits, vegetables, prune juice, apple juice and high-fiber cereal. Take a laxative (Miralax, milk of magnesia, Colace, Senna) if you don t move your bowels at least every other day.          Primary Care Provider Office Phone # Fax #    Christiano Palacios [phone]       [address] [facility] [address]        Equal Access to Services     [facility]: Hadii aad ku hadasho Soomaali, waaxda luqadaha, qaybta kaalmada adejaleel, carlyn ryan. So [facility] [phone].    ATENCIÓN: Si habla español, tiene a velazco disposición servicios gratuitos de asistencia lingüística. Tone al [phone].    We comply with applicable federal civil rights laws and [place] laws. We do not discriminate on the basis of race, color, national origin, age, disability, sex, sexual orientation, or gender identity.            Thank you!     Thank you for choosing [facility]  for your care. Our goal is always to provide you with excellent care. Hearing back from our patients is one way we can continue to improve our services. Please take a few minutes to complete the written survey that you may  receive in the mail after your visit with us. Thank you!             Your Updated Medication List - Protect others around you: Learn how to safely use, store and throw away your medicines at www.disposemymeds.org.          This list is accurate as of 5/16/18 11:12 AM.  Always use your most recent med list.                   Brand Name Dispense Instructions for use Diagnosis    dexamethasone 2 MG tablet    DECADRON    21 tablet    Take 1 tablet (2 mg) by mouth 3 times daily for 7 days    Cervical radiculopathy       gabapentin 300 MG capsule    NEURONTIN    90 capsule    Take 1 capsule (300 mg) by mouth 3 times daily    Cervical radiculopathy       oxyCODONE IR 5 MG tablet    ROXICODONE    12 tablet    Take 1 tablet (5 mg) by mouth every 6 hours as needed for pain    Cervical radiculopathy       pantoprazole 40 MG EC tablet    PROTONIX    90 tablet    Take 1 tablet (40 mg) by mouth daily Take 30-60 minutes before a meal.    Perforated peptic ulcer (H)       ranitidine 150 MG tablet    ZANTAC    15 tablet    Take 1 tablet (150 mg) by mouth At Bedtime for 15 days        sennosides 8.6 MG tablet    SENOKOT    60 tablet    Take 1 tablet by mouth 2 times daily        vitamin D 29802 UNIT capsule    ERGOCALCIFEROL     TAKE 1 CAPSULE (50,000 UNITS) BY MOUTH EVERY 7 DAYS FOR 8 DOSES

## 2018-05-16 NOTE — LETTER
May 16, 2018      Tonio Alarcon  [address] [place] [id number]        To Whom It May Concern:    Tonio Alarcon was seen in our clinic. He may return to work with the following: limited to light duty - lifting no greater than 10 pounds, may not operate heavy equipment  and must be able to take a break for 5 mins every 1-2 hrs to move position on or about 5/17/2018.    If unable to meet restrictions than it is my recommendation patient remain off work until follow up in the office after completion of imaging test.      Sincerely,          Christiano Palacios MD

## 2018-05-16 NOTE — PATIENT INSTRUCTIONS
[facility]   Discharged by : destinee  Paper scripts provided to patient : oxycodone #12     If you have any questions regarding your visit please contact your care team:     Team Gold                Clinic Hours Telephone Number     Dr. Vida Kelly, NP 7am-7pm  Monday - Thursday   7am-5pm  Fridays  [phone]   (Appointment scheduling available 24/7)     RN Line  [phone] option 2     Urgent Care - [place] - 11am-9pm Monday-Friday Saturday-Sunday- 9am-5pm     [place] -   5pm-9pm Monday-Friday Saturday-Sunday- 9am-5pm    [phone] - [place]    [phone] - [place]       For a Price Quote for your services, please call our Consumer Price Line at [phone].     What options do I have for visits at the clinic other than the traditional office visit?     To expand how we care for you, many of our providers are utilizing electronic visits (e-visits) and telephone visits, when medically appropriate, for interactions with their patients rather than a visit in the clinic. We also offer nurse visits for many medical concerns. Just like any other service, we will bill your insurance company for this type of visit based on time spent on the phone with your provider. Not all insurance companies cover these visits. Please check with your medical insurance if this type of visit is covered. You will be responsible for any charges that are not paid by your insurance.   E-visits via [employer]: generally incur a $35.00 fee.     Telephone visits:  Time spent on the phone: *charged based on time that is spent on the phone in increments of 10 minutes. Estimated cost:   5-10 mins $30.00   11-20 mins. $59.00   21-30 mins. $85.00       Use [employer] (secure email communication and access to your chart) to send your primary care provider a message or make an appointment. Ask someone  on your Team how to sign up for [employer].     As always, Thank you for trusting us with your health care needs!      [facility]:    Scheduling Appointments  [place], [facility]  Call: [phone]    Zeb Marshall [facility]  Call: [phone]    [facility]  Call: [phone]    For Gastroenterology referrals   [facility]   Clinics and Surgery [place], 4th Floor   [address] [place] [address]   Appointments: [phone]    WHERE TO GO FOR CARE?  Clinic    Make an appointment if you:       Are sick (cold, cough, flu, sore throat, earache or in pain).       Have a small injury (sprain, small cut, burn or broken bone).       Need a physical exam, Pap smear, vaccine or prescription refill.       Have questions about your health or medicines.    To reach us:      Call [phone] ([phone]). Open 24 hours every day. (For counseling services, call [phone].)    Log into [employer] at [email or web address]. (Visit [email or web address] to create an account.) Hospital emergency room    An emergency is a serious or life- threatening problem that must be treated right away.    Call [phone] or get to the hospital if you have:      Very bad or sudden:            - Chest pain or pressure         - Bleeding         - Head or belly pain         - Dizziness or trouble seeing, walking or                          Speaking      Problems breathing      Blood in your vomit or you are coughing up blood      A major injury (knocked out, loss of a finger or limb, rape, broken bone protruding from skin)    A mental health crisis. (Or call the Mental Health Crisis line at 1-221.277.9698 or Suicide Prevention Hotline at 1-908.958.2016.)    Open 24 hours every day. You don't need an appointment.     Urgent care    Visit urgent care for sickness or small injuries when the clinic is closed. You don't need an appointment. To check hours or  find an urgent care near you, visit [email or web address]. Online care    Get online care from [facility] for more than 70 common problems, like colds, allergies and infections. Open 24 hours every day at:   [email or web address]   Need help deciding?    For advice about where to be seen, you may call your clinic and ask to speak with a nurse. We're here for you 24 hours every day.         If you are deaf or hard of hearing, please let us know. We provide many free services including sign language interpreters, oral interpreters, TTYs, telephone amplifiers, note takers and written materials.

## 2018-05-31 ENCOUNTER — RADIANT APPOINTMENT (OUTPATIENT)
Dept: MRI IMAGING | Facility: CLINIC | Age: 38
End: 2018-05-31
Attending: FAMILY MEDICINE
Payer: COMMERCIAL

## 2018-05-31 DIAGNOSIS — M54.12 CERVICAL RADICULOPATHY: ICD-10-CM

## 2018-06-04 ENCOUNTER — OFFICE VISIT (OUTPATIENT)
Dept: FAMILY MEDICINE | Facility: CLINIC | Age: 38
End: 2018-06-04
Payer: COMMERCIAL

## 2018-06-04 VITALS
DIASTOLIC BLOOD PRESSURE: 70 MMHG | TEMPERATURE: 97.9 F | WEIGHT: 183 LBS | SYSTOLIC BLOOD PRESSURE: 128 MMHG | HEART RATE: 88 BPM | HEIGHT: 71 IN | BODY MASS INDEX: 25.62 KG/M2

## 2018-06-04 DIAGNOSIS — M54.12 CERVICAL RADICULOPATHY: ICD-10-CM

## 2018-06-04 DIAGNOSIS — M50.30 DDD (DEGENERATIVE DISC DISEASE), CERVICAL: Primary | ICD-10-CM

## 2018-06-04 PROCEDURE — 80307 DRUG TEST PRSMV CHEM ANLYZR: CPT | Mod: 90 | Performed by: FAMILY MEDICINE

## 2018-06-04 PROCEDURE — 99214 OFFICE O/P EST MOD 30 MIN: CPT | Performed by: FAMILY MEDICINE

## 2018-06-04 PROCEDURE — 99000 SPECIMEN HANDLING OFFICE-LAB: CPT | Performed by: FAMILY MEDICINE

## 2018-06-04 RX ORDER — OXYCODONE HYDROCHLORIDE 5 MG/1
5 TABLET ORAL EVERY 6 HOURS PRN
Qty: 12 TABLET | Refills: 0 | Status: SHIPPED | OUTPATIENT
Start: 2018-06-04 | End: 2018-06-08

## 2018-06-04 NOTE — LETTER
June 7, 2018      Tonio Alarcon  [address] [place] [id number]        Dear Tonio,      Your results of the urine drug screen showed only alcohol and THC, which results from marijuana use.      There were no unexpected findings.   Attached  are the reuslts.     Let me know if you have any questions.       Sincerely,       Christiano Palacios MD/kb

## 2018-06-04 NOTE — LETTER
18    Demographic Information on Tonio Alarcon:    Tonio Alarcon  Gender: male  : 1980  [address] [facility] [facility] [address] [phone] (home) none (work)    To Whom It May Concern:     Tonio Alarcon was seen in our clinic. He may return to work with the following: limited to light duty - lifting no greater than 10 pounds, may not operate heavy equipment  and must be able to take a break for 5 mins every 1-2 hrs to move position on or about 2018.     Patient is being referred to physical therapy at this time.  Follow up in my office is recommended in 4-6 weeks.    Sincerely,    Christiano Palacios MD

## 2018-06-04 NOTE — MR AVS SNAPSHOT
After Visit Summary   6/4/2018    Tonio Alarcon    MRN: [id number]           Patient Information     Date Of Birth          1980        Visit Information        Provider Department      6/4/2018 7:40 AM Christiano Palacios MD [facility]        Today's Diagnoses     DDD (degenerative disc disease), cervical    -  1    Cervical radiculopathy           Follow-ups after your visit        Additional Services     [facility] PT, HAND, AND CHIROPRACTIC REFERRAL       **This order will print in the [facility] Scheduling Office**    Physical Therapy, Hand Therapy and Chiropractic Care are available through:    *[facility]  *[facility]  *[facility]    Call one number to schedule at any of the above locations: [phone].    Your provider has referred you to: [facility] - PT and/or Chiropractic Care determined by clinical presentation at [facility] or [facility] Initial Visit    Indication/Reason for Referral: Neck Pain  Onset of Illness: few months  Therapy Orders: Evaluate and Treat  Special Programs: None and Sp[facility]  Special Request: None    Sam Vargas      Additional Comments for the Therapist or Chiropractor: MRI done showed multilevel bulging discs C5-T1    Please be aware that coverage of these services is subject to the terms and limitations of your health insurance plan.  Call member services at your health plan with any benefit or coverage questions.      Please bring the following to your appointment:    *Your personal calendar for scheduling future appointments  *Comfortable clothing                  Who to contact     If you have questions or need follow up information about today's clinic visit or your schedule please contact [facility] directly at [phone].  Normal or non-critical lab and imaging results will be communicated to you by MyChart, letter or phone within 4 business  "days after the clinic has received the results. If you do not hear from us within 7 days, please contact the clinic through [employer] or phone. If you have a critical or abnormal lab result, we will notify you by phone as soon as possible.  Submit refill requests through [employer] or call your pharmacy and they will forward the refill request to us. Please allow 3 business days for your refill to be completed.          Additional Information About Your Visit        [employer] Information     [employer] lets you send messages to your doctor, view your test results, renew your prescriptions, schedule appointments and more. To sign up, go to [email or web address] . Click on \"Log in\" on the left side of the screen, which will take you to the Welcome page. Then click on \"Sign up Now\" on the right side of the page.     You will be asked to enter the access code listed below, as well as some personal information. Please follow the directions to create your username and password.     Your access code is: [email or web address]  Expires: 2018  8:16 AM     Your access code will  in 90 days. If you need help or a new code, please call your [place] clinic or [phone].        Care EveryWhere ID     This is your Care EveryWhere ID. This could be used by other organizations to access your [place] medical records  [id number]        Your Vitals Were     Pulse Temperature Height BMI (Body Mass Index)          88 97.9  F (36.6  C) (Oral) 5' 11\" (1.803 m) 25.52 kg/m2         Blood Pressure from Last 3 Encounters:   18 128/70   18 126/60   18 (!) 136/92    Weight from Last 3 Encounters:   18 183 lb (83 kg)   18 186 lb (84.4 kg)   18 182 lb 3.2 oz (82.6 kg)              We Performed the Following     Drug  Screen Comprehensive, Urine w/o Reported Meds (Pain Care Package)     [facility] PT, HAND, AND CHIROPRACTIC REFERRAL          Where to get your medicines      Some of these will need a paper prescription " and others can be bought over the counter.  Ask your nurse if you have questions.     Bring a paper prescription for each of these medications     oxyCODONE IR 5 MG tablet         Information about OPIOIDS     PRESCRIPTION OPIOIDS: WHAT YOU NEED TO KNOW   You have a prescription for an opioid (narcotic) pain medicine. Opioids can cause addiction. If you have a history of chemical dependency of any type, you are at a higher risk of becoming addicted to opioids. Only take this medicine after all other options have been tried. Take it for as short a time and as few doses as possible.     Do not:    Drive. If you drive while taking these medicines, you could be arrested for driving under the influence (DUI).    Operate heavy machinery    Do any other dangerous activities while taking these medicines.     Drink any alcohol while taking these medicines.      Take with any other medicines that contain acetaminophen. Read all labels carefully. Look for the word  acetaminophen  or  Tylenol.  Ask your pharmacist if you have questions or are unsure.    Store your pills in a secure place, locked if possible. We will not replace any lost or stolen medicine. If you don t finish your medicine, please throw away (dispose) as directed by your pharmacist. The [place] Pollution Control Agency has more information about safe disposal: https://www.pca.[facility].[place].us/living-green/managing-unwanted-medications    All opioids tend to cause constipation. Drink plenty of water and eat foods that have a lot of fiber, such as fruits, vegetables, prune juice, apple juice and high-fiber cereal. Take a laxative (Miralax, milk of magnesia, Colace, Senna) if you don t move your bowels at least every other day.          Primary Care Provider Office Phone # Fax #    Christiano Palacios [phone] [address] [facility] [address]        Equal Access to Services     TONY SHAW AH: Alyssa philippe  Josselyn, keeganda luqadaha, qaybta kaalmada jose, carlyn robertain hayaan efraíncar alonsandra laByroncoy connor. So [facility] [phone].    ATENCIÓN: Si maximiliano rodriguez, tiene a velazco disposición servicios gratuitos de asistencia lingüística. Tone al [phone].    We comply with applicable federal civil rights laws and [place] laws. We do not discriminate on the basis of race, color, national origin, age, disability, sex, sexual orientation, or gender identity.            Thank you!     Thank you for choosing [facility]  for your care. Our goal is always to provide you with excellent care. Hearing back from our patients is one way we can continue to improve our services. Please take a few minutes to complete the written survey that you may receive in the mail after your visit with us. Thank you!             Your Updated Medication List - Protect others around you: Learn how to safely use, store and throw away your medicines at www.disposemymeds.org.          This list is accurate as of 6/4/18  8:13 AM.  Always use your most recent med list.                   Brand Name Dispense Instructions for use Diagnosis    gabapentin 300 MG capsule    NEURONTIN    90 capsule    Take 1 capsule (300 mg) by mouth 3 times daily    Cervical radiculopathy       oxyCODONE IR 5 MG tablet    ROXICODONE    12 tablet    Take 1 tablet (5 mg) by mouth every 6 hours as needed for pain    Cervical radiculopathy       pantoprazole 40 MG EC tablet    PROTONIX    90 tablet    Take 1 tablet (40 mg) by mouth daily Take 30-60 minutes before a meal.    Perforated peptic ulcer (H)       sennosides 8.6 MG tablet    SENOKOT    60 tablet    Take 1 tablet by mouth 2 times daily        vitamin D 25309 UNIT capsule    ERGOCALCIFEROL     TAKE 1 CAPSULE (50,000 UNITS) BY MOUTH EVERY 7 DAYS FOR 8 DOSES

## 2018-06-04 NOTE — PROGRESS NOTES
SUBJECTIVE:   Tonio Alarcon is a 37 year old male who presents to clinic today for the following health issues:      Follow-up on MRI done on 5/31/18    Wondering about Urine drug test    Symptoms in the neck and left arm are about the same at this time.  When symptoms get bad he will also have pain radiating onto the right side of the neck and into the shoulder area but not down the right arm.  He continues to endorse symptoms of pain, and tingling radiating all the way down into fingers of the left hand, most pronounced in probably the C6-C7 distribution.  He has a very physical job at work.  He reports that his employer was open to the restrictions provided at the last visit.  He would prefer to avoid surgery and is not particularly interested in an injection at this time.    His wife is with him today and I sense that there are some issues between them and concerns regarding substance use, hence the request for urine drug screen today.          Problem list and histories reviewed & adjusted, as indicated.  Additional history: as documented    Patient Active Problem List   Diagnosis     CARDIOVASCULAR SCREENING; LDL GOAL LESS THAN 160     Chronic low back pain     Past Surgical History:   Procedure Laterality Date     GI SURGERY      Hemorrhoids      HEMORRHOID SURGERY  2015     LAPAROSCOPIC HERNIORRHAPHY EPIGASTRIC N/A 10/11/2017    Procedure: LAPAROSCOPIC HERNIORRHAPHY EPIGASTRIC;  Exploratory Laparoscopy, Exploratory Laparotomy, EGD, Omental Patch, Upper Endoscopy;  Surgeon: Celio Keyes MD;  Location:  OR       Social History   Substance Use Topics     Smoking status: Current Every Day Smoker     Packs/day: 0.50     Years: 25.00     Smokeless tobacco: Never Used     Alcohol use Yes      Comment: heavy drinking about 2 x per week     Family History   Problem Relation Age of Onset     DIABETES Mother      Breast Cancer Mother      Dx at 61 years aol     Coronary Artery Disease Mother      3v  "CABG     CANCER Father      Stomach ca - Dx at 57 years old and passed away from this     Cancer - colorectal Maternal Grandmother      DIABETES Brother          Current Outpatient Prescriptions   Medication Sig Dispense Refill     gabapentin (NEURONTIN) 300 MG capsule Take 1 capsule (300 mg) by mouth 3 times daily 90 capsule 1     oxyCODONE IR (ROXICODONE) 5 MG tablet Take 1 tablet (5 mg) by mouth every 6 hours as needed for pain 12 tablet 0     pantoprazole (PROTONIX) 40 MG EC tablet Take 1 tablet (40 mg) by mouth daily Take 30-60 minutes before a meal. 90 tablet 3     sennosides (SENOKOT) 8.6 MG tablet Take 1 tablet by mouth 2 times daily 60 tablet 0     vitamin D (ERGOCALCIFEROL) 56547 UNIT capsule TAKE 1 CAPSULE (50,000 UNITS) BY MOUTH EVERY 7 DAYS FOR 8 DOSES  0     No Known Allergies    Reviewed and updated as needed this visit by clinical staff  Tobacco  Allergies  Meds  Problems  Med Hx  Surg Hx  Fam Hx  Soc Hx        Reviewed and updated as needed this visit by Provider  Allergies  Meds  Problems         ROS:  Constitutional, HEENT, cardiovascular, pulmonary, gi and gu systems are negative, except as otherwise noted.    OBJECTIVE:     /70 (BP Location: Right arm, Patient Position: Sitting, Cuff Size: Adult Large)  Pulse 88  Temp 97.9  F (36.6  C) (Oral)  Ht 5' 11\" (1.803 m)  Wt 183 lb (83 kg)  BMI 25.52 kg/m2  Body mass index is 25.52 kg/(m^2).  GENERAL: healthy, alert, no distress and appears mildly uncomfortable, and stands during the visit today as he reports sitting makes the symptoms worse.  EYES: Eyes grossly normal to inspection, PERRL and conjunctivae and sclerae normal  MS: no gross musculoskeletal defects noted, no edema  NEURO: Normal strength and tone, mentation intact and speech normal  PSYCH: mentation appears normal, affect normal/bright    Diagnostic Test Results:  MRI results below as noted   Impression:   1. Cervical spondylosis with mild neural foraminal narrowing on " the  left at C5-6, C6-7, and C7-T1.  2. Moderate spinal canal narrowing at C5-6 and C6-7.  3. No cord signal abnormality.     OSCAR LUO MD    ASSESSMENT/PLAN:             1. DDD (degenerative disc disease), cervical  The MRI did show some symptoms at multiple levels more prominent on the left side could be causing his symptoms.  This appears to be a combination of some disc bulging and facet joint hypertrophy causing some mild to moderate foraminal stenosis.  He has not really responded to medical therapy at this time.  He did ask today for refill of the oxycodone which she is using primarily to help sleep.  I talked about options including seeing a surgeon to help direct future care, injection therapy, or referral to physical therapy at this time.  He was most interested in the last option as he is familiar with doing this for his low back.  After discussion today, urine drug screen was ordered.  He does admit to alcohol and marijuana use at home but denies any other illicit drug use at this time.  - [facility] PT, HAND, AND CHIROPRACTIC REFERRAL  - Drug  Screen Comprehensive, Urine w/o Reported Meds (Pain Care Package)    2. Cervical radiculopathy  As above.  - [facility] PT, HAND, AND CHIROPRACTIC REFERRAL  - oxyCODONE IR (ROXICODONE) 5 MG tablet; Take 1 tablet (5 mg) by mouth every 6 hours as needed for pain  Dispense: 12 tablet; Refill: 0    FUTURE APPOINTMENTS:       - Follow-up visit in 6 weeks to see how therapy is going    Christiano Palacios MD  [facility]

## 2018-06-06 LAB — COMPREHEN DRUG ANALYSIS UR: NORMAL

## 2018-06-08 ENCOUNTER — HOSPITAL ENCOUNTER (OUTPATIENT)
Facility: CLINIC | Age: 38
Discharge: HOME | End: 2018-06-08
Attending: EMERGENCY MEDICINE | Admitting: COLON & RECTAL SURGERY
Payer: COMMERCIAL

## 2018-06-08 ENCOUNTER — APPOINTMENT (OUTPATIENT)
Dept: CT IMAGING | Facility: CLINIC | Age: 38
End: 2018-06-08
Attending: EMERGENCY MEDICINE
Payer: COMMERCIAL

## 2018-06-08 ENCOUNTER — ANESTHESIA (OUTPATIENT)
Dept: SURGERY | Facility: CLINIC | Age: 38
End: 2018-06-08
Payer: COMMERCIAL

## 2018-06-08 ENCOUNTER — ANESTHESIA EVENT (OUTPATIENT)
Dept: SURGERY | Facility: CLINIC | Age: 38
End: 2018-06-08
Payer: COMMERCIAL

## 2018-06-08 ENCOUNTER — SURGERY (OUTPATIENT)
Age: 38
End: 2018-06-08

## 2018-06-08 VITALS
TEMPERATURE: 97.7 F | OXYGEN SATURATION: 99 % | WEIGHT: 183 LBS | BODY MASS INDEX: 25.62 KG/M2 | SYSTOLIC BLOOD PRESSURE: 135 MMHG | DIASTOLIC BLOOD PRESSURE: 99 MMHG | RESPIRATION RATE: 10 BRPM | HEIGHT: 71 IN | HEART RATE: 100 BPM

## 2018-06-08 DIAGNOSIS — K61.0 PERIANAL ABSCESS: ICD-10-CM

## 2018-06-08 LAB
ABO + RH BLD: NORMAL
ABO + RH BLD: NORMAL
ANION GAP SERPL CALCULATED.3IONS-SCNC: 7 MMOL/L (ref 3–14)
BASOPHILS # BLD AUTO: 0 10E9/L (ref 0–0.2)
BASOPHILS NFR BLD AUTO: 0.1 %
BLD GP AB SCN SERPL QL: NORMAL
BLOOD BANK CMNT PATIENT-IMP: NORMAL
BUN SERPL-MCNC: 11 MG/DL (ref 7–30)
CALCIUM SERPL-MCNC: 8.6 MG/DL (ref 8.5–10.1)
CHLORIDE SERPL-SCNC: 109 MMOL/L (ref 94–109)
CO2 SERPL-SCNC: 27 MMOL/L (ref 20–32)
CREAT SERPL-MCNC: 0.81 MG/DL (ref 0.66–1.25)
DIFFERENTIAL METHOD BLD: ABNORMAL
EOSINOPHIL # BLD AUTO: 0.2 10E9/L (ref 0–0.7)
EOSINOPHIL NFR BLD AUTO: 2 %
ERYTHROCYTE [DISTWIDTH] IN BLOOD BY AUTOMATED COUNT: 15.8 % (ref 10–15)
GFR SERPL CREATININE-BSD FRML MDRD: >90 ML/MIN/1.7M2
GLUCOSE SERPL-MCNC: 94 MG/DL (ref 70–99)
HCT VFR BLD AUTO: 42.7 % (ref 40–53)
HGB BLD-MCNC: 14.6 G/DL (ref 13.3–17.7)
IMM GRANULOCYTES # BLD: 0 10E9/L (ref 0–0.4)
IMM GRANULOCYTES NFR BLD: 0.2 %
LYMPHOCYTES # BLD AUTO: 1.5 10E9/L (ref 0.8–5.3)
LYMPHOCYTES NFR BLD AUTO: 17.8 %
MCH RBC QN AUTO: 26.8 PG (ref 26.5–33)
MCHC RBC AUTO-ENTMCNC: 34.2 G/DL (ref 31.5–36.5)
MCV RBC AUTO: 78 FL (ref 78–100)
MONOCYTES # BLD AUTO: 0.9 10E9/L (ref 0–1.3)
MONOCYTES NFR BLD AUTO: 10.1 %
NEUTROPHILS # BLD AUTO: 5.9 10E9/L (ref 1.6–8.3)
NEUTROPHILS NFR BLD AUTO: 69.8 %
NRBC # BLD AUTO: 0 10*3/UL
NRBC BLD AUTO-RTO: 0 /100
PLATELET # BLD AUTO: 202 10E9/L (ref 150–450)
POTASSIUM SERPL-SCNC: 3.8 MMOL/L (ref 3.4–5.3)
RBC # BLD AUTO: 5.45 10E12/L (ref 4.4–5.9)
SODIUM SERPL-SCNC: 143 MMOL/L (ref 133–144)
SPECIMEN EXP DATE BLD: NORMAL
WBC # BLD AUTO: 8.4 10E9/L (ref 4–11)

## 2018-06-08 PROCEDURE — 25000128 H RX IP 250 OP 636: Performed by: EMERGENCY MEDICINE

## 2018-06-08 PROCEDURE — 71000014 ZZH RECOVERY PHASE 1 LEVEL 2 FIRST HR: Performed by: COLON & RECTAL SURGERY

## 2018-06-08 PROCEDURE — 86900 BLOOD TYPING SEROLOGIC ABO: CPT | Performed by: EMERGENCY MEDICINE

## 2018-06-08 PROCEDURE — 25000128 H RX IP 250 OP 636

## 2018-06-08 PROCEDURE — 25000128 H RX IP 250 OP 636: Performed by: NURSE ANESTHETIST, CERTIFIED REGISTERED

## 2018-06-08 PROCEDURE — 25000125 ZZHC RX 250

## 2018-06-08 PROCEDURE — 25000132 ZZH RX MED GY IP 250 OP 250 PS 637

## 2018-06-08 PROCEDURE — 86850 RBC ANTIBODY SCREEN: CPT | Performed by: EMERGENCY MEDICINE

## 2018-06-08 PROCEDURE — 99285 EMERGENCY DEPT VISIT HI MDM: CPT | Mod: 25 | Performed by: EMERGENCY MEDICINE

## 2018-06-08 PROCEDURE — 71000027 ZZH RECOVERY PHASE 2 EACH 15 MINS: Performed by: COLON & RECTAL SURGERY

## 2018-06-08 PROCEDURE — 25000128 H RX IP 250 OP 636: Performed by: ANESTHESIOLOGY

## 2018-06-08 PROCEDURE — 36000051 ZZH SURGERY LEVEL 2 1ST 30 MIN - UMMC: Performed by: COLON & RECTAL SURGERY

## 2018-06-08 PROCEDURE — 72193 CT PELVIS W/DYE: CPT

## 2018-06-08 PROCEDURE — 37000009 ZZH ANESTHESIA TECHNICAL FEE, EACH ADDTL 15 MIN: Performed by: COLON & RECTAL SURGERY

## 2018-06-08 PROCEDURE — C9399 UNCLASSIFIED DRUGS OR BIOLOG: HCPCS | Performed by: NURSE ANESTHETIST, CERTIFIED REGISTERED

## 2018-06-08 PROCEDURE — 86901 BLOOD TYPING SEROLOGIC RH(D): CPT | Performed by: EMERGENCY MEDICINE

## 2018-06-08 PROCEDURE — 37000008 ZZH ANESTHESIA TECHNICAL FEE, 1ST 30 MIN: Performed by: COLON & RECTAL SURGERY

## 2018-06-08 PROCEDURE — 25000566 ZZH SEVOFLURANE, EA 15 MIN: Performed by: COLON & RECTAL SURGERY

## 2018-06-08 PROCEDURE — 25000125 ZZHC RX 250: Performed by: NURSE ANESTHETIST, CERTIFIED REGISTERED

## 2018-06-08 PROCEDURE — 85025 COMPLETE CBC W/AUTO DIFF WBC: CPT | Performed by: EMERGENCY MEDICINE

## 2018-06-08 PROCEDURE — 40000170 ZZH STATISTIC PRE-PROCEDURE ASSESSMENT II: Performed by: COLON & RECTAL SURGERY

## 2018-06-08 PROCEDURE — 25000132 ZZH RX MED GY IP 250 OP 250 PS 637: Performed by: ANESTHESIOLOGY

## 2018-06-08 PROCEDURE — 99285 EMERGENCY DEPT VISIT HI MDM: CPT | Mod: Z6 | Performed by: EMERGENCY MEDICINE

## 2018-06-08 PROCEDURE — 25000125 ZZHC RX 250: Performed by: COLON & RECTAL SURGERY

## 2018-06-08 PROCEDURE — 80048 BASIC METABOLIC PNL TOTAL CA: CPT | Performed by: EMERGENCY MEDICINE

## 2018-06-08 PROCEDURE — 96374 THER/PROPH/DIAG INJ IV PUSH: CPT | Mod: 59 | Performed by: EMERGENCY MEDICINE

## 2018-06-08 PROCEDURE — 25000125 ZZHC RX 250: Performed by: EMERGENCY MEDICINE

## 2018-06-08 PROCEDURE — 36000053 ZZH SURGERY LEVEL 2 EA 15 ADDTL MIN - UMMC: Performed by: COLON & RECTAL SURGERY

## 2018-06-08 PROCEDURE — 96376 TX/PRO/DX INJ SAME DRUG ADON: CPT | Performed by: EMERGENCY MEDICINE

## 2018-06-08 PROCEDURE — 27210794 ZZH OR GENERAL SUPPLY STERILE: Performed by: COLON & RECTAL SURGERY

## 2018-06-08 RX ORDER — DEXAMETHASONE SODIUM PHOSPHATE 4 MG/ML
INJECTION, SOLUTION INTRA-ARTICULAR; INTRALESIONAL; INTRAMUSCULAR; INTRAVENOUS; SOFT TISSUE PRN
Status: DISCONTINUED | OUTPATIENT
Start: 2018-06-08 | End: 2018-06-08

## 2018-06-08 RX ORDER — NALOXONE HYDROCHLORIDE 0.4 MG/ML
.1-.4 INJECTION, SOLUTION INTRAMUSCULAR; INTRAVENOUS; SUBCUTANEOUS
Status: DISCONTINUED | OUTPATIENT
Start: 2018-06-08 | End: 2018-06-08 | Stop reason: HOSPADM

## 2018-06-08 RX ORDER — HYDROMORPHONE HYDROCHLORIDE 1 MG/ML
.3-.5 INJECTION, SOLUTION INTRAMUSCULAR; INTRAVENOUS; SUBCUTANEOUS EVERY 10 MIN PRN
Status: DISCONTINUED | OUTPATIENT
Start: 2018-06-08 | End: 2018-06-08 | Stop reason: HOSPADM

## 2018-06-08 RX ORDER — OXYCODONE HYDROCHLORIDE 5 MG/1
5-10 TABLET ORAL
Qty: 30 TABLET | Refills: 0 | Status: SHIPPED | OUTPATIENT
Start: 2018-06-08 | End: 2018-06-08

## 2018-06-08 RX ORDER — FENTANYL CITRATE 50 UG/ML
25-50 INJECTION, SOLUTION INTRAMUSCULAR; INTRAVENOUS
Status: DISCONTINUED | OUTPATIENT
Start: 2018-06-08 | End: 2018-06-08 | Stop reason: HOSPADM

## 2018-06-08 RX ORDER — ACETAMINOPHEN 325 MG/1
975 TABLET ORAL ONCE
Status: COMPLETED | OUTPATIENT
Start: 2018-06-08 | End: 2018-06-08

## 2018-06-08 RX ORDER — SODIUM CHLORIDE, SODIUM LACTATE, POTASSIUM CHLORIDE, CALCIUM CHLORIDE 600; 310; 30; 20 MG/100ML; MG/100ML; MG/100ML; MG/100ML
INJECTION, SOLUTION INTRAVENOUS CONTINUOUS PRN
Status: DISCONTINUED | OUTPATIENT
Start: 2018-06-08 | End: 2018-06-08

## 2018-06-08 RX ORDER — ONDANSETRON 2 MG/ML
INJECTION INTRAMUSCULAR; INTRAVENOUS PRN
Status: DISCONTINUED | OUTPATIENT
Start: 2018-06-08 | End: 2018-06-08

## 2018-06-08 RX ORDER — POLYETHYLENE GLYCOL 3350 17 G/17G
1 POWDER, FOR SOLUTION ORAL DAILY PRN
Qty: 500 G | Refills: 0 | Status: SHIPPED | OUTPATIENT
Start: 2018-06-08 | End: 2018-09-12

## 2018-06-08 RX ORDER — POLYETHYLENE GLYCOL 3350 17 G/17G
1 POWDER, FOR SOLUTION ORAL DAILY PRN
Qty: 500 G | Refills: 0 | Status: SHIPPED | OUTPATIENT
Start: 2018-06-08 | End: 2018-06-08

## 2018-06-08 RX ORDER — LIDOCAINE HYDROCHLORIDE 20 MG/ML
INJECTION, SOLUTION INFILTRATION; PERINEURAL PRN
Status: DISCONTINUED | OUTPATIENT
Start: 2018-06-08 | End: 2018-06-08

## 2018-06-08 RX ORDER — SODIUM CHLORIDE, SODIUM LACTATE, POTASSIUM CHLORIDE, CALCIUM CHLORIDE 600; 310; 30; 20 MG/100ML; MG/100ML; MG/100ML; MG/100ML
INJECTION, SOLUTION INTRAVENOUS CONTINUOUS
Status: DISCONTINUED | OUTPATIENT
Start: 2018-06-08 | End: 2018-06-08 | Stop reason: HOSPADM

## 2018-06-08 RX ORDER — KETOROLAC TROMETHAMINE 30 MG/ML
30 INJECTION, SOLUTION INTRAMUSCULAR; INTRAVENOUS EVERY 6 HOURS PRN
Status: DISCONTINUED | OUTPATIENT
Start: 2018-06-08 | End: 2018-06-08 | Stop reason: HOSPADM

## 2018-06-08 RX ORDER — MORPHINE SULFATE 4 MG/ML
4 INJECTION, SOLUTION INTRAMUSCULAR; INTRAVENOUS
Status: DISCONTINUED | OUTPATIENT
Start: 2018-06-08 | End: 2018-06-08 | Stop reason: HOSPADM

## 2018-06-08 RX ORDER — IOPAMIDOL 755 MG/ML
100 INJECTION, SOLUTION INTRAVASCULAR ONCE
Status: COMPLETED | OUTPATIENT
Start: 2018-06-08 | End: 2018-06-08

## 2018-06-08 RX ORDER — MORPHINE SULFATE 4 MG/ML
INJECTION, SOLUTION INTRAMUSCULAR; INTRAVENOUS
Status: COMPLETED
Start: 2018-06-08 | End: 2018-06-08

## 2018-06-08 RX ORDER — BUPIVACAINE HYDROCHLORIDE 2.5 MG/ML
INJECTION, SOLUTION EPIDURAL; INFILTRATION; INTRACAUDAL PRN
Status: DISCONTINUED | OUTPATIENT
Start: 2018-06-08 | End: 2018-06-08 | Stop reason: HOSPADM

## 2018-06-08 RX ORDER — PROPOFOL 10 MG/ML
INJECTION, EMULSION INTRAVENOUS PRN
Status: DISCONTINUED | OUTPATIENT
Start: 2018-06-08 | End: 2018-06-08

## 2018-06-08 RX ORDER — MEPERIDINE HYDROCHLORIDE 25 MG/ML
12.5 INJECTION INTRAMUSCULAR; INTRAVENOUS; SUBCUTANEOUS
Status: DISCONTINUED | OUTPATIENT
Start: 2018-06-08 | End: 2018-06-08 | Stop reason: HOSPADM

## 2018-06-08 RX ORDER — IBUPROFEN 800 MG/1
800 TABLET, FILM COATED ORAL 3 TIMES DAILY
Qty: 42 TABLET | Refills: 0 | Status: SHIPPED | OUTPATIENT
Start: 2018-06-08 | End: 2018-06-08

## 2018-06-08 RX ORDER — OXYCODONE HYDROCHLORIDE 5 MG/1
5-10 TABLET ORAL
Qty: 30 TABLET | Refills: 0 | Status: SHIPPED | OUTPATIENT
Start: 2018-06-08 | End: 2018-06-15

## 2018-06-08 RX ORDER — OXYCODONE HYDROCHLORIDE 5 MG/1
5 TABLET ORAL EVERY 4 HOURS PRN
Status: DISCONTINUED | OUTPATIENT
Start: 2018-06-08 | End: 2018-06-08 | Stop reason: HOSPADM

## 2018-06-08 RX ORDER — MORPHINE SULFATE 2 MG/ML
2 INJECTION, SOLUTION INTRAMUSCULAR; INTRAVENOUS ONCE
Status: COMPLETED | OUTPATIENT
Start: 2018-06-08 | End: 2018-06-08

## 2018-06-08 RX ORDER — METRONIDAZOLE 250 MG/1
250 TABLET ORAL 3 TIMES DAILY
Qty: 15 TABLET | Refills: 0 | Status: SHIPPED | OUTPATIENT
Start: 2018-06-08 | End: 2018-09-12

## 2018-06-08 RX ORDER — METRONIDAZOLE 250 MG/1
250 TABLET ORAL 3 TIMES DAILY
Qty: 15 TABLET | Refills: 0 | Status: SHIPPED | OUTPATIENT
Start: 2018-06-08 | End: 2018-06-08

## 2018-06-08 RX ORDER — MORPHINE SULFATE 2 MG/ML
4 INJECTION, SOLUTION INTRAMUSCULAR; INTRAVENOUS ONCE
Status: COMPLETED | OUTPATIENT
Start: 2018-06-08 | End: 2018-06-08

## 2018-06-08 RX ORDER — GINSENG 100 MG
CAPSULE ORAL PRN
Status: DISCONTINUED | OUTPATIENT
Start: 2018-06-08 | End: 2018-06-08 | Stop reason: HOSPADM

## 2018-06-08 RX ORDER — ACETAMINOPHEN 325 MG/1
650 TABLET ORAL 4 TIMES DAILY
Qty: 100 TABLET | Refills: 0 | Status: SHIPPED | OUTPATIENT
Start: 2018-06-08 | End: 2018-06-22

## 2018-06-08 RX ORDER — ONDANSETRON 4 MG/1
4 TABLET, ORALLY DISINTEGRATING ORAL
Status: DISCONTINUED | OUTPATIENT
Start: 2018-06-08 | End: 2018-06-08 | Stop reason: HOSPADM

## 2018-06-08 RX ORDER — IBUPROFEN 600 MG/1
600 TABLET, FILM COATED ORAL
Status: DISCONTINUED | OUTPATIENT
Start: 2018-06-08 | End: 2018-06-08 | Stop reason: HOSPADM

## 2018-06-08 RX ORDER — ONDANSETRON 2 MG/ML
4 INJECTION INTRAMUSCULAR; INTRAVENOUS EVERY 30 MIN PRN
Status: DISCONTINUED | OUTPATIENT
Start: 2018-06-08 | End: 2018-06-08 | Stop reason: HOSPADM

## 2018-06-08 RX ORDER — CIPROFLOXACIN 2 MG/ML
400 INJECTION, SOLUTION INTRAVENOUS SEE ADMIN INSTRUCTIONS
Status: DISCONTINUED | OUTPATIENT
Start: 2018-06-08 | End: 2018-06-08 | Stop reason: HOSPADM

## 2018-06-08 RX ORDER — IBUPROFEN 800 MG/1
800 TABLET, FILM COATED ORAL 3 TIMES DAILY
Qty: 42 TABLET | Refills: 0 | Status: SHIPPED | OUTPATIENT
Start: 2018-06-08 | End: 2018-09-12

## 2018-06-08 RX ORDER — ONDANSETRON 4 MG/1
4 TABLET, ORALLY DISINTEGRATING ORAL EVERY 30 MIN PRN
Status: DISCONTINUED | OUTPATIENT
Start: 2018-06-08 | End: 2018-06-08 | Stop reason: HOSPADM

## 2018-06-08 RX ORDER — CIPROFLOXACIN 2 MG/ML
400 INJECTION, SOLUTION INTRAVENOUS
Status: COMPLETED | OUTPATIENT
Start: 2018-06-08 | End: 2018-06-08

## 2018-06-08 RX ORDER — FENTANYL CITRATE 50 UG/ML
INJECTION, SOLUTION INTRAMUSCULAR; INTRAVENOUS PRN
Status: DISCONTINUED | OUTPATIENT
Start: 2018-06-08 | End: 2018-06-08

## 2018-06-08 RX ADMIN — ROCURONIUM BROMIDE 50 MG: 10 INJECTION INTRAVENOUS at 15:05

## 2018-06-08 RX ADMIN — ONDANSETRON 4 MG: 2 INJECTION INTRAMUSCULAR; INTRAVENOUS at 15:21

## 2018-06-08 RX ADMIN — MIDAZOLAM 2 MG: 1 INJECTION INTRAMUSCULAR; INTRAVENOUS at 14:56

## 2018-06-08 RX ADMIN — FENTANYL CITRATE 50 MCG: 50 INJECTION, SOLUTION INTRAMUSCULAR; INTRAVENOUS at 15:23

## 2018-06-08 RX ADMIN — IOPAMIDOL 90 ML: 755 INJECTION, SOLUTION INTRAVENOUS at 09:26

## 2018-06-08 RX ADMIN — MORPHINE SULFATE 2 MG: 2 INJECTION, SOLUTION INTRAMUSCULAR; INTRAVENOUS at 07:54

## 2018-06-08 RX ADMIN — SODIUM CHLORIDE 62 ML: 9 INJECTION, SOLUTION INTRAVENOUS at 09:28

## 2018-06-08 RX ADMIN — Medication 0.2 MG: at 16:41

## 2018-06-08 RX ADMIN — LIDOCAINE HYDROCHLORIDE 40 MG: 20 INJECTION, SOLUTION INFILTRATION; PERINEURAL at 15:05

## 2018-06-08 RX ADMIN — METRONIDAZOLE 500 MG: 500 INJECTION, SOLUTION INTRAVENOUS at 15:18

## 2018-06-08 RX ADMIN — BUPIVACAINE HYDROCHLORIDE 30 ML: 2.5 INJECTION, SOLUTION EPIDURAL; INFILTRATION; INTRACAUDAL at 15:25

## 2018-06-08 RX ADMIN — FENTANYL CITRATE 100 MCG: 50 INJECTION, SOLUTION INTRAMUSCULAR; INTRAVENOUS at 15:05

## 2018-06-08 RX ADMIN — SUGAMMADEX 200 MG: 100 INJECTION, SOLUTION INTRAVENOUS at 15:45

## 2018-06-08 RX ADMIN — Medication 0.3 MG: at 16:25

## 2018-06-08 RX ADMIN — OXYCODONE HYDROCHLORIDE 5 MG: 5 TABLET ORAL at 17:46

## 2018-06-08 RX ADMIN — DEXAMETHASONE SODIUM PHOSPHATE 4 MG: 4 INJECTION, SOLUTION INTRAMUSCULAR; INTRAVENOUS at 15:21

## 2018-06-08 RX ADMIN — BACITRACIN 1 TUBE: 500 OINTMENT TOPICAL at 15:33

## 2018-06-08 RX ADMIN — SODIUM CHLORIDE, POTASSIUM CHLORIDE, SODIUM LACTATE AND CALCIUM CHLORIDE: 600; 310; 30; 20 INJECTION, SOLUTION INTRAVENOUS at 15:00

## 2018-06-08 RX ADMIN — PROPOFOL 200 MG: 10 INJECTION, EMULSION INTRAVENOUS at 15:05

## 2018-06-08 RX ADMIN — MORPHINE SULFATE 2 MG: 2 INJECTION, SOLUTION INTRAMUSCULAR; INTRAVENOUS at 08:10

## 2018-06-08 RX ADMIN — MORPHINE SULFATE 4 MG: 4 INJECTION, SOLUTION INTRAMUSCULAR; INTRAVENOUS at 13:06

## 2018-06-08 RX ADMIN — ACETAMINOPHEN 975 MG: 325 TABLET, FILM COATED ORAL at 14:09

## 2018-06-08 RX ADMIN — CIPROFLOXACIN 400 MG: 2 INJECTION INTRAVENOUS at 15:16

## 2018-06-08 RX ADMIN — FENTANYL CITRATE 50 MCG: 50 INJECTION, SOLUTION INTRAMUSCULAR; INTRAVENOUS at 15:21

## 2018-06-08 ASSESSMENT — ENCOUNTER SYMPTOMS
DYSURIA: 0
RECTAL PAIN: 1
ABDOMINAL PAIN: 0
SHORTNESS OF BREATH: 0
VOMITING: 0
FEVER: 0

## 2018-06-08 ASSESSMENT — LIFESTYLE VARIABLES: TOBACCO_USE: 1

## 2018-06-08 NOTE — IP AVS SNAPSHOT
[place] [id number] [facility] [address]    Phone:  [phone]                                       After Visit Summary   6/8/2018    Tonio Alarcon    MRN: [id number]           After Visit Summary Signature Page     I have received my discharge instructions, and my questions have been answered. I have discussed any challenges I see with this plan with the nurse or doctor.    ..........................................................................................................................................  Patient/Patient Representative Signature      ..........................................................................................................................................  Patient Representative Print Name and Relationship to Patient    ..................................................               ................................................  Date                                            Time    ..........................................................................................................................................  Reviewed by Signature/Title    ...................................................              ..............................................  Date                                                            Time

## 2018-06-08 NOTE — IP AVS SNAPSHOT
MRN:[id number]                      After Visit Summary   6/8/2018    Tonio Alarcon    MRN: [id number]           Thank you!     Thank you for choosing [facility] for your care. Our goal is always to provide you with excellent care. Hearing back from our patients is one way we can continue to improve our services. Please take a few minutes to complete the written survey that you may receive in the mail after you visit with us. Thank you!        Patient Information     Date Of Birth          1980        About your hospital stay     You were admitted on:  N/A You last received care in the:  [facility] PACU    You were discharged on:  June 8, 2018       Who to Call     For medical emergencies, please call 911.  For non-urgent questions about your medical care, please call your primary care provider or clinic, [phone]  For questions related to your surgery, please call your surgery clinic        Attending Provider     Provider Monisha Hutchins MD Emergency Medicine       Primary Care Provider Office Phone # Fax #    Christiano Palacios [phone]      After Care Instructions     Diet Instructions       Anorectal Surgery Instructions      What can I expect after anorectal surgery?  Most anorectal procedures are done as outpatient surgery, and you go home the same day as the procedure. A few surgical procedures will require that you stay in the hospital for about one to three days. No matter where the procedure is done or how long or short it takes, these recommendations will help you heal and feel more comfortable.    Medicines:  The anal area is very sensitive; you can expect to have some pain for up to 2-4 weeks after the procedure. Your doctor will give you a prescription for one or more pain medications. In general, there are two types of medicines that can provide relief.    Non-steroidal anti-inflammatory drugs (NSAIDS). This class of drugs  includes ibuprophen and naproxen. Your doctor may give you a prescription for these, or you can buy the identical drugs in smaller size tablets, over the counter. Brand names include: Advil or Aleve. In general, it is best to take these drugs on a regular basis following your surgery. Some patients cannot take these drugs, either because they cause stomach upset or if the individual has a history of ulcers or gastritis. The drugs are best taken with food. The maximum dose of ibuprofen is 800 milligrams 3 times per dayOR 600 milligrams 4 times per day. The maximum does of naproxen is 500 milligrams 2 times per day. Your doctor may give you a prescription for one or the other of these drugs, or you can buy them at your drugstore.  Narcotic pain relievers. These include Vicodin, Percocet, and Tylenol number 3. These are all prescription medications. These should be used as prescribed and as needed. Because these drugs have side effects (including constipation), you should reduce your use of these medications as tolerated as your pain improves.  In general, the best strategy is to take (if you are able to tolerate it) an NSAID medication on a regular basis until your pain has largely gone away. You can take the narcotic pain medicine in addition to the NSAID medicine. Most patients use the full dose of the NSAID medication and narcotic pain reliever for the first few days after their operation. As your pain begins to lessen, you should cut back on your narcotic use while continuing to take your regular NSAID medication.  Some patients find it easiest to transition away from narcotic drugs by also taking acetaminophen (Tylenol). However, it is important to realize that most narcotic pain relievers also have acetaminophen, and excessive doses of acetaminophen can be dangerous.Accordingly, you can substitute 1000 milligrams of acetaminophen (two Extra Strength Tylenol or similar generic) for any given dose of narcotic, but  acetominophen should not be taken in addition to a full narcotic dose that contains acetaminophen. The maximum acceptable dose of acetaminophen is 4000 milligrams.  Refilling prescriptions. If you need additional pain medication, please call the triage nurse at [phone] during normal business hours (8 a.m. to 4 p.m., Monday though Friday) or have your pharmacy fax a refill request to [phone]. If you call after hours or on the weekends, the doctor on call may not know you personally and may not renew narcotic pain medication by phone. Call your primary care provider for all other medication refills.     Bowel function and Perineal care:  Bowel movements can be very painful. It is important to have regular bowel movements at least every other day and to keep your stool soft. Your doctor may tell you to take a stool softener, such as Colace, once or twice a day. Try to avoid constipation and/or diarrhea as this can make the pain and bleeding worse. A high fiber diet, including at least four servings of fruits or vegetables daily, will help to keep your bowel movements regular and soft. If you have trouble getting enough fiber in your daily diet, a fiber supplement can be considered, including Metamucil, Benefiber, or Citrucel, and can be bought at your local grocery store or pharmacy. It is important to drink six to eight glasses of water or juice everyday when using fiber products.    You can expect to have some bleeding after bowel movements, but it should stop soon after you wipe. Use a wet cloth or perianal pad (Tucks or Preparation H pads) to gently wipe the area after each bowel movement. Do not rub the anal area or use a lot of pressure. Using a spray bottle filled with warm water helps loosen any remaining stool. Blot gently with a soft dry cloth or tissue paper.    If possible, take a tub bath immediately after each bowel movement. Baths should be take at least 3 times daily for the first week to 10  days following your procedure. You should soak in the tub for 10 to 15 minutes each time with water as warm as you can tolerate. Even after you go back to work, it is a good idea to sit in the tub in the morning, after returning from work, and again in the evening before bedtime.    Constipation will cause you to strain when you have a bowel movement. The hard stool will be difficult to pass, will increase pain and bleeding, and will slow down healing. If you have not had a bowel movement within 2 days, take 2 teaspoons of Milk of Magnesia in the morning. If there are no results, repeat this. Stop taking Milk of Magnesia or other laxatives if you begin to have diarrhea.    Diarrhea can occur if too much laxative is taken or for several other reasons. If you have 3 or more loose, watery stools in a 24-hour period, stop taking laxatives. Continue to eat a high fiber diet and to take bulk-forming agents such as Metamucil, Benefiber, or Citrucel. You may take Immodium as directed on the package but please call the triage nurse, [phone], if this was not discussed with you surgeon preoperatively.    If you are still having diarrhea or constipation after you have tried these recommendations, please call the triage nurse line, [phone].    Bleeding/Infection:  Infection around the anal opening is not very common. The anal area has excellent blood supply, which helps the area to heal. Bloody discharge after bowel movements is normal and may last 2 to 4 weeks after your surgery. However, if you bleed between bowel movements and cannot get it to stop, call the triage nurse immediately [phone].    Activity:  After your procedure, there are no restrictions on your activity except restrictions surrounding being on narcotics and in pain, such as no heavy machine operating or driving. You may walk, climb stairs, ride in a car, and sit as tolerated. It is helpful to avoid sitting in one position for long periods (2 or  more hours). After some surgeries, you may be told not to perform any lifting (more than 10 pounds) for several weeks after surgery.    When do I need to call the doctor or triage nurse?  If you experience any of the problems listed here, call our triage nurse during business hours ([phone]). The nurse will help you with your problem or have the doctor call you. After hours and on weekends, please call the main hospital number ([phone]) and ask for the colon and rectal surgery person on call. Some is available to help you 24 hours a day, seven days a week.  Fever greater than 101 degrees  Chills  Foul-smelling drainage  Nausea and vomiting  Diarrhea - greater than 3 water stools in 24 hours  Constipation - no bowel movement after 3 days  Severe bleeding that does not stop soon after a bowel movement  Problems with the incision, including increased pain, swelling, or redness        [facility]  [address] [place] [address]  Phone: [phone]                  Further instructions from your care team       Same-Day Surgery   Adult Discharge Orders & Instructions     For 24 hours after surgery:  1. Get plenty of rest.  A responsible adult must stay with you for at least 24 hours after you leave the hospital.   2. Pain medication can slow your reflexes. Do not drive or use heavy equipment.  If you have weakness or tingling, don't drive or use heavy equipment until this feeling goes away.  3. Mixing alcohol and pain medication can cause dizziness and slow your breathing. It can even be fatal. Do not drink alcohol while taking pain medication.  4. Avoid strenuous or risky activities.  Ask for help when climbing stairs.   5. You may feel lightheaded.  If so, sit for a few minutes before standing.  Have someone help you get up.   6. If you have nausea (feel sick to your stomach), drink only clear liquids such as apple juice, ginger ale, broth or 7-Up.  Rest may also help.  Be sure  "to drink enough fluids.  Move to a regular diet as you feel able. Take pain medications with a small amount of solid food, such as toast or crackers, to avoid nausea.   7. A slight fever is normal. Call the doctor if your fever is over 100 F (37.7 C) (taken under the tongue) or lasts longer than 24 hours.  8. You may have a dry mouth, muscle aches, trouble sleeping or a sore throat.  These symptoms should go away after 24 hours.  9. Do not make important or legal decisions.   Pain Management:      1. Take pain medication (if prescribed) for pain as directed by your physician.        2. WARNING: If the pain medication you have been prescribed contains Tylenol  (acetaminophen), DO NOT take additional doses of Tylenol (acetaminophen).     Call your doctor for any of the followin.  Signs of infection (fever, growing tenderness at the surgery site, severe pain, a large amount of drainage or bleeding, foul-smelling drainage, redness, swelling).    2.  It has been over 8 to 10 hours since surgery and you are still not able to urinate (pee).    3.  Headache for over 24 hours.    4.  Numbness, tingling or weakness the day after surgery (if you had spinal anesthesia).  To contact a doctor, call _____________________________________ or:      [phone] and ask for the Resident On Call for:          __________________________________________ (answered 24 hours a day)      Emergency Department:  [facility]: [phone]  [facility]: [phone]               Rev. 10/2014       Pending Results     No orders found from 2018 to 2018.            Admission Information     Date & Time Department Dept. Phone    2018 [facility] PACU [phone]      Your Vitals Were     Blood Pressure Pulse Temperature Respirations Height Weight    138/95 100 98.8  F (37.1  C) (Oral) 17 1.803 m (5' 11\") 83 kg (183 lb)    Pulse Oximetry BMI (Body Mass Index)                99% 25.52 kg/m2        " "  MyChart Information     [employer] lets you send messages to your doctor, view your test results, renew your prescriptions, schedule appointments and more. To sign up, go to [email or web address] . Click on \"Log in\" on the left side of the screen, which will take you to the Welcome page. Then click on \"Sign up Now\" on the right side of the page.     You will be asked to enter the access code listed below, as well as some personal information. Please follow the directions to create your username and password.     Your access code is: [email or web address]  Expires: 2018  8:16 AM     Your access code will  in 90 days. If you need help or a new code, please call your [facility] clinic or [phone].        Care EveryWhere ID     This is your Care EveryWhere ID. This could be used by other organizations to access your [facility] medical records  [id number]        Equal Access to Services     TONY SHAW : Hadchantell conwayo Somaynor, waaxda lusuha, qaybta kaalmada adejaleel, carlyn osman . So [facility] [phone].    ATENCIÓN: Si cherylla español, tiene a velazco disposición servicios gratuitos de asistencia lingüística. Llame al [phone].    We comply with applicable federal civil rights laws and [place] laws. We do not discriminate on the basis of race, color, national origin, age, disability, sex, sexual orientation, or gender identity.               Review of your medicines      START taking        Dose / Directions    ibuprofen 800 MG tablet   Commonly known as:  ADVIL/MOTRIN   Used for:  Perianal abscess        Dose:  800 mg   Take 1 tablet (800 mg) by mouth 3 times daily Alternate with acetaminophen (TYLENOL), IF ordered.   Quantity:  42 tablet   Refills:  0       metroNIDAZOLE 250 MG tablet   Commonly known as:  FLAGYL   Used for:  Perianal abscess        Dose:  250 mg   Take 1 tablet (250 mg) by mouth 3 times daily   Quantity:  15 tablet   Refills:  0       oxyCODONE IR 5 MG tablet "   Commonly known as:  ROXICODONE   Used for:  Perianal abscess        Dose:  5-10 mg   Take 1-2 tablets (5-10 mg) by mouth every 3 hours as needed for severe pain   Quantity:  30 tablet   Refills:  0       polyethylene glycol powder   Commonly known as:  MIRALAX/GLYCOLAX   Used for:  Perianal abscess        Dose:  1 capful   Take 17 g (1 capful) by mouth daily as needed for constipation (If no bowel movement in 24 hours. Mix in 8 oz of water.  May take twice daily.)   Quantity:  500 g   Refills:  0         CONTINUE these medicines which may have CHANGED, or have new prescriptions. If we are uncertain of the size of tablets/capsules you have at home, strength may be listed as something that might have changed.        Dose / Directions    * TYLENOL ARTHRITIS PAIN PO   This may have changed:  Another medication with the same name was added. Make sure you understand how and when to take each.        Refills:  0       * acetaminophen 325 MG tablet   Commonly known as:  TYLENOL   This may have changed:  You were already taking a medication with the same name, and this prescription was added. Make sure you understand how and when to take each.   Used for:  Perianal abscess        Dose:  650 mg   Take 2 tablets (650 mg) by mouth 4 times daily for 14 days Alternate with ibuprofen (ADVIL/MOTRIN), IF ordered.   Quantity:  100 tablet   Refills:  0       * Notice:  This list has 2 medication(s) that are the same as other medications prescribed for you. Read the directions carefully, and ask your doctor or other care provider to review them with you.      CONTINUE these medicines which have NOT CHANGED        Dose / Directions    PROTONIX PO        Dose:  40 mg   Take 40 mg by mouth daily   Refills:  0            Where to get your medicines      Some of these will need a paper prescription and others can be bought over the counter. Ask your nurse if you have questions.     Bring a paper prescription for each of these medications      acetaminophen 325 MG tablet    ibuprofen 800 MG tablet    metroNIDAZOLE 250 MG tablet    oxyCODONE IR 5 MG tablet    polyethylene glycol powder                Protect others around you: Learn how to safely use, store and throw away your medicines at www.disposemymeds.org.        ANTIBIOTIC INSTRUCTION     You've Been Prescribed an Antibiotic - Now What?  Your healthcare team thinks that you or your loved one might have an infection. Some infections can be treated with antibiotics, which are powerful, life-saving drugs. Like all medications, antibiotics have side effects and should only be used when necessary. There are some important things you should know about your antibiotic treatment.      Your healthcare team may run tests before you start taking an antibiotic.    Your team may take samples (e.g., from your blood, urine or other areas) to run tests to look for bacteria. These test can be important to determine if you need an antibiotic at all and, if you do, which antibiotic will work best.      Within a few days, your healthcare team might change or even stop your antibiotic.    Your team may start you on an antibiotic while they are working to find out what is making you sick.    Your team might change your antibiotic because test results show that a different antibiotic would be better to treat your infection.    In some cases, once your team has more information, they learn that you do not need an antibiotic at all. They may find out that you don't have an infection, or that the antibiotic you're taking won't work against your infection. For example, an infection caused by a virus can't be treated with antibiotics. Staying on an antibiotic when you don't need it is more likely to be harmful than helpful.      You may experience side effects from your antibiotic.    Like all medications, antibiotics have side effects. Some of these can be serious.    Let you healthcare team know if you have any known  allergies when you are admitted to the hospital.    One significant side effect of nearly all antibiotics is the risk of severe and sometimes deadly diarrhea caused by Clostridium difficile (C. Difficile). This occurs when a person takes antibiotics because some good germs are destroyed. Antibiotic use allows C. diificile to take over, putting patients at high risk for this serious infection.    As a patient or caregiver, it is important to understand your or your loved one's antibiotic treatment. It is especially important for caregivers to speak up when patients can't speak for themselves. Here are some important questions to ask your healthcare team.    What infection is this antibiotic treating and how do you know I have that infection?    What side effects might occur from this antibiotic?    How long will I need to take this antibiotic?    Is it safe to take this antibiotic with other medications or supplements (e.g., vitamins) that I am taking?     Are there any special directions I need to know about taking this antibiotic? For example, should I take it with food?    How will I be monitored to know whether my infection is responding to the antibiotic?    What tests may help to make sure the right antibiotic is prescribed for me?      Information provided by:  www.cdc.gov/getsmart  U.S. Department of Health and Human Services  Centers for disease Control and Prevention  National Center for Emerging and Zoonotic Infectious Diseases  Division of Healthcare Quality Promotion        Information about OPIOIDS     PRESCRIPTION OPIOIDS: WHAT YOU NEED TO KNOW   You have a prescription for an opioid (narcotic) pain medicine. Opioids can cause addiction. If you have a history of chemical dependency of any type, you are at a higher risk of becoming addicted to opioids. Only take this medicine after all other options have been tried. Take it for as short a time and as few doses as possible.     Do not:    Drive. If you  drive while taking these medicines, you could be arrested for driving under the influence (DUI).    Operate heavy machinery    Do any other dangerous activities while taking these medicines.     Drink any alcohol while taking these medicines.      Take with any other medicines that contain acetaminophen. Read all labels carefully. Look for the word  acetaminophen  or  Tylenol.  Ask your pharmacist if you have questions or are unsure.    Store your pills in a secure place, locked if possible. We will not replace any lost or stolen medicine. If you don t finish your medicine, please throw away (dispose) as directed by your pharmacist. The [place] Pollution Control Agency has more information about safe disposal: https://www.pca.[facility].[place].us/living-green/managing-unwanted-medications    All opioids tend to cause constipation. Drink plenty of water and eat foods that have a lot of fiber, such as fruits, vegetables, prune juice, apple juice and high-fiber cereal. Take a laxative (Miralax, milk of magnesia, Colace, Senna) if you don t move your bowels at least every other day.              Medication List: This is a list of all your medications and when to take them. Check marks below indicate your daily home schedule. Keep this list as a reference.      Medications           Morning Afternoon Evening Bedtime As Needed    ibuprofen 800 MG tablet   Commonly known as:  ADVIL/MOTRIN   Take 1 tablet (800 mg) by mouth 3 times daily Alternate with acetaminophen (TYLENOL), IF ordered.                                metroNIDAZOLE 250 MG tablet   Commonly known as:  FLAGYL   Take 1 tablet (250 mg) by mouth 3 times daily                                oxyCODONE IR 5 MG tablet   Commonly known as:  ROXICODONE   Take 1-2 tablets (5-10 mg) by mouth every 3 hours as needed for severe pain                                polyethylene glycol powder   Commonly known as:  MIRALAX/GLYCOLAX   Take 17 g (1 capful) by mouth daily as needed for  constipation (If no bowel movement in 24 hours. Mix in 8 oz of water.  May take twice daily.)                                PROTONIX PO   Take 40 mg by mouth daily                                * TYLENOL ARTHRITIS PAIN PO                                * acetaminophen 325 MG tablet   Commonly known as:  TYLENOL   Take 2 tablets (650 mg) by mouth 4 times daily for 14 days Alternate with ibuprofen (ADVIL/MOTRIN), IF ordered.   Last time this was given:  975 mg on 6/8/2018  2:09 PM                                * Notice:  This list has 2 medication(s) that are the same as other medications prescribed for you. Read the directions carefully, and ask your doctor or other care provider to review them with you.

## 2018-06-08 NOTE — OP NOTE
"Procedure Date: 06/08/2018.      PREOPERATIVE DIAGNOSES:   1.  Anorectal abscess.   2.  Chronic lower back pain.   3.  History of severe hemorrhoidal disease (status post hemorrhoidectomy).      POSTOPERATIVE DIAGNOSES:   1.  Anorectal abscess.   2.  Chronic lower back pain.   3.  History of severe hemorrhoidal disease (status post hemorrhoidectomy).      ANESTHESIA:  General endotracheal anesthesia plus local anesthetic.      PROCEDURES PERFORMED:   1.  Evaluation under anesthesia.   2.  Incision and drainage of anorectal abscess.      SURGEON:  Solo Valero MD.      ASSISTANT:  Antonio Odell MD (Colon and Rectal Surgery fellow).      BRIEF HISTORY:  Chetan is a 37-year-old pleasant gentleman who presented to the Emergency Department with a 2-day history of increasing perianal pain and swelling.  Upon examination, it was not clear if there was an abscess or a fissure.  He underwent a CT scan that confirmed a left-sided perianal abscess.  His WBC was normal.  He denied any family history of inflammatory bowel disease or preexisting fecal incontinence.  I recommended incision and drainage in the operating room.  I thoroughly discussed the risks, benefits and alternatives of operative treatment with Chetan and he agreed to proceed.      DESCRIPTION OF PROCEDURE:  After obtaining informed consent, the patient was brought to the operating room and placed in the supine position.  General endotracheal anesthesia was gently induced without difficulty.  The patient was then turned to the prone jackknife position.  He received appropriate preoperative antibiotic prophylaxis as well as mechanical DVT prophylaxis.  Bilateral lower extremity pneumatic compression devices were applied and all pressure points were cushioned.  The perianal area was prepped and draped in the standard sterile fashion clear.  A \"timeout\" was performed.  Digital rectal exam as well as anoscopy were performed.  These revealed a left-sided " intersphincteric abscess at the left lateral position, at the scar from his previous hemorrhoidectomy.  The abscess had already started to drain through an orifice that was located right at the intersphincteric plane.  This area was gently probed and it did track externally.  There was no clear evidence of a fistula or necrotizing soft tissue infection.  The opening was enlarged with monopolar electrocautery.  Care was taken to not injure any of the muscle fibers of the external anal sphincter.  The internal anal sphincter was divided approximately 3 mm cephalad in order to achieve adequate drainage.        After this, the cavity was probed to break any loculations.  The cavity was then irrigated with Betadine solution and then peroxide solution.  Hemostasis was corroborated.  Inspection after this did not show any additional pathology.  The opening was then gently packed with a bacitracin moist gauze.  A total of 30 mL of bupivacaine 0.25% without epinephrine was injected at the surgical site.  Instrument count, sponge count and needle counts were all correct as reported to me.  The patient tolerated the procedure well.      COMPLICATIONS:  None immediately.      ESTIMATED BLOOD LOSS:  5 mL.      REPLACEMENT:  600 mL of crystalloid.      SPECIMENS:  None.      DRAINS/TUBES:  None.      FINDINGS:  Left lateral intersphincteric abscess with no clear evidence of fistula.  The abscess was located at the scar from his previous hemorrhoidectomy.      DISPOSITION:  PACU.         CLEOPATRA MITCHELL MD             D: 2018   T: 2018   MT: SUJEY      Name:     BREANA HAY   MRN:      [id number]        Account:        [id number]   :      1980           Procedure Date: 2018      Document: [id number]       cc: Monisha Palacios MD       [facility] Billing

## 2018-06-08 NOTE — ANESTHESIA POSTPROCEDURE EVALUATION
Patient: Tonio Alarcon    Procedure(s):   Examination of perineal abcess under anethesia, Incision and Drainage Perineal Abscess - Wound Class: IV-Dirty or Infected    Diagnosis:Myrtle Abscess  Diagnosis Additional Information: No value filed.    Anesthesia Type:  No value filed.    Note:  Anesthesia Post Evaluation    Patient location during evaluation: PACU  Patient participation: Able to fully participate in evaluation  Level of consciousness: awake and alert  Pain management: adequate  Airway patency: patent  Cardiovascular status: acceptable  Respiratory status: acceptable  Hydration status: acceptable  PONV: none             Last vitals:  Vitals:    06/08/18 1632 06/08/18 1645 06/08/18 1700   BP: (!) 144/94 (!) 152/102 (!) 138/95   Pulse:      Resp: 18 12 17   Temp:      SpO2: 99% 98% 99%         Electronically Signed By: Blanquita Silva MD  June 8, 2018  5:23 PM

## 2018-06-08 NOTE — ED PROVIDER NOTES
History     Chief Complaint   Patient presents with     Rectal Problem     patient has history of hemorrhoids and reported that he had hemorrhoidectomy last 2007. 2 days PTA, patient experienced burning sensation when patient do bowel movement     HPI  Tonio Alarcon is a 37 year old male who presents for rectal discomfort.  Patient states he has been having some discomfort with passing bowel movements for the last 2-3 days.  He has not noticed any blood in the stool or on the tissue but thinks that he noticed a swelling in the rectal area.  Patient reports that he had a hemorrhoidectomy in the past and he does not remember when it was.  Review of medical records reveals that it was in July 2015 and was done in the [facility] system.  Patient reports that he has been doing warm water baths and still having discomfort.  He is taking Tylenol arthritis for his pain.  Patient otherwise denies fever, chest pain, shortness of breath, abdominal pain.  He denies dysuria or hematuria.  Patient states he has been having soft formed stools.  He denies straining or having hard stools.  Patient rates his anal pain as a 10 out of 10.    I have reviewed the Medications, Allergies, Past Medical and Surgical History, and Social History in the [facility] system.    Past Medical History:   Diagnosis Date     Depressive disorder        Past Surgical History:   Procedure Laterality Date     GI SURGERY      Hemorrhoids      HEMORRHOID SURGERY  2015     LAPAROSCOPIC HERNIORRHAPHY EPIGASTRIC N/A 10/11/2017    Procedure: LAPAROSCOPIC HERNIORRHAPHY EPIGASTRIC;  Exploratory Laparoscopy, Exploratory Laparotomy, EGD, Omental Patch, Upper Endoscopy;  Surgeon: Celio Keyes MD;  Location:  OR       Family History   Problem Relation Age of Onset     DIABETES Mother      Breast Cancer Mother      Dx at 61 years aol     Coronary Artery Disease Mother      3v CABG     CANCER Father      Stomach ca - Dx at 57 years old and passed away from this  "    Cancer - colorectal Maternal Grandmother      DIABETES Brother        Social History   Substance Use Topics     Smoking status: Current Every Day Smoker     Packs/day: 1.00     Years: 25.00     Smokeless tobacco: Never Used     Alcohol use Yes      Comment: heavy drinking about 2 x per week         Review of Systems   Constitutional: Negative for fever.   Respiratory: Negative for shortness of breath.    Cardiovascular: Negative for chest pain.   Gastrointestinal: Positive for rectal pain. Negative for abdominal pain and vomiting.   Genitourinary: Negative for dysuria.   All other systems reviewed and are negative.      Physical Exam   BP: (!) 150/100 (patient is in severe pain, 10/10)  Pulse: 100  Temp: 98.2  F (36.8  C)  Resp: 20  Height: 180.3 cm (5' 11\")  Weight: 83 kg (183 lb)  SpO2: 100 %      Physical Exam    GEN:  Alert, well developed, no acute distress   HEENT:  PERRL, EOMI, Mucous membranes are moist.   Cardio:  RRR, no murmur, radial pulses equal bilaterally  PULM:  Lungs clear, good air movement, no wheezes, rales   Abd:  Soft, normal bowel sounds, no focal tenderness  Back exam:  No CVA tenderness  Rectal exam: No external hemorrhoids seen.  No fissures seen.  Patient does not tolerate digital rectal exam.  I was able to insert my gloved finger only to my distal phalanx; he does not tolerate any further digital rectal exam.  Musculoskeletal:  normal range of motion, no lower extremity swelling or calf tenderness  Neuro:  Alert and oriented X3, Follows commands, moving all extremities spontaneously   Skin:  Warm, dry   ED Course     ED Course     Procedures           Critical Care time:  none  Patient was given IV morphine for pain.  Patient was discussed with the colorectal surgery fellow who suggested CT of the pelvis with IV contrast to evaluate for possible perianal or perirectal abscess.    Labs are normal except as shown.  CT scan of the pelvis was done and results are shown here:  Results for " orders placed or performed during the hospital encounter of 06/08/18   CT Pelvis w Contrast    Narrative    CT PELVIS WITH CONTRAST  6/8/2018 9:28 AM      HISTORY: Rectal pain. Evaluate for abscess.    TECHNIQUE: CT pelvis with intravenous contrast. Radiation dose for  this scan was reduced using automated exposure control, adjustment of  the mA and/or kV according to patient size, or iterative  reconstruction technique. 90 mL Isovue 370     COMPARISON:  10/11/2017.    FINDINGS: There is a complex fluid collection in the left perianal  tissues consistent with an abscess. This measures approximately 2.2 x  1.5 x 3.1 cm. There is no intrapelvic extension. No bowel obstruction  or other inflammation. The appendix is normal. There are few mildly  enlarged pelvic lymph nodes. A left external iliac chain node on image  #37 measures 2.2 x 1.5 cm. There is degenerative disease in the lower  lumbar spine.      Impression    IMPRESSION: There is a left perianal abscess measuring up to 3.1 cm.  No intrapelvic extension.     Colorectal surgery was consulted and they plan to take the patient to the operating room today to drain the abscess.  They expect this to be same-day surgery.      Labs Ordered and Resulted from Time of ED Arrival Up to the Time of Departure from the ED   CBC WITH PLATELETS DIFFERENTIAL - Abnormal; Notable for the following:        Result Value    RDW 15.8 (*)     All other components within normal limits   BASIC METABOLIC PANEL   ABO/RH TYPE AND SCREEN            Assessments & Plan (with Medical Decision Making)   Patient presents with rectal/anal pain.  Workup reveals perianal abscess measuring up to 3.1 cm.  There is no sign of hemorrhoids or fissure.  Patient will go to the operating room today and colorectal surgery will drain the abscess.  Colorectal surgery anticipates he will be able to be discharged home after the procedure.    I have reviewed the nursing notes.    I have reviewed the findings,  diagnosis, plan and need for follow up with the patient.    New Prescriptions    No medications on file       Final diagnoses:   Perianal abscess       6/8/2018   [facility], [place], EMERGENCY DEPARTMENT     Monisha Rodriguez MD  06/08/18 9636

## 2018-06-08 NOTE — ANESTHESIA PREPROCEDURE EVALUATION
Anesthesia Evaluation     . Pt has had prior anesthetic. Type: General    No history of anesthetic complications          ROS/MED HX    ENT/Pulmonary:     (+)tobacco use, Current use 1 ppd packs/day  , . .   (-) recent URI   Neurologic:  - neg neurologic ROS     Cardiovascular:  - neg cardiovascular ROS   (+) ----. : . . . :. . Previous cardiac testing Echodate:4/21/2017results:LVEF 60-65% Trace mitral and tricuspid regurge. Exam was done for CP related to PUD bleeding. No CP since repair a year ago.date: results:ECG reviewed date:5/6/2018 results:SR date: results:          METS/Exercise Tolerance:  >4 METS   Hematologic:  - neg hematologic  ROS       Musculoskeletal:   (+) , , other musculoskeletal (Cervical DDD C5-T1.  New finding on MRI 3 days ago. Lumbar pain.)-       GI/Hepatic:     (+) Other GI/Hepatic (PUD) PUD s/p exploratory lap one year ago.     (-) GERD   Renal/Genitourinary:  - ROS Renal section negative       Endo:  - neg endo ROS       Psychiatric:     (+) psychiatric history depression      Infectious Disease:  - neg infectious disease ROS       Malignancy:      - no malignancy   Other:    (+) C-spine cleared: N/A, no other significant disability                    Physical Exam  Normal systems: cardiovascular, pulmonary and dental    Airway   Mallampati: II  TM distance: >3 FB  Neck ROM: full    Dental     Cardiovascular   Rhythm and rate: regular and normal      Pulmonary    breath sounds clear to auscultation                    Anesthesia Plan      History & Physical Review  History and physical reviewed and following examination; no interval change.    ASA Status:  2 .    NPO Status:  > 8 hours and > 2 hours    Plan for General and ETT (Prone position.) with Intravenous and Propofol induction. Maintenance will be Inhalation.    PONV prophylaxis:  Ondansetron (or other 5HT-3)  Additional equipment: Videolaryngoscope (No neck motion with intubation or positioning.) Reviewed pt chart and history.   Reviewed GA risks including sore throat, N/V, Tooth damage, heart and lung issues.  All questions answered.  Pt agrees with plan and wishes to proceed.      Postoperative Care  Postoperative pain management:  IV analgesics and Oral pain medications.      Consents  Anesthetic plan, risks, benefits and alternatives discussed with:  Patient and Spouse..                          .

## 2018-06-08 NOTE — BRIEF OP NOTE
[facility], [place]    Brief Operative Note    Pre-operative diagnosis: Perianal Abscess  Post-operative diagnosis Intersphincteric perianal abscess  Procedure(s):  Incision and Drainage Perineal Abscess - Wound Class: IV-Dirty or Infected  Surgeon: Surgeon(s) and Role:     * Solo Valero MD - Primary     * Antonio Odell MD - Assisting  Anesthesia: Other   Estimated blood loss: 5cc  Drains: None  Specimens: * No specimens in log *  Findings:   Left lateral area of fullness. Intersphincteric groove in left lateral position with thick purulent drainage. This external opening was probed and found to have a small intersphincteric abscess cavity.   Complications: None.  Implants: None.    --  Antonio Odell MD MPH  Colon and Rectal Surgery Fellow  [facility]   Pager: [phone]

## 2018-06-08 NOTE — OR NURSING
"Awake and alert.  Tolerating PO sips.  States \"pain is better now and I would like to move on with recovery\".  Small amount of pink drainage on fluff packing.  Vital signs stable.  Meets discharge criteria.  "

## 2018-06-08 NOTE — ED TRIAGE NOTES
patient has history of hemorrhoids and reported that he had hemorrhoidectomy last 2007. 2 days PTA, patient experienced burning sensation when patient do bowel movement. No bloody discharge, patient took Tylenol which rendered temporary relief.

## 2018-06-08 NOTE — OR NURSING
Arrived PACU per cart.  Pt awake and moves all fours.  Asking questions about surgery.  Denies pain or nausea.  Breath sounds clear to bases heard anterior chest.

## 2018-06-08 NOTE — DISCHARGE INSTRUCTIONS
Same-Day Surgery   Adult Discharge Orders & Instructions     For 24 hours after surgery:  1. Get plenty of rest.  A responsible adult must stay with you for at least 24 hours after you leave the hospital.   2. Pain medication can slow your reflexes. Do not drive or use heavy equipment.  If you have weakness or tingling, don't drive or use heavy equipment until this feeling goes away.  3. Mixing alcohol and pain medication can cause dizziness and slow your breathing. It can even be fatal. Do not drink alcohol while taking pain medication.  4. Avoid strenuous or risky activities.  Ask for help when climbing stairs.   5. You may feel lightheaded.  If so, sit for a few minutes before standing.  Have someone help you get up.   6. If you have nausea (feel sick to your stomach), drink only clear liquids such as apple juice, ginger ale, broth or 7-Up.  Rest may also help.  Be sure to drink enough fluids.  Move to a regular diet as you feel able. Take pain medications with a small amount of solid food, such as toast or crackers, to avoid nausea.   7. A slight fever is normal. Call the doctor if your fever is over 100 F (37.7 C) (taken under the tongue) or lasts longer than 24 hours.  8. You may have a dry mouth, muscle aches, trouble sleeping or a sore throat.  These symptoms should go away after 24 hours.  9. Do not make important or legal decisions.   Pain Management:      1. Take pain medication (if prescribed) for pain as directed by your physician.        2. WARNING: If the pain medication you have been prescribed contains Tylenol  (acetaminophen), DO NOT take additional doses of Tylenol (acetaminophen).     Call your doctor for any of the followin.  Signs of infection (fever, growing tenderness at the surgery site, severe pain, a large amount of drainage or bleeding, foul-smelling drainage, redness, swelling).    2.  It has been over 8 to 10 hours since surgery and you are still not able to urinate (pee).    3.   Headache for over 24 hours.    4.  Numbness, tingling or weakness the day after surgery (if you had spinal anesthesia).  To contact a doctor, call _____________________________________ or:      [phone] and ask for the Resident On Call for:          __________________________________________ (answered 24 hours a day)      Emergency Department:  [place] Emergency Department: [phone]  [place] Emergency Department: [phone]               Rev. 10/2014

## 2018-06-13 ENCOUNTER — TELEPHONE (OUTPATIENT)
Dept: SURGERY | Facility: CLINIC | Age: 38
End: 2018-06-13

## 2018-06-13 NOTE — TELEPHONE ENCOUNTER
----- Message from Antonio Odell MD sent at 6/8/2018  3:57 PM CDT -----  Regarding: Patient F/u  Nixon Bearden and Miki Grover and I took Mr Alarcon to the OR this afternoon for an EUA with drainage of intersphincteric abscess. He is being discharged from the PACU and Miki would like him to be seen in clinic this coming week. Can you please reach out to Mr. Alarcon to arrange this. Thanks!    Best,     ~Kamran

## 2018-06-14 ENCOUNTER — TELEPHONE (OUTPATIENT)
Dept: SURGERY | Facility: CLINIC | Age: 38
End: 2018-06-14

## 2018-06-14 NOTE — LETTER
June 14, 2018    RE:  Tonio Alarcon                              [address] [address]      To Whom It May Concern:    This letter is written to document that Tonio Alarcon is under the medical care of Dr. Solo Valero of the Division of Colon and Rectal Surgery at the [facility] outpatient clinics.    Tonio recently underwent a surgery on June 8, 2018 with Dr. Valero. Tonio hopes to return to work now. At that time he will have no work restrictions. Additionally, he will be able to drive and operate machinery.    Please take the above information into consideration when determining Tonio's future work schedule. If there are questions or concerns regarding the plan of care, please contact the Colon and Rectal Surgery Clinic at [phone].    Sincerely,        Solo Valero MD  Division of Colon and Rectal Surgery  Department of Surgery        Jenna Haines RN  Colon and Rectal Surgery Nurse Coordinator  [facility]

## 2018-06-14 NOTE — TELEPHONE ENCOUNTER
Health Call Center    Phone Message    May a detailed message be left on voicemail: yes    Reason for Call: Form or Letter   Type or form/letter needing completion: Form/letter stating when patient should go back to work. Patient stated his work was expecting him to go back today (6/14/2018) please clarify with patient to see if he is cleared to go back to work or when can he. Also, he is still waiting to get his post opt appt scheduled as well. Please follow up.  Provider: Dr. Florian  Date form needed: 6/14/2018  Once completed: Fax form to: [employer] fax# [phone] ATTN: Sanaz Arellano      Action Taken: Message routed to:  [facility] & Rec

## 2018-06-14 NOTE — PROGRESS NOTES
RN Post Op Care Coordination Note     POST-OP CALL      Patient is s/p  Examination of perineal abcess under anethesia, Incision and Drainage Perineal Abscess.     Reports doing well.       Fevers/chills: Patient denies fever/chills.  Eating/drinking: Patient is able to eat and drink without any complaints. Drinking 8-10 glasses of fluids per day. Tolerating low fiber diet.   Bowel habits: Patient reports having a soft brown bowel movements per day.  Urine output: Voiding without difficulty, light yellow urine.  Incisions: Patient denies any signs and symptoms of infection. No erythema, swelling or drainage.  Pain: Patient reports pain is controlled with Tylenol.  Narcotics: Not using the oxycodone  Follow up appointment is scheduled with Monisha on June 22 at 8:30 am.    Patient's wife was informed that a return to work letter will be faxed to [employer] at [phone] Attn: Sanaz Arellano.      Patient will call with any questions or concerns, all current questions and concerns were addressed to patient's satisfaction, patient aware and in agreement with current plan of care.    ANA MARIA West Care Coordinator  Colon & Rectal Surgery Clinic  [facility]  [phone]

## 2018-06-14 NOTE — TELEPHONE ENCOUNTER
Patient's wife left a message returning my call.  Called and spoke with Destiny.  Patient is scheduled 6/22/18 at 8:30 am.

## 2018-06-15 DIAGNOSIS — K61.0 PERIANAL ABSCESS: ICD-10-CM

## 2018-06-15 RX ORDER — OXYCODONE HYDROCHLORIDE 5 MG/1
5 TABLET ORAL EVERY 8 HOURS PRN
Qty: 15 TABLET | Refills: 0 | Status: SHIPPED | OUTPATIENT
Start: 2018-06-15 | End: 2018-07-03

## 2018-06-15 NOTE — PROGRESS NOTES
Patient was called back to discuss his prescription for oxycodone would be ready at the [facility] for him to . Patient was given instructions on coming to the  to get the paper script. Patient was given the direct phone number for the patient to call back.

## 2018-06-19 ENCOUNTER — CARE COORDINATION (OUTPATIENT)
Dept: SURGERY | Facility: CLINIC | Age: 38
End: 2018-06-19

## 2018-06-19 NOTE — PROGRESS NOTES
Patient was called to re-assess his pain after his pain medications were refilled x1 on Friday. Patient stated his pain has decreased and he is only using approximately one per day. Patient also states he is doing sitz baths as well to help with the pain. 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 Patient was reminded of his appointment this Friday with Monisha Zheng at 8:30 am. Patient's questions and concerns were addressed to his stated satisfaction, patient will call with any questions or concerns.

## 2018-06-20 ENCOUNTER — TELEPHONE (OUTPATIENT)
Dept: FAMILY MEDICINE | Facility: CLINIC | Age: 38
End: 2018-06-20

## 2018-06-20 NOTE — TELEPHONE ENCOUNTER
Reason for Call:  Other call back    Detailed comments: Patients wife called stating they misplaced the phone number to the referral for Pt..Please call back with the number for them to call    Phone Number Patient can be reached at: Home number on file [phone] (home)    Best Time: anytime    Can we leave a detailed message on this number? YES    Call taken on 6/20/2018 at 3:25 PM by Yelitza Elizondo

## 2018-06-21 NOTE — TELEPHONE ENCOUNTER
Called patient back and left a VM message for the phone number of referral.  I assume it is for [facility] for patients neck pain.  Phone number is [phone].    Fiorella Cuadra

## 2018-06-27 DIAGNOSIS — K61.0 PERIANAL ABSCESS: ICD-10-CM

## 2018-06-27 NOTE — TELEPHONE ENCOUNTER
Reason for Call:  Medication or medication refill:    Do you use a [facility]?  No    Name of the medication requested: oxyCODONE IR (ROXICODONE) 5 MG tablet    Other request: Please send to [facility], phone number [phone]. Please advise.     Can we leave a detailed message on this number? YES    Phone number patient can be reached at: Home number on file [phone] (home)    Best Time: Anytime    Call taken on 6/27/2018 at 2:10 PM by Mini Schultz

## 2018-07-03 ENCOUNTER — THERAPY VISIT (OUTPATIENT)
Dept: PHYSICAL THERAPY | Facility: CLINIC | Age: 38
End: 2018-07-03
Payer: COMMERCIAL

## 2018-07-03 DIAGNOSIS — M54.2 NECK PAIN: Primary | ICD-10-CM

## 2018-07-03 PROCEDURE — 97110 THERAPEUTIC EXERCISES: CPT | Mod: GP | Performed by: PHYSICAL THERAPIST

## 2018-07-03 PROCEDURE — 97530 THERAPEUTIC ACTIVITIES: CPT | Mod: GP | Performed by: PHYSICAL THERAPIST

## 2018-07-03 PROCEDURE — 97162 PT EVAL MOD COMPLEX 30 MIN: CPT | Mod: GP | Performed by: PHYSICAL THERAPIST

## 2018-07-03 RX ORDER — OXYCODONE HYDROCHLORIDE 5 MG/1
5 TABLET ORAL EVERY 8 HOURS PRN
Qty: 15 TABLET | Refills: 0 | Status: SHIPPED | OUTPATIENT
Start: 2018-07-03 | End: 2019-02-27

## 2018-07-03 NOTE — TELEPHONE ENCOUNTER
Patient called and stated he put in a request for his pain medication last week and he never heard back on this. He stated he just started his therapy but is dealing with a lot of pain but does not have pain medication. Please call patient back to discuss at [phone].    Thank you  Roberta White  Patient Representative
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
## 2018-07-05 PROBLEM — M54.2 NECK PAIN: Status: ACTIVE | Noted: 2018-07-05

## 2018-08-15 ENCOUNTER — TRANSFERRED RECORDS (OUTPATIENT)
Dept: PHYSICAL THERAPY | Facility: CLINIC | Age: 38
End: 2018-08-15

## 2018-08-31 ENCOUNTER — TELEPHONE (OUTPATIENT)
Dept: FAMILY MEDICINE | Facility: CLINIC | Age: 38
End: 2018-08-31

## 2018-08-31 NOTE — TELEPHONE ENCOUNTER
Call would not go through x2 so I reviewed contact tab and demographics and they all show the same phone number.  6/4 OV says: I talked about options including seeing a surgeon to help direct future care, injection therapy, or referral to physical therapy at this time. Follow-up visit in 6 weeks to see how therapy is going.  Patient is not currently scheduled.   Saritha Clark RN

## 2018-08-31 NOTE — TELEPHONE ENCOUNTER
Reason for Call:  Other     Detailed comments: Patient wanted to discuss his progress with physical therapy and other options he can pursue.     Phone Number Patient can be reached at: Home number on file [phone] (home)    Best Time:     Can we leave a detailed message on this number? Not Applicable    Call taken on 8/31/2018 at 10:30 AM by Elena Guzman

## 2018-09-07 NOTE — TELEPHONE ENCOUNTER
Called patient on wife's phone and corrected patient's phone number. He wants a referral for a cortisone shot in his neck/back. He feels that therapy made it worse. Scheduled with PCP on 9/12 and he is aware he may be referred to a specialist and has seen one before for this same concern. Patient verbalized understanding.    6/4 OV says: Follow-up visit in 6 weeks.  Saritha Clark RN

## 2018-09-12 ENCOUNTER — OFFICE VISIT (OUTPATIENT)
Dept: FAMILY MEDICINE | Facility: CLINIC | Age: 38
End: 2018-09-12
Payer: COMMERCIAL

## 2018-09-12 ENCOUNTER — HOSPITAL ENCOUNTER (EMERGENCY)
Facility: CLINIC | Age: 38
Discharge: HOME | End: 2018-09-12
Attending: EMERGENCY MEDICINE | Admitting: EMERGENCY MEDICINE
Payer: COMMERCIAL

## 2018-09-12 VITALS
HEART RATE: 111 BPM | BODY MASS INDEX: 25.68 KG/M2 | DIASTOLIC BLOOD PRESSURE: 80 MMHG | OXYGEN SATURATION: 99 % | WEIGHT: 184.13 LBS | TEMPERATURE: 98 F | SYSTOLIC BLOOD PRESSURE: 96 MMHG | RESPIRATION RATE: 18 BRPM

## 2018-09-12 VITALS
BODY MASS INDEX: 26.04 KG/M2 | HEART RATE: 100 BPM | WEIGHT: 186 LBS | SYSTOLIC BLOOD PRESSURE: 136 MMHG | DIASTOLIC BLOOD PRESSURE: 76 MMHG | HEIGHT: 71 IN | TEMPERATURE: 98.9 F

## 2018-09-12 DIAGNOSIS — M54.12 CERVICAL RADICULOPATHY: Primary | ICD-10-CM

## 2018-09-12 DIAGNOSIS — F10.920 ALCOHOLIC INTOXICATION WITHOUT COMPLICATION (H): ICD-10-CM

## 2018-09-12 DIAGNOSIS — L72.9 CUTANEOUS CYST: ICD-10-CM

## 2018-09-12 DIAGNOSIS — D17.30 LIPOMA OF SKIN AND SUBCUTANEOUS TISSUE: ICD-10-CM

## 2018-09-12 DIAGNOSIS — M54.2 NECK PAIN: ICD-10-CM

## 2018-09-12 PROCEDURE — 99283 EMERGENCY DEPT VISIT LOW MDM: CPT | Mod: Z6 | Performed by: EMERGENCY MEDICINE

## 2018-09-12 PROCEDURE — 99213 OFFICE O/P EST LOW 20 MIN: CPT | Performed by: FAMILY MEDICINE

## 2018-09-12 PROCEDURE — 99282 EMERGENCY DEPT VISIT SF MDM: CPT | Performed by: EMERGENCY MEDICINE

## 2018-09-12 RX ORDER — PANTOPRAZOLE SODIUM 40 MG/1
40 TABLET, DELAYED RELEASE ORAL DAILY
COMMUNITY
Start: 2018-08-21 | End: 2019-06-18

## 2018-09-12 RX ORDER — TRAMADOL HYDROCHLORIDE 50 MG/1
50 TABLET ORAL EVERY 6 HOURS PRN
Qty: 30 TABLET | Refills: 0 | Status: SHIPPED | OUTPATIENT
Start: 2018-09-12 | End: 2019-02-27

## 2018-09-12 ASSESSMENT — ENCOUNTER SYMPTOMS
APPETITE CHANGE: 0
WOUND: 0
PALPITATIONS: 0
NERVOUS/ANXIOUS: 1
FATIGUE: 1
BRUISES/BLEEDS EASILY: 0
CHEST TIGHTNESS: 0
DECREASED CONCENTRATION: 1
FACIAL SWELLING: 0
SORE THROAT: 0
NAUSEA: 0
DIZZINESS: 0
RHINORRHEA: 0
NUMBNESS: 0
SINUS PAIN: 0
COUGH: 0
TROUBLE SWALLOWING: 0
WEAKNESS: 0
HEADACHES: 0
VOMITING: 0
DIARRHEA: 0
SINUS PRESSURE: 0
FEVER: 0
ABDOMINAL PAIN: 0
SHORTNESS OF BREATH: 0
NECK PAIN: 1
CHILLS: 0

## 2018-09-12 NOTE — PROGRESS NOTES
SUBJECTIVE:   Tonio Alarcon is a 37 year old male who presents to clinic today for the following health issues:      ED/UC Followup:    Facility:  [facility]  Date of visit: 9/12/18  Reason for visit: Neck and back pain  Current Status:  Wondering about cortisone injections?     Patient was in the emergency room earlier this morning with neck and low back pain.  The emergency room note was reviewed.  There was a comment made around masses on the back of the neck.  The patient himself is unsure exactly how long these have been present.  There is one on the left side and one on the right side that are somewhat different.  They can both periodically be tender.  He has not been sick recently.  He is having more radicular symptoms down the left arm with tingling and pain into the index middle and ring fingers of the left hand.    He confirms that he was intoxicated in the emergency room this morning.  He is frustrated by the amount of pain that he is having around his neck but attributes to drinking last night to hanging out with a bunch of male friends.  At this point in time he expresses some commitment to cutting back on alcohol use and reports that he has already cut out marijuana use.  He is interested in possible pain clinic consultation and/or injections at this time.        Problem list and histories reviewed & adjusted, as indicated.  Additional history: as documented    Patient Active Problem List   Diagnosis     CARDIOVASCULAR SCREENING; LDL GOAL LESS THAN 160     Chronic low back pain     Neck pain     Past Surgical History:   Procedure Laterality Date     GI SURGERY      Hemorrhoids      HEMORRHOID SURGERY  2015     INCISION AND DRAINAGE PERINEAL, COMBINED N/A 6/8/2018    Procedure: COMBINED INCISION AND DRAINAGE PERINEAL;   Examination of perineal abcess under anethesia, Incision and Drainage Perineal Abscess;  Surgeon: Solo Valero MD;  Location: [facility] OR     LAPAROSCOPIC HERNIORRHAPHY  "EPIGASTRIC N/A 10/11/2017    Procedure: LAPAROSCOPIC HERNIORRHAPHY EPIGASTRIC;  Exploratory Laparoscopy, Exploratory Laparotomy, EGD, Omental Patch, Upper Endoscopy;  Surgeon: Celio Keyes MD;  Location:  OR       Social History   Substance Use Topics     Smoking status: Current Every Day Smoker     Packs/day: 1.00     Years: 25.00     Smokeless tobacco: Never Used     Alcohol use Yes      Comment: heavy drinking about 2 x per week     Family History   Problem Relation Age of Onset     Diabetes Mother      Breast Cancer Mother      Dx at 61 years aol     Coronary Artery Disease Mother      3v CABG     Cancer Father      Stomach ca - Dx at 57 years old and passed away from this     Cancer - colorectal Maternal Grandmother      Diabetes Brother          Current Outpatient Prescriptions   Medication Sig Dispense Refill     pantoprazole (PROTONIX) 40 MG EC tablet Take 40 mg by mouth daily       traMADol (ULTRAM) 50 MG tablet Take 1 tablet (50 mg) by mouth every 6 hours as needed for severe pain 30 tablet 0     Acetaminophen (TYLENOL ARTHRITIS PAIN PO)        oxyCODONE IR (ROXICODONE) 5 MG tablet Take 1 tablet (5 mg) by mouth every 8 hours as needed for severe pain (Patient not taking: Reported on 9/12/2018) 15 tablet 0     No Known Allergies    Reviewed and updated as needed this visit by clinical staff  Tobacco  Allergies  Meds  Problems  Med Hx  Surg Hx  Fam Hx  Soc Hx        Reviewed and updated as needed this visit by Provider  Tobacco  Allergies  Meds  Problems  Med Hx  Surg Hx  Soc Hx        ROS:  Constitutional, HEENT, cardiovascular, pulmonary, gi and gu systems are negative, except as otherwise noted.    OBJECTIVE:     /76 (BP Location: Right arm, Patient Position: Sitting, Cuff Size: Adult Large)  Pulse 100  Temp 98.9  F (37.2  C) (Oral)  Ht 5' 11\" (1.803 m)  Wt 186 lb (84.4 kg)  BMI 25.94 kg/m2  Body mass index is 25.94 kg/(m^2).  GENERAL: healthy, alert and " uncomfortable  EYES: Eyes grossly normal to inspection, PERRL and conjunctivae and sclerae normal  NECK: no adenopathy, no asymmetry, masses, or scars, thyroid normal to palpation and on the right posterior neck is a soft mass associated with the scanning consistent with a lipoma.  On the left side of the neck and the posterior cervical chain is a small pea-sized freely mobile nontender mass consistent with a palpable lymph node.  MS: no gross musculoskeletal defects noted, no edema  SKIN: no suspicious lesions or rashes  NEURO: Normal strength and tone, mentation intact and speech normal  PSYCH: mentation appears normal, affect normal/bright    Diagnostic Test Results:  none     ASSESSMENT/PLAN:             1. Cervical radiculopathy  I provided tramadol today for pain control.  He admits to using the oxycodone primarily for pain control and to help him sleep at night but I am concerned about longer-term use of that medication.  Pain management referral and possible options were discussed and given to the patient today.  - traMADol (ULTRAM) 50 MG tablet; Take 1 tablet (50 mg) by mouth every 6 hours as needed for severe pain  Dispense: 30 tablet; Refill: 0  - PAIN MANAGEMENT REFERRAL    2. Lipoma of skin and subcutaneous tissue  I think the larger mass on the right side of the neck is most consistent with a lipoma.  I would suggest observation at this time unless it is enlarging or recurrently tender.  I do not think either of the masses is playing any role in his current cervical radicular symptoms or neck pain.          Christiano Palacios MD  [facility]

## 2018-09-12 NOTE — DISCHARGE INSTRUCTIONS
"  Alcohol Intoxication  Alcohol intoxication occurs when you drink alcohol faster than your liver can remove it from your system. The following facts are important to remember:    It can take 10 minutes or more to start to feel the effects of a drink, so you can easily get more intoxicated than you intended.    One drink may be more than 1 serving of alcohol. Depending on the drink, it can be 2 to 4 servings.    It takes about an hour for your body to metabolize (clear) 1 serving. If you have more than 1 drink, it can take a couple of hours or more.    Many things affect how drinks will affect you, including whether you ve eaten, how fast you drink, your size, how much you normally drink (or not), medicines you take, chronic diseases you have, and gender.  Signs and symptoms of alcohol poisoning  The following are signs and symptoms of alcohol poisoning:  Mild impairment    Reduced inhibitions    Slurred speech    Drowsiness    Decreased fine motor skills  Moderate impairment    Erratic behavior, aggression, depression    Impaired judgment    Confusion    Concentration difficulties    Coordination problems  Severe impairment    Vomiting    Seizures    Unconsciousness    Cold, clammy    Slow or irregular breathing    Hypothermia (low body temperature)    Coma  Health effects  Alcohol abuse causes health problems. Sometimes this can happen after only drinking a  little.\" There is no set number of drinks or amount of alcohol that defines too much. The more you drink at one time, and the more frequently you drink determine both the short-term and long-term health effects. It affects all parts of your body and your health, including your:    Brain. Alcohol is a central nervous system depressant. It can damage parts of the brain that affect your balance, memory, thinking, and emotions. It can cause memory loss, blackouts, depression, agitation, sleep cycle changes, and seizures. These changes may or may not be " reversible.    Heart and vascular system. Alcohol affects multiple areas. It can damage heart muscle causing cardiomyopathy, which is a weakening and stretching of the heart muscle. This can lead to trouble breathing, an irregular heartbeat, atrial fibrillation, leg swelling, and heart failure. It makes the blood vessels stiffen causing hypertension (high blood pressure). All of these problems increase your risk of having heart attacks or strokes.    Liver. Alcohol causes fat to build up in the liver, affecting its normal function. This increases the risk for hepatitis, leading to abdominal pain, appetite loss, jaundice, bleeding problems, liver fibrosis, and cirrhosis. This in turn can affect your ability to fight off infections, and can cause diabetes. The liver changes prevent it from removing toxins in your blood that can cause encephalopathy. Signs of this are confusion, altered level of consciousness, personality changes, memory loss, seizures, coma, and death.    Pancreas. Alcohol can cause inflammation of the pancreas, or pancreatitis. This can cause pain in your abdomen, fever, and diabetes.    Immune system. Alcohol weakens your immune system in a number of ways. It suppresses your immune system making it harder to fight off infections and colds. You will also have a higher risk of certain infections like pneumonia and tuberculosis.    Cancer risk. Alcohol raises your risk of cancer of the mouth, esophagus, pharynx, larynx, liver, and breast.    Sexual function. Alcohol abuse can also lead to sexual problems.  Alcohol use during pregnancy may cause permanent damage to the growing baby.  Home care  The following guidelines will help you care for yourself at home:    Don't drink any more alcohol.    Don't drive until all effects of the alcohol have worn off.    Don't operate machinery that can cause injuries.    Get lots of rest over the next few days. Drink plenty of water and other non-alcoholic liquids.  Try to eat regular meals.    If you have been drinking heavily on a daily basis, you may go through alcohol withdrawal. The usual symptoms last 3 to 4 days and may include nervousness, shakiness, nausea, sweating, sleeplessness, and can even cause seizures and a serious withdrawal symptom called delirium tremens, or DTs. During this time, it is best that you stay with family or friends who can help and support you. You can also admit yourself to a residential detox program. If your symptoms are severe (seizures, severe shakiness, confusion), contact your doctor or call an ambulance for help (see below).   Follow-up care  If alcohol is a problem in your life, these are some organizations that can help you:    Alcoholics Anonymous offers support through a self-help fellowship. There are no dues or fees. See the Yellow Pages and call for time and place of meetings. Find AA online at www.aa.org.    [facility] offers support to families of alcohol users. Contact 160-660-4390, or online at www.al-anoemelia.org.    National [place] on Alcoholism and Drug Dependence can be reached at 829-523-6120, or online at www.ncadd.org.    There are also inpatient and residential alcohol detox programs. Check the Internet or phonebook Yellow Pages under  D[facility] Abuse [facility] Treatment [facility].   Call 911  Call 911 if any of these occur:    Trouble breathing or slow irregular breathing    Chest pain    Sudden weakness on one side of your body or sudden trouble speaking    Heavy bleeding or vomiting blood    Very drowsy or trouble awakening    Fainting or loss of consciousness    Rapid heart rate    Seizure  When to seek medical advice  Call your healthcare provider right away if any of these occur:    Severe shakiness     Fever of 100.4 F (38 C) or higher, or as directed by your healthcare provider    Confusion or hallucinations (seeing, hearing, or feeling things that are not there)    Pain in your upper abdomen that gets worse    Repeated  vomiting  Date Last Reviewed: 6/1/2016 2000-2017 [employer]. [address] [place] [address], [place] [address]. All rights reserved. This information is not intended as a substitute for professional medical care. Always follow your healthcare professional's instructions.          General Neck and Back Pain    Both neck and back pain are usually caused by injury to the muscles or ligaments of the spine. Sometimes the disks that separate each bone of the spine may cause pain by pressing on a nearby nerve. Back and neck pain may appear after a sudden twisting or bending force (such as in a car accident), or sometimes after a simple awkward movement. In either case, muscle spasm is often present and adds to the pain.  Acute neck and back pain usually gets better in 1 to 2 weeks. Pain related to disk disease, arthritis in the spinal joints or spinal stenosis (narrowing of the spinal canal) can become chronic and last for months or years.  Back and neck pain are common problems. Most people feel better in 1 or 2 weeks, and most of the rest in 1 to 2 months. Most people can remain active.  People have and describe pain differently.    Pain can be sharp, stabbing, shooting, aching, cramping, or burning    Movement, standing, bending, lifting, sitting, or walking may worsen the pain    Pain can be localized to one spot or area, or it can be more generalized    Pain can spread or radiate upwards, downwards, to the front, or go down your arms    Muscle spasm may occur.  Most of the time mechanical problems with the muscles or spine cause the pain. it is usually caused by an injury, whether known or not, to the muscles or ligaments. While illnesses can cause back pain, it is usually not caused by a serious illness. Pain is usually related to physical activity, whether sports, exercise, work, or normal activity. Sometimes it can occur without an identifiable cause. This can happen simply by stretching or moving wrong,  without noting pain at the time. Other causes include:    Overexertion, lifting, pushing, pulling incorrectly or too aggressively.    Sudden twisting, bending or stretching from an accident (car or fall), or accidental movement.    Poor posture    Poor conditioning, lack of regular exercise    Spinal disc disease or arthritis    Stress    Pregnancy, or illness like appendicitis, bladder or kidney infection, pelvic infections   Home care    For neck pain: Use a comfortable pillow that supports the head and keeps the spine in a neutral position. The position of the head should not be tilted forward or backward.    When in bed, try to find a position of comfort. A firm mattress is best. Try lying flat on your back with pillows under your knees. You can also try lying on your side with your knees bent up towards your chest and a pillow between your knees.    At first, do not try to stretch out the sore spots. If there is a strain, it is not like the good soreness you get after exercising without an injury. In this case, stretching may make it worse.    Don't sit for long periods, as in long car rides or other travel. This puts more stress on the lower back than standing or walking.    During the first 24 to 72 hours after an injury, apply an ice pack to the painful area for 20 minutes and then remove it for 20 minutes over a period of 60 to 90 minutes or several times a day.     You can alternate ice and heat therapies. Talk with your healthcare provider about the best treatment for your back or neck pain. As a safety precaution, do not use a heating pad at bedtime. Sleeping with a heating pad can lead to skin burns or tissue damage.    Therapeutic massage can help relax the back and neck muscles without stretching them.    Be aware of safe lifting methods and do not lift anything over 15 pounds until all the pain is gone.  Medicines  Talk to your healthcare provider before using medicine, especially if you have other  medical problems or are taking other medicines.    You may use over-the-counter medicine to control pain, unless another pain medicine was prescribed. If you have chronic conditions like diabetes, liver or kidney disease, stomach ulcers,  gastrointestinal bleeding, or are taking blood thinner medicines.    Be careful if you are given pain medicines, narcotics, or medicine for muscle spasm. They can cause drowsiness, and can affect your coordination, reflexes, and judgment. Do not drive or operate heavy machinery.  Follow-up care  Follow up with your healthcare provider, or as advised. Physical therapy or further tests may be needed.  If X-rays were taken, you will be notified of any new findings that may affect your care.  Call 911  Call 911 if any of the following occur:    Trouble breathing    Confusion    Very drowsy or trouble awakening    Fainting or loss of consciousness    Rapid or very slow heart rate    Loss of bowel or bladder control  When to seek medical advice  Call your healthcare provider right away if any of these occur:    Pain becomes worse or spreads into your arms or legs    Weakness, numbness or pain in one or both arms or legs    Numbness in the groin area    Difficulty walking    Fever of 100.4 F (38 C) or higher, or as directed by your healthcare provider  Date Last Reviewed: 7/1/2016 2000-2017 [employer]. [address] [place] [address], [place] [address]. All rights reserved. This information is not intended as a substitute for professional medical care. Always follow your healthcare professional's instructions.      Follow up with your primary care clinic provider regarding the cyst on your neck-one week.  Take acetaminophen (Tylenol) as needed.  Do not drive.   Avoid excessive amounts of alcohol.

## 2018-09-12 NOTE — ED AVS SNAPSHOT
[facility], [place], Emergency Department    [address]    [facility] [address]    Phone:  [phone]    Fax:  [phone]                                       Tonio Alarcon   MRN: [id number]    Department:  [facility], Emergency Department   Date of Visit:  9/12/2018           After Visit Summary Signature Page     I have received my discharge instructions, and my questions have been answered. I have discussed any challenges I see with this plan with the nurse or doctor.    ..........................................................................................................................................  Patient/Patient Representative Signature      ..........................................................................................................................................  Patient Representative Print Name and Relationship to Patient    ..................................................               ................................................  Date                                   Time    ..........................................................................................................................................  Reviewed by Signature/Title    ...................................................              ..............................................  Date                                               Time          22EPIC Rev 08/18

## 2018-09-12 NOTE — MR AVS SNAPSHOT
After Visit Summary   9/12/2018    Tonio Alarcon    MRN: [id number]           Patient Information     Date Of Birth          1980        Visit Information        Provider Department      9/12/2018 3:20 PM Christiano Palacios MD [facility]        Today's Diagnoses     Cervical radiculopathy    -  1    Lipoma of skin and subcutaneous tissue           Follow-ups after your visit        Additional Services     PAIN MANAGEMENT REFERRAL       Your provider has referred you to: G: [facility] -    Reason for Referral: Evaluation for comprehensive services- patients will be evaluated if appropriate for comprehensive service including medication changes, procedures, pain psychology, and pain physical therapy.  While involved with comprehensive services, pain providers will work with referring provider/PCP to stabilize appropriate medication management, with long-term plan of transition of prescribing back to referring provider/PCP upon completion of comprehensive services.      Please complete the following questions:    Do you have any specific questions for the pain specialist? No    Are there any red flags that may impact the assessment or management of the patient? Active or recent alcohol, drug or prescription medication misuse (explain): alcohol and marijuana, now stopped per pt      What is your diagnosis for the patient's pain? Cervical radiculopathy      For any questions, contact the [facility] at [phone].   [facility]: [facility] [place] [phone] [email or web address]      Please call [phone] to make an appointment. Clinic is located: [facility] [address] [place] [address] [id number]th [place] [address]      Please complete the following questions:    Procedure/Referral: Referral Only -   Comprehensive Evaluation and Management    What is your diagnosis for the patient's pain? Cervical radiculopathy    What are your specific questions for the pain specialist? Alcohol use    Are there any red flags that may impact the assessment or management of the patient? Active or recent alcohol, drug or prescription medication misuse (explain): alcohol and marijuana         Please note the Pre-Op Pain Consult must be scheduled 2-3 weeks prior to the patient's surgery.  Patient's trying to schedule within 2 weeks of surgery may not be accommodated.     Pre-Op Pain Consults are only good for 30 days.    **ANY DIAGNOSTIC TESTS THAT ARE NOT IN EPIC SHOULD BE SENT TO THE PAIN CENTER**    REGARDING OPIOID MEDICATIONS:  The discussion of opioids management, appropriateness of therapy, and dosing will be discussed in patients being seen for evaluation.  The pain management clinics are not long-term prescribing clinics, with transition of prescribing of medications ultimately going back to the referring provider/PCP.  If prescribing is taken over at the pain clinic, it is in actively involved patients whom are appropriate for opioids, urine drug screening is completed, and long-term prescribing plan has been determined.  Therefore, we will not be automatically taking over prescribing at the patient's first visit.  Is this agreeable to you? agrees.     Please be aware that coverage of these services is subject to the terms and limitations of your health insurance plan.  Call member services at your health plan with any benefit or coverage questions.      Please bring the following with you to your appointment:    (1) Any X-Rays, CTs or MRIs which have been performed.  Contact the facility where they were done to arrange for  prior to your scheduled appointment.    (2) List of current medications   (3) This referral request   (4) Any documents/labs given to you for this referral                  Follow-up notes from  "your care team     Return in about 2 months (around 2018) for Routine Visit.      Who to contact     If you have questions or need follow up information about today's clinic visit or your schedule please contact [facility] directly at [phone].  Normal or non-critical lab and imaging results will be communicated to you by MyChart, letter or phone within 4 business days after the clinic has received the results. If you do not hear from us within 7 days, please contact the clinic through MyChart or phone. If you have a critical or abnormal lab result, we will notify you by phone as soon as possible.  Submit refill requests through [employer] or call your pharmacy and they will forward the refill request to us. Please allow 3 business days for your refill to be completed.          Additional Information About Your Visit        MyChart Information     [employer] lets you send messages to your doctor, view your test results, renew your prescriptions, schedule appointments and more. To sign up, go to [email or web address] . Click on \"Log in\" on the left side of the screen, which will take you to the Welcome page. Then click on \"Sign up Now\" on the right side of the page.     You will be asked to enter the access code listed below, as well as some personal information. Please follow the directions to create your username and password.     Your access code is: [email or web address]  Expires: 2018  6:22 AM     Your access code will  in 90 days. If you need help or a new code, please call your [facility] or [phone].        Care EveryWhere ID     This is your Care EveryWhere ID. This could be used by other organizations to access your [facility] medical records  [id number]        Your Vitals Were     Pulse Temperature Height BMI (Body Mass Index)          100 98.9  F (37.2  C) (Oral) 5' 11\" (1.803 m) 25.94 kg/m2         Blood Pressure from Last 3 Encounters:   18 136/76   18 " 96/80   06/08/18 (!) 135/99    Weight from Last 3 Encounters:   09/12/18 186 lb (84.4 kg)   09/12/18 184 lb 2 oz (83.5 kg)   06/08/18 183 lb (83 kg)              We Performed the Following     PAIN MANAGEMENT REFERRAL          Today's Medication Changes          These changes are accurate as of 9/12/18  3:56 PM.  If you have any questions, ask your nurse or doctor.               Start taking these medicines.        Dose/Directions    traMADol 50 MG tablet   Commonly known as:  ULTRAM   Used for:  Cervical radiculopathy   Started by:  Christiano Palacios MD        Dose:  50 mg   Take 1 tablet (50 mg) by mouth every 6 hours as needed for severe pain   Quantity:  30 tablet   Refills:  0         These medicines have changed or have updated prescriptions.        Dose/Directions    pantoprazole 40 MG EC tablet   Commonly known as:  PROTONIX   This may have changed:  Another medication with the same name was removed. Continue taking this medication, and follow the directions you see here.   Changed by:  Christiano Palacios MD        Dose:  40 mg   Take 40 mg by mouth daily   Refills:  0         Stop taking these medicines if you haven't already. Please contact your care team if you have questions.     ibuprofen 800 MG tablet   Commonly known as:  ADVIL/MOTRIN   Stopped by:  Christiano Palacios MD           polyethylene glycol powder   Commonly known as:  MIRALAX/GLYCOLAX   Stopped by:  Christiano Palacios MD                Where to get your medicines      Some of these will need a paper prescription and others can be bought over the counter.  Ask your nurse if you have questions.     Bring a paper prescription for each of these medications     traMADol 50 MG tablet               Information about OPIOIDS     PRESCRIPTION OPIOIDS: WHAT YOU NEED TO KNOW   We gave you an opioid (narcotic) pain medicine. It is important to manage your pain, but opioids are not always the best choice. You should  first try all the other options your care team gave you. Take this medicine for as short a time (and as few doses) as possible.    Some activities can increase your pain, such as bandage changes or therapy sessions. It may help to take your pain medicine 30 to 60 minutes before these activities. Reduce your stress by getting enough sleep, working on hobbies you enjoy and practicing relaxation or meditation. Talk to your care team about ways to manage your pain beyond prescription opioids.    These medicines have risks:    DO NOT drive when on new or higher doses of pain medicine. These medicines can affect your alertness and reaction times, and you could be arrested for driving under the influence (DUI). If you need to use opioids long-term, talk to your care team about driving.    DO NOT operate heavy machinery    DO NOT do any other dangerous activities while taking these medicines.    DO NOT drink any alcohol while taking these medicines.     If the opioid prescribed includes acetaminophen, DO NOT take with any other medicines that contain acetaminophen. Read all labels carefully. Look for the word  acetaminophen  or  Tylenol.  Ask your pharmacist if you have questions or are unsure.    You can get addicted to pain medicines, especially if you have a history of addiction (chemical, alcohol or substance dependence). Talk to your care team about ways to reduce this risk.    All opioids tend to cause constipation. Drink plenty of water and eat foods that have a lot of fiber, such as fruits, vegetables, prune juice, apple juice and high-fiber cereal. Take a laxative (Miralax, milk of magnesia, Colace, Senna) if you don t move your bowels at least every other day. Other side effects include upset stomach, sleepiness, dizziness, throwing up, tolerance (needing more of the medicine to have the same effect), physical dependence and slowed breathing.    Store your pills in a secure place, locked if possible. We will not  replace any lost or stolen medicine. If you don t finish your medicine, please throw away (dispose) as directed by your pharmacist. The [place] [employer] has more information about safe disposal: [email or web address]         Primary Care Provider Office Phone # Fax #    Christiano Palacios [phone]       [address] [facility] [address]        Equal Access to Services     TONY SHAW : Hadii aad ku hadasho Soomaali, waaxda luqadaha, qaybta kaalmada adeegyada, waxay idiin hayaan adeeg kharash la'coy . So [facility] [phone].    ATENCIÓN: Si habla español, tiene a velazco disposición servicios gratuitos de asistencia lingüística. [facility] [phone].    We comply with applicable federal civil rights laws and [place] laws. We do not discriminate on the basis of race, color, national origin, age, disability, sex, sexual orientation, or gender identity.            Thank you!     Thank you for choosing [facility]  for your care. Our goal is always to provide you with excellent care. Hearing back from our patients is one way we can continue to improve our services. Please take a few minutes to complete the written survey that you may receive in the mail after your visit with us. Thank you!             Your Updated Medication List - Protect others around you: Learn how to safely use, store and throw away your medicines at www.disposemymeds.org.          This list is accurate as of 9/12/18  3:56 PM.  Always use your most recent med list.                   Brand Name Dispense Instructions for use Diagnosis    oxyCODONE IR 5 MG tablet    ROXICODONE    15 tablet    Take 1 tablet (5 mg) by mouth every 8 hours as needed for severe pain    Perianal abscess       pantoprazole 40 MG EC tablet    PROTONIX     Take 40 mg by mouth daily        traMADol 50 MG tablet    ULTRAM    30 tablet    Take 1 tablet (50 mg)  by mouth every 6 hours as needed for severe pain    Cervical radiculopathy       TYLENOL ARTHRITIS PAIN PO

## 2018-09-12 NOTE — ED AVS SNAPSHOT
[facility], Emergency Department    [address]    [facility] [address]    Phone:  [phone]    Fax:  [phone]                                       Tonio Alarcon   MRN: [id number]    Department:  [facility], Emergency Department   Date of Visit:  9/12/2018           Patient Information     Date Of Birth          1980        Your diagnoses for this visit were:     Neck pain     Cutaneous cyst     Alcoholic intoxication without complication (H)        You were seen by Domenico Avelar MD.      Follow-up Information     Follow up with Christiano Palacios MD.    Specialty:  Family Practice    Contact information:    [address] [facility] [id number] [phone]          Discharge Instructions         Alcohol Intoxication  Alcohol intoxication occurs when you drink alcohol faster than your liver can remove it from your system. The following facts are important to remember:    It can take 10 minutes or more to start to feel the effects of a drink, so you can easily get more intoxicated than you intended.    One drink may be more than 1 serving of alcohol. Depending on the drink, it can be 2 to 4 servings.    It takes about an hour for your body to metabolize (clear) 1 serving. If you have more than 1 drink, it can take a couple of hours or more.    Many things affect how drinks will affect you, including whether you ve eaten, how fast you drink, your size, how much you normally drink (or not), medicines you take, chronic diseases you have, and gender.  Signs and symptoms of alcohol poisoning  The following are signs and symptoms of alcohol poisoning:  Mild impairment    Reduced inhibitions    Slurred speech    Drowsiness    Decreased fine motor skills  Moderate impairment    Erratic behavior, aggression, depression    Impaired judgment    Confusion    Concentration difficulties    Coordination problems  Severe  "impairment    Vomiting    Seizures    Unconsciousness    Cold, clammy    Slow or irregular breathing    Hypothermia (low body temperature)    Coma  Health effects  Alcohol abuse causes health problems. Sometimes this can happen after only drinking a  little.\" There is no set number of drinks or amount of alcohol that defines too much. The more you drink at one time, and the more frequently you drink determine both the short-term and long-term health effects. It affects all parts of your body and your health, including your:    Brain. Alcohol is a central nervous system depressant. It can damage parts of the brain that affect your balance, memory, thinking, and emotions. It can cause memory loss, blackouts, depression, agitation, sleep cycle changes, and seizures. These changes may or may not be reversible.    Heart and vascular system. Alcohol affects multiple areas. It can damage heart muscle causing cardiomyopathy, which is a weakening and stretching of the heart muscle. This can lead to trouble breathing, an irregular heartbeat, atrial fibrillation, leg swelling, and heart failure. It makes the blood vessels stiffen causing hypertension (high blood pressure). All of these problems increase your risk of having heart attacks or strokes.    Liver. Alcohol causes fat to build up in the liver, affecting its normal function. This increases the risk for hepatitis, leading to abdominal pain, appetite loss, jaundice, bleeding problems, liver fibrosis, and cirrhosis. This in turn can affect your ability to fight off infections, and can cause diabetes. The liver changes prevent it from removing toxins in your blood that can cause encephalopathy. Signs of this are confusion, altered level of consciousness, personality changes, memory loss, seizures, coma, and death.    Pancreas. Alcohol can cause inflammation of the pancreas, or pancreatitis. This can cause pain in your abdomen, fever, and diabetes.    Immune system. Alcohol " weakens your immune system in a number of ways. It suppresses your immune system making it harder to fight off infections and colds. You will also have a higher risk of certain infections like pneumonia and tuberculosis.    Cancer risk. Alcohol raises your risk of cancer of the mouth, esophagus, pharynx, larynx, liver, and breast.    Sexual function. Alcohol abuse can also lead to sexual problems.  Alcohol use during pregnancy may cause permanent damage to the growing baby.  Home care  The following guidelines will help you care for yourself at home:    Don't drink any more alcohol.    Don't drive until all effects of the alcohol have worn off.    Don't operate machinery that can cause injuries.    Get lots of rest over the next few days. Drink plenty of water and other non-alcoholic liquids. Try to eat regular meals.    If you have been drinking heavily on a daily basis, you may go through alcohol withdrawal. The usual symptoms last 3 to 4 days and may include nervousness, shakiness, nausea, sweating, sleeplessness, and can even cause seizures and a serious withdrawal symptom called delirium tremens, or DTs. During this time, it is best that you stay with family or friends who can help and support you. You can also admit yourself to a residential detox program. If your symptoms are severe (seizures, severe shakiness, confusion), contact your doctor or call an ambulance for help (see below).   Follow-up care  If alcohol is a problem in your life, these are some organizations that can help you:    Alcoholics Anonymous offers support through a self-help fellowship. There are no dues or fees. See the Yellow Pages and call for time and place of meetings. Find AA online at www.aa.org.    [employer] offers support to families of alcohol users. Contact 261-225-8244, or online at www.al-anoemelia.org.    National [employer] on Alcoholism and Drug Dependence can be reached at 973-369-5716, or online at www.ncadd.org.    There are also  inpatient and residential alcohol detox programs. Check the Internet or phonebook Yellow Pages under  Drug Abuse and Treatment Centers.   Call 911  Call 911 if any of these occur:    Trouble breathing or slow irregular breathing    Chest pain    Sudden weakness on one side of your body or sudden trouble speaking    Heavy bleeding or vomiting blood    Very drowsy or trouble awakening    Fainting or loss of consciousness    Rapid heart rate    Seizure  When to seek medical advice  Call your healthcare provider right away if any of these occur:    Severe shakiness     Fever of 100.4 F (38 C) or higher, or as directed by your healthcare provider    Confusion or hallucinations (seeing, hearing, or feeling things that are not there)    Pain in your upper abdomen that gets worse    Repeated vomiting  Date Last Reviewed: 6/1/2016 2000-2017 [employer]. [address] [place] [id number]. All rights reserved. This information is not intended as a substitute for professional medical care. Always follow your healthcare professional's instructions.          General Neck and Back Pain    Both neck and back pain are usually caused by injury to the muscles or ligaments of the spine. Sometimes the disks that separate each bone of the spine may cause pain by pressing on a nearby nerve. Back and neck pain may appear after a sudden twisting or bending force (such as in a car accident), or sometimes after a simple awkward movement. In either case, muscle spasm is often present and adds to the pain.  Acute neck and back pain usually gets better in 1 to 2 weeks. Pain related to disk disease, arthritis in the spinal joints or spinal stenosis (narrowing of the spinal canal) can become chronic and last for months or years.  Back and neck pain are common problems. Most people feel better in 1 or 2 weeks, and most of the rest in 1 to 2 months. Most people can remain active.  People have and describe pain  differently.    Pain can be sharp, stabbing, shooting, aching, cramping, or burning    Movement, standing, bending, lifting, sitting, or walking may worsen the pain    Pain can be localized to one spot or area, or it can be more generalized    Pain can spread or radiate upwards, downwards, to the front, or go down your arms    Muscle spasm may occur.  Most of the time mechanical problems with the muscles or spine cause the pain. it is usually caused by an injury, whether known or not, to the muscles or ligaments. While illnesses can cause back pain, it is usually not caused by a serious illness. Pain is usually related to physical activity, whether sports, exercise, work, or normal activity. Sometimes it can occur without an identifiable cause. This can happen simply by stretching or moving wrong, without noting pain at the time. Other causes include:    Overexertion, lifting, pushing, pulling incorrectly or too aggressively.    Sudden twisting, bending or stretching from an accident (car or fall), or accidental movement.    Poor posture    Poor conditioning, lack of regular exercise    Spinal disc disease or arthritis    Stress    Pregnancy, or illness like appendicitis, bladder or kidney infection, pelvic infections   Home care    For neck pain: Use a comfortable pillow that supports the head and keeps the spine in a neutral position. The position of the head should not be tilted forward or backward.    When in bed, try to find a position of comfort. A firm mattress is best. Try lying flat on your back with pillows under your knees. You can also try lying on your side with your knees bent up towards your chest and a pillow between your knees.    At first, do not try to stretch out the sore spots. If there is a strain, it is not like the good soreness you get after exercising without an injury. In this case, stretching may make it worse.    Don't sit for long periods, as in long car rides or other travel. This puts  more stress on the lower back than standing or walking.    During the first 24 to 72 hours after an injury, apply an ice pack to the painful area for 20 minutes and then remove it for 20 minutes over a period of 60 to 90 minutes or several times a day.     You can alternate ice and heat therapies. Talk with your healthcare provider about the best treatment for your back or neck pain. As a safety precaution, do not use a heating pad at bedtime. Sleeping with a heating pad can lead to skin burns or tissue damage.    Therapeutic massage can help relax the back and neck muscles without stretching them.    Be aware of safe lifting methods and do not lift anything over 15 pounds until all the pain is gone.  Medicines  Talk to your healthcare provider before using medicine, especially if you have other medical problems or are taking other medicines.    You may use over-the-counter medicine to control pain, unless another pain medicine was prescribed. If you have chronic conditions like diabetes, liver or kidney disease, stomach ulcers,  gastrointestinal bleeding, or are taking blood thinner medicines.    Be careful if you are given pain medicines, narcotics, or medicine for muscle spasm. They can cause drowsiness, and can affect your coordination, reflexes, and judgment. Do not drive or operate heavy machinery.  Follow-up care  Follow up with your healthcare provider, or as advised. Physical therapy or further tests may be needed.  If X-rays were taken, you will be notified of any new findings that may affect your care.  Call 911  Call 911 if any of the following occur:    Trouble breathing    Confusion    Very drowsy or trouble awakening    Fainting or loss of consciousness    Rapid or very slow heart rate    Loss of bowel or bladder control  When to seek medical advice  Call your healthcare provider right away if any of these occur:    Pain becomes worse or spreads into your arms or legs    Weakness, numbness or pain in  one or both arms or legs    Numbness in the groin area    Difficulty walking    Fever of 100.4 F (38 C) or higher, or as directed by your healthcare provider  Date Last Reviewed: 7/1/2016 2000-2017 [employer]. [address] [place] [address]. All rights reserved. This information is not intended as a substitute for professional medical care. Always follow your healthcare professional's instructions.      Follow up with your primary care clinic provider regarding the cyst on your neck-one week.  Take acetaminophen (Tylenol) as needed.  Do not drive.   Avoid excessive amounts of alcohol.       Your next 10 appointments already scheduled     Sep 12, 2018  3:20 PM CDT   [facility] with [facility]acios MD   [facility]    [address] [place] [address] [phone]              24 Hour Appointment Hotline       To make an appointment at any [facility], call [phone] ([phone]). If you don't have a family doctor or clinic, we will help you find one. [facility] are conveniently located to serve the needs of you and your family.             Review of your medicines      Our records show that you are taking the medicines listed below. If these are incorrect, please call your family doctor or clinic.        Dose / Directions Last dose taken    ibuprofen 800 MG tablet   Commonly known as:  ADVIL/MOTRIN   Dose:  800 mg   Quantity:  42 tablet        Take 1 tablet (800 mg) by mouth 3 times daily Alternate with acetaminophen (TYLENOL), IF ordered.   Refills:  0        metroNIDAZOLE 250 MG tablet   Commonly known as:  FLAGYL   Dose:  250 mg   Quantity:  15 tablet        Take 1 tablet (250 mg) by mouth 3 times daily   Refills:  0        oxyCODONE IR 5 MG tablet   Commonly known as:  ROXICODONE   Dose:  5 mg   Quantity:  15 tablet        Take 1 tablet (5 mg) by mouth every 8 hours as needed for severe pain  "  Refills:  0        polyethylene glycol powder   Commonly known as:  MIRALAX/GLYCOLAX   Dose:  1 capful   Quantity:  500 g        Take 17 g (1 capful) by mouth daily as needed for constipation (If no bowel movement in 24 hours. Mix in 8 oz of water.  May take twice daily.)   Refills:  0        PROTONIX PO   Dose:  40 mg        Take 40 mg by mouth daily   Refills:  0        TYLENOL ARTHRITIS PAIN PO        Refills:  0                Orders Needing Specimen Collection     None      Pending Results     No orders found from 9/10/2018 to 9/13/2018.            Pending Culture Results     No orders found from 9/10/2018 to 9/13/2018.            Pending Results Instructions     If you had any lab results that were not finalized at the time of your Discharge, you can call the ED Lab Result RN at [phone]. You will be contacted by this team for any positive Lab results or changes in treatment. The nurses are available 7 days a week from 10A to 6:30P.  You can leave a message 24 hours per day and they will return your call.        Thank you for choosing [facility]       Thank you for choosing [facility] for your care. Our goal is always to provide you with excellent care. Hearing back from our patients is one way we can continue to improve our services. Please take a few minutes to complete the written survey that you may receive in the mail after you visit with us. Thank you!        [employer] Information     [employer] lets you send messages to your doctor, view your test results, renew your prescriptions, schedule appointments and more. To sign up, go to [email or web address] . Click on \"Log in\" on the left side of the screen, which will take you to the Welcome page. Then click on \"Sign up Now\" on the right side of the page.     You will be asked to enter the access code listed below, as well as some personal information. Please follow the directions to create your username and password.     Your access code is: " [email or web address]  Expires: 2018  6:22 AM     Your access code will  in 90 days. If you need help or a new code, please call your [facility] clinic or [phone].        Care EveryWhere ID     This is your Care EveryWhere ID. This could be used by other organizations to access your [facility] medical records  [id number]        Equal Access to Services     [facility]: Hadii balta quinonez hadasho Somaynor, waaxda luqadaha, qaybta kaalmada adecaryada, carlyn osman . So [facility] [phone].    ATENCIÓN: Si habla español, tiene a velazco disposición servicios gratuitos de asistencia lingüística. Llame al [phone].    We comply with applicable federal civil rights laws and [place] laws. We do not discriminate on the basis of race, color, national origin, age, disability, sex, sexual orientation, or gender identity.            After Visit Summary       This is your record. Keep this with you and show to your community pharmacist(s) and doctor(s) at your next visit.

## 2018-09-12 NOTE — ED PROVIDER NOTES
History     Chief Complaint   Patient presents with     Neck Pain     Pt c/o Neck pain and knots in neck, back, and chest     HPI  Tonio Alarcon is a 37 year old male who presents with and painful area on right side posterior neck. No trauma. No fever or chills. No bleeding or drainage. He also reports he has been drinking alcohol tonight. Denies drug use.    I have reviewed the Medications, Allergies, Past Medical and Surgical History, and Social History in the [employer] system.  Past Medical History:   Diagnosis Date     Depressive disorder      Ulcer, gastric, acute        Review of Systems   Constitutional: Positive for fatigue. Negative for appetite change, chills and fever.   HENT: Negative for congestion, dental problem, ear pain, facial swelling, mouth sores, rhinorrhea, sinus pain, sinus pressure, sore throat and trouble swallowing.    Eyes: Negative for visual disturbance.   Respiratory: Negative for cough, chest tightness and shortness of breath.    Cardiovascular: Negative for chest pain, palpitations and leg swelling.   Gastrointestinal: Negative for abdominal pain, diarrhea, nausea and vomiting.   Musculoskeletal: Positive for neck pain. Negative for gait problem.        Chronic neck pain; history of cervical radiculopathy.   Skin: Negative for rash and wound.   Allergic/Immunologic: Negative for immunocompromised state.   Neurological: Negative for dizziness, syncope, weakness, numbness and headaches.   Hematological: Does not bruise/bleed easily.   Psychiatric/Behavioral: Positive for decreased concentration. The patient is nervous/anxious.        Physical Exam   BP: 117/72  Pulse: 111  Temp: 98.9  F (37.2  C)  Resp: 16  Weight: 83.5 kg (184 lb 2 oz)  SpO2: 98 %      Physical Exam   Constitutional: He appears well-developed and well-nourished. No distress.   HENT:   Head: Normocephalic and atraumatic.   Mouth/Throat: Oropharynx is clear and moist.   Eyes: Conjunctivae and EOM are normal.   Neck:  Normal range of motion. Neck supple.   Small soft mobile mass approximately 1.5 cm diameter on right side posterior neck. Not fluctuant. There is also a much smaller pea sized mobile left posterior cervical node. Skin overlying and adjacent to this lesion is normal- no erythema. Not significantly tender.   Cardiovascular: Normal rate, regular rhythm, normal heart sounds and intact distal pulses.    Pulmonary/Chest: Effort normal and breath sounds normal. No respiratory distress.   Abdominal: Soft. Normal appearance. There is no hepatosplenomegaly. There is no tenderness.   Musculoskeletal: Normal range of motion. He exhibits no edema or tenderness.   Lymphadenopathy:     He has cervical adenopathy.        Left cervical: Posterior cervical adenopathy present.     He has no axillary adenopathy.        Right: No inguinal, no supraclavicular and no epitrochlear adenopathy present.        Left: No inguinal, no supraclavicular and no epitrochlear adenopathy present.   Neurological: He is alert.   Psychiatric: His mood appears anxious. His speech is slurred.   Nursing note and vitals reviewed.      ED Course     ED Course     Procedures             Critical Care time:  none             Labs Ordered and Resulted from Time of ED Arrival Up to the Time of Departure from the ED - No data to display         Assessments & Plan (with Medical Decision Making)   Differential diagnosis includes cutaneous cyst, adenopathy, lipoma, other neoplasm. No evidence of infection. Instructed to have recheck in his clinic one week and to return if persistent or worsening symptoms. No indication for incision and drainage at this time. He will also be observed in the ED until clinically sober and safe for discharge. Acetaminophen as needed for pain.     I have reviewed the nursing notes.    I have reviewed the findings, diagnosis, plan and need for follow up with the patient.    New Prescriptions    No medications on file       Final diagnoses:    Neck pain   Cutaneous cyst   Alcoholic intoxication without complication (H)       9/12/2018   [facility], [place], EMERGENCY DEPARTMENT     Domenico Avelar MD  09/12/18 0585

## 2019-02-01 ENCOUNTER — OFFICE VISIT (OUTPATIENT)
Dept: ANESTHESIOLOGY | Facility: CLINIC | Age: 39
End: 2019-02-01
Payer: COMMERCIAL

## 2019-02-01 ENCOUNTER — TELEPHONE (OUTPATIENT)
Dept: ANESTHESIOLOGY | Facility: CLINIC | Age: 39
End: 2019-02-01

## 2019-02-01 VITALS
BODY MASS INDEX: 26.46 KG/M2 | SYSTOLIC BLOOD PRESSURE: 124 MMHG | HEIGHT: 71 IN | DIASTOLIC BLOOD PRESSURE: 85 MMHG | WEIGHT: 189 LBS | HEART RATE: 87 BPM | RESPIRATION RATE: 16 BRPM

## 2019-02-01 DIAGNOSIS — M54.12 CERVICAL RADICULOPATHY: Primary | ICD-10-CM

## 2019-02-01 ASSESSMENT — ENCOUNTER SYMPTOMS
FEVER: 0
WEIGHT LOSS: 0
JOINT SWELLING: 0
NECK PAIN: 1
MEMORY LOSS: 0
HEADACHES: 1
MUSCLE CRAMPS: 0
ALTERED TEMPERATURE REGULATION: 0
CHILLS: 0
DIZZINESS: 0
MYALGIAS: 1
SEIZURES: 0
SPEECH CHANGE: 0
STIFFNESS: 0
POLYDIPSIA: 0
PARALYSIS: 0
FATIGUE: 1
INCREASED ENERGY: 0
WEIGHT GAIN: 0
TINGLING: 1
DECREASED APPETITE: 0
MUSCLE WEAKNESS: 1
POLYPHAGIA: 0
TREMORS: 0
BACK PAIN: 1
NIGHT SWEATS: 0
DISTURBANCES IN COORDINATION: 0
WEAKNESS: 1
LOSS OF CONSCIOUSNESS: 0
ARTHRALGIAS: 1
HALLUCINATIONS: 0
NUMBNESS: 1

## 2019-02-01 ASSESSMENT — MIFFLIN-ST. JEOR: SCORE: 1794.43

## 2019-02-01 ASSESSMENT — PAIN SCALES - GENERAL: PAINLEVEL: EXTREME PAIN (8)

## 2019-02-01 NOTE — NURSING NOTE
LPN reviewed AVS with Pt.  Pt verbalized an understanding of information, and was asked to contact clinic with questions.    LPN read through the instructions with pt for the recommended procedure: Interlaminar Cervical Epidural Steroid Injection.   Pt verbalized understanding to holding appropriate medication per protocol, and was agreeable to NPO policy and needing a .    LPN updated APRN, that pt reported that he may have taken Gabapentin before and found that he did not have benefit/ had side effects.   Pt was going to verify if Gabapentin was the medication they were thinking of, with their pharmacy.     If pt calls back looking for different medication, APRN recommended that pt follow up with their PCP, as they did not have other Medication recommendations at this time.       Ana Eric, ELISA

## 2019-02-01 NOTE — PATIENT INSTRUCTIONS
1. Call to schedule your procedure- at your convenience.     2. Titrate Gabapentin as instructed below.     Start gabapentin as follows:  1 tab= 300mg    AM   PM   Bedtime  0   0   300mg (1 tab).  After 1-3 days, increase as tolerated to the next line  300mg (1 tab)  0   300 (1 tab).  After 3-4 days, increase as tolerated to the next line  300mg (1 tab)  300mg (1 tab)  300 (1 tab).  Continue at this Dose.   Caution for sedation.    Do not drive until you know how the medication affects you.   You can go slower if you need to or increasing only one dose at a time.  Do not stop abruptly once at higher doses.  This medication must be tapered off.    Please ask your Primary Care Doctor to take over prescribing this medication, if it is beneficial for you.       When calling to schedule your procedure appointment, also make your follow up clinic appointment 4-6  weeks after the procedure.    Please call [phone] to schedule, reschedule, or cancel your procedure appointment.   Phones are answered Monday - Friday from 7:30 - 4:00pm.  Leave a voicemail with your name, birth date, and phone number if no one is available to take your call.     Your procedure: Interlaminar Cervical Epidural Steroid Injection.     On the day of the procedure  1. Arrive 1 hour earlier than your scheduled time, to the [facility]  Address: [address] [place] [id number]  2. Check in on the 5th floor for your procedure    If you must reschedule your procedure more than two times, you must follow up in clinic before rescheduling again.    Preparing for your procedure    CAUTION - FAILURE TO FOLLOW THESE PRE-PROCEDURE INSTRUCTIONS WILL RESULT IN YOUR PROCEDURE BEING RESCHEDULED.            You must have a  take you home after your procedure. Transportation by taxi or para-transit is okay as long as you have a responsible adult accompany you. You must provide your 's full name and contact number at time  of check in.     Fasting Protocol You may have NOTHING SOLID TO EAT 8 HOURS prior to arrival at the procedure area.     You may have CLEAR LIQUIDS UP TO 2 HOURS prior to arrival.    Broth and candy are considered solid food and require an eight hour fast.     Clear liquids include water, clear fruit juice (no pulp), carbonated beverages, ice, black coffee, black tea, clear jello. No alcohol containing beverages.   Medications If you take any medications, DO NOT STOP. Take your medications as usual the day of your procedure with a sip of water AT LEAST 2 HOURS PRIOR TO ARRIVAL.    Antibiotics If you are currently taking antibiotics, you must complete the entire dose 7 days prior to your scheduled procedure. You must be clear of any signs or symptoms of infection. If you begin antibiotics, please contact our clinic for instructions.     Fever, Chills, or Rash If you experience a fever of higher than 100 degrees, chills, rash, or open wounds during the one week before your procedure, please call the clinic to see if you may proceed with your procedure.      Medication Hold List  **Patients under Cardiology/Neurology care should consult their provider prior to the pain procedure to verify pre-procedure medication instructions. The information below contains general guidelines.**    Blood Thinners If you are taking daily ASPIRIN, PLAVIX, OR OTHER BLOOD THINNERS SUCH AS COUMADIN/WARFARIN, we will need your prescribing doctor to sign a release permitting you to stop these medications. Once approved by your prescribing doctor - STOP ALL BLOOD THINNERS BASED ON THE TIME TABLE BELOW PRIOR TO YOUR PROCEDURE. If you have been instructed to stop WARFARIN(COUMADIN), you must have an INR lab drawn the day before your procedure. . Your INR must be within normal limits before we can perform your injection. MEDICATIONS CAN BE RESTARTED AFTER YOUR PROCEDURE.    14 DAY HOLD  Ticlid (ticlopidine)    10 DAY HOLD  Effient (Prasugel)    3  DAY HOLD  Xarelto (rivaroxaban) 7 DAY HOLD  Anacin, Bufferin, Ecotrin, Excedrin, Aggrenox (Aspirin)  Brilinta (ticagrelor)  Coumadin (Warfarin)  Pradexa (Dabigatran)  Elmiron (Pentosan)  Plavix (Clopidogrel Bisulfate)  Pletal (Cilostazol)    24 HOUR HOLD  Lovenox (enoxaparin)  Agrylin (Anagrelide)        Non-steroidal Anti-inflammatories (NSAIDs) DO NOT TAKE any non-steroidal anti-inflammatory medications (NSAIDs) listed on the table below. MEDICATIONS CAN BE RESTARTED AFTER YOUR PROCEDURE. Celebrex is OK to take and does not need to be discontinued.     Medications to stop:  3 DAY HOLD  Advil, Motrin (Ibuprofen)  Arthrotec (diciofenac sodium/misoprostol)  Clinoril (Sulindac)  Indocin (Indomethacin)  Lodine (Etodolac)  Toradol (Ketorolac)  Vicoprofen (Hydrocodone and Ibuprofen)  Voltaren (Diclotenac)    14 DAY HOLD  Daypro (Oxaprozin)  Feldene (Piroxicam)   7 DAY HOLD  Aleve (Naproxen sodium)  Darvon compound (contains aspirin)  Naprosyn (Naproxen)  Norgesic Forte (contains aspirin)  Mobic (Meloxicam)  Oruvall (Ketoprofen)  Percodan (contains aspirin)  Relafen (Nabumetone)  Salsalate  Trilisate  Vitamin E (more than 400 mg per day)  Any medication containing aspirin                To speak with a nurse, schedule/reschedule/cancel a clinic appointment, or request a medication refill call: [phone]     You can also reach us by [facility]: [email or web address]

## 2019-02-01 NOTE — TELEPHONE ENCOUNTER
Spoke with: Ray     Date Scheduled: 3/4/19     Provider: Dr. Quiroz     : Yes     F/U Appointment: N/A     Patient was educated on pre-op nurse call: Yes

## 2019-02-01 NOTE — PROGRESS NOTES
I had the pleasure of meeting Mr. Tonio Alarcon on 2/1/2019 in the outpatient pain clinic in consult for Dr. Vegas with regards to his cervical radiculopathy.   HPI:  Patient presents with past medical history of depression, alcohol and cannabis dependence, gastric ulcer, and chronic neck and low back pain. He reports cervical pain with left arm numbness and tingling that onset last 5/6-2018. He states he has attempted oral steroid taper, muscle relaxants, and physical therapy (one visit with aggravated pain); no relief with attempted interventions. Patient expressed interest in trying epidural steroid injections for his neck pain. He describes shooting pain to his thumb, index finger, and middle finger. No loss of strength.        He has tried the following therapies for his pain:  Medications:   Current:  -none  Past:  -?tizanidine   -acetaminophen  -tramadol  -oxycodone  -Robaxin - ineffective  *Cannot tolerate NSAIDs due to h/o gastric ulcer  Physical Therapy:  One visit with aggravated pain  Injections/Interventions:  H/o lumbar ESIs; no injection therapy for neck    Other Modalities:  Chiropractic care: no   Acupuncture: no    TENS: no   Water Based Therapy: no    Past Medical History:   Diagnosis Date     Depressive disorder      Ulcer, gastric, acute     past medical history reviewed with patient.   Past Surgical History:   Procedure Laterality Date     GI SURGERY      Hemorrhoids      HEMORRHOID SURGERY  2015     INCISION AND DRAINAGE PERINEAL, COMBINED N/A 6/8/2018    Procedure: COMBINED INCISION AND DRAINAGE PERINEAL;   Examination of perineal abcess under anethesia, Incision and Drainage Perineal Abscess;  Surgeon: Solo Valero MD;  Location: [facility]     LAPAROSCOPIC HERNIORRHAPHY EPIGASTRIC N/A 10/11/2017    Procedure: LAPAROSCOPIC HERNIORRHAPHY EPIGASTRIC;  Exploratory Laparoscopy, Exploratory Laparotomy, EGD, Omental Patch, Upper Endoscopy;  Surgeon: Celio Keyes MD;   "Location: [facility] OR    past surgical history reviewed with patient.   Medications:  Current Outpatient Medications   Medication     Acetaminophen (TYLENOL ARTHRITIS PAIN PO)     pantoprazole (PROTONIX) 40 MG EC tablet     traMADol (ULTRAM) 50 MG tablet     oxyCODONE IR (ROXICODONE) 5 MG tablet     No current facility-administered medications for this visit.      A multi-state Prescription Monitoring Program reviewed and no aberrancies noted.     Allergies:   No Known Allergies  Family History:  family history includes Breast Cancer in his mother; Cancer in his father; Cancer - colorectal in his maternal grandmother; Coronary Artery Disease in his mother; Diabetes in his brother and mother.  Social history: he lives in [place] with his wife. He works for [employer] in [employer]. Smoking: daily smoker. Alcohol: admits to \"heavy drinking\" twice weekly. Street drugs: 2 grams of marijuana daily.     ROS:  A 14 point review of systems was completed; results are listed at end of note.     Objective:   /85   Pulse 87   Resp 16   Ht 1.803 m (5' 11\")   Wt 85.7 kg (189 lb)   BMI 26.36 kg/m    Body mass index is 26.36 kg/m .  General: In no apparent distress  Mental status: Normal mood and affect, pleasant  Neuro: Alert, oriented. No sensory deficits.   Head: Atraumatic, normocephalic  Eyes Extra-ocular movements grossly intact, no scleral icterus  Neck: Supple, Range of motion full  Respiratory: Non-labored breathing; No respiratory distress  Skin: No rashes or lesions noted on exposed areas of skin  Msk:   Cervical spine exam:  No focal tenderness is noted along spinous processes. Mild paravertebral tenderness along C6, C7. There is left sided tenderness of trapezius extending up side of neck.   Range of Motion: (limited effort by patient) forward flexion full , extension full , lateral rotation full , lateral flexion full.  Pain is increased with left lateral flexion and flexion. 5/5 strength with grasp, " deltoid, biceps, triceps. +1 brachioradialis DTR.     Imaging:   XR CERVICAL SPINE 2/3 VWS 5/7/2018 10:32 AM     COMPARISON: None.     HISTORY: Pain.                                                                      IMPRESSION: Straightening of the normal cervical lordosis, may be  positional in nature. Vertebral heights are preserved without evidence  of fracture. Anterior endplate spurring and mild disc space loss at  C5-6 and C6-7. No prevertebral soft tissue swelling.     JYOTI DIXON MD    MR CERVICAL SPINE W/O CONTRAST 5/31/2018 6:11 PM     Provided History: left arm radiculopathy; Cervical radiculopathy  ICD-10: Cervical radiculopathy     Comparison: Cervical spine radiograph 5/7/2018.     Technique: Sagittal T1-weighted, sagittal T2-weighted, sagittal STIR,  sagittal diffusion weighted, axial T2-weighted, and axial T2* gradient  echo images of the cervical spine were obtained without intravenous  contrast.     Findings:  The cervical vertebrae are normally aligned. Straightening of cervical  lordosis. Disc dehydration at C5-6 and C6-7.  No abnormal cord signal.   The findings on a level by level basis are as follows:     C2-3: No spinal canal or neural foraminal stenosis.     C3-4:  No spinal canal or neural foraminal stenosis.     C4-5:  No spinal canal or neural foraminal stenosis.     C5-6:  Disc bulge asymmetric to the right. Mild uncinate and facet  hypertrophy. Mild left neural foraminal narrowing. No right neural  foraminal stenosis. Mild spinal canal narrowing.     C6-7:  Disc bulge. Bilateral uncinate and facet hypertrophy. Mild left  neural foraminal narrowing. No right neural foraminal stenosis. Mild  spinal canal narrowing.     C7-T1:  Left facet hypertrophy. Mild left neural foraminal narrowing.  No right neural foraminal stenosis. No spinal canal stenosis.     No abnormality of the paraspinous soft tissues.                                                                      Impression:    1. Cervical spondylosis with mild neural foraminal narrowing on the  left at C5-6, C6-7, and C7-T1.  2. Moderate spinal canal narrowing at C5-6 and C6-7.  3. No cord signal abnormality.     OSCAR LUO MD    Assessment:  Mr. Chetan Alarcon is a 37 yo male seen today for cervical spondylosis with associated left radicular symptoms presenting in a C6, C7, C8 dermatomal pattern; mild left foraminal stenosis at correlating levels.      Plan:   1. Patient education: I went over the above diagnoses and treatment plan with him and answered all of his questions.  2. Cervical Imaging review: I reviewed the images with him in correlation to his pain pattern.   3. Interventions: Recommend an interlaminar C7-T1 epidural steroid injection; procedure explained in depth today. Patient was provided with directions for scheduling.   4. Medications  1. Start gabapentin 300 mg; directions to titrate to three times daily. PCP may continue to manage this medication if effective.   -Opioid medication not indicated for chronic musculoskeletal pain. Additionally, patient has admitted overuse of alcohol and illicit marijuana.   5. Exercise program: Patient only completed one visit of physical therapy and ceased due to aggravated pain; consider new physical therapy order with alternative technique.   6. Follow up: RTC 4-6 weeks following injection.     Total time spent was 30 minutes, and more than 50% of face to face time was spent in counseling and/or coordination of care regarding the above plan.    Thank you for the consult.   ISABEL Addison, [facility]  [facility]          Answers for HPI/ROS submitted by the patient on 2/1/2019   General Symptoms: Yes  Skin Symptoms: No  HENT Symptoms: No  EYE SYMPTOMS: No  HEART SYMPTOMS: No  LUNG SYMPTOMS: No  INTESTINAL SYMPTOMS: No  URINARY SYMPTOMS: No  REPRODUCTIVE SYMPTOMS: No  SKELETAL SYMPTOMS: Yes  BLOOD SYMPTOMS: No  NERVOUS SYSTEM SYMPTOMS: Yes  MENTAL  HEALTH SYMPTOMS: No  Fever: No  Loss of appetite: No  Weight loss: No  Weight gain: No  Fatigue: Yes  Night sweats: No  Chills: No  Increased stress: Yes  Excessive hunger: No  Excessive thirst: No  Feeling hot or cold when others believe the temperature is normal: No  Loss of height: No  Post-operative complications: No  Surgical site pain: No  Hallucinations: No  Change in or Loss of Energy: No  Hyperactivity: No  Confusion: No  Back pain: Yes  Muscle aches: Yes  Neck pain: Yes  Swollen joints: No  Joint pain: Yes  Bone pain: No  Muscle cramps: No  Muscle weakness: Yes  Joint stiffness: No  Bone fracture: No  Trouble with coordination: No  Dizziness or trouble with balance: No  Fainting or black-out spells: No  Memory loss: No  Headache: Yes  Seizures: No  Speech problems: No  Tingling: Yes  Tremor: No  Weakness: Yes  Difficulty walking: Yes  Paralysis: No  Numbness: Yes

## 2019-02-01 NOTE — LETTER
2/1/2019       RE: Tonio Alarcon  [address] [facility] [address] [id number]     Dear Colleague,    Thank you for referring your patient, Tonio Alarcon, to the [facility] CLIN[facility] FOR COMPREHENSIVE PAIN MANAGEMENT at [facility]. Please see a copy of my visit note below.    I had the pleasure of meeting Mr. Tonio Alarcon on 2/1/2019 in the outpatient pain clinic in consult for Dr. Vegas with regards to his cervical radiculopathy.     HPI:  Patient presents with past medical history of depression, alcohol and cannabis dependence, gastric ulcer, and chronic neck and low back pain. He reports cervical pain with left arm numbness and tingling that onset last 5/6-2018. He states he has attempted oral steroid taper, muscle relaxants, and physical therapy (one visit with aggravated pain); no relief with attempted interventions. Patient expressed interest in trying epidural steroid injections for his neck pain. He describes shooting pain to his thumb, index finger, and middle finger. No loss of strength.        He has tried the following therapies for his pain:  Medications:   Current:  -none  Past:  -?tizanidine   -acetaminophen  -tramadol  -oxycodone  -Robaxin - ineffective  *Cannot tolerate NSAIDs due to h/o gastric ulcer  Physical Therapy:  One visit with aggravated pain  Injections/Interventions:  H/o lumbar ESIs; no injection therapy for neck    Other Modalities:  Chiropractic care: no   Acupuncture: no    TENS: no   Water Based Therapy: no    Past Medical History:   Diagnosis Date     Depressive disorder      Ulcer, gastric, acute     past medical history reviewed with patient.   Past Surgical History:   Procedure Laterality Date     GI SURGERY      Hemorrhoids      HEMORRHOID SURGERY  2015     INCISION AND DRAINAGE PERINEAL, COMBINED N/A 6/8/2018    Procedure: COMBINED INCISION AND DRAINAGE PERINEAL;   Examination of perineal abcess under anethesia,  "Incision and Drainage Perineal Abscess;  Surgeon: Solo Valero MD;  [facility]     LAPAROSCOPIC HERNIORRHAPHY EPIGASTRIC N/A 10/11/2017    Procedure: LAPAROSCOPIC HERNIORRHAPHY EPIGASTRIC;  Exploratory Laparoscopy, Exploratory Laparotomy, EGD, Omental Patch, Upper Endoscopy;  Surgeon: Celio Keyes MD;  [facility]    past surgical history reviewed with patient.   Medications:  Current Outpatient Medications   Medication     Acetaminophen (TYLENOL ARTHRITIS PAIN PO)     pantoprazole (PROTONIX) 40 MG EC tablet     traMADol (ULTRAM) 50 MG tablet     oxyCODONE IR (ROXICODONE) 5 MG tablet     No current facility-administered medications for this visit.      A multi-state Prescription Monitoring Program reviewed and no aberrancies noted.     Allergies:   No Known Allergies  Family History:  family history includes Breast Cancer in his mother; Cancer in his father; Cancer - colorectal in his maternal grandmother; Coronary Artery Disease in his mother; Diabetes in his brother and mother.  Social history: he lives in [place] with his wife. He works for [employer] in [place]. Smoking: daily smoker. Alcohol: admits to \"heavy drinking\" twice weekly. Street drugs: 2 grams of marijuana daily.     ROS:  A 14 point review of systems was completed; results are listed at end of note.     Objective:   /85   Pulse 87   Resp 16   Ht 1.803 m (5' 11\")   Wt 85.7 kg (189 lb)   BMI 26.36 kg/m     Body mass index is 26.36 kg/m .  General: In no apparent distress  Mental status: Normal mood and affect, pleasant  Neuro: Alert, oriented. No sensory deficits.   Head: Atraumatic, normocephalic  Eyes Extra-ocular movements grossly intact, no scleral icterus  Neck: Supple, Range of motion full  Respiratory: Non-labored breathing; No respiratory distress  Skin: No rashes or lesions noted on exposed areas of skin  Msk:   Cervical spine exam:  No focal tenderness is noted along spinous processes. Mild " paravertebral tenderness along C6, C7. There is left sided tenderness of trapezius extending up side of neck.   Range of Motion: (limited effort by patient) forward flexion full , extension full , lateral rotation full , lateral flexion full.  Pain is increased with left lateral flexion and flexion. 5/5 strength with grasp, deltoid, biceps, triceps. +1 brachioradialis DTR.     Imaging:   XR CERVICAL SPINE 2/3 VWS 5/7/2018 10:32 AM     COMPARISON: None.     HISTORY: Pain.                                                                 IMPRESSION: Straightening of the normal cervical lordosis, may be  positional in nature. Vertebral heights are preserved without evidence  of fracture. Anterior endplate spurring and mild disc space loss at  C5-6 and C6-7. No prevertebral soft tissue swelling.     JYOTI DIXON MD    MR CERVICAL SPINE W/O CONTRAST 5/31/2018 6:11 PM     Provided History: left arm radiculopathy; Cervical radiculopathy  ICD-10: Cervical radiculopathy     Comparison: Cervical spine radiograph 5/7/2018.     Technique: Sagittal T1-weighted, sagittal T2-weighted, sagittal STIR,  sagittal diffusion weighted, axial T2-weighted, and axial T2* gradient  echo images of the cervical spine were obtained without intravenous  contrast.     Findings:  The cervical vertebrae are normally aligned. Straightening of cervical  lordosis. Disc dehydration at C5-6 and C6-7.  No abnormal cord signal.   The findings on a level by level basis are as follows:     C2-3: No spinal canal or neural foraminal stenosis.     C3-4:  No spinal canal or neural foraminal stenosis.     C4-5:  No spinal canal or neural foraminal stenosis.     C5-6:  Disc bulge asymmetric to the right. Mild uncinate and facet  hypertrophy. Mild left neural foraminal narrowing. No right neural  foraminal stenosis. Mild spinal canal narrowing.     C6-7:  Disc bulge. Bilateral uncinate and facet hypertrophy. Mild left  neural foraminal narrowing. No right neural  foraminal stenosis. Mild  spinal canal narrowing.     C7-T1:  Left facet hypertrophy. Mild left neural foraminal narrowing.  No right neural foraminal stenosis. No spinal canal stenosis.     No abnormality of the paraspinous soft tissues.                                                                      Impression:   1. Cervical spondylosis with mild neural foraminal narrowing on the  left at C5-6, C6-7, and C7-T1.  2. Moderate spinal canal narrowing at C5-6 and C6-7.  3. No cord signal abnormality.     OSCAR LUO MD    Assessment:  Mr. Chetan Alarcon is a 37 yo male seen today for cervical spondylosis with associated left radicular symptoms presenting in a C6, C7, C8 dermatomal pattern; mild left foraminal stenosis at correlating levels.      Plan:   1. Patient education: I went over the above diagnoses and treatment plan with him and answered all of his questions.  2. Cervical Imaging review: I reviewed the images with him in correlation to his pain pattern.   3. Interventions: Recommend an interlaminar C7-T1 epidural steroid injection; procedure explained in depth today. Patient was provided with directions for scheduling.   4. Medications  1. Start gabapentin 300 mg; directions to titrate to three times daily. PCP may continue to manage this medication if effective.   -Opioid medication not indicated for chronic musculoskeletal pain. Additionally, patient has admitted overuse of alcohol and illicit marijuana.   5. Exercise program: Patient only completed one visit of physical therapy and ceased due to aggravated pain; consider new physical therapy order with alternative technique.   6. Follow up: RTC 4-6 weeks following injection.     Total time spent was 30 minutes, and more than 50% of face to face time was spent in counseling and/or coordination of care regarding the above plan.    Thank you for the consult.     ISABEL Addison, [facility]  Pain [facility] Inter[facility] Clinic

## 2019-03-04 ENCOUNTER — ANCILLARY PROCEDURE (OUTPATIENT)
Dept: RADIOLOGY | Facility: AMBULATORY SURGERY CENTER | Age: 39
End: 2019-03-04
Payer: COMMERCIAL

## 2019-03-04 ENCOUNTER — HOSPITAL ENCOUNTER (OUTPATIENT)
Facility: AMBULATORY SURGERY CENTER | Age: 39
End: 2019-03-04
Payer: COMMERCIAL

## 2019-03-04 VITALS
HEIGHT: 71 IN | RESPIRATION RATE: 16 BRPM | WEIGHT: 198 LBS | DIASTOLIC BLOOD PRESSURE: 102 MMHG | BODY MASS INDEX: 27.72 KG/M2 | HEART RATE: 76 BPM | SYSTOLIC BLOOD PRESSURE: 138 MMHG | TEMPERATURE: 97.7 F | OXYGEN SATURATION: 100 %

## 2019-03-04 DIAGNOSIS — R52 PAIN: ICD-10-CM

## 2019-03-04 RX ORDER — IOPAMIDOL 408 MG/ML
INJECTION, SOLUTION INTRATHECAL PRN
Status: DISCONTINUED | OUTPATIENT
Start: 2019-03-04 | End: 2019-03-04 | Stop reason: HOSPADM

## 2019-03-04 RX ORDER — BETAMETHASONE SODIUM PHOSPHATE AND BETAMETHASONE ACETATE 3; 3 MG/ML; MG/ML
INJECTION, SUSPENSION INTRA-ARTICULAR; INTRALESIONAL; INTRAMUSCULAR; SOFT TISSUE PRN
Status: DISCONTINUED | OUTPATIENT
Start: 2019-03-04 | End: 2019-03-04 | Stop reason: HOSPADM

## 2019-03-04 RX ORDER — LIDOCAINE HYDROCHLORIDE 10 MG/ML
INJECTION, SOLUTION EPIDURAL; INFILTRATION; INTRACAUDAL; PERINEURAL PRN
Status: DISCONTINUED | OUTPATIENT
Start: 2019-03-04 | End: 2019-03-04 | Stop reason: HOSPADM

## 2019-03-04 ASSESSMENT — MIFFLIN-ST. JEOR: SCORE: 1835.25

## 2019-03-04 NOTE — PROCEDURES
Patient: Tonio Alarcon Age: 38 year old   MRN: [id number] Attending: Dr. Quiroz     Date of Visit: March 4, 2019      PAIN MEDICINE CLINIC PROCEDURE NOTE    ATTENDING CLINICIAN:    Nolan Quiroz MD    ASSISTANT CLINICIAN:  Manuel Rome MD    PREPROCEDURE DIAGNOSES:  1.  Cervical radiculopathy  2.  Chronic neck pain radiating to the upper extremity    POSTPROCEDURE DIAGNOSES:  1.  Cervical radiculopathy  2.  Chronic neck pain radiating to the upper extremity      PROCEDURE(S) PERFORMED:  1.  Interlaminar cervical epidural steroid injection  2.  Fluoroscopic guidance for the above-named procedure(s)      ANESTHESIA:  Local.    BLOOD LOSS:  Minimal.    DRAINS AND SPECIMENS:  None.    COMPLICATIONS:  None.    INDICATIONS:  Tonio Alarcon is a 38 year old male with a history of  chronic neck pain radiating to the upper extremity.      The patient stated that the patient was in their usual state of health and denied recent anticoagulant use or recent infections.  Therefore, the plan is for the above mentioned procedure.     Procedure Details:  The patient was met in the procedure room, where the patient was identified by name, medical record number and date of birth.  All of the patient s last minute questions were answered. Written informed consent was obtained and saved in the electronic medical record, after the risks, benefits, and alternatives were discussed with the patient.       A formal time-out procedure was performed, as per protocol, including patient name, title of procedure, and site of procedure, and all in the room concurred.  Routine monitors were applied.       The patient was placed in the prone position on the procedure room table.  All pressure points were checked and comfortably padded.  Routine monitors were placed.  Vital signs were stable.     A chlorhexidine prep was completed followed by sterile draping per standard procedure.      The AP fluoroscopic view was optimized  for approach at C7-T1 interspace.  The skin over the interspace was infiltrated with 4-5 mL of 1% Lidocaine using a 25 gauge, 1.5 inch needle.  A 20-gauge 3-1/2 inch Tuohy needle was advanced midline between C7-T1 interlaminar space using the loss of resistance technique with preservative free normal saline with fluoroscopic guidance. Mutiple AP, contralateral oblique, and lateral fluoroscopic images are taken as Tuohy needle was advanced to the epidural space. The epidural space was identified, without evidence of blood, cerebrospinal fluid, or parasthesia throughout. Needle tip placement within the epidural space was further confirmed with 1-2 mL of nonionic contrast agent, with the epidural space visualized in the AP and contralateral oblique views with appropriate spread of the agent with fat vacuolization and no intravascular or intrathecal spread noted.  Next, 4 mL of a treatment solution containing 2 mL of betamethasone 6mg/ml, 2 mL preservative free normal saline was administered. The needle was withdrawn.     Light pressure was held at the puncture sites to prevent ecchymosis and oozing.  The patient's skin was cleansed, and hemostasis was confirmed.  Band-aids were applied to the needle injection sites.      Condition:    The patient remained awake and alert throughout the procedure.  The patient tolerated the procedure well and was monitored for approximately 15 minutes afterward in the post procedure area.  There were no immediate post procedure complications noted.  The patient was then discharged to home as per protocol.      Pre-procedure pain score: 8/10  Post-procedure pain score: 8/10    I saw and examined the patient with the fellow and agree with the findings and the plan of care as documented in the fellow's note. I was present for the entire procedure.  Nolan Quiroz IV, MD

## 2019-03-04 NOTE — DISCHARGE INSTRUCTIONS
Home Care Instructions after an Epidural Steroid Pain Injection    A lumbar epidural steroid injection delivers steroid medication directly into the area that may be causing your lower back pain and/or leg pain. A cervical or thoracic epidural steroid injection delivers steroids into the epidural space surrounding spinal nerve roots to help relieve pain in the upper spine/neck.    Activity  -Rest today  -Do not work today  -Resume normal activity tomorrow  -DO NOT shower for 24 hours  -DO NOT remove bandaid for 24 hours    Pain  -You may experience soreness at the injection site for one or two days  -You may use an ice pack for 20 minutes every 2 hours for the first 24 hours  -You may use a heating pad after the first 24 hours  -You may use Tylenol (acetaminophen) every 4 hours or other pain medicines as     directed by your physician    You may experience numbness radiating into your legs or arms (depending on the procedure location). This numbness may last several hours. Until sensation returns to normal; please use caution in walking, climbing stairs, and stepping out of your vehicle, etc.      Common side effects of steroids:  Not everyone will experience corticosteroid side effects. If side effects are experienced, they will gradually subside in the 7-10 day period following an injection. Most common side effects include:  -Flushed face and/or chest  -Feeling of warmth, particularly in the face but could be an overall feeling of warmth  -Increased blood sugar in diabetic patients  -Menstrual irregularities my occur. If taking hormone-based birth control an alternate method of birth control is recommended  -Sleep disturbances and/or mood swings are possible  -Leg cramps    Please contact us if you have:  -Severe pain  -Fever more than 101.5 degrees Fahrenheit  -Signs of infection at the injection site (redness, swelling, or drainage)    If you have questions, please contact our office at [phone] between the  hours of 7:00 am and 3:00 pm Monday through Friday. After office hours you can contact the on call provider by dialing [phone]. If you need immediate attention, we recommend that you go to a hospital emergency room or dial [phone].

## 2019-03-04 NOTE — H&P
Patient presents with past medical history of depression, gastric ulcer, and chronic neck and low back pain. He reports cervical pain with left arm numbness and tingling that onset last 5/6-2018. He states he has attempted oral steroid taper, muscle relaxants, and physical therapy (one visit with aggravated pain); no relief with attempted interventions. Patient expressed interest in trying epidural steroid injections for his neck pain. He describes shooting pain to his thumb, index finger, and middle finger. No loss of strength.         He has tried the following therapies for his pain:  Medications:   Current:  -none  Past:  -?tizanidine   -acetaminophen  -tramadol  -oxycodone  -Robaxin - ineffective  *Cannot tolerate NSAIDs due to h/o gastric ulcer  Physical Therapy:  One visit with aggravated pain  Injections/Interventions:  H/o lumbar ESIs; no injection therapy for neck     Other Modalities:  Chiropractic care: no        Acupuncture: no               TENS: no             Water Based Therapy: no     Past Medical History        Past Medical History:   Diagnosis Date     Depressive disorder       Ulcer, gastric, acute         past medical history reviewed with patient.   Past Surgical History         Past Surgical History:   Procedure Laterality Date     GI SURGERY         Hemorrhoids      HEMORRHOID SURGERY   2015     INCISION AND DRAINAGE PERINEAL, COMBINED N/A 6/8/2018     Procedure: COMBINED INCISION AND DRAINAGE PERINEAL;   Examination of perineal abcess under anethesia, Incision and Drainage Perineal Abscess;  Surgeon: Solo Valero MD;  Location: [facility] OR     LAPAROSCOPIC HERNIORRHAPHY EPIGASTRIC N/A 10/11/2017     Procedure: LAPAROSCOPIC HERNIORRHAPHY EPIGASTRIC;  Exploratory Laparoscopy, Exploratory Laparotomy, EGD, Omental Patch, Upper Endoscopy;  Surgeon: Celio Keyes MD;  Location: [facility] OR       past surgical history reviewed with patient.   Medications:      Current Outpatient Medications  "  Medication     Acetaminophen (TYLENOL ARTHRITIS PAIN PO)     pantoprazole (PROTONIX) 40 MG EC tablet     traMADol (ULTRAM) 50 MG tablet     oxyCODONE IR (ROXICODONE) 5 MG tablet      No current facility-administered medications for this visit.       A multi-state Prescription Monitoring Program reviewed and no aberrancies noted.     Allergies:   No Known Allergies  Family History:  family history includes Breast Cancer in his mother; Cancer in his father; Cancer - colorectal in his maternal grandmother; Coronary Artery Disease in his mother; Diabetes in his brother and mother.  Social history: he lives in [place] with his wife. He works for [employer] in [employer]. Smoking: daily smoker. Alcohol: admits to \"heavy drinking\" twice weekly. Street drugs: 2 grams of marijuana daily.      ROS:  A 14 point review of systems was completed; results are listed at end of note.      Objective:   /85   Pulse 87   Resp 16   Ht 1.803 m (5' 11\")   Wt 85.7 kg (189 lb)   BMI 26.36 kg/m    Body mass index is 26.36 kg/m .  General: In no apparent distress  Mental status: Normal mood and affect, pleasant  Neuro: Alert, oriented. No sensory deficits.   Head: Atraumatic, normocephalic  Eyes Extra-ocular movements grossly intact, no scleral icterus  Neck: Supple, Range of motion full  Respiratory: Non-labored breathing; No respiratory distress  Cardiovascular: Nl S1/S2.  No S3/S4  Skin: No rashes or lesions noted on exposed areas of skin  Msk:   Cervical spine exam:  No focal tenderness is noted along spinous processes. Mild paravertebral tenderness along C6, C7. There is left sided tenderness of trapezius extending up side of neck.   Range of Motion: (limited effort by patient) forward flexion full , extension full , lateral rotation full , lateral flexion full.  Pain is increased with left lateral flexion and flexion. 5/5 strength with grasp, deltoid, biceps, triceps. +1 brachioradialis DTR.        "

## 2019-03-28 ENCOUNTER — APPOINTMENT (OUTPATIENT)
Dept: GENERAL RADIOLOGY | Facility: CLINIC | Age: 39
End: 2019-03-28
Attending: EMERGENCY MEDICINE
Payer: COMMERCIAL

## 2019-03-28 ENCOUNTER — HOSPITAL ENCOUNTER (EMERGENCY)
Facility: CLINIC | Age: 39
Discharge: HOME | End: 2019-03-28
Attending: EMERGENCY MEDICINE | Admitting: EMERGENCY MEDICINE
Payer: COMMERCIAL

## 2019-03-28 VITALS
OXYGEN SATURATION: 99 % | HEIGHT: 72 IN | RESPIRATION RATE: 21 BRPM | BODY MASS INDEX: 27.09 KG/M2 | DIASTOLIC BLOOD PRESSURE: 84 MMHG | HEART RATE: 88 BPM | TEMPERATURE: 99.1 F | WEIGHT: 200 LBS | SYSTOLIC BLOOD PRESSURE: 127 MMHG

## 2019-03-28 DIAGNOSIS — G89.29 CHRONIC NECK PAIN: ICD-10-CM

## 2019-03-28 DIAGNOSIS — R07.89 CHEST PRESSURE: ICD-10-CM

## 2019-03-28 DIAGNOSIS — M54.2 CHRONIC NECK PAIN: ICD-10-CM

## 2019-03-28 LAB
ALBUMIN UR-MCNC: 10 MG/DL
AMPHETAMINES UR QL SCN: NEGATIVE
APPEARANCE UR: CLEAR
BARBITURATES UR QL: NEGATIVE
BASOPHILS # BLD AUTO: 0 10E9/L (ref 0–0.2)
BASOPHILS NFR BLD AUTO: 0.7 %
BENZODIAZ UR QL: NEGATIVE
BILIRUB UR QL STRIP: NEGATIVE
CANNABINOIDS UR QL SCN: POSITIVE
COCAINE UR QL: NEGATIVE
COLOR UR AUTO: YELLOW
CRP SERPL-MCNC: <2.9 MG/L (ref 0–8)
DIFFERENTIAL METHOD BLD: ABNORMAL
EOSINOPHIL # BLD AUTO: 0.2 10E9/L (ref 0–0.7)
EOSINOPHIL NFR BLD AUTO: 2.6 %
ERYTHROCYTE [DISTWIDTH] IN BLOOD BY AUTOMATED COUNT: 15.8 % (ref 10–15)
ETHANOL SERPL-MCNC: 0.12 G/DL
ETHANOL UR QL SCN: POSITIVE
GLUCOSE UR STRIP-MCNC: NEGATIVE MG/DL
HCT VFR BLD AUTO: 45.1 % (ref 40–53)
HGB BLD-MCNC: 15 G/DL (ref 13.3–17.7)
HGB UR QL STRIP: NEGATIVE
HYALINE CASTS #/AREA URNS LPF: 16 /LPF (ref 0–2)
IMM GRANULOCYTES # BLD: 0 10E9/L (ref 0–0.4)
IMM GRANULOCYTES NFR BLD: 0.2 %
INTERPRETATION ECG - MUSE: NORMAL
KETONES UR STRIP-MCNC: NEGATIVE MG/DL
LEUKOCYTE ESTERASE UR QL STRIP: NEGATIVE
LYMPHOCYTES # BLD AUTO: 2.6 10E9/L (ref 0.8–5.3)
LYMPHOCYTES NFR BLD AUTO: 45.8 %
MCH RBC QN AUTO: 26.1 PG (ref 26.5–33)
MCHC RBC AUTO-ENTMCNC: 33.3 G/DL (ref 31.5–36.5)
MCV RBC AUTO: 78 FL (ref 78–100)
MONOCYTES # BLD AUTO: 0.5 10E9/L (ref 0–1.3)
MONOCYTES NFR BLD AUTO: 8.7 %
MUCOUS THREADS #/AREA URNS LPF: PRESENT /LPF
NEUTROPHILS # BLD AUTO: 2.4 10E9/L (ref 1.6–8.3)
NEUTROPHILS NFR BLD AUTO: 42 %
NITRATE UR QL: NEGATIVE
NRBC # BLD AUTO: 0 10*3/UL
NRBC BLD AUTO-RTO: 0 /100
OPIATES UR QL SCN: NEGATIVE
PH UR STRIP: 5.5 PH (ref 5–7)
PLATELET # BLD AUTO: 228 10E9/L (ref 150–450)
RBC # BLD AUTO: 5.75 10E12/L (ref 4.4–5.9)
RBC #/AREA URNS AUTO: 0 /HPF (ref 0–2)
SOURCE: ABNORMAL
SP GR UR STRIP: 1.02 (ref 1–1.03)
SQUAMOUS #/AREA URNS AUTO: <1 /HPF (ref 0–1)
TROPONIN I SERPL-MCNC: <0.015 UG/L (ref 0–0.04)
UROBILINOGEN UR STRIP-MCNC: 4 MG/DL (ref 0–2)
WBC # BLD AUTO: 5.8 10E9/L (ref 4–11)
WBC #/AREA URNS AUTO: 1 /HPF (ref 0–5)

## 2019-03-28 PROCEDURE — 80307 DRUG TEST PRSMV CHEM ANLYZR: CPT | Performed by: EMERGENCY MEDICINE

## 2019-03-28 PROCEDURE — 84484 ASSAY OF TROPONIN QUANT: CPT | Performed by: EMERGENCY MEDICINE

## 2019-03-28 PROCEDURE — 86140 C-REACTIVE PROTEIN: CPT | Performed by: EMERGENCY MEDICINE

## 2019-03-28 PROCEDURE — 99285 EMERGENCY DEPT VISIT HI MDM: CPT | Mod: 25

## 2019-03-28 PROCEDURE — 93005 ELECTROCARDIOGRAM TRACING: CPT

## 2019-03-28 PROCEDURE — 25000132 ZZH RX MED GY IP 250 OP 250 PS 637: Performed by: EMERGENCY MEDICINE

## 2019-03-28 PROCEDURE — 99285 EMERGENCY DEPT VISIT HI MDM: CPT | Mod: 25 | Performed by: EMERGENCY MEDICINE

## 2019-03-28 PROCEDURE — 93010 ELECTROCARDIOGRAM REPORT: CPT | Mod: Z6 | Performed by: EMERGENCY MEDICINE

## 2019-03-28 PROCEDURE — 71046 X-RAY EXAM CHEST 2 VIEWS: CPT

## 2019-03-28 PROCEDURE — 36415 COLL VENOUS BLD VENIPUNCTURE: CPT

## 2019-03-28 PROCEDURE — 81001 URINALYSIS AUTO W/SCOPE: CPT | Performed by: EMERGENCY MEDICINE

## 2019-03-28 PROCEDURE — 80320 DRUG SCREEN QUANTALCOHOLS: CPT | Performed by: EMERGENCY MEDICINE

## 2019-03-28 PROCEDURE — 85025 COMPLETE CBC W/AUTO DIFF WBC: CPT | Performed by: EMERGENCY MEDICINE

## 2019-03-28 RX ORDER — DIAZEPAM 5 MG
5 TABLET ORAL EVERY 6 HOURS PRN
Qty: 8 TABLET | Refills: 0 | Status: SHIPPED | OUTPATIENT
Start: 2019-03-28 | End: 2019-06-18

## 2019-03-28 RX ORDER — DIAZEPAM 10 MG
10 TABLET ORAL ONCE
Status: COMPLETED | OUTPATIENT
Start: 2019-03-28 | End: 2019-03-28

## 2019-03-28 RX ADMIN — DIAZEPAM 10 MG: 10 TABLET ORAL at 11:02

## 2019-03-28 ASSESSMENT — ENCOUNTER SYMPTOMS
NECK STIFFNESS: 0
ARTHRALGIAS: 0
SHORTNESS OF BREATH: 0
HEADACHES: 0
COLOR CHANGE: 0
ABDOMINAL PAIN: 0
DIFFICULTY URINATING: 0
CONFUSION: 0
EYE REDNESS: 0
FEVER: 0

## 2019-03-28 ASSESSMENT — MIFFLIN-ST. JEOR: SCORE: 1852.25

## 2019-03-28 NOTE — LETTER
[facility]   [address] [place]  [address]  [phone]                                 March 28, 2019    Tonio Alarcon  [address] [facility] [facility] [address]        Dear Tonio,    Normal result  thanks    Results for orders placed or performed during the hospital encounter of 03/28/19   XR Chest 2 Views    Narrative    XR CHEST 2 VW 3/28/2019 10:54 AM    HISTORY: Chest pain.    COMPARISON: 10/3/2017    FINDINGS: No airspace consolidation, pleural effusion or pneumothorax.  Stable heart size.      Impression    IMPRESSION: No acute cardiopulmonary abnormality.    JOSÉ LUIS DUMONT MD   CBC with platelets differential   Result Value Ref Range    WBC 5.8 4.0 - 11.0 10e9/L    RBC Count 5.75 4.4 - 5.9 10e12/L    Hemoglobin 15.0 13.3 - 17.7 g/dL    Hematocrit 45.1 40.0 - 53.0 %    MCV 78 78 - 100 fl    MCH 26.1 (L) 26.5 - 33.0 pg    MCHC 33.3 31.5 - 36.5 g/dL    RDW 15.8 (H) 10.0 - 15.0 %    Platelet Count 228 150 - 450 10e9/L    Diff Method Automated Method     % Neutrophils 42.0 %    % Lymphocytes 45.8 %    % Monocytes 8.7 %    % Eosinophils 2.6 %    % Basophils 0.7 %    % Immature Granulocytes 0.2 %    Nucleated RBCs 0 0 /100    Absolute Neutrophil 2.4 1.6 - 8.3 10e9/L    Absolute Lymphocytes 2.6 0.8 - 5.3 10e9/L    Absolute Monocytes 0.5 0.0 - 1.3 10e9/L    Absolute Eosinophils 0.2 0.0 - 0.7 10e9/L    Absolute Basophils 0.0 0.0 - 0.2 10e9/L    Abs Immature Granulocytes 0.0 0 - 0.4 10e9/L    Absolute Nucleated RBC 0.0    CRP inflammation   Result Value Ref Range    CRP Inflammation <2.9 0.0 - 8.0 mg/L   UA reflex to Microscopic and Culture   Result Value Ref Range    Color Urine Yellow     Appearance Urine Clear     Glucose Urine Negative NEG^Negative mg/dL    Bilirubin Urine Negative NEG^Negative    Ketones Urine Negative NEG^Negative mg/dL    Specific Gravity Urine 1.021 1.003 - 1.035    Blood Urine Negative NEG^Negative    pH Urine 5.5 5.0 - 7.0 pH    Protein  Albumin Urine 10 (A) NEG^Negative mg/dL    Urobilinogen mg/dL 4.0 (H) 0.0 - 2.0 mg/dL    Nitrite Urine Negative NEG^Negative    Leukocyte Esterase Urine Negative NEG^Negative    Source Unspecified Urine     RBC Urine 0 0 - 2 /HPF    WBC Urine 1 0 - 5 /HPF    Squamous Epithelial /HPF Urine <1 0 - 1 /HPF    Mucous Urine Present (A) NEG^Negative /LPF    Hyaline Casts 16 (H) 0 - 2 /LPF   Drug abuse screen 6 urine (chem dep)   Result Value Ref Range    Amphetamine Qual Urine Negative NEG^Negative    Barbiturates Qual Urine Negative NEG^Negative    Benzodiazepine Qual Urine Negative NEG^Negative    Cannabinoids Qual Urine Positive (A) NEG^Negative    Cocaine Qual Urine Negative NEG^Negative    Ethanol Qual Urine Positive (A) NEG^Negative    Opiates Qualitative Urine Negative NEG^Negative   Alcohol ethyl   Result Value Ref Range    Ethanol g/dL 0.12 (H) <0.01 g/dL   Troponin I   Result Value Ref Range    Troponin I ES <0.015 0.000 - 0.045 ug/L   If you have any questions please call the clinic at [phone]    Sincerely,    Ed Babin MD  bmd

## 2019-03-28 NOTE — ED PROVIDER NOTES
"  History     Chief Complaint   Patient presents with     Back Pain     Pt had steroid injection three weeks ago, no relief     HPI  Tonio Alarcon is a 38 year old male who presents for evaluation of upper neck pain.  Patient apparently has been having neck pain ongoing for the past year.  He states that on March 4, he underwent a cervical epidural steroid injection at the C7-T1 level and since then his pain has intensified.  He states that the pain is sharp and radiates up the left side of his neck and down his left arm.  He denies weakness or numbness.  He denies other ongoing symptoms.  I have reviewed the Medications, Allergies, Past Medical and Surgical History, and Social History in the Epic system.    Review of Systems   Constitutional: Negative for fever.   HENT: Negative for congestion.    Eyes: Negative for redness.   Respiratory: Negative for shortness of breath.    Cardiovascular: Negative for chest pain.   Gastrointestinal: Negative for abdominal pain.   Genitourinary: Negative for difficulty urinating.   Musculoskeletal: Negative for arthralgias and neck stiffness.   Skin: Negative for color change.   Neurological: Negative for headaches.   Psychiatric/Behavioral: Negative for confusion.       Physical Exam   BP: (!) 149/97  Pulse: 110  Heart Rate: 90  Temp: 99.1  F (37.3  C)  Resp: 16  Height: 181.6 cm (5' 11.5\")  Weight: 90.7 kg (200 lb)  SpO2: 99 %      Physical Exam   Constitutional: No distress.   HENT:   Head: Atraumatic.   Mouth/Throat: Oropharynx is clear and moist.   Eyes: Pupils are equal, round, and reactive to light. No scleral icterus.   Neck:       Cardiovascular: Normal heart sounds and intact distal pulses.   Pulmonary/Chest: Breath sounds normal. No respiratory distress.   Abdominal: Soft. Bowel sounds are normal. There is no tenderness.   Musculoskeletal: He exhibits no edema or tenderness.   Skin: Skin is warm. No rash noted. He is not diaphoretic.   Psychiatric: His mood " appears anxious.       ED Course        Procedures             EKG Interpretation:      Interpreted by José Luis Armstrong  Time reviewed: 10:28 AM  Symptoms at time of EKG: Chest discomfort  Rhythm: normal sinus   Rate: normal  Axis: Rightward  Ectopy: none  Conduction: normal  ST Segments/ T Waves: No ST-T wave changes  Q Waves: none  Comparison to prior: Unchanged from May 6, 2018    Clinical Impression: normal EKG      Results for orders placed or performed during the hospital encounter of 03/28/19   XR Chest 2 Views    Narrative    XR CHEST 2 VW 3/28/2019 10:54 AM    HISTORY: Chest pain.    COMPARISON: 10/3/2017    FINDINGS: No airspace consolidation, pleural effusion or pneumothorax.  Stable heart size.      Impression    IMPRESSION: No acute cardiopulmonary abnormality.    JOSÉ LUIS DUMONT MD   CBC with platelets differential   Result Value Ref Range    WBC 5.8 4.0 - 11.0 10e9/L    RBC Count 5.75 4.4 - 5.9 10e12/L    Hemoglobin 15.0 13.3 - 17.7 g/dL    Hematocrit 45.1 40.0 - 53.0 %    MCV 78 78 - 100 fl    MCH 26.1 (L) 26.5 - 33.0 pg    MCHC 33.3 31.5 - 36.5 g/dL    RDW 15.8 (H) 10.0 - 15.0 %    Platelet Count 228 150 - 450 10e9/L    Diff Method Automated Method     % Neutrophils 42.0 %    % Lymphocytes 45.8 %    % Monocytes 8.7 %    % Eosinophils 2.6 %    % Basophils 0.7 %    % Immature Granulocytes 0.2 %    Nucleated RBCs 0 0 /100    Absolute Neutrophil 2.4 1.6 - 8.3 10e9/L    Absolute Lymphocytes 2.6 0.8 - 5.3 10e9/L    Absolute Monocytes 0.5 0.0 - 1.3 10e9/L    Absolute Eosinophils 0.2 0.0 - 0.7 10e9/L    Absolute Basophils 0.0 0.0 - 0.2 10e9/L    Abs Immature Granulocytes 0.0 0 - 0.4 10e9/L    Absolute Nucleated RBC 0.0    CRP inflammation   Result Value Ref Range    CRP Inflammation <2.9 0.0 - 8.0 mg/L   UA reflex to Microscopic and Culture   Result Value Ref Range    Color Urine Yellow     Appearance Urine Clear     Glucose Urine Negative NEG^Negative mg/dL    Bilirubin Urine Negative NEG^Negative    Ketones  Urine Negative NEG^Negative mg/dL    Specific Gravity Urine 1.021 1.003 - 1.035    Blood Urine Negative NEG^Negative    pH Urine 5.5 5.0 - 7.0 pH    Protein Albumin Urine 10 (A) NEG^Negative mg/dL    Urobilinogen mg/dL 4.0 (H) 0.0 - 2.0 mg/dL    Nitrite Urine Negative NEG^Negative    Leukocyte Esterase Urine Negative NEG^Negative    Source Unspecified Urine     RBC Urine 0 0 - 2 /HPF    WBC Urine 1 0 - 5 /HPF    Squamous Epithelial /HPF Urine <1 0 - 1 /HPF    Mucous Urine Present (A) NEG^Negative /LPF    Hyaline Casts 16 (H) 0 - 2 /LPF   Drug abuse screen 6 urine (chem dep)   Result Value Ref Range    Amphetamine Qual Urine Negative NEG^Negative    Barbiturates Qual Urine Negative NEG^Negative    Benzodiazepine Qual Urine Negative NEG^Negative    Cannabinoids Qual Urine Positive (A) NEG^Negative    Cocaine Qual Urine Negative NEG^Negative    Ethanol Qual Urine Positive (A) NEG^Negative    Opiates Qualitative Urine Negative NEG^Negative   Alcohol ethyl   Result Value Ref Range    Ethanol g/dL 0.12 (H) <0.01 g/dL   Troponin I   Result Value Ref Range    Troponin I ES <0.015 0.000 - 0.045 ug/L   EKG 12 lead   Result Value Ref Range    Interpretation ECG Click View Image link to view waveform and result        Approximately 3 hours into his emergency room visit, patient began complaining of pain in his chest that was radiating to his neck and vice versa.  At this time, he was reexamined and appeared quite anxious.  He was given 10 mg of Valium with excellent relief.       Labs Ordered and Resulted from Time of ED Arrival Up to the Time of Departure from the ED   CBC WITH PLATELETS DIFFERENTIAL - Abnormal; Notable for the following components:       Result Value    MCH 26.1 (*)     RDW 15.8 (*)     All other components within normal limits   UA MACROSCOPIC WITH REFLEX TO MICRO AND CULTURE - Abnormal; Notable for the following components:    Protein Albumin Urine 10 (*)     Urobilinogen mg/dL 4.0 (*)     Mucous Urine  Present (*)     Hyaline Casts 16 (*)     All other components within normal limits   DRUG ABUSE SCREEN 6 CHEM DEP URINE ([facility]) - Abnormal; Notable for the following components:    Cannabinoids Qual Urine Positive (*)     Ethanol Qual Urine Positive (*)     All other components within normal limits   ALCOHOL ETHYL - Abnormal; Notable for the following components:    Ethanol g/dL 0.12 (*)     All other components within normal limits   CRP INFLAMMATION   TROPONIN I            Assessments & Plan (with Medical Decision Making)   38-year-old male with known cervical disc disease presents with worsening ongoing neck pain initially.  This occurs since undergoing epidural steroid injection approximately 3 weeks ago.  Exam was unremarkable without evidence of neurosensory deficits.  Laboratories were obtained including CBC, CRP the patient was concerned he may have an infection.  These were normal.  Patient developed chest pain and shortness of breath and therefore EKG, troponin and chest x-ray were obtained all of which were unremarkable.  Patient was treated with Valium with excellent relief including significant reduction in neck pain.  We discussed that this medication is not for long-term use as he requested this medication for home use.  I have agreed to give him a limited amount.  I have recommended follow-up with his primary physician to discuss other options.  He briefly asked about surgery and I have given him the number for the neurosurgery clinic.    I have reviewed the nursing notes.    I have reviewed the findings, diagnosis, plan and need for follow up with the patient.       Medication List      There are no discharge medications for this visit.         Final diagnoses:   Chronic neck pain   Chest pressure       3/28/2019   [facility], [place], EMERGENCY DEPARTMENT     Donald Armstrong MD  03/28/19 [id number]

## 2019-03-28 NOTE — ED TRIAGE NOTES
Pt had steroid injection in cervical spine three weeks ago, Pt has had no relief, pain is exacerbating.

## 2019-03-28 NOTE — ED AVS SNAPSHOT
[facility], [place], Emergency Department  [address]  [facility] [address]  Phone:  [phone]  Fax:  [phone]                                    Tonio Alarcon   MRN: [id number]    Department:  [facility], Emergency Department   Date of Visit:  3/28/2019           After Visit Summary Signature Page    I have received my discharge instructions, and my questions have been answered. I have discussed any challenges I see with this plan with the nurse or doctor.    ..........................................................................................................................................  Patient/Patient Representative Signature      ..........................................................................................................................................  Patient Representative Print Name and Relationship to Patient    ..................................................               ................................................  Date                                   Time    ..........................................................................................................................................  Reviewed by Signature/Title    ...................................................              ..............................................  Date                                               Time          22EPIC Rev 08/18

## 2019-03-28 NOTE — DISCHARGE INSTRUCTIONS
Please make an appointment to follow up with Your Primary Care Provider and Neurosurgery Clinic (phone: [phone]) as soon as possible unless symptoms completely resolve.

## 2019-03-28 NOTE — ED NOTES
"Pt. Is complaining of the pain being intolerable and wants us to know that he is telling us the truth. He is hoping to figure out why he is having more pain in his neck, which radiates down to his left shoulder into his left hand and fingers. Denies any numbess or tingling, but the pain is more of a stabbing/sharp pain. He is also stating that he feels the pain is going through \"his neck around, into his chest and coming out\".   "

## 2019-04-17 ENCOUNTER — OFFICE VISIT (OUTPATIENT)
Dept: FAMILY MEDICINE | Facility: CLINIC | Age: 39
End: 2019-04-17
Payer: COMMERCIAL

## 2019-04-17 VITALS
SYSTOLIC BLOOD PRESSURE: 122 MMHG | DIASTOLIC BLOOD PRESSURE: 81 MMHG | HEART RATE: 90 BPM | OXYGEN SATURATION: 100 % | BODY MASS INDEX: 26.54 KG/M2 | TEMPERATURE: 98.2 F | WEIGHT: 193 LBS

## 2019-04-17 DIAGNOSIS — M54.12 CERVICAL RADICULOPATHY: Primary | ICD-10-CM

## 2019-04-17 PROCEDURE — 99214 OFFICE O/P EST MOD 30 MIN: CPT | Performed by: FAMILY MEDICINE

## 2019-04-17 RX ORDER — PREGABALIN 75 MG/1
75 CAPSULE ORAL 2 TIMES DAILY
Qty: 60 CAPSULE | Refills: 1 | Status: SHIPPED | OUTPATIENT
Start: 2019-04-17 | End: 2019-06-18

## 2019-04-17 RX ORDER — DIAZEPAM 5 MG
5 TABLET ORAL
Qty: 15 TABLET | Refills: 1 | Status: SHIPPED | OUTPATIENT
Start: 2019-04-17 | End: 2019-07-16

## 2019-04-17 ASSESSMENT — ANXIETY QUESTIONNAIRES
2. NOT BEING ABLE TO STOP OR CONTROL WORRYING: NOT AT ALL
5. BEING SO RESTLESS THAT IT IS HARD TO SIT STILL: NOT AT ALL
7. FEELING AFRAID AS IF SOMETHING AWFUL MIGHT HAPPEN: NOT AT ALL
6. BECOMING EASILY ANNOYED OR IRRITABLE: SEVERAL DAYS
GAD7 TOTAL SCORE: 6
IF YOU CHECKED OFF ANY PROBLEMS ON THIS QUESTIONNAIRE, HOW DIFFICULT HAVE THESE PROBLEMS MADE IT FOR YOU TO DO YOUR WORK, TAKE CARE OF THINGS AT HOME, OR GET ALONG WITH OTHER PEOPLE: VERY DIFFICULT
1. FEELING NERVOUS, ANXIOUS, OR ON EDGE: SEVERAL DAYS
3. WORRYING TOO MUCH ABOUT DIFFERENT THINGS: SEVERAL DAYS

## 2019-04-17 ASSESSMENT — PATIENT HEALTH QUESTIONNAIRE - PHQ9
SUM OF ALL RESPONSES TO PHQ QUESTIONS 1-9: 10
5. POOR APPETITE OR OVEREATING: NEARLY EVERY DAY

## 2019-04-17 NOTE — PROGRESS NOTES
SUBJECTIVE:   Tonio Alarcon is a 38 year old male who presents to clinic today for the following   health issues:        ED/UC Followup:    Facility:  [facility]  Date of visit: 03/28/19  Reason for visit: Back pain   Current Status: Not feeling any better      Patient is here for follow-up of recent emergency room visit as well as follow-up of recent cervical transforaminal epidural injection, left side C7-T1.  He continues to have primarily pain across the left upper back radiating into the left chest but primarily pain and other sensory symptoms including paresthesias and tingling radiating down the left arm.  Primary involvement is probably the middle and ring fingers.  He reports that he received complete relief of his symptoms for approximately 1-2 hours after the injection but has not had significant relief since then, possibly some worsening of the pain.  His last cervical spine MRI had shown mild to moderate foraminal narrowing at that level but it was done in May 2018 and he admits his symptoms have probably worsened since that time.  He also admits to periodically using alcohol and marijuana to self medicate for the discomfort.  He has used tramadol in the past and did not find that helpful.  Oxycodone and hydrocodone provided in the past only made him sleepy but did not significantly improve the pain.  Gabapentin was recommended when he was seen at the pain clinic at the [facility] but it is not clear if this was prescribed to him and he does not recall much about this medication.  He does seem to recall that we had tried gabapentin through this office in the past and he did not find it helpful.    He does not have any fevers, chills, unanticipated weight loss.  Bowel and bladder function is normal at this time.        Additional history: as documented    Reviewed  and updated as needed this visit by clinical staff  Tobacco  Allergies  Meds  Problems  Med Hx  Surg Hx  Fam Hx  Soc Hx           Reviewed and updated as needed this visit by Provider  Tobacco  Allergies  Meds  Problems  Med Hx  Surg Hx  Fam Hx         Patient Active Problem List   Diagnosis     CARDIOVASCULAR SCREENING; LDL GOAL LESS THAN 160     Chronic low back pain     Neck pain     Past Surgical History:   Procedure Laterality Date     ABDOMEN SURGERY       GI SURGERY      Hemorrhoids      HEMORRHOID SURGERY  2015     INCISION AND DRAINAGE PERINEAL, COMBINED N/A 6/8/2018    Procedure: COMBINED INCISION AND DRAINAGE PERINEAL;   Examination of perineal abcess under anethesia, Incision and Drainage Perineal Abscess;  Surgeon: Solo Valero MD;  [facility]     INJECT EPIDURAL TRANSFORAMINAL LUMBAR / SACRAL EA ADDITIONAL LEVEL N/A 3/4/2019    Procedure: Cervical Epidural Steroid Injection, Cervical 7-Thoracic 1 Interlaminar;  Surgeon: Nolan Quiroz MD;  [facility]     LAPAROSCOPIC HERNIORRHAPHY EPIGASTRIC N/A 10/11/2017    Procedure: LAPAROSCOPIC HERNIORRHAPHY EPIGASTRIC;  Exploratory Laparoscopy, Exploratory Laparotomy, EGD, Omental Patch, Upper Endoscopy;  Surgeon: Celio Keyes MD;  [facility]       Social History     Tobacco Use     Smoking status: Current Every Day Smoker     Packs/day: 1.00     Years: 25.00     Pack years: 25.00     Smokeless tobacco: Never Used   Substance Use Topics     Alcohol use: Yes     Comment: heavy drinking about 2 x per week     Family History   Problem Relation Age of Onset     Diabetes Mother      Breast Cancer Mother         Dx at 61 years aol     Coronary Artery Disease Mother         3v CABG     Cancer Father         Stomach ca - Dx at 57 years old and passed away from this     Cancer - colorectal Maternal Grandmother      Diabetes Brother          Current Outpatient Medications   Medication Sig Dispense Refill     diazepam (VALIUM) 5 MG tablet Take 1 tablet (5 mg) by mouth nightly as needed for anxiety 15 tablet 1     pantoprazole (PROTONIX) 40 MG EC tablet  Take 40 mg by mouth daily       pregabalin (LYRICA) 75 MG capsule Take 1 capsule (75 mg) by mouth 2 times daily 60 capsule 1     Acetaminophen (TYLENOL ARTHRITIS PAIN PO)        No Known Allergies    ROS:  Constitutional, HEENT, cardiovascular, pulmonary, gi and gu systems are negative, except as otherwise noted.    OBJECTIVE:     /81 (BP Location: Right arm, Patient Position: Sitting, Cuff Size: Adult Large)   Pulse 90   Temp 98.2  F (36.8  C) (Oral)   Wt 87.5 kg (193 lb)   SpO2 100%   BMI 26.54 kg/m    Body mass index is 26.54 kg/m .  GENERAL: healthy, alert, no distress and mildly uncomfortable, exhibiting stiff neck.  MS: no gross musculoskeletal defects noted, no edema  NEURO: Normal strength and tone, mentation intact and speech normal  PSYCH: mentation appears normal, affect normal/bright    Diagnostic Test Results:  none     ASSESSMENT/PLAN:             1. Cervical radiculopathy  Symptoms seem consistent with a cervical radiculopathy.  I think we should probably update his imaging as the current study is almost 1 year old and I have suggested that he see neurosurgery for an opinion after completing the MRI.  His pain is primarily neuropathic in nature so I suggested a trial of Lyrica since opiates and gabapentin have not been helpful.  He does admit that Valium helped with sleep and relaxation of the muscle spasm in his neck so I did issue a short-term prescription for that with a caution about combining this with alcohol.  Tentatively follow-up in this office in approximately 4 weeks to assess effectiveness of Lyrica and review MRI findings.  - diazepam (VALIUM) 5 MG tablet; Take 1 tablet (5 mg) by mouth nightly as needed for anxiety  Dispense: 15 tablet; Refill: 1  - pregabalin (LYRICA) 75 MG capsule; Take 1 capsule (75 mg) by mouth 2 times daily  Dispense: 60 capsule; Refill: 1  - NEUROSURGERY REFERRAL  - MR Cervical Spine w/o Contrast; Future    See Patient Instructions    Christiano LENNON  MD Philip  [facility]

## 2019-04-18 ASSESSMENT — ANXIETY QUESTIONNAIRES: GAD7 TOTAL SCORE: 6

## 2019-04-19 ENCOUNTER — TELEPHONE (OUTPATIENT)
Dept: FAMILY MEDICINE | Facility: CLINIC | Age: 39
End: 2019-04-19

## 2019-04-19 NOTE — TELEPHONE ENCOUNTER
Prior Authorization Retail Medication Request    Medication/Dose: Lyrica 75 MG Capsule  ICD code (if different than what is on RX):    Previously Tried and Failed:    Rationale:      Insurance Name:  [facility]  Insurance ID:  [id number]      Pharmacy Information (if different than what is on RX)  Name:  [facility]  Phone:  [phone]    Charisse Duque MA

## 2019-04-24 NOTE — TELEPHONE ENCOUNTER
Prior Authorization Approval    Authorization Effective Date: 3/25/2019  Authorization Expiration Date: 4/23/2020  Medication: Lyrica 75 MG Capsule - APPROVED  Approved Dose/Quantity: 60 FOR 30  Reference #:     Insurance Company: [employer] - Phone [phone] Fax [phone]  Expected CoPay:       CoPay Card Available:      Foundation Assistance Needed:    Which Pharmacy is filling the prescription (Not needed for infusion/clinic administered): [id number] [place], [place] - [address] [place] [address]  Pharmacy Notified: Yes  Patient Notified: Yes

## 2019-04-28 ENCOUNTER — ANCILLARY PROCEDURE (OUTPATIENT)
Dept: MRI IMAGING | Facility: CLINIC | Age: 39
End: 2019-04-28
Attending: FAMILY MEDICINE
Payer: COMMERCIAL

## 2019-04-28 DIAGNOSIS — M54.12 CERVICAL RADICULOPATHY: ICD-10-CM

## 2019-06-13 ENCOUNTER — OFFICE VISIT (OUTPATIENT)
Dept: NEUROSURGERY | Facility: CLINIC | Age: 39
End: 2019-06-13
Payer: COMMERCIAL

## 2019-06-13 VITALS
HEART RATE: 109 BPM | DIASTOLIC BLOOD PRESSURE: 95 MMHG | WEIGHT: 183.8 LBS | TEMPERATURE: 98.9 F | OXYGEN SATURATION: 98 % | BODY MASS INDEX: 25.28 KG/M2 | SYSTOLIC BLOOD PRESSURE: 140 MMHG

## 2019-06-13 DIAGNOSIS — M54.12 CERVICAL RADICULOPATHY: Primary | ICD-10-CM

## 2019-06-13 PROCEDURE — 99204 OFFICE O/P NEW MOD 45 MIN: CPT | Performed by: NEUROLOGICAL SURGERY

## 2019-06-13 RX ORDER — DIAZEPAM 5 MG
5 TABLET ORAL EVERY 8 HOURS PRN
Qty: 30 TABLET | Refills: 0 | Status: CANCELLED
Start: 2019-06-13

## 2019-06-13 ASSESSMENT — PAIN SCALES - GENERAL: PAINLEVEL: WORST PAIN (10)

## 2019-06-13 NOTE — PROGRESS NOTES
Chetan to follow up with Primary Care provider regarding elevated blood pressure.    Deanna Lemus, MANDI  6/13/2019  3:03 PM

## 2019-06-13 NOTE — PATIENT INSTRUCTIONS
Surgery scheduled at [facility] for C5-7 Arthroplasty    Pre-Operative:  -Surgical risks: blood clots in the leg or lung, problems urinating, nerve damage, drainage from the incision, infection, stiffness  - Pre-operative physical with primary care physician within 30 days of surgical date.   -Stop all foods and liquids 8 hours prior to surgery.  -Shower procedure: please shower with antibacterial soap the night before surgery and morning of surgery. Refer to information sheet in folder.   - Discontinue Aspirin, NSAIDs (Advil, Ibuprofen, Naproxen, Nuprin, Diclofenac, Meloxicam, Aleve, Celebrex) x 7 days prior to surgical date. After surgery, do not begin taking these medications until given clearance.  - May try Tylenol for pain.    Post-Operative:  - Overnight hospitalization stay  - Post operative pain  will require pain medications and muscle relaxants. You will receive medication upon discharge.  -Do NOT drive while taking narcotic pain medication.  -Post operative incision care-    -Watch for signs of infection: redness, swelling, warmth, drainage, and fever of 101 degrees or higher. Notify clinic [phone].   -Keep incision clean and dry. You may shower. No submerging incision in water such as pools, hot tubs, baths for at least 8 weeks or until incision is healed.   - Post operative activity limitations for 4-6 weeks after surgery: no lifting > 10 pounds; limited bending, twisting, and overhead reaching. You will be re-evaluated at your follow up appointments.   -If you are currently employed, you will need to be off work for recovery and healing. Please fax any FMLA/short term disability paperwork to [phone]. You may call our clinic when you'd like to return to work and we can provide a work letter.   - Follow up appointments: 6 week post op and 3 months post op with xray prior to appointment. Please call to schedule follow up appointment at [phone].          STOP SMOKING  INSTRUCTION  Instruction is provided on the importance of smoking cessation and its impact on current and future health.    - alternatives available to help   - making commitment and decision to quit   - The nature of nicotine addiction is discussed   - behavioral therapy is discussed and suggested.    - nicotine replacement therapyis discussed.    - Chantix side effects and risk discussed   Handout given with warning about psychiatric risks- advised to report if excessive dysphoria

## 2019-06-13 NOTE — LETTER
"    6/13/2019         RE: Tonio Alarcon  [address] [id number]        Dear Colleague,    Thank you for referring your patient, Tonio Alarcon, to the [facility]. Please see a copy of my visit note below.    Ray to follow up with Primary Care provider regarding elevated blood pressure.    Deanna Lemus, [facility]  6/13/2019  3:03 PM      Tonio Alarcon is a 38 year old male who presents for:  Chief Complaint   Patient presents with     Neurologic Problem     Referred [facility] Dr Palacios re: cervical radiculopathy multi level cervical spondylosis        Initial Vitals:  BP (!) 140/95 (BP Location: Right arm, Patient Position: Sitting, Cuff Size: Adult Regular)   Pulse 109   Temp 98.9  F (37.2  C) (Oral)   Wt 183 lb 12.8 oz (83.4 kg)   SpO2 98%   BMI 25.28 kg/m    Estimated body mass index is 25.28 kg/m  as calculated from the following:    Height as of 3/28/19: 5' 11.5\" (1.816 m).    Weight as of this encounter: 183 lb 12.8 oz (83.4 kg).. Body surface area is 2.05 meters squared. BP completed using cuff size: regular  Worst Pain (10)        Nursing Comments: Referred [facility] Dr Palacios re: cervical radiculopathy multi level cervical spondylosis        Deanna Lemus, [facility]  6/13/2019  3:03 PM          I was asked by Dr. Palacios to see this patient in consultation    38M w/ severe left arm pain, C5-7 foraminal stenosis.  Over a year of progressive, 9/10 sharp left arm pain radiating to 1st-4th digits, worse with any neck range of motion.  Associated numbness and weakness.  MR with C5-6 and C6-7 moderate central and severe left foraminal stenosis.  Has undergone PT and EVAN without improvement.       Past Medical History:   Diagnosis Date     Depressive disorder      Ulcer, gastric, acute      Past Surgical History:   Procedure Laterality Date     ABDOMEN SURGERY       GI SURGERY      Hemorrhoids      HEMORRHOID SURGERY  2015     INCISION AND DRAINAGE " PERINEAL, COMBINED N/A 6/8/2018    Procedure: COMBINED INCISION AND DRAINAGE PERINEAL;   Examination of perineal abcess under anethesia, Incision and Drainage Perineal Abscess;  Surgeon: Solo Valero MD;  [facility]     INJECT EPIDURAL TRANSFORAMINAL LUMBAR / SACRAL EA ADDITIONAL LEVEL N/A 3/4/2019    Procedure: Cervical Epidural Steroid Injection, Cervical 7-Thoracic 1 Interlaminar;  Surgeon: Nolan Quiroz MD;  [facility]     LAPAROSCOPIC HERNIORRHAPHY EPIGASTRIC N/A 10/11/2017    Procedure: LAPAROSCOPIC HERNIORRHAPHY EPIGASTRIC;  Exploratory Laparoscopy, Exploratory Laparotomy, EGD, Omental Patch, Upper Endoscopy;  Surgeon: Celio Keyes MD;  [facility]     Social History     Socioeconomic History     Marital status:      Spouse name: Not on file     Number of children: 6     Years of education: Not on file     Highest education level: Not on file   Occupational History     Occupation: recycling   Social Needs     Financial resource strain: Not on file     Food insecurity:     Worry: Not on file     Inability: Not on file     Transportation needs:     Medical: Not on file     Non-medical: Not on file   Tobacco Use     Smoking status: Current Every Day Smoker     Packs/day: 1.00     Years: 25.00     Pack years: 25.00     Smokeless tobacco: Never Used   Substance and Sexual Activity     Alcohol use: Yes     Comment: heavy drinking about 2 x per week     Drug use: Yes     Types: Marijuana     Comment: 2 gms daily     Sexual activity: Yes     Partners: Female     Birth control/protection: Pill   Lifestyle     Physical activity:     Days per week: Not on file     Minutes per session: Not on file     Stress: Not on file   Relationships     Social connections:     Talks on phone: Not on file     Gets together: Not on file     Attends Oriental orthodox service: Not on file     Active member of club or organization: Not on file     Attends meetings of clubs or organizations: Not on  file     Relationship status: Not on file     Intimate partner violence:     Fear of current or ex partner: Not on file     Emotionally abused: Not on file     Physically abused: Not on file     Forced sexual activity: Not on file   Other Topics Concern     Parent/sibling w/ CABG, MI or angioplasty before 65F 55M? No   Social History Narrative    Live at home with spouse and 6 kids.     Family History   Problem Relation Age of Onset     Diabetes Mother      Breast Cancer Mother         Dx at 61 years aol     Coronary Artery Disease Mother         3v CABG     Cancer Father         Stomach ca - Dx at 57 years old and passed away from this     Cancer - colorectal Maternal Grandmother      Diabetes Brother         ROS: 10 point ROS neg other than the symptoms noted above in the HPI.    Physical Exam  BP (!) 140/95 (BP Location: Right arm, Patient Position: Sitting, Cuff Size: Adult Regular)   Pulse 109   Temp 98.9  F (37.2  C) (Oral)   Wt 83.4 kg (183 lb 12.8 oz)   SpO2 98%   BMI 25.28 kg/m     HEENT:  Normocephalic, atraumatic.  PERRLA.  EOM s intact.  Visual fields full to gross exam  Neck:  Supple, non-tender, without lymphadenopathy.  Heart:  No peripheral edema  Lungs:  No SOB  Abdomen:  Non-distended.   Skin:  Warm and dry.  Extremities:  No edema, cyanosis or clubbing.  Psychiatric:  No apparent distress  Musculoskeletal:  Normal bulk and tone    NEUROLOGICAL EXAMINATION:     Mental status:  Alert and Oriented x 3, speech is fluent.  Cranial nerves:  II-XII intact.   Motor:    Shoulder Abduction:  Right:  5/5   Left:  5/5  Biceps:                      Right:  5/5   Left:  5/5  Triceps:                     Right:  5/5   Left:  4+/5  Wrist Extensors:       Right:  5/5   Left:  5/5  Wrist Flexors:           Right:  5/5   Left:  5/5  interosseus :            Right:  5/5   Left:  4+/5  Hip Flexion:                Right: 5/5  Left:  5/5  Quadriceps:             Right:  5/5  Left:  5/5  Hamstrings:              Right:  5/5  Left:  5/5  Gastroc Soleus:        Right:  5/5  Left:  5/5  Tib/Ant:                      Right:  5/5  Left:  5/5  EHL:                     Right:  5/5  Left:  5/5  Sensation:  Left hand numbness  Reflexes:  Negative Babinski.  Negative Clonus.  Negative Mccarthy's.  Coordination:  Smooth finger to nose testing.   Negative pronator drift.  Smooth tandem walking.    A/P:  38M w/ severe left arm pain, C5-7 foraminal stenosis    I had a discussion with the patient, reviewing the history, symptoms, and imaging  Will plan for C5-7 arthroplasty  Risks and benefits discussed         Again, thank you for allowing me to participate in the care of your patient.        Sincerely,        Aaron Craft MD

## 2019-06-13 NOTE — PROGRESS NOTES
I was asked by Dr. Palacios to see this patient in consultation    38M w/ severe left arm pain, C5-7 foraminal stenosis.  Over a year of progressive, 9/10 sharp left arm pain radiating to 1st-4th digits, worse with any neck range of motion.  Associated numbness and weakness.  MR with C5-6 and C6-7 moderate central and severe left foraminal stenosis.  Has undergone PT and EVAN without improvement.       Past Medical History:   Diagnosis Date     Depressive disorder      Ulcer, gastric, acute      Past Surgical History:   Procedure Laterality Date     ABDOMEN SURGERY       GI SURGERY      Hemorrhoids      HEMORRHOID SURGERY  2015     INCISION AND DRAINAGE PERINEAL, COMBINED N/A 6/8/2018    Procedure: COMBINED INCISION AND DRAINAGE PERINEAL;   Examination of perineal abcess under anethesia, Incision and Drainage Perineal Abscess;  Surgeon: Solo Valero MD;  [facility]     INJECT EPIDURAL TRANSFORAMINAL LUMBAR / SACRAL EA ADDITIONAL LEVEL N/A 3/4/2019    Procedure: Cervical Epidural Steroid Injection, Cervical 7-Thoracic 1 Interlaminar;  Surgeon: Nolan Quiroz MD;  [facility]     LAPAROSCOPIC HERNIORRHAPHY EPIGASTRIC N/A 10/11/2017    Procedure: LAPAROSCOPIC HERNIORRHAPHY EPIGASTRIC;  Exploratory Laparoscopy, Exploratory Laparotomy, EGD, Omental Patch, Upper Endoscopy;  Surgeon: Celio Keyes MD;  [facility] OR     Social History     Socioeconomic History     Marital status:      Spouse name: Not on file     Number of children: 6     Years of education: Not on file     Highest education level: Not on file   Occupational History     Occupation: recycling   Social Needs     Financial resource strain: Not on file     Food insecurity:     Worry: Not on file     Inability: Not on file     Transportation needs:     Medical: Not on file     Non-medical: Not on file   Tobacco Use     Smoking status: Current Every Day Smoker     Packs/day: 1.00     Years: 25.00     Pack years: 25.00      Smokeless tobacco: Never Used   Substance and Sexual Activity     Alcohol use: Yes     Comment: heavy drinking about 2 x per week     Drug use: Yes     Types: Marijuana     Comment: 2 gms daily     Sexual activity: Yes     Partners: Female     Birth control/protection: Pill   Lifestyle     Physical activity:     Days per week: Not on file     Minutes per session: Not on file     Stress: Not on file   Relationships     Social connections:     Talks on phone: Not on file     Gets together: Not on file     Attends Mormon service: Not on file     Active member of club or organization: Not on file     Attends meetings of clubs or organizations: Not on file     Relationship status: Not on file     Intimate partner violence:     Fear of current or ex partner: Not on file     Emotionally abused: Not on file     Physically abused: Not on file     Forced sexual activity: Not on file   Other Topics Concern     Parent/sibling w/ CABG, MI or angioplasty before 65F 55M? No   Social History Narrative    Live at home with spouse and 6 kids.     Family History   Problem Relation Age of Onset     Diabetes Mother      Breast Cancer Mother         Dx at 61 years aol     Coronary Artery Disease Mother         3v CABG     Cancer Father         Stomach ca - Dx at 57 years old and passed away from this     Cancer - colorectal Maternal Grandmother      Diabetes Brother         ROS: 10 point ROS neg other than the symptoms noted above in the HPI.    Physical Exam  BP (!) 140/95 (BP Location: Right arm, Patient Position: Sitting, Cuff Size: Adult Regular)   Pulse 109   Temp 98.9  F (37.2  C) (Oral)   Wt 83.4 kg (183 lb 12.8 oz)   SpO2 98%   BMI 25.28 kg/m    HEENT:  Normocephalic, atraumatic.  PERRLA.  EOM s intact.  Visual fields full to gross exam  Neck:  Supple, non-tender, without lymphadenopathy.  Heart:  No peripheral edema  Lungs:  No SOB  Abdomen:  Non-distended.   Skin:  Warm and dry.  Extremities:  No edema, cyanosis or  clubbing.  Psychiatric:  No apparent distress  Musculoskeletal:  Normal bulk and tone    NEUROLOGICAL EXAMINATION:     Mental status:  Alert and Oriented x 3, speech is fluent.  Cranial nerves:  II-XII intact.   Motor:    Shoulder Abduction:  Right:  5/5   Left:  5/5  Biceps:                      Right:  5/5   Left:  5/5  Triceps:                     Right:  5/5   Left:  4+/5  Wrist Extensors:       Right:  5/5   Left:  5/5  Wrist Flexors:           Right:  5/5   Left:  5/5  interosseus :            Right:  5/5   Left:  4+/5  Hip Flexion:                Right: 5/5  Left:  5/5  Quadriceps:             Right:  5/5  Left:  5/5  Hamstrings:             Right:  5/5  Left:  5/5  Gastroc Soleus:        Right:  5/5  Left:  5/5  Tib/Ant:                      Right:  5/5  Left:  5/5  EHL:                     Right:  5/5  Left:  5/5  Sensation:  Left hand numbness  Reflexes:  Negative Babinski.  Negative Clonus.  Negative Mccarthy's.  Coordination:  Smooth finger to nose testing.   Negative pronator drift.  Smooth tandem walking.    A/P:  38M w/ severe left arm pain, C5-7 foraminal stenosis    I had a discussion with the patient, reviewing the history, symptoms, and imaging  Will plan for C5-7 arthroplasty  Risks and benefits discussed

## 2019-06-13 NOTE — PROGRESS NOTES
"Tonio Alarcon is a 38 year old male who presents for:  Chief Complaint   Patient presents with     Neurologic Problem     Referred kalin Palacios re: cervical radiculopathy multi level cervical spondylosis        Initial Vitals:  BP (!) 140/95 (BP Location: Right arm, Patient Position: Sitting, Cuff Size: Adult Regular)   Pulse 109   Temp 98.9  F (37.2  C) (Oral)   Wt 183 lb 12.8 oz (83.4 kg)   SpO2 98%   BMI 25.28 kg/m   Estimated body mass index is 25.28 kg/m  as calculated from the following:    Height as of 3/28/19: 5' 11.5\" (1.816 m).    Weight as of this encounter: 183 lb 12.8 oz (83.4 kg).. Body surface area is 2.05 meters squared. BP completed using cuff size: regular  Worst Pain (10)        Nursing Comments: Referred kalin Palacios re: cervical radiculopathy multi level cervical spondylosis        Deanna Lemus CMA  6/13/2019  3:03 PM        "

## 2019-06-13 NOTE — NURSING NOTE
Patient Education    Education included but not limited to:  - Surgical risks: blood clots, urinating difficulties, nerve damage, infection.  - Pre-operative physical with primary care physician within 30 days of surgical date.   - Pre-operative clearance from other pertaining specialties.   - Discontinue NSAIDS x 7 days prior to surgical date.   -Do not begin taking NSAIDs (Advil, Motrin, Ibuprofen, Nuprin, Diclofenac, Meloxicam, Aleve, Celebrex, Aspirin, etc.) until 6 weeks after surgery if you had a fusion. May cause bleeding and interfere with bone healing.    -May try Tylenol for pain.  -Smoking cessation- Current smoker plans to quit today 6/13/19  -Discussed being off work after surgery, short term disability, FMLA, etc.   -Forms to be completed    -Pre-op timeline: NPO, shower, medications    -Hospital stay: Checking in, surgery, recovery room, hospital room.    - Post operative pain management: narcotics, muscle relaxants, ice, etc.   -No driving while taking narcotics     -Post operative incision care:   Keep your incision clean and dry.   Okay to shower. No submerging in water until incision healed.   Watch for signs of infection and notify clinic if drainage or fever develops.   - Post operative activity limitations recommended until follow up appointment: no lifting > 10 pounds; limited bending, twisting, overhead reaching.  -If a brace is required per Dr. Craft, [facility] will fit you for the brace in the hospital.  - Follow up appointments: 6 week post op, 3 months post op, 6 months post op, 1 year post op. You will need to an xray before each appointment. Please call to schedule follow up appointment at [phone].   - Education book was also given to the patient for further review.      Patient verbalized understanding of above instructions. All questions were answered to the best of my ability and the patient's satisfaction. Patient advised to call with any additional questions or  concerns.

## 2019-06-18 ENCOUNTER — OFFICE VISIT (OUTPATIENT)
Dept: FAMILY MEDICINE | Facility: CLINIC | Age: 39
End: 2019-06-18
Payer: COMMERCIAL

## 2019-06-18 VITALS
TEMPERATURE: 99.7 F | BODY MASS INDEX: 27.09 KG/M2 | DIASTOLIC BLOOD PRESSURE: 81 MMHG | SYSTOLIC BLOOD PRESSURE: 122 MMHG | OXYGEN SATURATION: 100 % | WEIGHT: 197 LBS | HEART RATE: 101 BPM

## 2019-06-18 DIAGNOSIS — M54.12 CERVICAL RADICULOPATHY: Primary | ICD-10-CM

## 2019-06-18 DIAGNOSIS — K27.5 PERFORATED PEPTIC ULCER (H): ICD-10-CM

## 2019-06-18 PROCEDURE — 99213 OFFICE O/P EST LOW 20 MIN: CPT | Performed by: FAMILY MEDICINE

## 2019-06-18 RX ORDER — PANTOPRAZOLE SODIUM 40 MG/1
40 TABLET, DELAYED RELEASE ORAL DAILY
Qty: 90 TABLET | Refills: 3 | Status: SHIPPED | OUTPATIENT
Start: 2019-06-18 | End: 2020-08-11

## 2019-06-18 RX ORDER — NORTRIPTYLINE HCL 25 MG
25 CAPSULE ORAL AT BEDTIME
Qty: 30 CAPSULE | Refills: 1 | Status: SHIPPED | OUTPATIENT
Start: 2019-06-18 | End: 2019-10-22

## 2019-06-18 ASSESSMENT — PAIN SCALES - GENERAL: PAINLEVEL: EXTREME PAIN (8)

## 2019-06-18 NOTE — PROGRESS NOTES
"  [address] [address]  [phone]  Dept: [phone]    PRE-OP EVALUATION:  Today's date: 2019    Tonio Alarcon (: 1980) presents for pre-operative evaluation assessment as requested by  ***.  He requires evaluation and anesthesia risk assessment prior to undergoing surgery/procedure for treatment of *** .    Fax number for surgical facility: ***  Primary Physician: Chrsitiano Palacios  Type of Anesthesia Anticipated: {ANESTHESIA:096750}    Patient has a Health Care Directive or Living Will:  {YES/NO:921160::\"NO\"}    Preop Questions 2019   Who is doing your surgery? no one   1.  Do you have a history of Heart attack, stroke, stent, coronary bypass surgery, or other heart surgery? No   2.  Do you ever have any pain or discomfort in your chest? No   3.  Do you have a history of  Heart Failure? No   4.   Are you troubled by shortness of breath when:  walking on a level surface, or up a slight hill, or at night? No   5.  Do you currently have a cold, bronchitis or other respiratory infection? YES - ***   6.  Do you have a cough, shortness of breath, or wheezing? YES - ***   7.  Do you sometimes get pains in the calves of your legs when you walk? YES - ***   8. Do you or anyone in your family have previous history of blood clots? No   9.  Do you or does anyone in your family have a serious bleeding problem such as prolonged bleeding following surgeries or cuts? No   10. Have you ever had problems with anemia or been told to take iron pills? No   11. Have you had any abnormal blood loss such as black, tarry or bloody stools? No   12. Have you ever had a blood transfusion? No   13. Have you or any of your relatives ever had problems with anesthesia? No   14. Do you have sleep apnea, excessive snoring or daytime drowsiness? UNKNOWN - ***   15. Do you have any prosthetic heart valves? No   16. Do you have " prosthetic joints? No         Charisse Duque MA    HPI:     HPI related to upcoming procedure: ***      {. Problems:442491}    MEDICAL HISTORY:     Patient Active Problem List    Diagnosis Date Noted     Neck pain 07/05/2018     Priority: Medium     Chronic low back pain 09/26/2011     Priority: Medium     CARDIOVASCULAR SCREENING; LDL GOAL LESS THAN 160 05/09/2010     Priority: Medium      Past Medical History:   Diagnosis Date     Depressive disorder      Ulcer, gastric, acute      Past Surgical History:   Procedure Laterality Date     ABDOMEN SURGERY       GI SURGERY      Hemorrhoids      HEMORRHOID SURGERY  2015     INCISION AND DRAINAGE PERINEAL, COMBINED N/A 6/8/2018    Procedure: COMBINED INCISION AND DRAINAGE PERINEAL;   Examination of perineal abcess under anethesia, Incision and Drainage Perineal Abscess;  Surgeon: Solo Valero MD;  [facility]     INJECT EPIDURAL TRANSFORAMINAL LUMBAR / SACRAL EA ADDITIONAL LEVEL N/A 3/4/2019    Procedure: Cervical Epidural Steroid Injection, Cervical 7-Thoracic 1 Interlaminar;  Surgeon: Nolan Quiroz MD;  [facility]     LAPAROSCOPIC HERNIORRHAPHY EPIGASTRIC N/A 10/11/2017    Procedure: LAPAROSCOPIC HERNIORRHAPHY EPIGASTRIC;  Exploratory Laparoscopy, Exploratory Laparotomy, EGD, Omental Patch, Upper Endoscopy;  Surgeon: Celio Keyes MD;  [facility]     Current Outpatient Medications   Medication Sig Dispense Refill     Acetaminophen (TYLENOL ARTHRITIS PAIN PO)        diazepam (VALIUM) 5 MG tablet Take 1 tablet (5 mg) by mouth nightly as needed for anxiety (Patient not taking: Reported on 6/13/2019) 15 tablet 1     pantoprazole (PROTONIX) 40 MG EC tablet Take 40 mg by mouth daily       pregabalin (LYRICA) 75 MG capsule Take 1 capsule (75 mg) by mouth 2 times daily (Patient not taking: Reported on 6/13/2019) 60 capsule 1     OTC products: {OTC ANALGESICS:581532}    No Known Allergies   Latex Allergy: {YES/NO WITH  "DEFAULT:280196::\"NO\"}    Social History     Tobacco Use     Smoking status: Current Every Day Smoker     Packs/day: 1.00     Years: 25.00     Pack years: 25.00     Smokeless tobacco: Never Used   Substance Use Topics     Alcohol use: Yes     Comment: heavy drinking about 2 x per week     History   Drug Use     Types: Marijuana     Comment: 2 gms daily       REVIEW OF SYSTEMS:   {ROS Preop Choices:898942}    EXAM:   There were no vitals taken for this visit.  {EXAM Preop Choices:819450}    DIAGNOSTICS:   {DIAGNOSTIC FOR PREOP:453740}    Recent Labs   Lab Test 03/28/19  0734 06/08/18  0756  05/06/18  0643   HGB 15.0 14.6   < > 14.8    202   < > 260   NA  --  143  --  143   POTASSIUM  --  3.8  --  3.6   CR  --  0.81  --  0.87    < > = values in this interval not displayed.        IMPRESSION:   {PREOP REASONS:089341::\"Reason for surgery/procedure: ***\",\"Diagnosis/reason for consult: ***\"}    The proposed surgical procedure is considered {HIGH=major cardiovascular or procedures requiring prolonged anesthesia >4 hours or large fluid shifts;    INTERMEDIATE=abdominal, most orthopedic and intrathoracic surgery; LOW= endoscopy, cataract and breast surgery:137943} risk.    REVISED CARDIAC RISK INDEX  The patient has the following serious cardiovascular risks for perioperative complications such as (MI, PE, VFib and 3  AV Block):  {PREOP REVISED CARDIAC INDEX (RCI):993023:p:\"No serious cardiac risks\"}  INTERPRETATION: {REVISED CARDIAC RISK INTERPRETATION:792247}    The patient has the following additional risks for perioperative complications:  {Additional perioperative risks:227959:p:\"No identified additional risks\"}      ICD-10-CM    1. Preop general physical exam Z01.818        RECOMMENDATIONS:     {IMPORTANT - Conditions - complete carefully!!:986520}    {IMPORTANT - Medications:350196::\"--Patient is to take all scheduled medications on the day of surgery EXCEPT for modifications listed below.\"}    {IMPORTANT - " "Approval:[id number]:p:\"APPROVAL GIVEN to proceed with proposed procedure, without further diagnostic evaluation\"}       Signed Electronically by: Christiano Palacios MD    Copy of this evaluation report is provided to requesting physician.    [facility] Preop Guidelines    Revised Cardiac Risk Index  "

## 2019-06-18 NOTE — PROGRESS NOTES
Subjective     Tonio Alarcon is a 38 year old male who presents to clinic today for the following health issues:    HPI   Patient is here to talk to provider about denial of surgery. He got a call about an hour ago regarding this.  Also needs refill of Protonix.    Charisse Duque MA    Patient was originally scheduled for preop today.  Neurosurgery is recommended a C5-7 anterior cervical discectomy and fusion for left arm radiculopathy.  He reports today that preauthorization for the surgery has yet to be granted so we elected to talk about some medical options today.        Patient Active Problem List   Diagnosis     CARDIOVASCULAR SCREENING; LDL GOAL LESS THAN 160     Chronic low back pain     Neck pain     Past Surgical History:   Procedure Laterality Date     ABDOMEN SURGERY       GI SURGERY      Hemorrhoids      HEMORRHOID SURGERY  2015     INCISION AND DRAINAGE PERINEAL, COMBINED N/A 6/8/2018    Procedure: COMBINED INCISION AND DRAINAGE PERINEAL;   Examination of perineal abcess under anethesia, Incision and Drainage Perineal Abscess;  Surgeon: Solo Valero MD;  [facility]     INJECT EPIDURAL TRANSFORAMINAL LUMBAR / SACRAL EA ADDITIONAL LEVEL N/A 3/4/2019    Procedure: Cervical Epidural Steroid Injection, Cervical 7-Thoracic 1 Interlaminar;  Surgeon: Nolan Quiroz MD;  [facility]     LAPAROSCOPIC HERNIORRHAPHY EPIGASTRIC N/A 10/11/2017    Procedure: LAPAROSCOPIC HERNIORRHAPHY EPIGASTRIC;  Exploratory Laparoscopy, Exploratory Laparotomy, EGD, Omental Patch, Upper Endoscopy;  Surgeon: Celio Keyes MD;  [facility]       Social History     Tobacco Use     Smoking status: Current Every Day Smoker     Packs/day: 1.00     Years: 25.00     Pack years: 25.00     Smokeless tobacco: Never Used   Substance Use Topics     Alcohol use: Yes     Comment: heavy drinking about 2 x per week     Family History   Problem Relation Age of Onset     Diabetes Mother      Breast Cancer  Mother         Dx at 61 years aol     Coronary Artery Disease Mother         3v CABG     Cancer Father         Stomach ca - Dx at 57 years old and passed away from this     Cancer - colorectal Maternal Grandmother      Diabetes Brother          Current Outpatient Medications   Medication Sig Dispense Refill     diazepam (VALIUM) 5 MG tablet Take 1 tablet (5 mg) by mouth nightly as needed for anxiety 15 tablet 1     nortriptyline (PAMELOR) 25 MG capsule Take 1 capsule (25 mg) by mouth At Bedtime 30 capsule 1     pantoprazole (PROTONIX) 40 MG EC tablet Take 1 tablet (40 mg) by mouth daily 90 tablet 3     Acetaminophen (TYLENOL ARTHRITIS PAIN PO)        No Known Allergies    Reviewed and updated as needed this visit by Provider  Meds  Problems         Review of Systems   ROS COMP: Constitutional, HEENT, cardiovascular, pulmonary, gi and gu systems are negative, except as otherwise noted.      Objective    /81 (BP Location: Right arm, Patient Position: Chair, Cuff Size: Adult Large)   Pulse 101   Temp 99.7  F (37.6  C) (Oral)   Wt 89.4 kg (197 lb)   SpO2 100%   BMI 27.09 kg/m    Body mass index is 27.09 kg/m .  Physical Exam   GENERAL: healthy, alert and no distress  EYES: Eyes grossly normal to inspection, PERRL and conjunctivae and sclerae normal  MS: no gross musculoskeletal defects noted, no edema  NEURO: Normal strength and tone, mentation intact and speech normal  PSYCH: mentation appears normal, affect normal/bright    Diagnostic Test Results:  Labs reviewed in Epic        Assessment & Plan     1. Cervical radiculopathy  Medical options were reviewed with the patient.  He did not tolerate gabapentin and was fearful of trying the Lyrica due to listed side effects and his history of alcohol dependency.  Right now try nortriptyline 25 mg at at bedtime.  Side effects were reviewed.  This is pending surgical approval which likely will need the surgeon's office to work on getting authorization.  -  "nortriptyline (PAMELOR) 25 MG capsule; Take 1 capsule (25 mg) by mouth At Bedtime  Dispense: 30 capsule; Refill: 1    2. Perforated peptic ulcer (H)  He needed a refill on Protonix so that was provided today.  - pantoprazole (PROTONIX) 40 MG EC tablet; Take 1 tablet (40 mg) by mouth daily  Dispense: 90 tablet; Refill: 3     Tobacco Cessation:   reports that he has been smoking.  He has a 25.00 pack-year smoking history. He has never used smokeless tobacco.  Tobacco Cessation Action Plan: Information offered: Patient not interested at this time      BMI:   Estimated body mass index is 27.09 kg/m  as calculated from the following:    Height as of 3/28/19: 1.816 m (5' 11.5\").    Weight as of this encounter: 89.4 kg (197 lb).           See Patient Instructions    Return in about 2 weeks (around 7/2/2019) for preop.    Christiano Palacios MD  [facility]    "

## 2019-06-24 ENCOUNTER — OFFICE VISIT (OUTPATIENT)
Dept: FAMILY MEDICINE | Facility: CLINIC | Age: 39
End: 2019-06-24
Payer: COMMERCIAL

## 2019-06-24 ENCOUNTER — TELEPHONE (OUTPATIENT)
Dept: NEUROSURGERY | Facility: CLINIC | Age: 39
End: 2019-06-24

## 2019-06-24 VITALS
HEART RATE: 96 BPM | WEIGHT: 187 LBS | SYSTOLIC BLOOD PRESSURE: 135 MMHG | TEMPERATURE: 98.1 F | DIASTOLIC BLOOD PRESSURE: 98 MMHG | BODY MASS INDEX: 25.72 KG/M2 | OXYGEN SATURATION: 100 %

## 2019-06-24 DIAGNOSIS — M54.12 CERVICAL RADICULOPATHY: ICD-10-CM

## 2019-06-24 DIAGNOSIS — M50.30 DDD (DEGENERATIVE DISC DISEASE), CERVICAL: ICD-10-CM

## 2019-06-24 DIAGNOSIS — Z01.818 PREOP GENERAL PHYSICAL EXAM: Primary | ICD-10-CM

## 2019-06-24 DIAGNOSIS — K27.5 PERFORATED PEPTIC ULCER (H): ICD-10-CM

## 2019-06-24 LAB
BASOPHILS # BLD AUTO: 0 10E9/L (ref 0–0.2)
BASOPHILS NFR BLD AUTO: 0.4 %
DIFFERENTIAL METHOD BLD: ABNORMAL
EOSINOPHIL # BLD AUTO: 0.3 10E9/L (ref 0–0.7)
EOSINOPHIL NFR BLD AUTO: 4.4 %
ERYTHROCYTE [DISTWIDTH] IN BLOOD BY AUTOMATED COUNT: 16.1 % (ref 10–15)
HCT VFR BLD AUTO: 46.6 % (ref 40–53)
HGB BLD-MCNC: 15.9 G/DL (ref 13.3–17.7)
LYMPHOCYTES # BLD AUTO: 2 10E9/L (ref 0.8–5.3)
LYMPHOCYTES NFR BLD AUTO: 35.2 %
MCH RBC QN AUTO: 26.1 PG (ref 26.5–33)
MCHC RBC AUTO-ENTMCNC: 34.1 G/DL (ref 31.5–36.5)
MCV RBC AUTO: 76 FL (ref 78–100)
MONOCYTES # BLD AUTO: 0.7 10E9/L (ref 0–1.3)
MONOCYTES NFR BLD AUTO: 11.7 %
NEUTROPHILS # BLD AUTO: 2.8 10E9/L (ref 1.6–8.3)
NEUTROPHILS NFR BLD AUTO: 48.3 %
PLATELET # BLD AUTO: 256 10E9/L (ref 150–450)
RBC # BLD AUTO: 6.1 10E12/L (ref 4.4–5.9)
WBC # BLD AUTO: 5.7 10E9/L (ref 4–11)

## 2019-06-24 PROCEDURE — 85025 COMPLETE CBC W/AUTO DIFF WBC: CPT | Performed by: FAMILY MEDICINE

## 2019-06-24 PROCEDURE — 99214 OFFICE O/P EST MOD 30 MIN: CPT | Performed by: FAMILY MEDICINE

## 2019-06-24 PROCEDURE — 36415 COLL VENOUS BLD VENIPUNCTURE: CPT | Performed by: FAMILY MEDICINE

## 2019-06-24 ASSESSMENT — PAIN SCALES - GENERAL: PAINLEVEL: EXTREME PAIN (8)

## 2019-06-24 NOTE — PROGRESS NOTES
[address] [address] [phone]  Dept: [phone]    PRE-OP EVALUATION:  Today's date: 2019    Tonio Alarcon (: 1980) presents for pre-operative evaluation assessment as requested by Dr. Craft.  He requires evaluation and anesthesia risk assessment prior to undergoing surgery/procedure for treatment of Arthroplasty C 5-7    Fax number for surgical facility:[phone]  Primary Physician: Christiano Palacios  Type of Anesthesia Anticipated: to be determined    Patient has a Health Care Directive or Living Will:  NO    Preop Questions 2019   Who is doing your surgery? dr craft   What are you having done? disc replacement   Date of Surgery/Procedure: 2019   Facility or Hospital where procedure/surgery will be performed: unknown at moment: [facility]   1.  Do you have a history of Heart attack, stroke, stent, coronary bypass surgery, or other heart surgery? No   2.  Do you ever have any pain or discomfort in your chest? No   3.  Do you have a history of  Heart Failure? No   4.   Are you troubled by shortness of breath when:  walking on a level surface, or up a slight hill, or at night? No   5.  Do you currently have a cold, bronchitis or other respiratory infection? YES - mild congestion, improving   6.  Do you have a cough, shortness of breath, or wheezing? YES - improving   7.  Do you sometimes get pains in the calves of your legs when you walk? No   8. Do you or anyone in your family have previous history of blood clots? No   9.  Do you or does anyone in your family have a serious bleeding problem such as prolonged bleeding following surgeries or cuts? No   10. Have you ever had problems with anemia or been told to take iron pills? No   11. Have you had any abnormal blood loss such as black, tarry or bloody stools? No   12. Have you ever had a blood transfusion? No   13. Have you or any  of your relatives ever had problems with anesthesia? No   14. Do you have sleep apnea, excessive snoring or daytime drowsiness? No   15. Do you have any prosthetic heart valves? No   16. Do you have prosthetic joints? No     Charisse Duque MA      HPI:     HPI related to upcoming procedure: neck and left arm radiculopathy that have failed conservative measures.      See problem list for active medical problems.  Problems all longstanding and stable, except as noted/documented.  See ROS for pertinent symptoms related to these conditions.      MEDICAL HISTORY:     Patient Active Problem List    Diagnosis Date Noted     Neck pain 07/05/2018     Priority: Medium     Chronic low back pain 09/26/2011     Priority: Medium     CARDIOVASCULAR SCREENING; LDL GOAL LESS THAN 160 05/09/2010     Priority: Medium      Past Medical History:   Diagnosis Date     Depressive disorder      Ulcer, gastric, acute      Past Surgical History:   Procedure Laterality Date     ABDOMEN SURGERY       GI SURGERY      Hemorrhoids      HEMORRHOID SURGERY  2015     INCISION AND DRAINAGE PERINEAL, COMBINED N/A 6/8/2018    Procedure: COMBINED INCISION AND DRAINAGE PERINEAL;   Examination of perineal abcess under anethesia, Incision and Drainage Perineal Abscess;  Surgeon: Solo Valero MD;  [facility]     INJECT EPIDURAL TRANSFORAMINAL LUMBAR / SACRAL EA ADDITIONAL LEVEL N/A 3/4/2019    Procedure: Cervical Epidural Steroid Injection, Cervical 7-Thoracic 1 Interlaminar;  Surgeon: Nolan Quiroz MD;  [facility]     LAPAROSCOPIC HERNIORRHAPHY EPIGASTRIC N/A 10/11/2017    Procedure: LAPAROSCOPIC HERNIORRHAPHY EPIGASTRIC;  Exploratory Laparoscopy, Exploratory Laparotomy, EGD, Omental Patch, Upper Endoscopy;  Surgeon: Celio Keyes MD;  [facility]     Current Outpatient Medications   Medication Sig Dispense Refill     diazepam (VALIUM) 5 MG tablet Take 1 tablet (5 mg) by mouth nightly as needed for anxiety 15 tablet 1      nortriptyline (PAMELOR) 25 MG capsule Take 1 capsule (25 mg) by mouth At Bedtime 30 capsule 1     pantoprazole (PROTONIX) 40 MG EC tablet Take 1 tablet (40 mg) by mouth daily 90 tablet 3     Acetaminophen (TYLENOL ARTHRITIS PAIN PO)        OTC products: None, except as noted above    No Known Allergies   Latex Allergy: NO    Social History     Tobacco Use     Smoking status: Current Every Day Smoker     Packs/day: 1.00     Years: 25.00     Pack years: 25.00     Smokeless tobacco: Never Used   Substance Use Topics     Alcohol use: Yes     Comment: heavy drinking about 2 x per week     History   Drug Use Unknown     Comment: 2 gms daily       REVIEW OF SYSTEMS:   CONSTITUTIONAL: NEGATIVE for fever, chills, change in weight  INTEGUMENTARY/SKIN: NEGATIVE for worrisome rashes, moles or lesions  EYES: NEGATIVE for vision changes or irritation  ENT/MOUTH: NEGATIVE for ear, mouth and throat problems  RESP: NEGATIVE for significant cough or SOB  BREAST: NEGATIVE for masses, tenderness or discharge  CV: NEGATIVE for chest pain, palpitations or peripheral edema  GI: NEGATIVE for nausea, abdominal pain, heartburn, or change in bowel habits  : NEGATIVE for frequency, dysuria, or hematuria  MUSCULOSKELETAL: NEGATIVE for significant arthralgias or myalgia  NEURO: NEGATIVE for weakness, dizziness or paresthesias  ENDOCRINE: NEGATIVE for temperature intolerance, skin/hair changes  HEME: NEGATIVE for bleeding problems  PSYCHIATRIC: NEGATIVE for changes in mood or affect    EXAM:   BP (!) 135/98 (BP Location: Left arm, Patient Position: Chair, Cuff Size: Adult Regular)   Pulse 96   Temp 98.1  F (36.7  C) (Oral)   Wt 84.8 kg (187 lb)   SpO2 100%   BMI 25.72 kg/m      GENERAL APPEARANCE: healthy, alert and no distress     EYES: EOMI,  PERRL     HENT: ear canals and TM's normal and nose and mouth without ulcers or lesions     NECK: no adenopathy, no asymmetry, masses, or scars and thyroid normal to palpation     RESP: lungs  clear to auscultation - no rales, rhonchi or wheezes     CV: regular rates and rhythm, normal S1 S2, no S3 or S4 and no murmur, click or rub     ABDOMEN:  soft, nontender, no HSM or masses and bowel sounds normal     MS: extremities normal- no gross deformities noted, no evidence of inflammation in joints, FROM in all extremities.     SKIN: no suspicious lesions or rashes     NEURO: Normal strength and tone, sensory exam grossly normal, mentation intact and speech normal     PSYCH: mentation appears normal. and affect normal/bright     LYMPHATICS: No cervical adenopathy    DIAGNOSTICS:     EKG: Not indicated due to non-vascular surgery and low risk of event (age <65 and without cardiac risk factors)  Labs Drawn and in Process:   Unresulted Labs Ordered in the Past 30 Days of this Admission     No orders found from 4/25/2019 to 6/25/2019.          Recent Labs   Lab Test 03/28/19  0734 06/08/18  0756  05/06/18  0643   HGB 15.0 14.6   < > 14.8    202   < > 260   NA  --  143  --  143   POTASSIUM  --  3.8  --  3.6   CR  --  0.81  --  0.87    < > = values in this interval not displayed.        IMPRESSION:   Reason for surgery/procedure: persistent neck pain and radiculopathy  Diagnosis/reason for consult: clearance for anesthesia    The proposed surgical procedure is considered INTERMEDIATE risk.    REVISED CARDIAC RISK INDEX  The patient has the following serious cardiovascular risks for perioperative complications such as (MI, PE, VFib and 3  AV Block):  No serious cardiac risks  INTERPRETATION: 1 risks: Class II (low risk - 0.9% complication rate)    The patient has the following additional risks for perioperative complications:  No identified additional risks      ICD-10-CM    1. Preop general physical exam Z01.818    2. Cervical radiculopathy M54.12 CBC with platelets and differential   3. DDD (degenerative disc disease), cervical M50.30    4. Perforated peptic ulcer (H) K27.5        RECOMMENDATIONS:          --Patient is to take all scheduled medications on the day of surgery EXCEPT for modifications listed below.    APPROVAL GIVEN to proceed with proposed procedure, without further diagnostic evaluation       Signed Electronically by: Christiano Palacios MD    Copy of this evaluation report is provided to requesting physician.    [facility] Preop Guidelines    Revised Cardiac Risk Index

## 2019-06-24 NOTE — TELEPHONE ENCOUNTER
Type of surgery: C5-7 arthroplasty  Location of surgery: [place]  Date and time of surgery: 06/25/2019 at 8:40am  Surgeon: AIDEN Carft  Pre-Op Appt Date: 06/24/2019  Post-Op Appt Date: 08/06/2019   Packet sent out: Yes  Pre-cert/Authorization completed:  Yes  Date: 06/24/2019 [facility]

## 2019-06-25 ENCOUNTER — APPOINTMENT (OUTPATIENT)
Dept: GENERAL RADIOLOGY | Facility: CLINIC | Age: 39
End: 2019-06-25
Attending: NEUROLOGICAL SURGERY
Payer: COMMERCIAL

## 2019-06-25 ENCOUNTER — ANESTHESIA (OUTPATIENT)
Dept: SURGERY | Facility: CLINIC | Age: 39
End: 2019-06-25
Payer: COMMERCIAL

## 2019-06-25 ENCOUNTER — ANESTHESIA EVENT (OUTPATIENT)
Dept: SURGERY | Facility: CLINIC | Age: 39
End: 2019-06-25
Payer: COMMERCIAL

## 2019-06-25 ENCOUNTER — HOSPITAL ENCOUNTER (OUTPATIENT)
Facility: CLINIC | Age: 39
Discharge: HOME | End: 2019-06-25
Attending: NEUROLOGICAL SURGERY | Admitting: NEUROLOGICAL SURGERY
Payer: COMMERCIAL

## 2019-06-25 VITALS
HEIGHT: 71 IN | HEART RATE: 87 BPM | DIASTOLIC BLOOD PRESSURE: 82 MMHG | SYSTOLIC BLOOD PRESSURE: 122 MMHG | BODY MASS INDEX: 26.25 KG/M2 | TEMPERATURE: 97.7 F | WEIGHT: 187.5 LBS | RESPIRATION RATE: 16 BRPM | OXYGEN SATURATION: 98 %

## 2019-06-25 DIAGNOSIS — Z98.890 S/P CERVICAL DISC REPLACEMENT: Primary | ICD-10-CM

## 2019-06-25 PROCEDURE — 25000128 H RX IP 250 OP 636: Performed by: PHYSICIAN ASSISTANT

## 2019-06-25 PROCEDURE — 40000170 ZZH STATISTIC PRE-PROCEDURE ASSESSMENT II: Performed by: NEUROLOGICAL SURGERY

## 2019-06-25 PROCEDURE — 25000132 ZZH RX MED GY IP 250 OP 250 PS 637: Performed by: PHYSICIAN ASSISTANT

## 2019-06-25 PROCEDURE — 71000013 ZZH RECOVERY PHASE 1 LEVEL 1 EA ADDTL HR: Performed by: NEUROLOGICAL SURGERY

## 2019-06-25 PROCEDURE — 25000128 H RX IP 250 OP 636: Performed by: NEUROLOGICAL SURGERY

## 2019-06-25 PROCEDURE — 71000027 ZZH RECOVERY PHASE 2 EACH 15 MINS: Performed by: NEUROLOGICAL SURGERY

## 2019-06-25 PROCEDURE — 22856 TOT DISC ARTHRP 1NTRSPC CRV: CPT | Mod: AS | Performed by: PHYSICIAN ASSISTANT

## 2019-06-25 PROCEDURE — 36000069 ZZH SURGERY LEVEL 5 EA 15 ADDTL MIN: Performed by: NEUROLOGICAL SURGERY

## 2019-06-25 PROCEDURE — 25000125 ZZHC RX 250: Performed by: NEUROLOGICAL SURGERY

## 2019-06-25 PROCEDURE — 22858 TOT DISC ARTHRP 2ND LVL CRV: CPT | Mod: AS | Performed by: PHYSICIAN ASSISTANT

## 2019-06-25 PROCEDURE — 22856 TOT DISC ARTHRP 1NTRSPC CRV: CPT | Performed by: NEUROLOGICAL SURGERY

## 2019-06-25 PROCEDURE — 25800030 ZZH RX IP 258 OP 636: Performed by: NURSE ANESTHETIST, CERTIFIED REGISTERED

## 2019-06-25 PROCEDURE — 40000277 XR SURGERY CARM FLUORO LESS THAN 5 MIN W STILLS

## 2019-06-25 PROCEDURE — 25000125 ZZHC RX 250: Performed by: NURSE ANESTHETIST, CERTIFIED REGISTERED

## 2019-06-25 PROCEDURE — 27210794 ZZH OR GENERAL SUPPLY STERILE: Performed by: NEUROLOGICAL SURGERY

## 2019-06-25 PROCEDURE — 22858 TOT DISC ARTHRP 2ND LVL CRV: CPT | Performed by: NEUROLOGICAL SURGERY

## 2019-06-25 PROCEDURE — 37000008 ZZH ANESTHESIA TECHNICAL FEE, 1ST 30 MIN: Performed by: NEUROLOGICAL SURGERY

## 2019-06-25 PROCEDURE — 71000012 ZZH RECOVERY PHASE 1 LEVEL 1 FIRST HR: Performed by: NEUROLOGICAL SURGERY

## 2019-06-25 PROCEDURE — 36000071 ZZH SURGERY LEVEL 5 W FLUORO 1ST 30 MIN: Performed by: NEUROLOGICAL SURGERY

## 2019-06-25 PROCEDURE — L8699 PROSTHETIC IMPLANT NOS: HCPCS | Performed by: NEUROLOGICAL SURGERY

## 2019-06-25 PROCEDURE — 25000566 ZZH SEVOFLURANE, EA 15 MIN: Performed by: NEUROLOGICAL SURGERY

## 2019-06-25 PROCEDURE — 25000128 H RX IP 250 OP 636: Performed by: ANESTHESIOLOGY

## 2019-06-25 PROCEDURE — 25000128 H RX IP 250 OP 636: Performed by: NURSE ANESTHETIST, CERTIFIED REGISTERED

## 2019-06-25 PROCEDURE — 37000009 ZZH ANESTHESIA TECHNICAL FEE, EACH ADDTL 15 MIN: Performed by: NEUROLOGICAL SURGERY

## 2019-06-25 DEVICE — IMPLANTABLE DEVICE: Type: IMPLANTABLE DEVICE | Site: SPINE CERVICAL | Status: FUNCTIONAL

## 2019-06-25 RX ORDER — LIDOCAINE HYDROCHLORIDE 20 MG/ML
INJECTION, SOLUTION INFILTRATION; PERINEURAL PRN
Status: DISCONTINUED | OUTPATIENT
Start: 2019-06-25 | End: 2019-06-25

## 2019-06-25 RX ORDER — AMOXICILLIN 250 MG
1-2 CAPSULE ORAL 2 TIMES DAILY
Qty: 30 TABLET | Refills: 0 | Status: SHIPPED | OUTPATIENT
Start: 2019-06-25 | End: 2019-10-22

## 2019-06-25 RX ORDER — ONDANSETRON 2 MG/ML
4 INJECTION INTRAMUSCULAR; INTRAVENOUS EVERY 30 MIN PRN
Status: DISCONTINUED | OUTPATIENT
Start: 2019-06-25 | End: 2019-06-25 | Stop reason: HOSPADM

## 2019-06-25 RX ORDER — GLYCOPYRROLATE 0.2 MG/ML
INJECTION, SOLUTION INTRAMUSCULAR; INTRAVENOUS PRN
Status: DISCONTINUED | OUTPATIENT
Start: 2019-06-25 | End: 2019-06-25

## 2019-06-25 RX ORDER — HYDROMORPHONE HYDROCHLORIDE 1 MG/ML
.3-.5 INJECTION, SOLUTION INTRAMUSCULAR; INTRAVENOUS; SUBCUTANEOUS EVERY 5 MIN PRN
Status: DISCONTINUED | OUTPATIENT
Start: 2019-06-25 | End: 2019-06-25 | Stop reason: HOSPADM

## 2019-06-25 RX ORDER — METHOCARBAMOL 750 MG/1
750-1500 TABLET, FILM COATED ORAL
Status: DISCONTINUED | OUTPATIENT
Start: 2019-06-25 | End: 2019-06-25 | Stop reason: HOSPADM

## 2019-06-25 RX ORDER — SODIUM CHLORIDE, SODIUM LACTATE, POTASSIUM CHLORIDE, CALCIUM CHLORIDE 600; 310; 30; 20 MG/100ML; MG/100ML; MG/100ML; MG/100ML
INJECTION, SOLUTION INTRAVENOUS CONTINUOUS
Status: DISCONTINUED | OUTPATIENT
Start: 2019-06-25 | End: 2019-06-25 | Stop reason: HOSPADM

## 2019-06-25 RX ORDER — TIZANIDINE 2 MG/1
2-4 TABLET ORAL 3 TIMES DAILY
Qty: 60 TABLET | Refills: 1 | Status: SHIPPED | OUTPATIENT
Start: 2019-06-25 | End: 2019-10-22

## 2019-06-25 RX ORDER — HYDROXYZINE HYDROCHLORIDE 25 MG/1
25 TABLET, FILM COATED ORAL
Status: COMPLETED | OUTPATIENT
Start: 2019-06-25 | End: 2019-06-25

## 2019-06-25 RX ORDER — OXYCODONE HYDROCHLORIDE 5 MG/1
5-10 TABLET ORAL EVERY 4 HOURS PRN
Qty: 30 TABLET | Refills: 0 | Status: SHIPPED | OUTPATIENT
Start: 2019-06-25 | End: 2019-10-22

## 2019-06-25 RX ORDER — KETAMINE HYDROCHLORIDE 10 MG/ML
INJECTION, SOLUTION INTRAMUSCULAR; INTRAVENOUS PRN
Status: DISCONTINUED | OUTPATIENT
Start: 2019-06-25 | End: 2019-06-25

## 2019-06-25 RX ORDER — MEPERIDINE HYDROCHLORIDE 25 MG/ML
12.5 INJECTION INTRAMUSCULAR; INTRAVENOUS; SUBCUTANEOUS EVERY 5 MIN PRN
Status: DISCONTINUED | OUTPATIENT
Start: 2019-06-25 | End: 2019-06-25 | Stop reason: HOSPADM

## 2019-06-25 RX ORDER — CEFAZOLIN SODIUM 1 G/3ML
1 INJECTION, POWDER, FOR SOLUTION INTRAMUSCULAR; INTRAVENOUS SEE ADMIN INSTRUCTIONS
Status: DISCONTINUED | OUTPATIENT
Start: 2019-06-25 | End: 2019-06-25 | Stop reason: HOSPADM

## 2019-06-25 RX ORDER — NEOSTIGMINE METHYLSULFATE 1 MG/ML
VIAL (ML) INJECTION PRN
Status: DISCONTINUED | OUTPATIENT
Start: 2019-06-25 | End: 2019-06-25

## 2019-06-25 RX ORDER — HYDRALAZINE HYDROCHLORIDE 20 MG/ML
2.5-5 INJECTION INTRAMUSCULAR; INTRAVENOUS EVERY 10 MIN PRN
Status: DISCONTINUED | OUTPATIENT
Start: 2019-06-25 | End: 2019-06-25 | Stop reason: HOSPADM

## 2019-06-25 RX ORDER — CEFAZOLIN SODIUM 2 G/100ML
2 INJECTION, SOLUTION INTRAVENOUS
Status: COMPLETED | OUTPATIENT
Start: 2019-06-25 | End: 2019-06-25

## 2019-06-25 RX ORDER — PROPOFOL 10 MG/ML
INJECTION, EMULSION INTRAVENOUS PRN
Status: DISCONTINUED | OUTPATIENT
Start: 2019-06-25 | End: 2019-06-25

## 2019-06-25 RX ORDER — ONDANSETRON 4 MG/1
4 TABLET, ORALLY DISINTEGRATING ORAL
Status: DISCONTINUED | OUTPATIENT
Start: 2019-06-25 | End: 2019-06-25 | Stop reason: HOSPADM

## 2019-06-25 RX ORDER — ALBUTEROL SULFATE 0.83 MG/ML
2.5 SOLUTION RESPIRATORY (INHALATION) EVERY 4 HOURS PRN
Status: DISCONTINUED | OUTPATIENT
Start: 2019-06-25 | End: 2019-06-25 | Stop reason: HOSPADM

## 2019-06-25 RX ORDER — ACETAMINOPHEN 325 MG/1
975 TABLET ORAL ONCE
Status: COMPLETED | OUTPATIENT
Start: 2019-06-25 | End: 2019-06-25

## 2019-06-25 RX ORDER — OXYCODONE HYDROCHLORIDE 5 MG/1
5-10 TABLET ORAL
Status: COMPLETED | OUTPATIENT
Start: 2019-06-25 | End: 2019-06-25

## 2019-06-25 RX ORDER — ONDANSETRON 2 MG/ML
INJECTION INTRAMUSCULAR; INTRAVENOUS PRN
Status: DISCONTINUED | OUTPATIENT
Start: 2019-06-25 | End: 2019-06-25

## 2019-06-25 RX ORDER — BUPIVACAINE HYDROCHLORIDE AND EPINEPHRINE 5; 5 MG/ML; UG/ML
INJECTION, SOLUTION EPIDURAL; INTRACAUDAL; PERINEURAL PRN
Status: DISCONTINUED | OUTPATIENT
Start: 2019-06-25 | End: 2019-06-25 | Stop reason: HOSPADM

## 2019-06-25 RX ORDER — SODIUM CHLORIDE, SODIUM LACTATE, POTASSIUM CHLORIDE, CALCIUM CHLORIDE 600; 310; 30; 20 MG/100ML; MG/100ML; MG/100ML; MG/100ML
INJECTION, SOLUTION INTRAVENOUS CONTINUOUS PRN
Status: DISCONTINUED | OUTPATIENT
Start: 2019-06-25 | End: 2019-06-25

## 2019-06-25 RX ORDER — ONDANSETRON 4 MG/1
4 TABLET, ORALLY DISINTEGRATING ORAL EVERY 30 MIN PRN
Status: DISCONTINUED | OUTPATIENT
Start: 2019-06-25 | End: 2019-06-25 | Stop reason: HOSPADM

## 2019-06-25 RX ORDER — NALOXONE HYDROCHLORIDE 0.4 MG/ML
.1-.4 INJECTION, SOLUTION INTRAMUSCULAR; INTRAVENOUS; SUBCUTANEOUS
Status: DISCONTINUED | OUTPATIENT
Start: 2019-06-25 | End: 2019-06-25 | Stop reason: HOSPADM

## 2019-06-25 RX ORDER — FENTANYL CITRATE 50 UG/ML
INJECTION, SOLUTION INTRAMUSCULAR; INTRAVENOUS PRN
Status: DISCONTINUED | OUTPATIENT
Start: 2019-06-25 | End: 2019-06-25

## 2019-06-25 RX ORDER — FENTANYL CITRATE 50 UG/ML
50 INJECTION, SOLUTION INTRAMUSCULAR; INTRAVENOUS EVERY 10 MIN PRN
Status: DISCONTINUED | OUTPATIENT
Start: 2019-06-25 | End: 2019-06-25 | Stop reason: HOSPADM

## 2019-06-25 RX ORDER — DEXAMETHASONE SODIUM PHOSPHATE 4 MG/ML
INJECTION, SOLUTION INTRA-ARTICULAR; INTRALESIONAL; INTRAMUSCULAR; INTRAVENOUS; SOFT TISSUE PRN
Status: DISCONTINUED | OUTPATIENT
Start: 2019-06-25 | End: 2019-06-25

## 2019-06-25 RX ORDER — FENTANYL CITRATE 50 UG/ML
25-50 INJECTION, SOLUTION INTRAMUSCULAR; INTRAVENOUS
Status: DISCONTINUED | OUTPATIENT
Start: 2019-06-25 | End: 2019-06-25 | Stop reason: HOSPADM

## 2019-06-25 RX ORDER — VECURONIUM BROMIDE 1 MG/ML
INJECTION, POWDER, LYOPHILIZED, FOR SOLUTION INTRAVENOUS PRN
Status: DISCONTINUED | OUTPATIENT
Start: 2019-06-25 | End: 2019-06-25

## 2019-06-25 RX ADMIN — VECURONIUM BROMIDE 2 MG: 1 INJECTION, POWDER, LYOPHILIZED, FOR SOLUTION INTRAVENOUS at 09:46

## 2019-06-25 RX ADMIN — HYDROMORPHONE HYDROCHLORIDE 0.5 MG: 1 INJECTION, SOLUTION INTRAMUSCULAR; INTRAVENOUS; SUBCUTANEOUS at 09:24

## 2019-06-25 RX ADMIN — PROPOFOL 50 MG: 10 INJECTION, EMULSION INTRAVENOUS at 09:02

## 2019-06-25 RX ADMIN — GLYCOPYRROLATE 0.8 MG: 0.2 INJECTION, SOLUTION INTRAMUSCULAR; INTRAVENOUS at 11:02

## 2019-06-25 RX ADMIN — OXYCODONE HYDROCHLORIDE 5 MG: 5 TABLET ORAL at 13:00

## 2019-06-25 RX ADMIN — Medication 15 MG: at 10:28

## 2019-06-25 RX ADMIN — DEXMEDETOMIDINE HYDROCHLORIDE 0.5 MCG/KG/HR: 100 INJECTION, SOLUTION INTRAVENOUS at 09:02

## 2019-06-25 RX ADMIN — MIDAZOLAM 2 MG: 1 INJECTION INTRAMUSCULAR; INTRAVENOUS at 08:56

## 2019-06-25 RX ADMIN — HYDROMORPHONE HYDROCHLORIDE 0.5 MG: 1 INJECTION, SOLUTION INTRAMUSCULAR; INTRAVENOUS; SUBCUTANEOUS at 11:50

## 2019-06-25 RX ADMIN — PROPOFOL 250 MG: 10 INJECTION, EMULSION INTRAVENOUS at 08:58

## 2019-06-25 RX ADMIN — FENTANYL CITRATE 50 MCG: 50 INJECTION, SOLUTION INTRAMUSCULAR; INTRAVENOUS at 13:37

## 2019-06-25 RX ADMIN — FENTANYL CITRATE 100 MCG: 50 INJECTION, SOLUTION INTRAMUSCULAR; INTRAVENOUS at 08:57

## 2019-06-25 RX ADMIN — ONDANSETRON 4 MG: 2 INJECTION INTRAMUSCULAR; INTRAVENOUS at 10:55

## 2019-06-25 RX ADMIN — FENTANYL CITRATE 50 MCG: 50 INJECTION, SOLUTION INTRAMUSCULAR; INTRAVENOUS at 13:58

## 2019-06-25 RX ADMIN — ROCURONIUM BROMIDE 50 MG: 10 INJECTION INTRAVENOUS at 08:59

## 2019-06-25 RX ADMIN — HYDROXYZINE HYDROCHLORIDE 25 MG: 25 TABLET, FILM COATED ORAL at 13:40

## 2019-06-25 RX ADMIN — PHENYLEPHRINE HYDROCHLORIDE 50 MCG: 10 INJECTION INTRAVENOUS at 10:46

## 2019-06-25 RX ADMIN — CEFAZOLIN SODIUM 2 G: 2 INJECTION, SOLUTION INTRAVENOUS at 09:06

## 2019-06-25 RX ADMIN — FENTANYL CITRATE 50 MCG: 50 INJECTION, SOLUTION INTRAMUSCULAR; INTRAVENOUS at 09:50

## 2019-06-25 RX ADMIN — ACETAMINOPHEN 975 MG: 325 TABLET, FILM COATED ORAL at 06:54

## 2019-06-25 RX ADMIN — VECURONIUM BROMIDE 2 MG: 1 INJECTION, POWDER, LYOPHILIZED, FOR SOLUTION INTRAVENOUS at 10:39

## 2019-06-25 RX ADMIN — DEXAMETHASONE SODIUM PHOSPHATE 4 MG: 4 INJECTION, SOLUTION INTRA-ARTICULAR; INTRALESIONAL; INTRAMUSCULAR; INTRAVENOUS; SOFT TISSUE at 09:17

## 2019-06-25 RX ADMIN — LIDOCAINE HYDROCHLORIDE 100 MG: 20 INJECTION, SOLUTION INFILTRATION; PERINEURAL at 08:58

## 2019-06-25 RX ADMIN — NEOSTIGMINE METHYLSULFATE 4 MG: 1 INJECTION, SOLUTION INTRAVENOUS at 11:02

## 2019-06-25 RX ADMIN — SODIUM CHLORIDE, POTASSIUM CHLORIDE, SODIUM LACTATE AND CALCIUM CHLORIDE: 600; 310; 30; 20 INJECTION, SOLUTION INTRAVENOUS at 08:54

## 2019-06-25 RX ADMIN — SODIUM CHLORIDE, POTASSIUM CHLORIDE, SODIUM LACTATE AND CALCIUM CHLORIDE: 600; 310; 30; 20 INJECTION, SOLUTION INTRAVENOUS at 10:46

## 2019-06-25 RX ADMIN — Medication 15 MG: at 10:10

## 2019-06-25 RX ADMIN — VECURONIUM BROMIDE 2 MG: 1 INJECTION, POWDER, LYOPHILIZED, FOR SOLUTION INTRAVENOUS at 10:13

## 2019-06-25 ASSESSMENT — MIFFLIN-ST. JEOR: SCORE: 1787.62

## 2019-06-25 ASSESSMENT — LIFESTYLE VARIABLES: TOBACCO_USE: 1

## 2019-06-25 NOTE — ANESTHESIA PREPROCEDURE EVALUATION
Anesthesia Pre-Procedure Evaluation    Patient: Tonio Alarcon   MRN: [id number] : 1980          Preoperative Diagnosis: CERVICAL RIDICULOPATHY    Procedure(s):  C 5-7 ARTHROPLASTY ( ELECTRIC BED ; AARON ; MIDAS ORIANA ; LEICA MICROSCOPE ; LDR MOBI C )^    Past Medical History:   Diagnosis Date     Depressive disorder      Ulcer, gastric, acute      Past Surgical History:   Procedure Laterality Date     ABDOMEN SURGERY       GI SURGERY      Hemorrhoids      HEMORRHOID SURGERY       INCISION AND DRAINAGE PERINEAL, COMBINED N/A 2018    Procedure: COMBINED INCISION AND DRAINAGE PERINEAL;   Examination of perineal abcess under anethesia, Incision and Drainage Perineal Abscess;  Surgeon: Solo Valero MD;  Location: [facility]     INJECT EPIDURAL TRANSFORAMINAL LUMBAR / SACRAL EA ADDITIONAL LEVEL N/A 3/4/2019    Procedure: Cervical Epidural Steroid Injection, Cervical 7-Thoracic 1 Interlaminar;  Surgeon: Nolan Quiroz MD;  [facility]     LAPAROSCOPIC HERNIORRHAPHY EPIGASTRIC N/A 10/11/2017    Procedure: LAPAROSCOPIC HERNIORRHAPHY EPIGASTRIC;  Exploratory Laparoscopy, Exploratory Laparotomy, EGD, Omental Patch, Upper Endoscopy;  Surgeon: Celio Keyes MD;  [facility]: [facility] OR       Anesthesia Evaluation     . Pt has had prior anesthetic.     No history of anesthetic complications          ROS/MED HX    ENT/Pulmonary:     (+)tobacco use, , . .   (-) sleep apnea   Neurologic:     (+)other neuro Cervical radiculopathy    Cardiovascular:         METS/Exercise Tolerance:     Hematologic:         Musculoskeletal:         GI/Hepatic:        (-) GERD   Renal/Genitourinary:         Endo:         Psychiatric:         Infectious Disease:         Malignancy:         Other:    (+) H/O Chronic Pain,                        Physical Exam  Normal systems: cardiovascular, pulmonary and dental    Airway   Mallampati: III  TM distance: >3 FB  Neck ROM: full    Dental     Cardiovascular   Rhythm and  "rate: regular and normal      Pulmonary    breath sounds clear to auscultation            Lab Results   Component Value Date    WBC 5.7 06/24/2019    HGB 15.9 06/24/2019    HCT 46.6 06/24/2019     06/24/2019    CRP <2.9 03/28/2019    SED 4 04/17/2017     06/08/2018    POTASSIUM 3.8 06/08/2018    CHLORIDE 109 06/08/2018    CO2 27 06/08/2018    BUN 11 06/08/2018    CR 0.81 06/08/2018    GLC 94 06/08/2018    MIMI 8.6 06/08/2018    PHOS 3.1 04/22/2017    MAG 2.1 04/22/2017    ALBUMIN 4.1 05/06/2018    PROTTOTAL 8.4 05/06/2018    ALT 14 05/06/2018    AST 22 05/06/2018    ALKPHOS 93 05/06/2018    BILITOTAL 0.5 05/06/2018    LIPASE 110 05/06/2018    TSH 0.81 04/22/2017       Preop Vitals  BP Readings from Last 3 Encounters:   06/25/19 (!) 134/99   06/24/19 (!) 135/98   06/18/19 122/81    Pulse Readings from Last 3 Encounters:   06/25/19 81   06/24/19 96   06/18/19 101      Resp Readings from Last 3 Encounters:   06/25/19 18   03/28/19 21   03/04/19 16    SpO2 Readings from Last 3 Encounters:   06/25/19 100%   06/24/19 100%   06/18/19 100%      Temp Readings from Last 1 Encounters:   06/25/19 36.3  C (97.4  F) (Oral)    Ht Readings from Last 1 Encounters:   06/25/19 1.803 m (5' 11\")      Wt Readings from Last 1 Encounters:   06/25/19 85 kg (187 lb 8 oz)    Estimated body mass index is 26.15 kg/m  as calculated from the following:    Height as of this encounter: 1.803 m (5' 11\").    Weight as of this encounter: 85 kg (187 lb 8 oz).       Anesthesia Plan      History & Physical Review  History and physical reviewed and following examination; no interval change.    ASA Status:  2 .    NPO Status:  > 8 hours    Plan for General and ETT with Intravenous induction. Maintenance will be Balanced.    PONV prophylaxis:  Ondansetron (or other 5HT-3) and Dexamethasone or Solumedrol  Additional equipment: Videolaryngoscope Precedex gtt      Postoperative Care  Postoperative pain management:  IV analgesics and Oral pain " medications.      Consents  Anesthetic plan, risks, benefits and alternatives discussed with:  Patient..                 Mel Amaral MD, MD

## 2019-06-25 NOTE — OP NOTE
DATE:  6/25/19  SURGEON:  Aaron Craft MD       ASSISTANT:  TERESA Travis   Note: Dylan Quevedo was present for and assisted with the entire surgery and his/her role as an assistant was crucial for aid in positioning, exposure, suctioning, retraction and closure.       PREOPERATIVE DIAGNOSIS:  Cervical radiculopathy.       POSTOPERATIVE DIAGNOSIS:  Cervical radiculopathy.       PROCEDURES:   1.  Cervical 5-6 anterior diskectomy and bilateral foraminal decompression.  2.  Cervical 5-6 preparation of disk space for arthroplasty.   3.  Cervical 5-6 insertion of LDR Mobi-C artificial disk.   4.  Cervical 6-7 anterior diskectomy and bilateral foraminal decompression.   5.  Cervical 6-7 preparation of disk space for arthroplasty.   6.  Cervical 6-7 insertion of LDR Mobi-C artificial disk.   7.  Use of intraoperative microscope and fluoroscopy.       ESTIMATED BLOOD LOSS:  50 mL.       INDICATIONS FOR PROCEDURE:  38-year-old male with intractable left arm pain which failed to improve with conservative management.  Imaging showed C5-6, C6-7 severe foraminal stenosis.  Risks, benefits, indications and alternatives were discussed with the patient and her family in detail.  All their questions answered and they wished to proceed with surgery.       DESCRIPTION OF PROCEDURE:  The patient was positioned supine.  Our sterile prepping and draping procedures were performed.  Antibiotics were administered and timeout was performed.  The right-sided horizontal neck incision was planned.  The 10 blade was used to open the incision and the monopolar was used to come down on the platysma and divide the platysma.  The Metzenbaum scissors used to create a plane in the investing fascia medial to the sternocleidomastoid muscle.  Blunt dissection was used to come down upon the anterior cervical spine.  The spinal needle was used to verify the correct level.  The monopolar was used to elevate the longus colli the Trimline retractor was  inserted.  Westmoreland pins were inserted.  The #15 blade was used to perform an annulotomy at C6-7 and a complete diskectomy was performed with a combination of curets, pituitary rongeurs and Kerrison rongeurs.  The nerve hook used to work under the posterior longitudinal ligament and the posterior longitudinal ligament was removed with the Kerrison rongeurs and the bilateral foramina were decompressed with the Kerrison rongeurs.  Hemostasis was achieved with Surgiflo.  The LDR Mobi-C graft was inserted into the disk space at C6-7.  Next, the #15 blade was used to perform an annulotomy at C5-6 and a complete diskectomy was performed with a combination of curets, pituitary rongeurs and Kerrison rongeurs.  The nerve hook used to work under the posterior longitudinal ligament and the posterior longitudinal ligament was removed with the Kerrison rongeurs and the bilateral foramina were decompressed with the Kerrison rongeurs.  Hemostasis was achieved with Surgiflo.  The LDR Mobi-C graft was inserted into the disk space at C5-6.  Fluoroscopy demonstrated excellent positioning.  Antibiotic irrigation was performed, hemostasis was achieved.  The platysma and dermal layer were closed with 3-0 Vicryl sutures and the skin was closed with a running subcuticular stitch.

## 2019-06-25 NOTE — ANESTHESIA POSTPROCEDURE EVALUATION
Patient: Tonio Alarcon    Procedure(s):  CERVICAL 5-7 ARTHROPLASTY    Diagnosis:CERVICAL RIDICULOPATHY  Diagnosis Additional Information: No value filed.    Anesthesia Type:  General, ETT    Note:  Anesthesia Post Evaluation    Patient location during evaluation: PACU  Patient participation: Able to fully participate in evaluation  Level of consciousness: awake  Pain management: adequate  Airway patency: patent  Cardiovascular status: acceptable  Respiratory status: acceptable  Hydration status: acceptable  PONV: none     Anesthetic complications: None          Last vitals:  Vitals:    06/25/19 1330 06/25/19 1350 06/25/19 1410   BP: 118/79 118/82 122/82   Pulse:      Resp: 16 16 16   Temp:      SpO2: 99% 99% 98%         Electronically Signed By: Mel Amaral MD, MD  June 25, 2019  4:08 PM

## 2019-06-25 NOTE — DISCHARGE INSTRUCTIONS
Same Day Surgery Discharge Instructions for  Sedation and General Anesthesia       It's not unusual to feel dizzy, light-headed or faint for up to 24 hours after surgery or while taking pain medication.  If you have these symptoms: sit for a few minutes before standing and have someone assist you when you get up to walk or use the bathroom.      You should rest and relax for the next 24 hours. We recommend you make arrangements to have an adult stay with you for at least 24 hours after your discharge.  Avoid hazardous and strenuous activity.      DO NOT DRIVE any vehicle or operate mechanical equipment for 24 hours following the end of your surgery.  Even though you may feel normal, your reactions may be affected by the medication you have received.      Do not drink alcoholic beverages for 24 hours following surgery.       Slowly progress to your regular diet as you feel able. It's not unusual to feel nauseated and/or vomit after receiving anesthesia.  If you develop these symptoms, drink clear liquids (apple juice, ginger ale, broth, 7-up, etc. ) until you feel better.  If your nausea and vomiting persists for 24 hours, please notify your surgeon.        All narcotic pain medications, along with inactivity and anesthesia, can cause constipation. Drinking plenty of liquids and increasing fiber intake will help.      For any questions of a medical nature, call your surgeon.      Do not make important decisions for 24 hours.      If you had general anesthesia, you may have a sore throat for a couple of days related to the breathing tube used during surgery.  You may use Cepacol lozenges to help with this discomfort.  If it worsens or if you develop a fever, contact your surgeon.       If you feel your pain is not well managed with the pain medications prescribed by your surgeon, please contact your surgeon's office to let them know so they can address your concerns.

## 2019-06-25 NOTE — ANESTHESIA CARE TRANSFER NOTE
Patient: Tonio Alarcon    Procedure(s):  CERVICAL 5-7 ARTHROPLASTY    Diagnosis: CERVICAL RIDICULOPATHY  Diagnosis Additional Information: No value filed.    Anesthesia Type:   General, ETT     Note:  Airway :Face Mask  Patient transferred to:PACU  Comments: Patient awake, extubated, and transferred to PACU. VS stable and report given to RN. Handoff Report: Identifed the Patient, Identified the Reponsible Provider, Reviewed the pertinent medical history, Discussed the surgical course, Reviewed Intra-OP anesthesia mangement and issues during anesthesia, Set expectations for post-procedure period and Allowed opportunity for questions and acknowledgement of understanding      Vitals: (Last set prior to Anesthesia Care Transfer)    CRNA VITALS  6/25/2019 1045 - 6/25/2019 1120      6/25/2019             Resp Rate (set):  10                Electronically Signed By: ISABEL Reeves CRNA  June 25, 2019  11:20 AM

## 2019-06-25 NOTE — BRIEF OP NOTE
[facility]    Brief Operative Note    Pre-operative diagnosis: CERVICAL RADICULOPATHY  Post-operative diagnosis SAME  Procedure: Procedure(s):  CERVICAL 5-7 ARTHROPLASTY  Surgeon: Surgeon(s) and Role:     * Aaron Craft MD - Primary     * Dylan Quevedo PA-C - Assisting  Anesthesia: General   Estimated blood loss: 25 mL  Drains: None  Specimens: * No specimens in log *  Findings:   None.  Complications: None.  Implants:    Implant Name Type Inv. Item Serial No.  Lot No. LRB No. Used   IMP DISC LDR MOBI-C CERVICAL [id number] H6 US [id number] Total Joint Component/Insert IMP DISC LDR MOBI-C CERVICAL [id number] H6 US [id number]  LDR SPINE [id number] N/A 1   IMP DISC LDR MOBI-C CERVICAL [id number] H5 US [id number] Total Joint Component/Insert IMP DISC LDR MOBI-C CERVICAL [id number] H5 US [id number]  LDR SPINE [id number] N/A 1          Dylan Quevedo PA-C  [facility]

## 2019-06-25 NOTE — OR NURSING
In PACU pt c/o pain increase 7/10- Dr dickson called- admin 50 mcg fentanyl Q 10 PRN- admin total of 100 mcg fentanyl IV - also admin mm relaxers- pain control effective- tolerable 3/10. Resting quietly.

## 2019-06-27 ENCOUNTER — TELEPHONE (OUTPATIENT)
Dept: NEUROSURGERY | Facility: CLINIC | Age: 39
End: 2019-06-27

## 2019-06-27 NOTE — TELEPHONE ENCOUNTER
Post op call -Called and LM. Patient is S/P C5-7 arthroplasty on 6/25/19. Advised to call back with any questions or concerns.

## 2019-07-09 ENCOUNTER — DOCUMENTATION ONLY (OUTPATIENT)
Dept: NEUROSURGERY | Facility: CLINIC | Age: 39
End: 2019-07-09

## 2019-07-09 NOTE — PROGRESS NOTES
7/9/2019    [employer] Forms: yes    Faxed: [phone]     Type of form: Certification of Health Care Provider for spouse.     Placed a copy in the bin and sent the original to medical records

## 2019-07-10 ENCOUNTER — TELEPHONE (OUTPATIENT)
Dept: NEUROSURGERY | Facility: CLINIC | Age: 39
End: 2019-07-10

## 2019-07-10 NOTE — TELEPHONE ENCOUNTER
Follow up call completed per Dr Craft request. Unable to reach patient. His phone number is no longer in service. Called and spoke to EC and Wife Destiny. She stated that patient is doing well and had no concerns regarding his post op care. She stated that we can update Ray's number with her number for now. Number updated in chart. Informed Destiny that Dr Craft would like to see patient for 6 week follow up rather than [facility]. He is currently scheduled 8/6/19. She will have ray call back to r/s that appt with Dr Craft instead.    Also, Destiny would like her FMLA/STD PW updated to reflect continuous leave from 6/25/19-7/6/19 and start intermittent leave to care for her  when needed from 7/10/19-8/2/19. She will have the new PW faxed over today.

## 2019-07-12 ENCOUNTER — TELEPHONE (OUTPATIENT)
Dept: FAMILY MEDICINE | Facility: CLINIC | Age: 39
End: 2019-07-12

## 2019-07-12 DIAGNOSIS — M54.12 CERVICAL RADICULOPATHY: ICD-10-CM

## 2019-07-12 NOTE — TELEPHONE ENCOUNTER
Routed to covering providers as patent is out of medication.        Requested Prescriptions   Pending Prescriptions Disp Refills     diazepam (VALIUM) 5 MG tablet 15 tablet 1     Sig: Take 1 tablet (5 mg) by mouth nightly as needed for anxiety       There is no refill protocol information for this order        Last refill 4/17//19 # 15 with one refill  Last OV 6/24/19    Marlene Deluca, RN [facility] Triage.

## 2019-07-12 NOTE — TELEPHONE ENCOUNTER
Reason for Call:  Medication or medication refill:    Do you use a [facility]?  Name of the pharmacy and phone number for the current request:  [facility] [id number] [place] [facility] [place], [place] - [address]    Name of the medication requested: diazepam (VALIUM) 5 MG tablet    Other request: Please call patient when the prescription is sent to pharmacy.  Patient states he is all out of this medication (Pharmacy told him to call)    Can we leave a detailed message on this number? YES    Phone number patient can be reached at: Home number on file [phone] (home)    Best Time: [phone]      Call taken on 7/12/2019 at 2:55 PM by Yelitza Elizondo

## 2019-07-16 ENCOUNTER — TELEPHONE (OUTPATIENT)
Dept: NEUROSURGERY | Facility: CLINIC | Age: 39
End: 2019-07-16

## 2019-07-16 ENCOUNTER — DOCUMENTATION ONLY (OUTPATIENT)
Dept: NEUROSURGERY | Facility: CLINIC | Age: 39
End: 2019-07-16

## 2019-07-16 RX ORDER — DIAZEPAM 5 MG
5 TABLET ORAL
Qty: 15 TABLET | Refills: 0 | Status: SHIPPED | OUTPATIENT
Start: 2019-07-16 | End: 2020-02-03

## 2019-07-16 NOTE — TELEPHONE ENCOUNTER
1 refill on the diazepam was sent to the pharmacy.  Please call patient to let him know as requested.    Christiano Palacios MD

## 2019-07-16 NOTE — TELEPHONE ENCOUNTER
Reached out to follow up to see how patient is doing per Dr Craft request.     LM for patient to call back to speak to RN.     Dr Craft would like an update on patient.

## 2019-07-16 NOTE — PROGRESS NOTES
7/16/2019    [employer] Forms: yes    Faxed: [phone]     Type of form: FMLA forms for patients wife to take intermittent FMLA to care for him.     Placed a copy in the bin and sent the original to medical records

## 2019-07-19 NOTE — TELEPHONE ENCOUNTER
Attempted to reach patient again to follow-up to see how he is doing post op.     Left message. Awaiting call back.

## 2019-08-16 NOTE — TELEPHONE ENCOUNTER
Called patient to follow-up on post op recovery.     No answer. LVM advising patient to call back.     Patient does have post op appt scheduled with Damaris EASTMAN on 9/26/19. Patient did not show for 6 week post op appt.     Once patient calls back, I think recommend we move his appt to a day Dr. Craft is also at .

## 2019-08-22 NOTE — TELEPHONE ENCOUNTER
Attempted to contact patient again for a post op follow up, no answer, left message asking patient to return call to clinic.    No other number listed for patient or EC.

## 2019-09-26 DIAGNOSIS — Z98.1 STATUS POST CERVICAL SPINAL ARTHRODESIS: Primary | ICD-10-CM

## 2019-10-22 ENCOUNTER — OFFICE VISIT (OUTPATIENT)
Dept: FAMILY MEDICINE | Facility: CLINIC | Age: 39
End: 2019-10-22
Payer: COMMERCIAL

## 2019-10-22 VITALS
WEIGHT: 204 LBS | BODY MASS INDEX: 28.45 KG/M2 | TEMPERATURE: 98 F | DIASTOLIC BLOOD PRESSURE: 70 MMHG | HEART RATE: 82 BPM | SYSTOLIC BLOOD PRESSURE: 115 MMHG | OXYGEN SATURATION: 100 %

## 2019-10-22 DIAGNOSIS — Z98.890 S/P CERVICAL DISC REPLACEMENT: ICD-10-CM

## 2019-10-22 DIAGNOSIS — M50.30 DDD (DEGENERATIVE DISC DISEASE), CERVICAL: ICD-10-CM

## 2019-10-22 DIAGNOSIS — G90.01 CAROTIDYNIA: Primary | ICD-10-CM

## 2019-10-22 PROBLEM — S10.93XA HEMATOMA OF NECK, INITIAL ENCOUNTER: Status: ACTIVE | Noted: 2019-06-26

## 2019-10-22 PROCEDURE — 99214 OFFICE O/P EST MOD 30 MIN: CPT | Performed by: FAMILY MEDICINE

## 2019-10-22 RX ORDER — CYCLOBENZAPRINE HCL 10 MG
10 TABLET ORAL AT BEDTIME
Qty: 30 TABLET | Refills: 3 | Status: SHIPPED | OUTPATIENT
Start: 2019-10-22 | End: 2020-02-03

## 2019-10-22 RX ORDER — PREDNISONE 20 MG/1
20 TABLET ORAL 2 TIMES DAILY
Qty: 14 TABLET | Refills: 0 | Status: SHIPPED | OUTPATIENT
Start: 2019-10-22 | End: 2020-02-03

## 2019-10-22 RX ORDER — OXYCODONE HYDROCHLORIDE 5 MG/1
5-10 TABLET ORAL EVERY 4 HOURS PRN
Qty: 30 TABLET | Refills: 0 | Status: SHIPPED | OUTPATIENT
Start: 2019-10-22 | End: 2020-02-03

## 2019-10-22 ASSESSMENT — PAIN SCALES - GENERAL: PAINLEVEL: SEVERE PAIN (6)

## 2019-10-22 NOTE — PROGRESS NOTES
Subjective     Tonio Alarcon is a 38 year old male who presents to clinic today for the following health issues:    HPI   Follow up neck pain      Duration: Last year    Description (location/character/radiation): Neck pain both sides    Intensity:  6/10    Accompanying signs and symptoms: Tightness, sharp shooting pains, pain is going into jaw and left shoulder sometimes    History (similar episodes/previous evaluation): Surgery    Precipitating or alleviating factors: None    Therapies tried and outcome: Rx meds     Declined flu shot.  Charisse Duque MA    Patient comes in today for follow-up.  He had C5-6 and C6-7 anterior disc replacement surgery in June that was subsequently complicated by a large hematoma developing in the neck secondary to an injury to the right vertebral artery.  The swelling has improved.  Recent angiogram showed that the vertebral artery was patent with a small amount of narrowing at the site of the repair.  Since that time he has had some intermittent discomfort in the right neck beginning underneath the angle of the mandible and radiating up into the jaw.  This is intermittent in nature.  He notices it primarily at rest but it can occur with activity although not routinely associated with it.  Can last up to 1 hour when it is bothering him.  He also has some more left-sided neck discomfort radiating to the shoulder.  The symptoms of radiculopathy going down the left arm have improved.  He takes Tylenol which she does find somewhat helpful for the pain.  Both areas of pain are more intermittent.  He cannot take nonsteroidal anti-inflammatories because he has had a perforated gastric ulcer that was worsened by use of nonsteroidal anti-inflammatories.      Patient Active Problem List   Diagnosis     CARDIOVASCULAR SCREENING; LDL GOAL LESS THAN 160     Chronic low back pain     Neck pain     Hematoma of neck, initial encounter     Past Surgical History:   Procedure Laterality Date      ABDOMEN SURGERY       GI SURGERY      Hemorrhoids      HEMORRHOID SURGERY  2015     INCISION AND DRAINAGE PERINEAL, COMBINED N/A 6/8/2018    Procedure: COMBINED INCISION AND DRAINAGE PERINEAL;   Examination of perineal abcess under anethesia, Incision and Drainage Perineal Abscess;  Surgeon: Solo Valero MD;  [facility]     INJECT EPIDURAL TRANSFORAMINAL LUMBAR / SACRAL EA ADDITIONAL LEVEL N/A 3/4/2019    Procedure: Cervical Epidural Steroid Injection, Cervical 7-Thoracic 1 Interlaminar;  Surgeon: Nolan Quiroz MD;  [facility]     LAPAROSCOPIC HERNIORRHAPHY EPIGASTRIC N/A 10/11/2017    Procedure: LAPAROSCOPIC HERNIORRHAPHY EPIGASTRIC;  Exploratory Laparoscopy, Exploratory Laparotomy, EGD, Omental Patch, Upper Endoscopy;  Surgeon: Celio Keyes MD;  [facility]     REPLACE DISK LUMBAR N/A 6/25/2019    Procedure: CERVICAL 5-7 ARTHROPLASTY;  Surgeon: Aaron Craft MD;  Location: [facility] OR       Social History     Tobacco Use     Smoking status: Current Every Day Smoker     Packs/day: 1.00     Years: 25.00     Pack years: 25.00     Smokeless tobacco: Never Used   Substance Use Topics     Alcohol use: Yes     Comment: heavy drinking about 2 x per week     Family History   Problem Relation Age of Onset     Diabetes Mother      Breast Cancer Mother         Dx at 61 years aol     Coronary Artery Disease Mother         3v CABG     Cancer Father         Stomach ca - Dx at 57 years old and passed away from this     Cancer - colorectal Maternal Grandmother      Diabetes Brother          Current Outpatient Medications   Medication Sig Dispense Refill     Acetaminophen (TYLENOL ARTHRITIS PAIN PO)        cyclobenzaprine (FLEXERIL) 10 MG tablet Take 1 tablet (10 mg) by mouth At Bedtime 30 tablet 3     diazepam (VALIUM) 5 MG tablet Take 1 tablet (5 mg) by mouth nightly as needed for anxiety 15 tablet 0     oxyCODONE (ROXICODONE) 5 MG tablet Take 1-2 tablets (5-10 mg) by mouth every 4 hours  as needed for moderate to severe pain 30 tablet 0     pantoprazole (PROTONIX) 40 MG EC tablet Take 1 tablet (40 mg) by mouth daily 90 tablet 3     predniSONE (DELTASONE) 20 MG tablet Take 1 tablet (20 mg) by mouth 2 times daily for 7 days 14 tablet 0     No Known Allergies    Reviewed and updated as needed this visit by Provider  Tobacco  Problems  Med Hx  Surg Hx  Soc Hx        Review of Systems   ROS COMP: Constitutional, HEENT, cardiovascular, pulmonary, gi and gu systems are negative, except as otherwise noted.      Objective    /70 (BP Location: Right arm, Patient Position: Sitting, Cuff Size: Adult Large)   Pulse 82   Temp 98  F (36.7  C) (Oral)   Wt 92.5 kg (204 lb)   SpO2 100%   BMI 28.45 kg/m    Body mass index is 28.45 kg/m .  Physical Exam   GENERAL: healthy, alert and no distress  EYES: Eyes grossly normal to inspection, PERRL and conjunctivae and sclerae normal  HENT: ear canals and TM's normal, nose and mouth without ulcers or lesions  NECK: no adenopathy, no asymmetry, masses, or scars and thyroid normal to palpation  NECK: There is focal tenderness over the carotid pulse on the right side that reproduces the patient's symptoms and correlates with the location of his pain.  RESP: lungs clear to auscultation - no rales, rhonchi or wheezes  CV: regular rate and rhythm, normal S1 S2, no S3 or S4, no murmur, click or rub, no peripheral edema and peripheral pulses strong  MS: no gross musculoskeletal defects noted, no edema  SKIN: no suspicious lesions or rashes  NEURO: Normal strength and tone, mentation intact and speech normal  PSYCH: mentation appears normal, affect normal/bright    Diagnostic Test Results:  Labs reviewed in Epic        Assessment & Plan     1. Carotidynia  It appears he may have some symptoms suggestive of carotidynia at this time.  Aspirin would be contraindicated so I suggested a short course of prednisone to see if this would be helpful.  Reviewed with the patient  "that frequently this is a self-limited condition and could have developed as a complication of the hematoma due to may be irritation of the nerves traveling along the carotid artery.  - predniSONE (DELTASONE) 20 MG tablet; Take 1 tablet (20 mg) by mouth 2 times daily for 7 days  Dispense: 14 tablet; Refill: 0    2. S/P cervical disc replacement  He also had some cervical disc degenerative disease and facet hypertrophy consistent with arthritis.  I suspect that is the current cause of the left-sided neck pain radiating into the shoulder.  NSAIDs are relatively contraindicated so I suggested Tylenol for most of the pain and that if his pain achieves a more significant threshold then a prescription was issued for oxycodone.  He did get a prescription for this as well in June and and it lasted about 4 months.  We talked specifically about the risk for dependency with use of this type of medication on a regular basis.  - cyclobenzaprine (FLEXERIL) 10 MG tablet; Take 1 tablet (10 mg) by mouth At Bedtime  Dispense: 30 tablet; Refill: 3  - oxyCODONE (ROXICODONE) 5 MG tablet; Take 1-2 tablets (5-10 mg) by mouth every 4 hours as needed for moderate to severe pain  Dispense: 30 tablet; Refill: 0    3. DDD (degenerative disc disease), cervical  As above.       Tobacco Cessation:   reports that he has been smoking. He has a 25.00 pack-year smoking history. He has never used smokeless tobacco.  Tobacco Cessation Action Plan: Information offered: Patient not interested at this time      BMI:   Estimated body mass index is 28.45 kg/m  as calculated from the following:    Height as of 6/25/19: 1.803 m (5' 11\").    Weight as of this encounter: 92.5 kg (204 lb).   Weight management plan: Discussed healthy diet and exercise guidelines            Return in about 3 months (around 1/22/2020) for BP Recheck.    Christiano Palacios MD  [facility]"

## 2020-02-03 ENCOUNTER — ANCILLARY PROCEDURE (OUTPATIENT)
Dept: GENERAL RADIOLOGY | Facility: CLINIC | Age: 40
End: 2020-02-03
Attending: FAMILY MEDICINE
Payer: COMMERCIAL

## 2020-02-03 ENCOUNTER — OFFICE VISIT (OUTPATIENT)
Dept: FAMILY MEDICINE | Facility: CLINIC | Age: 40
End: 2020-02-03
Payer: COMMERCIAL

## 2020-02-03 VITALS
DIASTOLIC BLOOD PRESSURE: 109 MMHG | HEART RATE: 102 BPM | WEIGHT: 210 LBS | TEMPERATURE: 98.3 F | HEIGHT: 71 IN | OXYGEN SATURATION: 99 % | BODY MASS INDEX: 29.4 KG/M2 | SYSTOLIC BLOOD PRESSURE: 150 MMHG

## 2020-02-03 DIAGNOSIS — M54.12 CERVICAL RADICULOPATHY: ICD-10-CM

## 2020-02-03 DIAGNOSIS — S62.653A CLOSED NONDISPLACED FRACTURE OF MIDDLE PHALANX OF LEFT MIDDLE FINGER, INITIAL ENCOUNTER: ICD-10-CM

## 2020-02-03 DIAGNOSIS — Z98.890 S/P CERVICAL DISC REPLACEMENT: ICD-10-CM

## 2020-02-03 DIAGNOSIS — S10.93XA HEMATOMA OF NECK, INITIAL ENCOUNTER: Primary | ICD-10-CM

## 2020-02-03 DIAGNOSIS — M54.2 NECK PAIN: ICD-10-CM

## 2020-02-03 DIAGNOSIS — M79.645 PAIN OF FINGER OF LEFT HAND: ICD-10-CM

## 2020-02-03 PROCEDURE — 73140 X-RAY EXAM OF FINGER(S): CPT | Mod: LT

## 2020-02-03 PROCEDURE — 99214 OFFICE O/P EST MOD 30 MIN: CPT | Performed by: FAMILY MEDICINE

## 2020-02-03 RX ORDER — DIAZEPAM 5 MG
5 TABLET ORAL
Qty: 15 TABLET | Refills: 0 | Status: SHIPPED | OUTPATIENT
Start: 2020-02-03 | End: 2020-03-04

## 2020-02-03 RX ORDER — OXYCODONE HYDROCHLORIDE 5 MG/1
5-10 TABLET ORAL EVERY 4 HOURS PRN
Qty: 30 TABLET | Refills: 0 | Status: SHIPPED | OUTPATIENT
Start: 2020-02-03 | End: 2020-02-03

## 2020-02-03 ASSESSMENT — PAIN SCALES - GENERAL: PAINLEVEL: EXTREME PAIN (8)

## 2020-02-03 ASSESSMENT — MIFFLIN-ST. JEOR: SCORE: 1884.68

## 2020-02-03 NOTE — PROGRESS NOTES
Subjective     Tonio Alarcon is a 39 year old male who presents to clinic today for the following health issues:    HPI   Chronic/Recurring Back Pain Follow Up      Where is your back pain located? (Select all that apply) low back left, neck    How would you describe your back pain?  Consistent pain, tingling sometimes    Where does your back pain spread? the left foot    Since your last clinic visit for back pain, how has your pain changed? always present, but gets better and worse    Does your back pain interfere with your job? Not applicable    Since your last visit, have you tried any new treatment? No    How many servings of fruits and vegetables do you eat daily?  0-1    On average, how many sweetened beverages do you drink each day (Examples: soda, juice, sweet tea, etc.  Do NOT count diet or artificially sweetened beverages)?   0    How many days per week do you exercise enough to make your heart beat faster? 3 or less    How many minutes a day do you exercise enough to make your heart beat faster? 30 - 60    How many days per week do you miss taking your medication? 0    Was told to come in for BP check as well.  Jammed middle left finger 2 days ago, would like you to look at it.    Charisse Duque MA    Patient is here for follow-up of his neck issues.  The radicular symptomatology down the left arm has resolved.  He continues to have some achy discomfort radiating into the left shoulder and this was present before the surgery as well.  There is also a little bit on the right side into the right neck.  He also describes more of a continued sensation of tightening and pressure over the sternocleidomastoid muscle on the right side as well as a feeling of pressure in the ear as if it needs to pop.  This is been present after the surgery and developed after the complication of the hematoma in the neck.  He only takes aspirin which was recommended for treatment of the hematoma.  He is not any  over-the-counter pain medications.  He says both the oxycodone and diazepam are helpful and initially I thought he was requesting a refill of the oxycodone but then he suggested that the diazepam is actually more helpful with sleep and muscle relaxation so he would prefer to go with that instead of the oxycodone at this time.    He also hurt his left middle finger 2 days ago jamming it on a ladder at home.  Since then he is noticed significant swelling and discomfort and decreased range of motion.        Patient Active Problem List   Diagnosis     CARDIOVASCULAR SCREENING; LDL GOAL LESS THAN 160     Chronic low back pain     Neck pain     Hematoma of neck, initial encounter     Past Surgical History:   Procedure Laterality Date     ABDOMEN SURGERY       GI SURGERY      Hemorrhoids      HEMORRHOID SURGERY  2015     INCISION AND DRAINAGE PERINEAL, COMBINED N/A 6/8/2018    Procedure: COMBINED INCISION AND DRAINAGE PERINEAL;   Examination of perineal abcess under anethesia, Incision and Drainage Perineal Abscess;  Surgeon: Solo Valero MD;  Location: [facility] OR     INJECT EPIDURAL TRANSFORAMINAL LUMBAR / SACRAL EA ADDITIONAL LEVEL N/A 3/4/2019    Procedure: Cervical Epidural Steroid Injection, Cervical 7-Thoracic 1 Interlaminar;  Surgeon: Nolan Quiroz MD;  Location: [facility]     LAPAROSCOPIC HERNIORRHAPHY EPIGASTRIC N/A 10/11/2017    Procedure: LAPAROSCOPIC HERNIORRHAPHY EPIGASTRIC;  Exploratory Laparoscopy, Exploratory Laparotomy, EGD, Omental Patch, Upper Endoscopy;  Surgeon: Celio Keyes MD;  Location: [facility] OR     REPLACE DISK LUMBAR N/A 6/25/2019    Procedure: CERVICAL 5-7 ARTHROPLASTY;  Surgeon: Aaron Craft MD;  Location:  OR       Social History     Tobacco Use     Smoking status: Current Every Day Smoker     Packs/day: 1.00     Years: 25.00     Pack years: 25.00     Smokeless tobacco: Never Used   Substance Use Topics     Alcohol use: Yes     Comment: heavy drinking about 2 x per week      "Family History   Problem Relation Age of Onset     Diabetes Mother      Breast Cancer Mother         Dx at 61 years aol     Coronary Artery Disease Mother         3v CABG     Cancer Father         Stomach ca - Dx at 57 years old and passed away from this     Cancer - colorectal Maternal Grandmother      Diabetes Brother          Current Outpatient Medications   Medication Sig Dispense Refill     aspirin (ASA) 81 MG tablet Take 81 mg by mouth 2 times daily       diazepam (VALIUM) 5 MG tablet Take 1 tablet (5 mg) by mouth nightly as needed for anxiety 15 tablet 0     pantoprazole (PROTONIX) 40 MG EC tablet Take 1 tablet (40 mg) by mouth daily 90 tablet 3     Acetaminophen (TYLENOL ARTHRITIS PAIN PO)        No Known Allergies    Reviewed and updated as needed this visit by Provider  Meds  Problems         Review of Systems   ROS COMP: Constitutional, HEENT, cardiovascular, pulmonary, gi and gu systems are negative, except as otherwise noted.      Objective    BP (!) 150/109 (BP Location: Right arm, Patient Position: Chair, Cuff Size: Adult Regular)   Pulse 102   Temp 98.3  F (36.8  C) (Oral)   Ht 1.803 m (5' 11\")   Wt 95.3 kg (210 lb)   SpO2 99%   BMI 29.29 kg/m    Body mass index is 29.29 kg/m .  Physical Exam   GENERAL: healthy, alert and no distress  EYES: Eyes grossly normal to inspection, PERRL and conjunctivae and sclerae normal  HENT: ear canals and TM's normal, nose and mouth without ulcers or lesions  NECK: no adenopathy, no asymmetry, masses, or scars and thyroid normal to palpation  RESP: lungs clear to auscultation - no rales, rhonchi or wheezes  CV: regular rate and rhythm, normal S1 S2, no S3 or S4, no murmur, click or rub, no peripheral edema and peripheral pulses strong  MS: Left hand shows an injury to the left middle finger.  There is tenderness over the PIP and DIP joint as well as primarily tenderness and swelling over the middle phalanx.  He has what appears to be a little bit of a " boutonniere deformity as well and I cannot get him to actively flex at the DIP joint.  SKIN: no suspicious lesions or rashes  NEURO: Normal strength and tone, mentation intact and speech normal  NEURO: Normal strength and tone, sensory exam grossly normal, mentation intact, speech normal, cranial nerves 2-12 intact, DTR's normal and symmetric 2+ and gait was normal  PSYCH: mentation appears normal, affect normal/bright    Diagnostic Test Results:  Labs reviewed in Epic  Xray -x-ray left middle finger shows a nondisplaced comminuted fracture of the middle phalanx involving the PIP joint.        Assessment & Plan     1. Hematoma of neck, initial encounter  He had a hematoma of the neck that some right-sided symptoms developed after that.  My suspicion is that they will resolve over time but the prednisone and muscle relaxant issued before were not helpful.  I have asked him to see ENT just for an opinion and for a second opinion to make sure nothing else is going on in this area.  - OTOLARYNGOLOGY REFERRAL    2. Neck pain  The neck pain I believe is most consistent with degenerative disc disease.  I have suggested at this time that he see a nonsurgical spine specialist to review imaging, aid in diagnosis, and help with treatment program.  He really cannot take nonsteroidal anti-inflammatories due to the gastric perforation he had before.  He is also tried Lyrica and gabapentin, tramadol, and oxycodone.  I could not find.  Where he had tried duloxetine but he thought he had.  He is also tried nortriptyline without relief.  - Orthopedic & Spine  Referral; Future    3. S/P cervical disc replacement  As above.    4. Closed nondisplaced fracture of middle phalanx of left middle finger, initial encounter  He needs to see orthopedics for this and a referral was placed.  - XR Finger Left G/E 2 Views; Future  - Orthopedic & Spine  Referral; Future    5. Cervical radiculopathy  I did refill the diazepam  today.  - diazepam (VALIUM) 5 MG tablet; Take 1 tablet (5 mg) by mouth nightly as needed for anxiety  Dispense: 15 tablet; Refill: 0           Return in about 6 weeks (around 3/16/2020) for BP Recheck, Routine Visit.    Christiano Palacios MD  [facility]

## 2020-02-24 NOTE — TELEPHONE ENCOUNTER
DIAGNOSIS: Neck pain [M54.2], MRI from 04/2019 in [facility], Per Pt   APPOINTMENT DATE: 3/10   NOTES STATUS DETAILS   Referring provider Internal Christiano Palacios MD   OFFICE NOTE from other specialist Internal    Care everywhere     [facility]-care everywhere   DISCHARGE SUMMARY from hospital Internal    DISCHARGE REPORT from the ER Internal 3/28/19  9/12/18  5/13/18  5/7/18   OPERATIVE REPORT Internal    MEDICATION LIST Internal    MRI Internal    CT SCAN recieved [facility] image in pacs   XRAYS (IMAGES & REPORTS) Internal/recieved [facility] imaging in pac           Sent fax request to [facility] for imaging 2/24

## 2020-03-03 DIAGNOSIS — M54.12 CERVICAL RADICULOPATHY: ICD-10-CM

## 2020-03-03 NOTE — TELEPHONE ENCOUNTER
diazepam (VALIUM) 5 MG tablet      Last Written Prescription Date:  2/3/2020  Last Fill Quantity: 15 tablet,   # refills: 0  Last Office Visit: 2/3/2020  w/ SHELBY Palacios  Future Office visit:    Next 5 appointments (look out 90 days)    Mar 16, 2020 10:40 AM CDT  SHORT with Christiano [facility]ios MD  [facility] [address] [place] [id number]  [phone]           Routing refill request to provider for review/approval because:  Drug not on the [facility], [facility] or  Health refill protocol or controlled substance

## 2020-03-04 RX ORDER — DIAZEPAM 5 MG
5 TABLET ORAL
Qty: 15 TABLET | Refills: 0 | Status: SHIPPED | OUTPATIENT
Start: 2020-03-04 | End: 2020-04-06

## 2020-03-10 ENCOUNTER — PRE VISIT (OUTPATIENT)
Dept: ORTHOPEDICS | Facility: CLINIC | Age: 40
End: 2020-03-10

## 2020-04-06 DIAGNOSIS — M54.12 CERVICAL RADICULOPATHY: ICD-10-CM

## 2020-04-06 RX ORDER — DIAZEPAM 5 MG
5 TABLET ORAL
Qty: 15 TABLET | Refills: 0 | Status: SHIPPED | OUTPATIENT
Start: 2020-04-06 | End: 2021-04-06

## 2020-04-06 NOTE — TELEPHONE ENCOUNTER
Requested Prescriptions   Pending Prescriptions Disp Refills     diazepam (VALIUM) 5 MG tablet 15 tablet 0     Sig: Take 1 tablet (5 mg) by mouth nightly as needed for anxiety       There is no refill protocol information for this order         Last Written Prescription Date:  3-4-2020  Last Fill Quantity: 15,   # refills: 0  Last Office Visit: 2-3-2020  Future Office visit:       Routing refill request to provider for review/approval because:  Drug not on the [facility] or [facility] refill protocol or controlled substance

## 2020-08-10 DIAGNOSIS — K27.5 PERFORATED PEPTIC ULCER (H): ICD-10-CM

## 2020-08-11 RX ORDER — PANTOPRAZOLE SODIUM 40 MG/1
40 TABLET, DELAYED RELEASE ORAL DAILY
Qty: 90 TABLET | Refills: 0 | Status: SHIPPED | OUTPATIENT
Start: 2020-08-11 | End: 2021-03-21

## 2020-08-11 NOTE — TELEPHONE ENCOUNTER
"Requested Prescriptions   Signed Prescriptions Disp Refills    pantoprazole (PROTONIX) 40 MG EC tablet 90 tablet 0     Sig: Take 1 tablet (40 mg) by mouth daily       PPI Protocol Passed - 8/10/2020  5:24 PM        Passed - Not on Clopidogrel (unless Pantoprazole ordered)        Passed - No diagnosis of osteoporosis on record        Passed - Recent (12 mo) or future (30 days) visit within the authorizing provider's specialty     Patient has had an office visit with the authorizing provider or a provider within the authorizing providers department within the previous 12 mos or has a future within next 30 days. See \"Patient Info\" tab in inbasket, or \"Choose Columns\" in Meds & Orders section of the refill encounter.              Passed - Medication is active on med list        Passed - Patient is age 18 or older             Prescription approved per [facility] Refill Protocol.    Lenore Terry RN    "

## 2021-03-21 DIAGNOSIS — K27.5 PERFORATED PEPTIC ULCER (H): ICD-10-CM

## 2021-03-21 RX ORDER — PANTOPRAZOLE SODIUM 40 MG/1
TABLET, DELAYED RELEASE ORAL
Qty: 90 TABLET | Refills: 0 | Status: SHIPPED | OUTPATIENT
Start: 2021-03-21 | End: 2021-06-28

## 2021-04-06 ENCOUNTER — HOSPITAL ENCOUNTER (OUTPATIENT)
Facility: AMBULATORY SURGERY CENTER | Age: 41
End: 2021-04-06
Attending: STUDENT IN AN ORGANIZED HEALTH CARE EDUCATION/TRAINING PROGRAM
Payer: COMMERCIAL

## 2021-04-06 ENCOUNTER — TELEPHONE (OUTPATIENT)
Dept: GASTROENTEROLOGY | Facility: CLINIC | Age: 41
End: 2021-04-06

## 2021-04-06 ENCOUNTER — OFFICE VISIT (OUTPATIENT)
Dept: FAMILY MEDICINE | Facility: CLINIC | Age: 41
End: 2021-04-06
Payer: COMMERCIAL

## 2021-04-06 VITALS
WEIGHT: 215.5 LBS | SYSTOLIC BLOOD PRESSURE: 110 MMHG | OXYGEN SATURATION: 100 % | HEART RATE: 97 BPM | BODY MASS INDEX: 30.06 KG/M2 | DIASTOLIC BLOOD PRESSURE: 70 MMHG | TEMPERATURE: 97 F

## 2021-04-06 DIAGNOSIS — R10.13 EPIGASTRIC PAIN: Primary | ICD-10-CM

## 2021-04-06 DIAGNOSIS — M54.12 CERVICAL RADICULOPATHY: ICD-10-CM

## 2021-04-06 DIAGNOSIS — Z11.59 ENCOUNTER FOR SCREENING FOR OTHER VIRAL DISEASES: ICD-10-CM

## 2021-04-06 DIAGNOSIS — R10.13 EPIGASTRIC PAIN: ICD-10-CM

## 2021-04-06 LAB
ALBUMIN SERPL-MCNC: 3.8 G/DL (ref 3.4–5)
ALP SERPL-CCNC: 61 U/L (ref 40–150)
ALT SERPL W P-5'-P-CCNC: 49 U/L (ref 0–70)
AST SERPL W P-5'-P-CCNC: 22 U/L (ref 0–45)
BILIRUB DIRECT SERPL-MCNC: <0.1 MG/DL (ref 0–0.2)
BILIRUB SERPL-MCNC: 0.3 MG/DL (ref 0.2–1.3)
ERYTHROCYTE [DISTWIDTH] IN BLOOD BY AUTOMATED COUNT: 17.6 % (ref 10–15)
HCT VFR BLD AUTO: 41.8 % (ref 40–53)
HGB BLD-MCNC: 14.1 G/DL (ref 13.3–17.7)
LIPASE SERPL-CCNC: 150 U/L (ref 73–393)
MCH RBC QN AUTO: 26.1 PG (ref 26.5–33)
MCHC RBC AUTO-ENTMCNC: 33.7 G/DL (ref 31.5–36.5)
MCV RBC AUTO: 77 FL (ref 78–100)
PLATELET # BLD AUTO: 229 10E9/L (ref 150–450)
PROT SERPL-MCNC: 7.1 G/DL (ref 6.8–8.8)
RBC # BLD AUTO: 5.4 10E12/L (ref 4.4–5.9)
WBC # BLD AUTO: 6.1 10E9/L (ref 4–11)

## 2021-04-06 PROCEDURE — 80076 HEPATIC FUNCTION PANEL: CPT | Performed by: FAMILY MEDICINE

## 2021-04-06 PROCEDURE — 99214 OFFICE O/P EST MOD 30 MIN: CPT | Performed by: FAMILY MEDICINE

## 2021-04-06 PROCEDURE — 85027 COMPLETE CBC AUTOMATED: CPT | Performed by: FAMILY MEDICINE

## 2021-04-06 PROCEDURE — 83690 ASSAY OF LIPASE: CPT | Performed by: FAMILY MEDICINE

## 2021-04-06 PROCEDURE — 36415 COLL VENOUS BLD VENIPUNCTURE: CPT | Performed by: FAMILY MEDICINE

## 2021-04-06 RX ORDER — DIAZEPAM 5 MG
5 TABLET ORAL
Qty: 15 TABLET | Refills: 0 | Status: SHIPPED | OUTPATIENT
Start: 2021-04-06 | End: 2022-03-14

## 2021-04-06 NOTE — TELEPHONE ENCOUNTER
Patient is scheduled for EGD with Dr. MATT    Spoke with:     Date of Procedure: 04/15/2021    [facility]: [facility]    Sedation Type CS    Conscious Sedation- Needs  for 6 hours after the procedure  MAC/General-Needs  for 24 hours after procedure    Pre-op for Unit [address] MAC and OR NOT NEEDED    (if yes advise patient they will need a pre-op prior to procedure)      Is patient on blood thinners? -NO (If yes- inform patient to follow up with PCP or provider for follow up instructions)     Informed patient they will need an adult  YES  Cannot take any type of public or medical transportation alone    Informed Patient of COVID Test Requirement YES    Preferred Pharmacy for Pre Prescription NA    Confirmed Nurse will call to complete assessment YES    Additional comments: NA

## 2021-04-06 NOTE — PROGRESS NOTES
Assessment & Plan     Epigastric pain  Patient does have a history of a perforated peptic ulcer.  His current symptoms are less significant and severe than what he experienced at that time.  He has not had a recent EGD for follow-up and I think that would be appropriate.  He does report compliance with the Protonix but temporarily have asked him to increase that to twice daily.  Laboratory studies as noted.  Referral was placed for an EGD and the patient was given the phone number to reach out to get this scheduled.  Moderation or elimination of alcohol use was recommended and he is not currently using aspirin or any NSAID medications.  - **CBC with platelets FUTURE anytime; Future  - Hepatic panel (Albumin, ALT, AST, Bili, Alk Phos, TP); Future  - Lipase; Future  - GASTROENTEROLOGY ADULT REF PROCEDURE ONLY; Future    Cervical radiculopathy  He has periodic issues and will take Valium on a rare basis.  This has not been filled for a year so a refill was provided today.  - diazepam (VALIUM) 5 MG tablet; Take 1 tablet (5 mg) by mouth nightly as needed for anxiety             Tobacco Cessation:   reports that he has been smoking. He has a 25.00 pack-year smoking history. He has never used smokeless tobacco.          Return in about 1 week (around 4/13/2021) for if not improving.    Christiano Palacios MD  [facility]    Izabella Benton is a 40 year old who presents for the following health issues     HPI     Concern - Ulcer flare  Onset: 1-2 months constant, with intermittent worsening  Description: burning, sharp  Intensity: moderate  Progression of Symptoms:  worsening  Accompanying Signs & Symptoms: No vomiting, dysphagia, odynophagia, melena, diarrhea, constipation.  He does note some burning periodically in the throat and when the epigastric discomfort worsens he also notes some pressure in the right lower quadrant.  Previous history of similar problem: Symptoms are similar to  previous peptic ulcer but less severe at this time.  Precipitating factors:        Worsened by: Possible alcohol use.  Eating does not seem to affect the current symptoms.  Alleviating factors:        Improved by: Nothing.  Therapies tried and outcome: Protonix.    Patient comes in today with approximately 2-month history of some discomfort in the epigastric area.  He has maybe once or twice a week episodes where will worsen for about 30 minutes.  He denies any pressure or exertional component to it and there is no radiation of the discomfort.  Tobacco use may seem to make it worse.  Has had no vomiting.  He cut back on alcohol use approximately 2 weeks ago because he thought that might be aggravating the current symptoms.  He does have a history of a perforated peptic ulcer in October 2017 and says the current symptoms are not that severe.    Review of Systems   Constitutional, HEENT, cardiovascular, pulmonary, gi and gu systems are negative, except as otherwise noted.      Objective    /70   Pulse 97   Temp 97  F (36.1  C) (Temporal)   Wt 97.8 kg (215 lb 8 oz)   SpO2 100%   BMI 30.06 kg/m    Body mass index is 30.06 kg/m .  Physical Exam   GENERAL: healthy, alert and no distress  EYES: Eyes grossly normal to inspection, PERRL and conjunctivae and sclerae normal  HENT: normal cephalic/atraumatic, nose and mouth without ulcers or lesions, oropharynx clear and oral mucous membranes moist  NECK: no adenopathy, no asymmetry, masses, or scars and thyroid normal to palpation  RESP: lungs clear to auscultation - no rales, rhonchi or wheezes  CV: regular rate and rhythm, normal S1 S2, no S3 or S4, no murmur, click or rub, no peripheral edema and peripheral pulses strong  ABDOMEN: tenderness epigastric and RLQ, no organomegaly or masses, bowel sounds normal and well-healed incision midline.  No rebound or guarding is noted.  MS: no gross musculoskeletal defects noted, no edema  SKIN: no suspicious lesions or  rashes  NEURO: Normal strength and tone, mentation intact and speech normal  PSYCH: mentation appears normal, affect normal/bright

## 2021-04-06 NOTE — PATIENT INSTRUCTIONS
1.  Increase the protonix to two per day for about 10 days.    2.  Please call to schedule the EGD (scope) to look into the esophagus and stomach.

## 2021-04-12 DIAGNOSIS — Z11.59 ENCOUNTER FOR SCREENING FOR OTHER VIRAL DISEASES: ICD-10-CM

## 2021-04-12 LAB
SARS-COV-2 RNA RESP QL NAA+PROBE: NORMAL
SPECIMEN SOURCE: NORMAL

## 2021-04-12 PROCEDURE — U0003 INFECTIOUS AGENT DETECTION BY NUCLEIC ACID (DNA OR RNA); SEVERE ACUTE RESPIRATORY SYNDROME CORONAVIRUS 2 (SARS-COV-2) (CORONAVIRUS DISEASE [COVID-19]), AMPLIFIED PROBE TECHNIQUE, MAKING USE OF HIGH THROUGHPUT TECHNOLOGIES AS DESCRIBED BY CMS-2020-01-R: HCPCS | Mod: 90 | Performed by: PATHOLOGY

## 2021-04-12 PROCEDURE — U0005 INFEC AGEN DETEC AMPLI PROBE: HCPCS | Mod: 90 | Performed by: PATHOLOGY

## 2021-04-13 LAB
LABORATORY COMMENT REPORT: NORMAL
SARS-COV-2 RNA RESP QL NAA+PROBE: NEGATIVE
SPECIMEN SOURCE: NORMAL

## 2021-04-13 RX ORDER — LIDOCAINE 40 MG/G
CREAM TOPICAL
Status: CANCELLED | OUTPATIENT
Start: 2021-04-13

## 2021-04-13 RX ORDER — ONDANSETRON 2 MG/ML
4 INJECTION INTRAMUSCULAR; INTRAVENOUS
Status: CANCELLED | OUTPATIENT
Start: 2021-04-13

## 2021-04-15 ENCOUNTER — TELEPHONE (OUTPATIENT)
Dept: GASTROENTEROLOGY | Facility: CLINIC | Age: 41
End: 2021-04-15

## 2021-04-15 NOTE — TELEPHONE ENCOUNTER
Caller: SCOTT    Procedure: EGD    Ordering Provider: DR. DICKINSON    Reason for cancel: PT IS NOT READY FOR PROCEDURE    Rescheduled: NO    Will patient call back to reschedule? YES

## 2021-06-28 DIAGNOSIS — K27.5 PERFORATED PEPTIC ULCER (H): ICD-10-CM

## 2021-06-28 RX ORDER — PANTOPRAZOLE SODIUM 40 MG/1
TABLET, DELAYED RELEASE ORAL
Qty: 90 TABLET | Refills: 0 | Status: SHIPPED | OUTPATIENT
Start: 2021-06-28 | End: 2022-11-30

## 2021-08-17 ENCOUNTER — HOSPITAL ENCOUNTER (OUTPATIENT)
Facility: AMBULATORY SURGERY CENTER | Age: 41
End: 2021-08-17
Attending: INTERNAL MEDICINE
Payer: COMMERCIAL

## 2021-08-17 ENCOUNTER — TELEPHONE (OUTPATIENT)
Dept: GASTROENTEROLOGY | Facility: CLINIC | Age: 41
End: 2021-08-17

## 2021-08-17 DIAGNOSIS — Z11.59 ENCOUNTER FOR SCREENING FOR OTHER VIRAL DISEASES: ICD-10-CM

## 2021-08-17 NOTE — TELEPHONE ENCOUNTER
Screening Questions  1. Are you active on mychart? NO    2. What insurance is in the chart? [employer]    2.  Ordering/Referring Provider: Christiano Palacios MD    3. BMI 30.0    4. Are you on daily home oxygen? n    5. Do you have a history of difficult airway? n    6. Have you had a heart, lung, or liver transplant? n    7. Are you currently on dialysis? n    8. Have you had a stroke or Transient ischemic atttack (TIA) within 6 months? n    9. In the past 6 months, have you had any heart related issues including cardiomyopathy or heart attack?         If yes, did it require cardiac stenting or other implantable device?n    10. Do you have any implantable devices in your body (pacemaker, defib, LVAD)? n    11. Do you take nitroglycerin? If yes, how often? n    12. Are you currently taking any blood thinners?n    13. Are you a diabetic? n    14. (Females) Are you currently pregnant? n  If yes, how many weeks?    15. Have you had a procedure in the past that was difficult to tolerate with conscious sedation? Any allergies to Fentanyl or Versed n    16. Are you taking any scheduled prescription narcotics more than once daily? n    17. Do you have any chemical dependencies such as alcohol, street drugs, or methadone? Weed     18. Do you have any history of post-traumatic stress syndrome or mental health issues? n -     19. Do you transfer independently? y    20.  Do you have any issues with constipation? n    21. Preferred Pharmacy for Pre Prescription     Scheduling Details    Which Colonoscopy Prep was Sent?:   Procedure Scheduled: egd  Provider/Surgeon: esteban  Date of Procedure: 9/3  Location: [facility]  Caller (Please ask for phone number if not scheduled by patient): Destiny      Sedation Type: cs  Conscious Sedation- Needs  for 6 hours after the procedure  MAC/General-Needs  for 24 hours after procedure    Pre-op Required at [facility] and OR for MAC sedation:   (if yes advise patient  they will need a pre-op prior to procedure)      Is patient on blood thinners? -n (If yes- inform patient to follow up with PCP or provider for follow up instructions)     Informed patient they will need an adult  y  Cannot take any type of public or medical transportation alone    Informed Patient of COVID Test Requirement y    Confirmed Nurse will call to complete assessment y    Additional comments:

## 2021-08-26 ENCOUNTER — TELEPHONE (OUTPATIENT)
Dept: GASTROENTEROLOGY | Facility: CLINIC | Age: 41
End: 2021-08-26

## 2021-08-30 NOTE — TELEPHONE ENCOUNTER
Third attempt.    Number on file for patient is invalid.      RN called pt's EC number for Destiny.  Per endo scheduling questionnaire, Destiny scheduled pt's appt.     Destiny stated that her number is the only number to reach Ray.  Ray is unavailable at this time.  RN gave [facility] # [phone] Opt 2 to have pt call back for pre-assessment.      Attempted to contact patient regarding upcoming EGD procedure on 9.3.2021 for pre assessment questions.     Covid test scheduled: 8.31.2021    Arrival time: 1030    Facility location: [facility]    Sedation type: CS    Shy Chavez RN

## 2021-09-02 NOTE — TELEPHONE ENCOUNTER
Fourth attempt for pre-assessment prior to upcoming EGD.    No answer.  Left message to return call [phone] #2    COVID test?    Shy Chavez RN

## 2021-09-03 RX ORDER — SODIUM CHLORIDE, SODIUM LACTATE, POTASSIUM CHLORIDE, CALCIUM CHLORIDE 600; 310; 30; 20 MG/100ML; MG/100ML; MG/100ML; MG/100ML
INJECTION, SOLUTION INTRAVENOUS CONTINUOUS
Status: CANCELLED | OUTPATIENT
Start: 2021-09-03

## 2021-09-03 RX ORDER — ONDANSETRON 2 MG/ML
4 INJECTION INTRAMUSCULAR; INTRAVENOUS
Status: CANCELLED | OUTPATIENT
Start: 2021-09-03

## 2021-09-03 RX ORDER — LIDOCAINE 40 MG/G
CREAM TOPICAL
Status: CANCELLED | OUTPATIENT
Start: 2021-09-03

## 2021-09-07 ENCOUNTER — TELEPHONE (OUTPATIENT)
Dept: GASTROENTEROLOGY | Facility: CLINIC | Age: 41
End: 2021-09-07

## 2021-09-07 NOTE — TELEPHONE ENCOUNTER
Caller: Writer left message for patient to call back    Procedure: EGD    Date of Procedure Cancelled: 09/03    Ordering Provider:Christiano Palacios MD    Reason for cancel: No showed    Any Staff messages sent regarding case?: YES                  Recipient and Sender:                  Message Details:   ----- Message from Sultana Son RN sent at 9/3/2021  1:37 PM CDT -----  Regarding: Please reschedule  Hi,    This pt was a no show DOS, did not see documented that he had answered any of the pre-op calls.    Thanks!        Rescheduled: N     If rescheduled:    Date:    Location:    Note any change or update to original order/sedation:

## 2022-01-26 NOTE — ANESTHESIA CARE TRANSFER NOTE
No new care gaps identified.  Powered by [employer] PDF [employer]. Reference number: [id number].   1/26/2022 4:17:45 PM CST   Patient: Tonio Alarcon    Procedure(s):   Examination of perineal abcess under anethesia, Incision and Drainage Perineal Abscess - Wound Class: IV-Dirty or Infected    Diagnosis: Myrtle Abscess  Diagnosis Additional Information: No value filed.    Anesthesia Type:   No value filed.     Note:  Airway :Face Mask  Patient transferred to:PACU  Comments: Report  RN, vss  IV and airway patent, awake, exchanging well on O2, states comfortHandoff Report: Identifed the Patient, Identified the Reponsible Provider, Reviewed the pertinent medical history, Discussed the surgical course, Reviewed Intra-OP anesthesia mangement and issues during anesthesia, Set expectations for post-procedure period and Allowed opportunity for questions and acknowledgement of understanding      Vitals: (Last set prior to Anesthesia Care Transfer)    CRNA VITALS  6/8/2018 1519 - 6/8/2018 1558      6/8/2018             Pulse: 100    Ht Rate: 101    SpO2: 100 %    Resp Rate (observed): (!)  3                Electronically Signed By: ISABEL Dean CRNA  June 8, 2018  3:58 PM

## 2022-03-14 ENCOUNTER — OFFICE VISIT (OUTPATIENT)
Dept: FAMILY MEDICINE | Facility: CLINIC | Age: 42
End: 2022-03-14
Payer: COMMERCIAL

## 2022-03-14 VITALS
HEART RATE: 80 BPM | BODY MASS INDEX: 29.8 KG/M2 | WEIGHT: 220 LBS | DIASTOLIC BLOOD PRESSURE: 86 MMHG | SYSTOLIC BLOOD PRESSURE: 138 MMHG | OXYGEN SATURATION: 100 % | HEIGHT: 72 IN | TEMPERATURE: 95.6 F

## 2022-03-14 DIAGNOSIS — R10.31 RLQ ABDOMINAL PAIN: ICD-10-CM

## 2022-03-14 DIAGNOSIS — Z23 HIGH PRIORITY FOR 2019-NCOV VACCINE: ICD-10-CM

## 2022-03-14 DIAGNOSIS — M54.12 CERVICAL RADICULOPATHY: ICD-10-CM

## 2022-03-14 DIAGNOSIS — F43.22 ADJUSTMENT DISORDER WITH ANXIOUS MOOD: ICD-10-CM

## 2022-03-14 DIAGNOSIS — M54.2 CERVICALGIA: Primary | ICD-10-CM

## 2022-03-14 LAB
ALBUMIN SERPL-MCNC: 3.7 G/DL (ref 3.4–5)
ALBUMIN UR-MCNC: NEGATIVE MG/DL
ALP SERPL-CCNC: 67 U/L (ref 40–150)
ALT SERPL W P-5'-P-CCNC: 44 U/L (ref 0–70)
ANION GAP SERPL CALCULATED.3IONS-SCNC: 8 MMOL/L (ref 3–14)
APPEARANCE UR: CLEAR
AST SERPL W P-5'-P-CCNC: 16 U/L (ref 0–45)
BASOPHILS # BLD AUTO: 0 10E3/UL (ref 0–0.2)
BASOPHILS NFR BLD AUTO: 0 %
BILIRUB SERPL-MCNC: 0.3 MG/DL (ref 0.2–1.3)
BILIRUB UR QL STRIP: NEGATIVE
BUN SERPL-MCNC: 15 MG/DL (ref 7–30)
CALCIUM SERPL-MCNC: 9.3 MG/DL (ref 8.5–10.1)
CHLORIDE BLD-SCNC: 113 MMOL/L (ref 94–109)
CO2 SERPL-SCNC: 22 MMOL/L (ref 20–32)
COLOR UR AUTO: YELLOW
CREAT SERPL-MCNC: 1.08 MG/DL (ref 0.66–1.25)
CRP SERPL-MCNC: <2.9 MG/L (ref 0–8)
EOSINOPHIL # BLD AUTO: 0.1 10E3/UL (ref 0–0.7)
EOSINOPHIL NFR BLD AUTO: 2 %
ERYTHROCYTE [DISTWIDTH] IN BLOOD BY AUTOMATED COUNT: 18.2 % (ref 10–15)
ERYTHROCYTE [SEDIMENTATION RATE] IN BLOOD BY WESTERGREN METHOD: 3 MM/HR (ref 0–15)
GFR SERPL CREATININE-BSD FRML MDRD: 88 ML/MIN/1.73M2
GLUCOSE BLD-MCNC: 101 MG/DL (ref 70–99)
GLUCOSE UR STRIP-MCNC: NEGATIVE MG/DL
HCT VFR BLD AUTO: 42.9 % (ref 40–53)
HGB BLD-MCNC: 14.4 G/DL (ref 13.3–17.7)
HGB UR QL STRIP: NEGATIVE
KETONES UR STRIP-MCNC: NEGATIVE MG/DL
LEUKOCYTE ESTERASE UR QL STRIP: NEGATIVE
LYMPHOCYTES # BLD AUTO: 2.5 10E3/UL (ref 0.8–5.3)
LYMPHOCYTES NFR BLD AUTO: 38 %
MCH RBC QN AUTO: 25.8 PG (ref 26.5–33)
MCHC RBC AUTO-ENTMCNC: 33.6 G/DL (ref 31.5–36.5)
MCV RBC AUTO: 77 FL (ref 78–100)
MONOCYTES # BLD AUTO: 0.5 10E3/UL (ref 0–1.3)
MONOCYTES NFR BLD AUTO: 8 %
NEUTROPHILS # BLD AUTO: 3.4 10E3/UL (ref 1.6–8.3)
NEUTROPHILS NFR BLD AUTO: 52 %
NITRATE UR QL: NEGATIVE
PH UR STRIP: 6.5 [PH] (ref 5–7)
PLATELET # BLD AUTO: 274 10E3/UL (ref 150–450)
POTASSIUM BLD-SCNC: 4.2 MMOL/L (ref 3.4–5.3)
PROT SERPL-MCNC: 7.6 G/DL (ref 6.8–8.8)
RBC # BLD AUTO: 5.59 10E6/UL (ref 4.4–5.9)
RBC #/AREA URNS AUTO: NORMAL /HPF
SODIUM SERPL-SCNC: 143 MMOL/L (ref 133–144)
SP GR UR STRIP: 1.02 (ref 1–1.03)
UROBILINOGEN UR STRIP-ACNC: 1 E.U./DL
WBC # BLD AUTO: 6.6 10E3/UL (ref 4–11)
WBC #/AREA URNS AUTO: NORMAL /HPF

## 2022-03-14 PROCEDURE — 99214 OFFICE O/P EST MOD 30 MIN: CPT | Performed by: FAMILY MEDICINE

## 2022-03-14 PROCEDURE — 36415 COLL VENOUS BLD VENIPUNCTURE: CPT | Performed by: FAMILY MEDICINE

## 2022-03-14 PROCEDURE — 85652 RBC SED RATE AUTOMATED: CPT | Performed by: FAMILY MEDICINE

## 2022-03-14 PROCEDURE — 0051A COVID-19,PF,PFIZER (12+ YRS): CPT | Performed by: FAMILY MEDICINE

## 2022-03-14 PROCEDURE — 86140 C-REACTIVE PROTEIN: CPT | Performed by: FAMILY MEDICINE

## 2022-03-14 PROCEDURE — 91305 COVID-19,PF,PFIZER (12+ YRS): CPT | Performed by: FAMILY MEDICINE

## 2022-03-14 PROCEDURE — 80053 COMPREHEN METABOLIC PANEL: CPT | Performed by: FAMILY MEDICINE

## 2022-03-14 PROCEDURE — 81001 URINALYSIS AUTO W/SCOPE: CPT | Performed by: FAMILY MEDICINE

## 2022-03-14 PROCEDURE — 85025 COMPLETE CBC W/AUTO DIFF WBC: CPT | Performed by: FAMILY MEDICINE

## 2022-03-14 RX ORDER — DIAZEPAM 5 MG
5 TABLET ORAL
Qty: 15 TABLET | Refills: 0 | Status: ON HOLD | OUTPATIENT
Start: 2022-03-14 | End: 2022-10-13

## 2022-03-14 ASSESSMENT — PATIENT HEALTH QUESTIONNAIRE - PHQ9
SUM OF ALL RESPONSES TO PHQ QUESTIONS 1-9: 4
10. IF YOU CHECKED OFF ANY PROBLEMS, HOW DIFFICULT HAVE THESE PROBLEMS MADE IT FOR YOU TO DO YOUR WORK, TAKE CARE OF THINGS AT HOME, OR GET ALONG WITH OTHER PEOPLE: SOMEWHAT DIFFICULT
SUM OF ALL RESPONSES TO PHQ QUESTIONS 1-9: 4

## 2022-03-14 ASSESSMENT — ENCOUNTER SYMPTOMS: BACK PAIN: 1

## 2022-03-14 NOTE — PROGRESS NOTES
Assessment & Plan     Cervicalgia  He really has a couple areas of pain in the neck.  The one that bothers him the most is bilateral and radiates from the top of the cervical spine up into the skull and I believe is consistent with occipital neuralgia given his areas of tenderness today.  He also has some pain radiating from the right side near the carotid up towards the right ear which I suspect may be some degree of carotidynia related to the vascular injury that had occurred in that area at the time of the initial fusion.  No specific medication today.  Tylenol would be okay to use given his history of a perforated peptic ulcer.  He has expressed a preference to avoid opiates because he does not want to get hooked on anything.  I suggested either a physical medicine or pain management referral and he was preferable to the latter.  If it takes too long to get in he could reach out to me and we could consider doing occipital nerve injections to see if that is helpful for the symptoms.  - Pain Management Referral; Future    RLQ abdominal pain  This is been present now for about 4 months and he had similar symptoms previously.  Laboratory studies checked as noted below.  CT scan was ordered to rule out any type of abnormal process.  - CBC with platelets and differential; Future  - Comprehensive metabolic panel (BMP + Alb, Alk Phos, ALT, AST, Total. Bili, TP); Future  - CRP, inflammation; Future  - ESR: Erythrocyte sedimentation rate; Future  - CT Abdomen Pelvis w Contrast; Future  - UA with Microscopic reflex to Culture - lab collect; Future  - CBC with platelets and differential  - Comprehensive metabolic panel (BMP + Alb, Alk Phos, ALT, AST, Total. Bili, TP)  - CRP, inflammation  - ESR: Erythrocyte sedimentation rate  - UA with Microscopic reflex to Culture - lab collect  - Urine Microscopic    Adjustment disorder with anxious mood  Patient admits that he has had difficulty dealing with the medical profession  "since the neck surgery that was complicated by vertebral artery injury and hematoma.  He may have some degree of PTSD but also concerned that this is interfering with spousal relationship and his health because he is reluctant to see doctors.  He was interested in a counseling referral and that was provided today.  - Adult Mental Health  Referral; Future    High priority for 2019-nCoV vaccine  First dose of the Covid vaccine with Pfizer was provided today.  - COVID-19,PF,PFIZER (12+ Yrs GRAY LABEL)    Cervical radiculopathy  Diazepam is been used infrequently and is helpful with some of the neck symptoms.  - diazepam (VALIUM) 5 MG tablet; Take 1 tablet (5 mg) by mouth nightly as needed for anxiety    Review of prior external note(s) from [facility] information from [facility]  reviewed         Tobacco Cessation:   reports that he has been smoking. He has a 25.00 pack-year smoking history. He has never used smokeless tobacco.      BMI:   Estimated body mass index is 30.26 kg/m  as calculated from the following:    Height as of this encounter: 1.816 m (5' 11.5\").    Weight as of this encounter: 99.8 kg (220 lb).           Return in about 2 months (around 5/14/2022) for Pain Recheck.    Christiano Palacios MD  [facility] SHANTELLE Benton is a 41 year old who presents for the following health issues     Back Pain     History of Present Illness       Mental Health Follow-up:                    Today's PHQ-9         PHQ-9 Total Score: 4  PHQ-9 Q9 Thoughts of better off dead/self-harm past 2 weeks :   (P) Not at all    How difficult have these problems made it for you to do your work, take care of things at home, or get along with other people: Somewhat difficult        Reason for visit:  Pain management kindney  problem    He eats 0-1 servings of fruits and vegetables daily.He consumes 1 sweetened beverage(s) daily.He exercises with enough effort to " increase his heart rate 10 to 19 minutes per day.  He exercises with enough effort to increase his heart rate 3 or less days per week.   He is taking medications regularly.       Pain History:  When did you first notice your pain? - More than 6 weeks   Have you seen this provider for your pain in the past?   Yes   Where in your body do you have pain? Neck  Are you seeing anyone else for your pain? Yes - Had surgery, but pain is still very bad and is looking for more pain management    PHQ-9 SCORE 3/26/2018 4/17/2019 3/14/2022   PHQ-9 Total Score MyChart - - 4 (Minimal depression)   PHQ-9 Total Score 12 10 4                  Chronic Pain Follow Up:    Location of pain: Neck  Analgesia/pain control:    - Recent changes:  Had surgery, but has not gotten better and pain is shooting up into his head    - Overall control: Inadequate pain control, patient rated the pain a 7 or 8 out of 10   - Current treatments: Bought new pillows, and trying to do anything to keep comfort   Adherence:     - Do you ever take more pain medicine than prescribed? No    - When did you take your last dose of pain medicine?  Since last visit with Dr. Palacios, patient estimates he took the last one about 3 weeks after the last visit   Adverse effects: No     Roomed by LORIN Foster    PDMP Review       Value Time User    State PDMP site checked  Yes 3/14/2022 10:53 AM Christiano Palacios MD        Last CSA Agreement:   CSA -- Patient Level:    CSA: None found at the patient level.       Last UDS: 3/28/2019        Patient is here today to discuss a few issues.  He does have neck and back pain but says the back pain is manageable.  His main concern is the neck pain.  This is basically been a problem since he had a cervical fusion complicated by vertebral artery laceration in June 2019.  The radicular symptoms down the arm have not recurred since the surgery but he has had some pretty persistent pain along the right carotid artery  "radiating to the ear as well as some pain in the posterior neck bilaterally that radiates up towards the top of the head causing some headaches.  He had a follow-up with the neurosurgeon in January and they did CT imaging to indicate that the arteries are in good shape.  That neurosurgeon feels that some of the pain may be related to nerve injury from the surgery but did not feel that there was really anything that they could offer.  He is not taking any regular pain medication for it except for Tylenol.  Valium has been helpful in the past and he asked for refill of that today.   was reviewed and he is filled it once in the last year.    He describes some marital stress and difficulty coping with the medical events from June 2019 associated with the surgical complication and would be interested in seeing a counselor at this time.    He also has about 4 to 5 months of right lower quadrant discomfort.  He expresses a concern this is related to his kidneys.  He has had this symptom previously and it did improve.  He is also had imaging and laboratory testing done in the past which was unremarkable.    Review of Systems   Musculoskeletal: Positive for back pain.      Constitutional, HEENT, cardiovascular, pulmonary, gi and gu systems are negative, except as otherwise noted.      Objective    /86 (BP Location: Right arm, Patient Position: Sitting, Cuff Size: Adult Regular)   Pulse 80   Temp (!) 95.6  F (35.3  C) (Temporal)   Ht 1.816 m (5' 11.5\")   Wt 99.8 kg (220 lb)   SpO2 100%   BMI 30.26 kg/m    Body mass index is 30.26 kg/m .  Physical Exam   GENERAL: healthy, alert and no distress  EYES: Eyes grossly normal to inspection, PERRL and conjunctivae and sclerae normal  HENT: ear canals and TM's normal, nose and mouth without ulcers or lesions  NECK: no adenopathy, no asymmetry, masses, or scars and thyroid normal to palpation  NECK: no adenopathy, no asymmetry, masses, or scars, thyroid normal to " palpation and there is tenderness and reproduction of the patient's pain with palpation of the occipital notch on either side roughly approximating the occipital nerve.  There is no tenderness along the course of the carotid artery.  RESP: lungs clear to auscultation - no rales, rhonchi or wheezes  CV: regular rate and rhythm, normal S1 S2, no S3 or S4, no murmur, click or rub, no peripheral edema and peripheral pulses strong  ABDOMEN: soft, nontender, no hepatosplenomegaly, no masses and bowel sounds normal  ABDOMEN: soft, nontender, tenderness RLQ, no organomegaly or masses, liver span normal to percussion, bowel sounds normal and no palpable or pulsatile masses  MS: no gross musculoskeletal defects noted, no edema  SKIN: no suspicious lesions or rashes  NEURO: Normal strength and tone, mentation intact and speech normal  PSYCH: mentation appears normal, affect normal/bright

## 2022-03-15 ASSESSMENT — PATIENT HEALTH QUESTIONNAIRE - PHQ9: SUM OF ALL RESPONSES TO PHQ QUESTIONS 1-9: 4

## 2022-03-18 ENCOUNTER — TELEPHONE (OUTPATIENT)
Dept: FAMILY MEDICINE | Facility: CLINIC | Age: 42
End: 2022-03-18
Payer: COMMERCIAL

## 2022-03-18 NOTE — TELEPHONE ENCOUNTER
Please call Ray and let him know that the blood and urine testing looked pretty good.  There are no specific findings to suggest a cause for some of the abdominal discomfort that he is experiencing.  I would recommend that he proceed next with the CT scan that we ordered at the visit on Monday.    Christiano Palacios MD

## 2022-03-18 NOTE — TELEPHONE ENCOUNTER
Attempted to call, left vm stating labs ok and to proceed with CT, left imaging phone number.     Thanks,  ANA MARIA Pablo  [place]

## 2022-10-11 ENCOUNTER — HOSPITAL ENCOUNTER (INPATIENT)
Facility: CLINIC | Age: 42
LOS: 1 days | Discharge: HOME | End: 2022-10-13
Attending: EMERGENCY MEDICINE | Admitting: INTERNAL MEDICINE
Payer: COMMERCIAL

## 2022-10-11 DIAGNOSIS — Z20.822 LAB TEST NEGATIVE FOR COVID-19 VIRUS: ICD-10-CM

## 2022-10-11 DIAGNOSIS — F10.929 ALCOHOLIC INTOXICATION WITH COMPLICATION (H): ICD-10-CM

## 2022-10-11 DIAGNOSIS — R47.01 APHASIA: Primary | ICD-10-CM

## 2022-10-11 DIAGNOSIS — R47.89 WORD FINDING DIFFICULTY: ICD-10-CM

## 2022-10-11 LAB — ALCOHOL BREATH TEST: 0.14 (ref 0–0.01)

## 2022-10-11 PROCEDURE — 99285 EMERGENCY DEPT VISIT HI MDM: CPT | Mod: 25 | Performed by: EMERGENCY MEDICINE

## 2022-10-11 PROCEDURE — 82075 ASSAY OF BREATH ETHANOL: CPT | Performed by: EMERGENCY MEDICINE

## 2022-10-12 ENCOUNTER — APPOINTMENT (OUTPATIENT)
Dept: CARDIOLOGY | Facility: CLINIC | Age: 42
End: 2022-10-12
Attending: INTERNAL MEDICINE
Payer: COMMERCIAL

## 2022-10-12 ENCOUNTER — APPOINTMENT (OUTPATIENT)
Dept: MRI IMAGING | Facility: CLINIC | Age: 42
End: 2022-10-12
Attending: INTERNAL MEDICINE
Payer: COMMERCIAL

## 2022-10-12 ENCOUNTER — APPOINTMENT (OUTPATIENT)
Dept: CT IMAGING | Facility: CLINIC | Age: 42
End: 2022-10-12
Attending: EMERGENCY MEDICINE
Payer: COMMERCIAL

## 2022-10-12 ENCOUNTER — APPOINTMENT (OUTPATIENT)
Dept: CT IMAGING | Facility: CLINIC | Age: 42
End: 2022-10-12
Attending: PHYSICIAN ASSISTANT
Payer: COMMERCIAL

## 2022-10-12 PROBLEM — F10.929 ALCOHOLIC INTOXICATION WITH COMPLICATION (H): Status: ACTIVE | Noted: 2022-10-12

## 2022-10-12 PROBLEM — R47.89 WORD FINDING DIFFICULTY: Status: ACTIVE | Noted: 2022-10-12

## 2022-10-12 LAB
ALBUMIN SERPL-MCNC: 4.8 G/DL (ref 3.4–5)
ALP SERPL-CCNC: 89 U/L (ref 40–150)
ALT SERPL W P-5'-P-CCNC: 50 U/L (ref 0–70)
AMPHETAMINES UR QL SCN: ABNORMAL
ANION GAP SERPL CALCULATED.3IONS-SCNC: 10 MMOL/L (ref 3–14)
ANION GAP SERPL CALCULATED.3IONS-SCNC: 10 MMOL/L (ref 3–14)
AST SERPL W P-5'-P-CCNC: 42 U/L (ref 0–45)
ATRIAL RATE - MUSE: 111 BPM
BARBITURATES UR QL: ABNORMAL
BASOPHILS # BLD AUTO: 0 10E3/UL (ref 0–0.2)
BASOPHILS NFR BLD AUTO: 1 %
BENZODIAZ UR QL: ABNORMAL
BILIRUB SERPL-MCNC: 0.9 MG/DL (ref 0.2–1.3)
BUN SERPL-MCNC: 10 MG/DL (ref 7–30)
BUN SERPL-MCNC: 11 MG/DL (ref 7–30)
CALCIUM SERPL-MCNC: 9 MG/DL (ref 8.5–10.1)
CALCIUM SERPL-MCNC: 9.1 MG/DL (ref 8.5–10.1)
CANNABINOIDS UR QL SCN: ABNORMAL
CHLORIDE BLD-SCNC: 106 MMOL/L (ref 94–109)
CHLORIDE BLD-SCNC: 107 MMOL/L (ref 94–109)
CHOLEST SERPL-MCNC: 200 MG/DL
CO2 SERPL-SCNC: 23 MMOL/L (ref 20–32)
CO2 SERPL-SCNC: 23 MMOL/L (ref 20–32)
COCAINE UR QL: ABNORMAL
CREAT BLD-MCNC: 1.1 MG/DL (ref 0.7–1.3)
CREAT SERPL-MCNC: 0.88 MG/DL (ref 0.66–1.25)
CREAT SERPL-MCNC: 0.92 MG/DL (ref 0.66–1.25)
DIASTOLIC BLOOD PRESSURE - MUSE: NORMAL MMHG
EOSINOPHIL # BLD AUTO: 0.2 10E3/UL (ref 0–0.7)
EOSINOPHIL NFR BLD AUTO: 2 %
ERYTHROCYTE [DISTWIDTH] IN BLOOD BY AUTOMATED COUNT: 16.3 % (ref 10–15)
ERYTHROCYTE [DISTWIDTH] IN BLOOD BY AUTOMATED COUNT: 17.1 % (ref 10–15)
GFR SERPL CREATININE-BSD FRML MDRD: >60 ML/MIN/1.73M2
GFR SERPL CREATININE-BSD FRML MDRD: >90 ML/MIN/1.73M2
GFR SERPL CREATININE-BSD FRML MDRD: >90 ML/MIN/1.73M2
GLUCOSE BLD-MCNC: 114 MG/DL (ref 70–99)
GLUCOSE BLD-MCNC: 97 MG/DL (ref 70–99)
GLUCOSE BLDC GLUCOMTR-MCNC: 114 MG/DL (ref 70–99)
HCT VFR BLD AUTO: 47.4 % (ref 40–53)
HCT VFR BLD AUTO: 47.9 % (ref 40–53)
HDLC SERPL-MCNC: 43 MG/DL
HGB BLD-MCNC: 16 G/DL (ref 13.3–17.7)
HGB BLD-MCNC: 16.3 G/DL (ref 13.3–17.7)
IMM GRANULOCYTES # BLD: 0 10E3/UL
IMM GRANULOCYTES NFR BLD: 0 %
INTERPRETATION ECG - MUSE: NORMAL
LDLC SERPL CALC-MCNC: 98 MG/DL
LYMPHOCYTES # BLD AUTO: 4 10E3/UL (ref 0.8–5.3)
LYMPHOCYTES NFR BLD AUTO: 45 %
MAGNESIUM SERPL-MCNC: 2.3 MG/DL (ref 1.6–2.3)
MCH RBC QN AUTO: 25.6 PG (ref 26.5–33)
MCH RBC QN AUTO: 25.6 PG (ref 26.5–33)
MCHC RBC AUTO-ENTMCNC: 33.8 G/DL (ref 31.5–36.5)
MCHC RBC AUTO-ENTMCNC: 34 G/DL (ref 31.5–36.5)
MCV RBC AUTO: 75 FL (ref 78–100)
MCV RBC AUTO: 76 FL (ref 78–100)
MONOCYTES # BLD AUTO: 0.8 10E3/UL (ref 0–1.3)
MONOCYTES NFR BLD AUTO: 10 %
NEUTROPHILS # BLD AUTO: 3.6 10E3/UL (ref 1.6–8.3)
NEUTROPHILS NFR BLD AUTO: 42 %
NONHDLC SERPL-MCNC: 157 MG/DL
NRBC # BLD AUTO: 0 10E3/UL
NRBC BLD AUTO-RTO: 0 /100
OPIATES UR QL SCN: ABNORMAL
P AXIS - MUSE: 60 DEGREES
PHOSPHATE SERPL-MCNC: 2.8 MG/DL (ref 2.5–4.5)
PLATELET # BLD AUTO: 241 10E3/UL (ref 150–450)
PLATELET # BLD AUTO: 268 10E3/UL (ref 150–450)
POTASSIUM BLD-SCNC: 3.7 MMOL/L (ref 3.4–5.3)
POTASSIUM BLD-SCNC: 3.8 MMOL/L (ref 3.4–5.3)
PR INTERVAL - MUSE: 156 MS
PROT SERPL-MCNC: 8.7 G/DL (ref 6.8–8.8)
QRS DURATION - MUSE: 88 MS
QT - MUSE: 350 MS
QTC - MUSE: 476 MS
R AXIS - MUSE: 116 DEGREES
RBC # BLD AUTO: 6.25 10E6/UL (ref 4.4–5.9)
RBC # BLD AUTO: 6.36 10E6/UL (ref 4.4–5.9)
SARS-COV-2 RNA RESP QL NAA+PROBE: NEGATIVE
SODIUM SERPL-SCNC: 139 MMOL/L (ref 133–144)
SODIUM SERPL-SCNC: 140 MMOL/L (ref 133–144)
SYSTOLIC BLOOD PRESSURE - MUSE: NORMAL MMHG
T AXIS - MUSE: 45 DEGREES
TRIGL SERPL-MCNC: 293 MG/DL
VENTRICULAR RATE- MUSE: 111 BPM
WBC # BLD AUTO: 6.7 10E3/UL (ref 4–11)
WBC # BLD AUTO: 8.7 10E3/UL (ref 4–11)

## 2022-10-12 PROCEDURE — 83735 ASSAY OF MAGNESIUM: CPT | Performed by: INTERNAL MEDICINE

## 2022-10-12 PROCEDURE — 70450 CT HEAD/BRAIN W/O DYE: CPT | Mod: 26 | Performed by: STUDENT IN AN ORGANIZED HEALTH CARE EDUCATION/TRAINING PROGRAM

## 2022-10-12 PROCEDURE — 250N000011 HC RX IP 250 OP 636: Performed by: INTERNAL MEDICINE

## 2022-10-12 PROCEDURE — 999N000147 HC STATISTIC PT IP EVAL DEFER: Performed by: PHYSICAL THERAPIST

## 2022-10-12 PROCEDURE — C9803 HOPD COVID-19 SPEC COLLECT: HCPCS | Performed by: EMERGENCY MEDICINE

## 2022-10-12 PROCEDURE — 70551 MRI BRAIN STEM W/O DYE: CPT

## 2022-10-12 PROCEDURE — 93306 TTE W/DOPPLER COMPLETE: CPT | Mod: 26 | Performed by: INTERNAL MEDICINE

## 2022-10-12 PROCEDURE — 93010 ELECTROCARDIOGRAM REPORT: CPT | Performed by: EMERGENCY MEDICINE

## 2022-10-12 PROCEDURE — 70496 CT ANGIOGRAPHY HEAD: CPT

## 2022-10-12 PROCEDURE — 70498 CT ANGIOGRAPHY NECK: CPT

## 2022-10-12 PROCEDURE — 250N000013 HC RX MED GY IP 250 OP 250 PS 637: Performed by: EMERGENCY MEDICINE

## 2022-10-12 PROCEDURE — 82565 ASSAY OF CREATININE: CPT

## 2022-10-12 PROCEDURE — 99222 1ST HOSP IP/OBS MODERATE 55: CPT | Mod: AI | Performed by: INTERNAL MEDICINE

## 2022-10-12 PROCEDURE — 70450 CT HEAD/BRAIN W/O DYE: CPT

## 2022-10-12 PROCEDURE — 250N000011 HC RX IP 250 OP 636: Performed by: EMERGENCY MEDICINE

## 2022-10-12 PROCEDURE — 85014 HEMATOCRIT: CPT | Performed by: INTERNAL MEDICINE

## 2022-10-12 PROCEDURE — HZ2ZZZZ DETOXIFICATION SERVICES FOR SUBSTANCE ABUSE TREATMENT: ICD-10-PCS | Performed by: INTERNAL MEDICINE

## 2022-10-12 PROCEDURE — 70551 MRI BRAIN STEM W/O DYE: CPT | Mod: 26 | Performed by: STUDENT IN AN ORGANIZED HEALTH CARE EDUCATION/TRAINING PROGRAM

## 2022-10-12 PROCEDURE — 36415 COLL VENOUS BLD VENIPUNCTURE: CPT | Performed by: EMERGENCY MEDICINE

## 2022-10-12 PROCEDURE — 36415 COLL VENOUS BLD VENIPUNCTURE: CPT | Performed by: INTERNAL MEDICINE

## 2022-10-12 PROCEDURE — 85025 COMPLETE CBC W/AUTO DIFF WBC: CPT | Performed by: EMERGENCY MEDICINE

## 2022-10-12 PROCEDURE — 80307 DRUG TEST PRSMV CHEM ANLYZR: CPT | Performed by: INTERNAL MEDICINE

## 2022-10-12 PROCEDURE — 999N000208 ECHOCARDIOGRAM COMPLETE

## 2022-10-12 PROCEDURE — 999N000111 HC STATISTIC OT IP EVAL DEFER

## 2022-10-12 PROCEDURE — 93005 ELECTROCARDIOGRAM TRACING: CPT | Performed by: EMERGENCY MEDICINE

## 2022-10-12 PROCEDURE — 258N000003 HC RX IP 258 OP 636: Performed by: INTERNAL MEDICINE

## 2022-10-12 PROCEDURE — 250N000013 HC RX MED GY IP 250 OP 250 PS 637: Performed by: INTERNAL MEDICINE

## 2022-10-12 PROCEDURE — 120N000002 HC R&B MED SURG/OB UMMC

## 2022-10-12 PROCEDURE — 80053 COMPREHEN METABOLIC PANEL: CPT | Performed by: EMERGENCY MEDICINE

## 2022-10-12 PROCEDURE — 84100 ASSAY OF PHOSPHORUS: CPT | Performed by: INTERNAL MEDICINE

## 2022-10-12 PROCEDURE — 250N000009 HC RX 250: Performed by: EMERGENCY MEDICINE

## 2022-10-12 PROCEDURE — 70450 CT HEAD/BRAIN W/O DYE: CPT | Mod: 77

## 2022-10-12 PROCEDURE — U0003 INFECTIOUS AGENT DETECTION BY NUCLEIC ACID (DNA OR RNA); SEVERE ACUTE RESPIRATORY SYNDROME CORONAVIRUS 2 (SARS-COV-2) (CORONAVIRUS DISEASE [COVID-19]), AMPLIFIED PROBE TECHNIQUE, MAKING USE OF HIGH THROUGHPUT TECHNOLOGIES AS DESCRIBED BY CMS-2020-01-R: HCPCS | Performed by: EMERGENCY MEDICINE

## 2022-10-12 PROCEDURE — 80061 LIPID PANEL: CPT | Performed by: INTERNAL MEDICINE

## 2022-10-12 RX ORDER — ENOXAPARIN SODIUM 100 MG/ML
40 INJECTION SUBCUTANEOUS EVERY 24 HOURS
Status: DISCONTINUED | OUTPATIENT
Start: 2022-10-12 | End: 2022-10-13

## 2022-10-12 RX ORDER — MULTIVITAMIN,THERAPEUTIC
1 TABLET ORAL ONCE
Status: COMPLETED | OUTPATIENT
Start: 2022-10-12 | End: 2022-10-12

## 2022-10-12 RX ORDER — ACYCLOVIR 200 MG/1
10 CAPSULE ORAL ONCE
Status: DISCONTINUED | OUTPATIENT
Start: 2022-10-12 | End: 2022-10-13 | Stop reason: HOSPADM

## 2022-10-12 RX ORDER — SODIUM CHLORIDE 9 MG/ML
INJECTION, SOLUTION INTRAVENOUS CONTINUOUS
Status: DISCONTINUED | OUTPATIENT
Start: 2022-10-12 | End: 2022-10-13

## 2022-10-12 RX ORDER — IOPAMIDOL 755 MG/ML
100 INJECTION, SOLUTION INTRAVASCULAR ONCE
Status: COMPLETED | OUTPATIENT
Start: 2022-10-12 | End: 2022-10-12

## 2022-10-12 RX ORDER — ACETAMINOPHEN 500 MG
1000 TABLET ORAL ONCE
Status: DISCONTINUED | OUTPATIENT
Start: 2022-10-12 | End: 2022-10-12

## 2022-10-12 RX ORDER — OLANZAPINE 5 MG/1
5-10 TABLET, ORALLY DISINTEGRATING ORAL EVERY 6 HOURS PRN
Status: DISCONTINUED | OUTPATIENT
Start: 2022-10-12 | End: 2022-10-13 | Stop reason: HOSPADM

## 2022-10-12 RX ORDER — ONDANSETRON 4 MG/1
4 TABLET, ORALLY DISINTEGRATING ORAL EVERY 6 HOURS PRN
Status: DISCONTINUED | OUTPATIENT
Start: 2022-10-12 | End: 2022-10-13 | Stop reason: HOSPADM

## 2022-10-12 RX ORDER — FLUMAZENIL 0.1 MG/ML
0.2 INJECTION, SOLUTION INTRAVENOUS
Status: DISCONTINUED | OUTPATIENT
Start: 2022-10-12 | End: 2022-10-13 | Stop reason: HOSPADM

## 2022-10-12 RX ORDER — MULTIPLE VITAMINS W/ MINERALS TAB 9MG-400MCG
1 TAB ORAL DAILY
Status: DISCONTINUED | OUTPATIENT
Start: 2022-10-12 | End: 2022-10-13 | Stop reason: HOSPADM

## 2022-10-12 RX ORDER — GADOBUTROL 604.72 MG/ML
0.1 INJECTION INTRAVENOUS ONCE
Status: DISCONTINUED | OUTPATIENT
Start: 2022-10-12 | End: 2022-10-13 | Stop reason: HOSPADM

## 2022-10-12 RX ORDER — DIAZEPAM 5 MG
10 TABLET ORAL EVERY 30 MIN PRN
Status: DISCONTINUED | OUTPATIENT
Start: 2022-10-12 | End: 2022-10-13 | Stop reason: HOSPADM

## 2022-10-12 RX ORDER — DIAZEPAM 10 MG/2ML
5-10 INJECTION, SOLUTION INTRAMUSCULAR; INTRAVENOUS EVERY 30 MIN PRN
Status: DISCONTINUED | OUTPATIENT
Start: 2022-10-12 | End: 2022-10-13 | Stop reason: HOSPADM

## 2022-10-12 RX ORDER — MAGNESIUM OXIDE 400 MG/1
800 TABLET ORAL ONCE
Status: COMPLETED | OUTPATIENT
Start: 2022-10-12 | End: 2022-10-12

## 2022-10-12 RX ORDER — ONDANSETRON 2 MG/ML
4 INJECTION INTRAMUSCULAR; INTRAVENOUS EVERY 6 HOURS PRN
Status: DISCONTINUED | OUTPATIENT
Start: 2022-10-12 | End: 2022-10-13 | Stop reason: HOSPADM

## 2022-10-12 RX ORDER — ACETAMINOPHEN 500 MG
1000 TABLET ORAL ONCE
Status: COMPLETED | OUTPATIENT
Start: 2022-10-12 | End: 2022-10-12

## 2022-10-12 RX ORDER — HALOPERIDOL 5 MG/ML
2.5-5 INJECTION INTRAMUSCULAR EVERY 6 HOURS PRN
Status: DISCONTINUED | OUTPATIENT
Start: 2022-10-12 | End: 2022-10-13 | Stop reason: HOSPADM

## 2022-10-12 RX ORDER — PANTOPRAZOLE SODIUM 40 MG/1
40 TABLET, DELAYED RELEASE ORAL DAILY
Status: DISCONTINUED | OUTPATIENT
Start: 2022-10-12 | End: 2022-10-13 | Stop reason: HOSPADM

## 2022-10-12 RX ORDER — FOLIC ACID 1 MG/1
1 TABLET ORAL DAILY
Status: DISCONTINUED | OUTPATIENT
Start: 2022-10-12 | End: 2022-10-13 | Stop reason: HOSPADM

## 2022-10-12 RX ORDER — FOLIC ACID 1 MG/1
1 TABLET ORAL ONCE
Status: COMPLETED | OUTPATIENT
Start: 2022-10-12 | End: 2022-10-12

## 2022-10-12 RX ORDER — DIAZEPAM 5 MG
5-20 TABLET ORAL EVERY 30 MIN PRN
Status: DISCONTINUED | OUTPATIENT
Start: 2022-10-12 | End: 2022-10-12

## 2022-10-12 RX ADMIN — SODIUM CHLORIDE: 9 INJECTION, SOLUTION INTRAVENOUS at 15:18

## 2022-10-12 RX ADMIN — IOPAMIDOL 75 ML: 755 INJECTION, SOLUTION INTRAVENOUS at 00:27

## 2022-10-12 RX ADMIN — ACETAMINOPHEN 1000 MG: 500 TABLET ORAL at 10:11

## 2022-10-12 RX ADMIN — ENOXAPARIN SODIUM 40 MG: 40 INJECTION SUBCUTANEOUS at 08:45

## 2022-10-12 RX ADMIN — SODIUM CHLORIDE 80 ML: 9 INJECTION, SOLUTION INTRAVENOUS at 00:38

## 2022-10-12 RX ADMIN — MULTIPLE VITAMINS W/ MINERALS TAB 1 TABLET: TAB at 07:53

## 2022-10-12 RX ADMIN — THERA TABS 1 TABLET: TAB at 02:56

## 2022-10-12 RX ADMIN — MAGNESIUM OXIDE TAB 400 MG (241.3 MG ELEMENTAL MG) 800 MG: 400 (241.3 MG) TAB at 02:56

## 2022-10-12 RX ADMIN — THIAMINE HCL TAB 100 MG 100 MG: 100 TAB at 02:57

## 2022-10-12 RX ADMIN — FOLIC ACID 1 MG: 1 TABLET ORAL at 07:53

## 2022-10-12 RX ADMIN — FOLIC ACID 1 MG: 1 TABLET ORAL at 02:56

## 2022-10-12 RX ADMIN — PANTOPRAZOLE SODIUM 40 MG: 40 TABLET, DELAYED RELEASE ORAL at 07:53

## 2022-10-12 RX ADMIN — THIAMINE HCL TAB 100 MG 100 MG: 100 TAB at 07:53

## 2022-10-12 ASSESSMENT — ACTIVITIES OF DAILY LIVING (ADL)
FALL_HISTORY_WITHIN_LAST_SIX_MONTHS: NO
DRESSING/BATHING_DIFFICULTY: NO
ADLS_ACUITY_SCORE: 21
ADLS_ACUITY_SCORE: 35
ADLS_ACUITY_SCORE: 35
CHANGE_IN_FUNCTIONAL_STATUS_SINCE_ONSET_OF_CURRENT_ILLNESS/INJURY: NO
DIFFICULTY_EATING/SWALLOWING: NO
ADLS_ACUITY_SCORE: 21
DOING_ERRANDS_INDEPENDENTLY_DIFFICULTY: NO
ADLS_ACUITY_SCORE: 21
ADLS_ACUITY_SCORE: 21
TOILETING_ISSUES: NO
WEAR_GLASSES_OR_BLIND: YES
ADLS_ACUITY_SCORE: 35
ADLS_ACUITY_SCORE: 35
ADLS_ACUITY_SCORE: 21
ADLS_ACUITY_SCORE: 21
CONCENTRATING,_REMEMBERING_OR_MAKING_DECISIONS_DIFFICULTY: YES
WALKING_OR_CLIMBING_STAIRS_DIFFICULTY: NO
VISION_MANAGEMENT: GLASSES

## 2022-10-12 ASSESSMENT — ENCOUNTER SYMPTOMS
FACIAL ASYMMETRY: 0
FEVER: 0
RHINORRHEA: 0
WEAKNESS: 0
VOMITING: 0
NAUSEA: 0
ABDOMINAL PAIN: 0
DIARRHEA: 0
NECK PAIN: 1
NUMBNESS: 0
HEADACHES: 1
SHORTNESS OF BREATH: 0
SORE THROAT: 0
CHILLS: 0
COUGH: 0
SPEECH DIFFICULTY: 1

## 2022-10-12 NOTE — ED PROVIDER NOTES
"ED Provider Note  [facility]      History     Chief Complaint   Patient presents with     Aphasia     Pt having difficulty finding words only able to answer yes and no questions clearly     Mental Health Problem     The history is provided by the patient, medical records and the spouse.      Tonio Alarcon is a 41 year old male with a past medical history of DDD (lumbar and cervical), cervical radiculopathy, right vertebral artery injury during C5-C7 arthroplasty (C5-6 and C6-7 anterior disc replacement) leading to enlarging right sided neck hematoma s/p right vertebral artery repair in 2019 with resulting carotidynia presenting to the ED brought in by his wife for evaluation of aphasia. Patient's last known well per his wife was at 8 am when she left for work, approximately 16 hours ago. She states that when she returned home around 5:30 pm she noted him to have difficulty finding words, repeating himself, and asking for clarification. He was complaining of shooting pains from his neck into the forehead which she notes are not uncommon for him to have following his neck surgery in 2019. She states that normally he is very articulate, but she noted him to have a very confused, frustrated look on his face and could tell he was struggling to come up with words. She states that he began repeating the same things to her every 30 minutes with no recollection of previously saying them. He was not slurring words, but stuttering. This has been consistent since 5:30 pm to arrival. The patient states that the shooting pains in his neck into the forehead began this morning. He states that he gets this quite often. He also reports having an episode of \"shaking all over\" which stopped 1 hour ago. He was awake and alert during this episode. He has no weakness, numbness, difficulty walking, blurry or double vision, or facial asymmetry. He has history of HTN, but is not on antihypertensives. He " "denies history of heart disease or DM. He denies fever, chills, runny nose, congestion, cough, sore throat.     Patient was at home all day today with his sons because he had told his wife that he could not be alone. He states that he felt this way because he knew something was wrong or \"not normal\" due to his word finding difficulty. He also reports having new thoughts of hurting himself today. He states that he feels as though he should be punished due to \"his childhood\". Does not elaborate on this. He endorses SI, but no plan. He reports history of diagnosed depression, but does not take his medications.     Patient also admits to drinking 1 pint of will today. His wife reports that he is a binge drinker and typically goes for around 2 weeks sober and then binges. He was 2.5 weeks sober before drinking for the past 3 days. He was drinking more than 1 pint daily for the past 3 days per his wife. Patient also admits to using marijuana, OxyContin, percocet, fentanyl, and ecstasy. He uses these both orally and intranasally. He states that he typically uses 4-5x monthly and has not used opiates in a few weeks. He states that today he only used marijuana and alcohol.     Past Medical History  Past Medical History:   Diagnosis Date     Depressive disorder      Ulcer, gastric, acute      Past Surgical History:   Procedure Laterality Date     ABDOMEN SURGERY  10/2017    Perforated peptic ulcer     GI SURGERY      Hemorrhoids      HEMORRHOID SURGERY  2015     INCISION AND DRAINAGE PERINEAL, COMBINED N/A 06/08/2018    Procedure: COMBINED INCISION AND DRAINAGE PERINEAL;   Examination of perineal abcess under anethesia, Incision and Drainage Perineal Abscess;  Surgeon: Solo Valero MD;  Location: [facility] OR     INJECT EPIDURAL TRANSFORAMINAL LUMBAR / SACRAL EA ADDITIONAL LEVEL N/A 03/04/2019    Procedure: Cervical Epidural Steroid Injection, Cervical 7-Thoracic 1 Interlaminar;  Surgeon: Nolan Quiroz MD;  " Location: [facility] OR     LAPAROSCOPIC HERNIORRHAPHY EPIGASTRIC N/A 10/11/2017    Procedure: LAPAROSCOPIC HERNIORRHAPHY EPIGASTRIC;  Exploratory Laparoscopy, Exploratory Laparotomy, EGD, Omental Patch, Upper Endoscopy;  Surgeon: Celio Keyes MD;  Location: [facility] OR     NECK SURGERY  06/2019    Neck hematoma related to vertebral artery injury from cervical disk replacement     REPLACE DISK LUMBAR N/A 06/25/2019    Procedure: CERVICAL 5-7 ARTHROPLASTY;  Surgeon: Aaron Craft MD;  Location:  OR     pantoprazole (PROTONIX) 40 MG EC tablet  Acetaminophen (TYLENOL ARTHRITIS PAIN PO)  aspirin (ASA) 81 MG tablet  diazepam (VALIUM) 5 MG tablet      Allergies   Allergen Reactions     Seasonal Allergies Other (See Comments)     Clogged sinuses     Family History  Family History   Problem Relation Age of Onset     Diabetes Mother      Breast Cancer Mother         Dx at 61 years aol     Coronary Artery Disease Mother         3v CABG     Cancer Father         Stomach ca - Dx at 57 years old and passed away from this     Cancer - colorectal Maternal Grandmother      Diabetes Brother      Social History   Social History     Tobacco Use     Smoking status: Every Day     Packs/day: 1.00     Years: 25.00     Pack years: 25.00     Types: Cigarettes     Smokeless tobacco: Never   Substance Use Topics     Alcohol use: Yes     Comment: heavy drinking about 2 x per week     Drug use: Not Currently     Types: Marijuana, Oxycodone, MDMA (Ecstasy)      Past medical history, past surgical history, medications, allergies, family history, and social history were reviewed with the patient. No additional pertinent items.       Review of Systems   Constitutional: Negative for chills and fever.   HENT: Negative for congestion, rhinorrhea and sore throat.    Respiratory: Negative for cough and shortness of breath.    Cardiovascular: Negative for chest pain.   Gastrointestinal: Negative for abdominal pain, diarrhea, nausea and vomiting.  "  Musculoskeletal: Positive for neck pain. Negative for gait problem.   Neurological: Positive for speech difficulty (word finding difficulty) and headaches. Negative for facial asymmetry, weakness and numbness.   Psychiatric/Behavioral: Positive for suicidal ideas.   All other systems reviewed and are negative.    A complete review of systems was performed with pertinent positives and negatives noted in the HPI, and all other systems negative.    Physical Exam   BP: (!) 156/94  Pulse: (!) 124  Temp: 99.2  F (37.3  C)  Resp: 16  Height: 180.3 cm (5' 11\")  Weight: 102.1 kg (225 lb)  SpO2: 97 %  Physical Exam  Vitals and nursing note reviewed.   Constitutional:       Appearance: He is well-developed and well-nourished.      Comments: Adult male, sitting up in bed.  Obvious word finding difficulties with frequent stuttering.  Does seem to get somewhat frustrated.  Cooperative.  Smells of alcohol.   HENT:      Head: Normocephalic.      Mouth/Throat:      Mouth: Oropharynx is clear and moist. Mucous membranes are moist.      Pharynx: Oropharynx is clear.   Eyes:      Pupils: Pupils are equal, round, and reactive to light.   Cardiovascular:      Rate and Rhythm: Regular rhythm. Tachycardia present.      Pulses: Normal pulses.      Heart sounds: Normal heart sounds. No murmur heard.    No gallop.   Pulmonary:      Effort: Pulmonary effort is normal. No respiratory distress.      Breath sounds: Normal breath sounds. No wheezing or rales.   Abdominal:      General: Bowel sounds are normal. There is no distension.      Palpations: Abdomen is soft.      Tenderness: There is no abdominal tenderness. There is no guarding or rebound.   Skin:     General: Skin is warm and dry.   Neurological:      Mental Status: He is alert.      GCS: GCS eye subscore is 4. GCS verbal subscore is 4. GCS motor subscore is 6.      Cranial Nerves: Cranial nerves 2-12 are intact.      Sensory: Sensation is intact.      Motor: Motor function is intact. " "     Coordination: Coordination is intact.      Comments: Stuttering, frequent word finding difficulties. Knows he is in a hospital. Identifies \"[place]\" as the city. Can name current month and year.  Correctly identifies current president.    In terms of word finding and object naming, patient appropriately names a pen.  When I point to my glasses, patient calls them \"really cool frames\" but does not actually use the word glasses.  When I pointed to my ring, patient uses the word \"love\" but does not use the word ring.   Psychiatric:         Mood and Affect: Mood is depressed.      Comments: Intoxicated, stuttering.  Appears to seem overwhelmed at times.  Denies active SI.  Seems to indicate some passive SI but does not elaborate.  No AH.         ED Course      Procedures             EKG Interpretation:      Interpreted by Erlinda Milton MD  Time reviewed:0000   Symptoms at time of EKG: None   Rhythm: Sinus tachycardia  Rate: 110-120  Axis: Normal  Ectopy: None  Conduction: Normal  ST Segments/ T Waves: No ST-T wave changes and No acute ischemic changes  Q Waves: None  Comparison to prior: No old EKG available    Clinical Impression: sinus tachycardia      The medical record was reviewed and interpreted.  Current labs reviewed and interpreted.              Results for orders placed or performed during the hospital encounter of 10/11/22   Head CT w/o contrast     Status: None    Narrative    EXAM: CT HEAD W/O CONTRAST, CTA HEAD NECK W CONTRAST  LOCATION: [facility]  DATE/TIME: 10/12/2022 12:43 AM    INDICATION: word finding difficulty, eval for bleed  COMPARISON: None  CONTRAST: 75mL isovue 370  TECHNIQUE: Head and neck CT angiogram with IV contrast. Noncontrast head CT followed by axial helical CT images of the head and neck vessels obtained during the arterial phase of intravenous contrast administration. Axial 2D reconstructed images and   multiplanar 3D MIP " reconstructed images of the head and neck vessels were performed by the technologist. Dose reduction techniques were used. All stenosis measurements made according to NASCET criteria unless otherwise specified.    FINDINGS:   NONCONTRAST HEAD CT:   INTRACRANIAL CONTENTS: No intracranial hemorrhage, extraaxial collection, or mass effect.  No CT evidence of acute infarct. Normal parenchymal attenuation. Normal ventricles and sulci.     VISUALIZED ORBITS/SINUSES/MASTOIDS: No intraorbital abnormality. No paranasal sinus mucosal disease. No middle ear or mastoid effusion.    BONES/SOFT TISSUES: No acute abnormality.    HEAD CTA:  ANTERIOR CIRCULATION: No stenosis/occlusion, aneurysm, or high flow vascular malformation. Standard [place] of Aj anatomy.    POSTERIOR CIRCULATION: No stenosis/occlusion, aneurysm, or high flow vascular malformation. Balanced vertebral arteries supply a normal basilar artery.     DURAL VENOUS SINUSES: Expected enhancement of the major dural venous sinuses.    NECK CTA:  RIGHT CAROTID: No measurable stenosis or dissection.    LEFT CAROTID: No measurable stenosis or dissection.    VERTEBRAL ARTERIES: No focal stenosis or dissection. Balanced vertebral arteries.    AORTIC ARCH: Classic aortic arch anatomy with no significant stenosis at the origin of the great vessels.    NONVASCULAR STRUCTURES: Disc prostheses at C5-C6 and C6-C7.      Impression    IMPRESSION:   HEAD CT:  1.  No acute intracranial abnormality.    HEAD CTA:   1.  Normal CTA [place] of [place].    NECK CTA:  1.  Normal neck CTA.   CTA Head Neck with Contrast     Status: None    Narrative    EXAM: CT HEAD W/O CONTRAST, CTA HEAD NECK W CONTRAST  LOCATION: [facility]  DATE/TIME: 10/12/2022 12:43 AM    INDICATION: word finding difficulty, eval for bleed  COMPARISON: None  CONTRAST: 75mL isovue 370  TECHNIQUE: Head and neck CT angiogram with IV contrast. Noncontrast head CT followed by  axial helical CT images of the head and neck vessels obtained during the arterial phase of intravenous contrast administration. Axial 2D reconstructed images and   multiplanar 3D MIP reconstructed images of the head and neck vessels were performed by the technologist. Dose reduction techniques were used. All stenosis measurements made according to NASCET criteria unless otherwise specified.    FINDINGS:   NONCONTRAST HEAD CT:   INTRACRANIAL CONTENTS: No intracranial hemorrhage, extraaxial collection, or mass effect.  No CT evidence of acute infarct. Normal parenchymal attenuation. Normal ventricles and sulci.     VISUALIZED ORBITS/SINUSES/MASTOIDS: No intraorbital abnormality. No paranasal sinus mucosal disease. No middle ear or mastoid effusion.    BONES/SOFT TISSUES: No acute abnormality.    HEAD CTA:  ANTERIOR CIRCULATION: No stenosis/occlusion, aneurysm, or high flow vascular malformation. Standard [place] of Aj anatomy.    POSTERIOR CIRCULATION: No stenosis/occlusion, aneurysm, or high flow vascular malformation. Balanced vertebral arteries supply a normal basilar artery.     DURAL VENOUS SINUSES: Expected enhancement of the major dural venous sinuses.    NECK CTA:  RIGHT CAROTID: No measurable stenosis or dissection.    LEFT CAROTID: No measurable stenosis or dissection.    VERTEBRAL ARTERIES: No focal stenosis or dissection. Balanced vertebral arteries.    AORTIC ARCH: Classic aortic arch anatomy with no significant stenosis at the origin of the great vessels.    NONVASCULAR STRUCTURES: Disc prostheses at C5-C6 and C6-C7.      Impression    IMPRESSION:   HEAD CT:  1.  No acute intracranial abnormality.    HEAD CTA:   1.  Normal CTA [place] of Aj.    NECK CTA:  1.  Normal neck CTA.   Comprehensive metabolic panel     Status: Abnormal   Result Value Ref Range    Sodium 140 133 - 144 mmol/L    Potassium 3.7 3.4 - 5.3 mmol/L    Chloride 107 94 - 109 mmol/L    Carbon Dioxide (CO2) 23 20 - 32 mmol/L    Anion  Gap 10 3 - 14 mmol/L    Urea Nitrogen 11 7 - 30 mg/dL    Creatinine 0.92 0.66 - 1.25 mg/dL    Calcium 9.1 8.5 - 10.1 mg/dL    Glucose 114 (H) 70 - 99 mg/dL    Alkaline Phosphatase 89 40 - 150 U/L    AST 42 0 - 45 U/L    ALT 50 0 - 70 U/L    Protein Total 8.7 6.8 - 8.8 g/dL    Albumin 4.8 3.4 - 5.0 g/dL    Bilirubin Total 0.9 0.2 - 1.3 mg/dL    GFR Estimate >90 >60 mL/min/1.73m2   CBC with platelets and differential     Status: Abnormal   Result Value Ref Range    WBC Count 8.7 4.0 - 11.0 10e3/uL    RBC Count 6.36 (H) 4.40 - 5.90 10e6/uL    Hemoglobin 16.3 13.3 - 17.7 g/dL    Hematocrit 47.9 40.0 - 53.0 %    MCV 75 (L) 78 - 100 fL    MCH 25.6 (L) 26.5 - 33.0 pg    MCHC 34.0 31.5 - 36.5 g/dL    RDW 17.1 (H) 10.0 - 15.0 %    Platelet Count 268 150 - 450 10e3/uL    % Neutrophils 42 %    % Lymphocytes 45 %    % Monocytes 10 %    % Eosinophils 2 %    % Basophils 1 %    % Immature Granulocytes 0 %    NRBCs per 100 WBC 0 <1 /100    Absolute Neutrophils 3.6 1.6 - 8.3 10e3/uL    Absolute Lymphocytes 4.0 0.8 - 5.3 10e3/uL    Absolute Monocytes 0.8 0.0 - 1.3 10e3/uL    Absolute Eosinophils 0.2 0.0 - 0.7 10e3/uL    Absolute Basophils 0.0 0.0 - 0.2 10e3/uL    Absolute Immature Granulocytes 0.0 <=0.4 10e3/uL    Absolute NRBCs 0.0 10e3/uL   Glucose by meter     Status: Abnormal   Result Value Ref Range    GLUCOSE BY METER POCT 114 (H) 70 - 99 mg/dL   Creatinine POCT     Status: Normal   Result Value Ref Range    Creatinine POCT 1.1 0.7 - 1.3 mg/dL    GFR, ESTIMATED POCT >60 >60 mL/min/1.73m2   Asymptomatic COVID-19 Virus (Coronavirus) by PCR Nasopharyngeal     Status: Normal    Specimen: Nasopharyngeal; Swab   Result Value Ref Range    SARS CoV2 PCR Negative Negative    Narrative    Testing was performed using the [employer] Xpress SARS-CoV-2 Assay on the   Cepheid Gene-Xpert Instrument Systems. Additional information about   this Emergency Use Authorization (EUA) assay can be found via the Lab   Guide. This test should be ordered for  the detection of SARS-CoV-2 in   individuals who meet SARS-CoV-2 clinical and/or epidemiological   criteria. Test performance is unknown in asymptomatic patients. This   test is for in vitro diagnostic use under the FDA EUA for   laboratories certified under CLIA to perform high complexity testing.   This test has not been FDA cleared or approved. A negative result   does not rule out the presence of PCR inhibitors in the specimen or   target RNA in concentration below the limit of detection for the   assay. The possibility of a false negative should be considered if   the patient's recent exposure or clinical presentation suggests   COVID-19. This test was validated by the [facility]. This laboratory is certified under the Clinical Laboratory Improvement Amendments of 1988 (CLIA-88) as qualified to perform high complexity laboratory testing.     EKG 12 lead     Status: None (Preliminary result)   Result Value Ref Range    Systolic Blood Pressure  mmHg    Diastolic Blood Pressure  mmHg    Ventricular Rate 111 BPM    Atrial Rate 111 BPM    AR Interval 156 ms    QRS Duration 88 ms     ms    QTc 476 ms    P Axis 60 degrees    R AXIS 116 degrees    T Axis 45 degrees    Interpretation ECG       Sinus tachycardia  Possible Left atrial enlargement  Right axis deviation  Abnormal ECG     Alcohol breath test POCT     Status: Abnormal   Result Value Ref Range    Alcohol Breath Test 0.145 (A) 0.00 - 0.01   CBC with Platelets & Differential     Status: Abnormal    Narrative    The following orders were created for panel order CBC with Platelets & Differential.  Procedure                               Abnormality         Status                     ---------                               -----------         ------                     CBC with platelets and d...[[id number]]  Abnormal            Final result                 Please view results for these tests on the individual orders.      Medications   iopamidol (ISOVUE-370) solution 100 mL (75 mLs Intravenous Given 10/12/22 0027)   sodium chloride 0.9 % bag 100mL (80 mLs Intravenous Given 10/12/22 0038)        Assessments & Plan (with Medical Decision Making)   Patient presents to the emergency department today for the above complaints.  I did evaluate him as soon as nursing placed him and in exam room.  Did speak with the patient as well as his wife.  He does have very obvious word finding difficulty/stuttering.  Other than this, his neuro exam is nonfocal but his word finding difficulty/stuttering do appear to be really quite impressive.  He is intoxicated, breathalyzer today 0.145.  He is a binge drinker, it does not seem like this degree of intoxication would likely cause this degree of word finding difficulties.  However this needs to be considered.  Additionally, strongly suspect overlying mental health component which the patient is not currently elaborating on, though he denies any active SI at this time.  Alternatively, other toxicologic etiology needs to be considered as he does admit to using marijuana and street drugs, though denies use of anything other than marijuana for the past few weeks.    We did do a head CT as well as a CTA of the head and the neck.  This shows no evidence of acute intracranial abnormality.  Head CTA shows normal [place] of Aj.  Neck CTA was read as normal.  Labs are unrevealing, though urine tox is still pending.  I did speak to the neuro stroke fellow about this patient.  Fellow is recommending that we admit the patient to medicine overnight, anticipating that his word finding difficulties will probably clear as he metabolizes his alcohol.  If he does not clear in the morning, they will see him, even here on the [facility].  I specifically questioned them about this, and the neuro stroke fellow did tell me that they would find a way to see the patient on the [facility] if he continues to be  symptomatic.    Therefore, we will admit to medicine on the [facility].  I would advise a psychiatric consult in the morning as I strongly suspect that overlying mental health issues may be contributing to current symptoms.  I have spoken to the [place] hospitalist who is in agreement with plan.    I have reviewed the nursing notes. I have reviewed the findings, diagnosis, plan and need for follow up with the patient.    New Prescriptions    No medications on file       Final diagnoses:   Word finding difficulty   Alcoholic intoxication with complication (H)   I, Laura Hancock, am serving as a trained medical scribe to document services personally performed by Erlinda Milton MD, based on the provider's statements to me.     I, Erlinda Milton MD, was physically present and have reviewed and verified the accuracy of this note documented by Laura Hancock.      --  Erlinda Milton MD   [facility] EMERGENCY DEPARTMENT  10/11/2022     Erlinda Milton MD  10/12/22 0235

## 2022-10-12 NOTE — CONSULTS
[facility]    Stroke Telephone Note    I was called by Erlinda Milton on 10/12/22 regarding patient Tonio Alarcon.     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 He presents for expressive aphasia with LKW noted to be 0800 by wife when he went to work. ED exam notable for some word finding difficulties and some paraphrasic or word substitution errors in the setting of acute EtOH intoxication.     In ED BP 150s/90s. . EtOH breath test 0.145. Rest of labs pending.     Imaging Findings   Pending    Impression/Recommendations  # Aphasia in setting of EtOH toxicity. Highest suspicion for acute EtOH toxicity as contributing to symptoms. Iatrogenic R VA stenosis is not likely contributing given symptoms localize to L MCA territory. Possibility of small stroke cannot be excluded at this time - ED has reservations about MRI compatibility of R VA gortex graft.   - CTA H/N now to r/o high grade stenosis  - Rec admission - if symptoms improve as EtOH is metabolized, then no further stroke workup  - If has ongoing symptoms in AM rec TIA/stroke workup  - For TIA/stroke workup, please use admission orderset stroke no TNK  - Workup includes:    - Labs: A1c, lipid panel   - EKG, telemetry, TTE complete WITH bubble  - If unable to get MRI brain, can get CT head w/o contrast to look for interval evolution of ischemic stroke  - Telestroke Neuro team will see pt tomorrow, and if unable Neuro resident can see - given no bed availability per ED to transfer to [place] for Neuro service admit    My recommendations are based on the information provided over the phone by Tonio Trevon Dorian's  "in-person providers. They are not intended to replace the clinical judgment of his in-person providers. I was not requested to personally see or examine the patient at this time.    The Stroke Staff is Dr. Tsang.    Dirk Cooper MD  Vascular Neurology Fellow  To page me or covering stroke neurology team member, click here: AMCOM   Choose \"On Call\" tab at top, then search dropdown box for \"Neurology Adult\", select location, press Enter, then look for stroke/neuro ICU/telestroke.      "

## 2022-10-12 NOTE — PROGRESS NOTES
"[facility]    Medicine Progress Note - Hospitalist Service, GOLD TEAM 16    Date of Admission:  10/11/2022    Assessment & Plan      Tonio Alarcon is a 41 year old man with history of EtOH use disorder, depression, C5-C7 anterior cervical arthroplasty in 2019 complicated by right neck hematoma 2/2 right vertebral artery defect s/p Goretex graft repair with 35-40% stenosis of R V1/V2 junction who presented with word finding difficulties. Last known normal around 8 am. Wife noticed that he was stuttering, repeating himself. When she returned home, she found that he had worsened and was having difficulty getting words out, so she brought him to the ER. He had been drinking for most of the day at home. He is unable to quantify how much he drank.        Aphasia  CT head/CTA head/neck negative for acute findings. ER MD discussed with neuro stroke team. MRI requested but not able to be performed over night as ER MD and Neuro not sure if previous surgical hardware/graft is MRI compatible. Given long duration since onset, no acute intervention possible/indicated.     - patient  Still with aphasia , discussed with  Stroke neuro team will attempt MRI and if cannot do them will need follow up  CT . If has evidence of stroke then will need to transfer to [place], if no evidence of stoke then will have neurology to see  - asked patient  Not to leave the floor so we can do test and continue with evaluation  - placed on telemetry   - EKG on admission was sinus    - echo pending   - lipid panel done       History C5-C7 anterior cervical arthroplasty  6/2019  Complicated by right sided neck hematoma  Right vertebral artery defect  And  Was \"  repaired primarily and reinforced using a Goretex graft secured by a clip\"     Found out patient  Notes form [facility]   \"..... We identified the 2 mm defect along the medial and posterior wall of the vertebral artery. The edges of the defect " "were irregular but still discrete, and we felt that a primary repair was possible. The patient received 3000 units of IV heparin. We trimmed the irregularities of the edges of the defect. We then repaired the defect with interrupted 6-0 and 7-0 Prolene. We reinforced the middle suture with a small pledget of muscle. . We intermittently checked for bleeding by releasing the distal clip. There was still some bleeding from the back wall of the artery. We reapplied the clip and placed another suture (7-0 Prolene). This secured the bleeding. We released both clamps and the artery appeared to be patent. Patency was confirmed with ICG videoangiography. We decided to reinforce the vessel wall with wrapping. We made a sling out of a [place] graft and wrapped the repair site. The edges of the Goretex wrapping were then secured with a right-angle Sugita T2 aneurysm clip.... \"       - had CTA  10/11/22 that showed normal  neck CTA as well as [place] of Aj        Alcohol intoxication  Alcohol abuse   Drinks daily, drink of choice is will. Per his wife, he gets pretty agitated if he abstains for more than a day or two.   - CIWA with diazepam  - MVI, folate, thiamine     tox screen  + cocaine and cannabinoids      GERD  - Continue protonix         Diet: NPO for Medical/Clinical Reasons Except for: Ice Chips    DVT Prophylaxis: patient  Ambulating   Powell Catheter: Not present  Central Lines: None  Cardiac Monitoring: None  Code Status: Full Code      Disposition Plan      Expected Discharge Date: 10/14/2022                The patient's care was discussed with neuro stroke VNICENT, his wife by bedside as well as MRI tech     Hannah Roche MD  Hospitalist Service, GOLD TEAM [address] [place] [address]  Securely message with the Vocera Web Console (learn more here)  Text page via [facility] Paging/Directory   Please see signed in provider for up to date coverage information      Clinically Significant " "Risk Factors Present on Admission                    # Obesity: Estimated body mass index is 31.38 kg/m  as calculated from the following:    Height as of this encounter: 1.803 m (5' 11\").    Weight as of this encounter: 102.1 kg (225 lb).        ______________________________________________________________________    Interval History      Attempted to see patient  Number of times but has gone off the floor with his wife several times    Patient  Still has significant expressive aphasia, per his wife was better in AM and now bit worse again  He denies any weakness or numbness anywhere, does feel pressure behind left eye.       Data reviewed today: I reviewed all medications, new labs and imaging results over the last 24 hours.   IPhysical Exam   Vital Signs: Temp: 98.2  F (36.8  C) Temp src: Oral BP: 139/87 Pulse: 98   Resp: 25 SpO2: 99 % O2 Device: None (Room air)    Weight: 225 lbs 0 oz     General appearence: awake alert  In  no apparent distress    HEENT: EOMI, PEARLA, sclera nonicteric,  moist,  mucus membranes,   NECK : supple  RESPIRATORY: lungs clear to auscultation bilateral,   CARDIOVASCULAR:S1 S2 regular rate and rhythm, no rubs gallops or murmurs appreciated  GASTROINTESTINAL:soft, non-distended , non-tender , + bowel sounds, no masses felt   SKIN: warm and dry, no mottling noted   NEUROLOGIC; awake alert, has expressive aphasia , normal finger to nose bilateral , no pronator drift , 5/5 = strength in all extremities , sensation intact   EXTREMITIES: no clubbing, cyanosis or edema , moves all extremity, good pedal pulses   MUSCULOSKELETAL: without deformity       Data   Recent Labs   Lab 10/12/22  0614 10/12/22  0009 10/12/22  0007 10/12/22  0005   WBC 6.7  --  8.7  --    HGB 16.0  --  16.3  --    MCV 76*  --  75*  --      --  268  --      --  140  --    POTASSIUM 3.8  --  3.7  --    CHLORIDE 106  --  107  --    CO2 23  --  23  --    BUN 10  --  11  --    CR 0.88 1.1 0.92  --    ANIONGAP 10 "  --  10  --    MIMI 9.0  --  9.1  --    GLC 97  --  114* 114*   ALBUMIN  --   --  4.8  --    PROTTOTAL  --   --  8.7  --    BILITOTAL  --   --  0.9  --    ALKPHOS  --   --  89  --    ALT  --   --  50  --    AST  --   --  42  --      Recent Results (from the past 24 hour(s))   CTA Head Neck with Contrast    Narrative    EXAM: CT HEAD W/O CONTRAST, CTA HEAD NECK W CONTRAST  LOCATION: [facility]  DATE/TIME: 10/12/2022 12:43 AM    INDICATION: word finding difficulty, eval for bleed  COMPARISON: None  CONTRAST: 75mL isovue 370  TECHNIQUE: Head and neck CT angiogram with IV contrast. Noncontrast head CT followed by axial helical CT images of the head and neck vessels obtained during the arterial phase of intravenous contrast administration. Axial 2D reconstructed images and   multiplanar 3D MIP reconstructed images of the head and neck vessels were performed by the technologist. Dose reduction techniques were used. All stenosis measurements made according to NASCET criteria unless otherwise specified.    FINDINGS:   NONCONTRAST HEAD CT:   INTRACRANIAL CONTENTS: No intracranial hemorrhage, extraaxial collection, or mass effect.  No CT evidence of acute infarct. Normal parenchymal attenuation. Normal ventricles and sulci.     VISUALIZED ORBITS/SINUSES/MASTOIDS: No intraorbital abnormality. No paranasal sinus mucosal disease. No middle ear or mastoid effusion.    BONES/SOFT TISSUES: No acute abnormality.    HEAD CTA:  ANTERIOR CIRCULATION: No stenosis/occlusion, aneurysm, or high flow vascular malformation. Standard [place] of [place] anatomy.    POSTERIOR CIRCULATION: No stenosis/occlusion, aneurysm, or high flow vascular malformation. Balanced vertebral arteries supply a normal basilar artery.     DURAL VENOUS SINUSES: Expected enhancement of the major dural venous sinuses.    NECK CTA:  RIGHT CAROTID: No measurable stenosis or dissection.    LEFT CAROTID: No measurable stenosis  or dissection.    VERTEBRAL ARTERIES: No focal stenosis or dissection. Balanced vertebral arteries.    AORTIC ARCH: Classic aortic arch anatomy with no significant stenosis at the origin of the great vessels.    NONVASCULAR STRUCTURES: Disc prostheses at C5-C6 and C6-C7.      Impression    IMPRESSION:   HEAD CT:  1.  No acute intracranial abnormality.    HEAD CTA:   1.  Normal CTA [place] of [place].    NECK CTA:  1.  Normal neck CTA.   Head CT w/o contrast    Narrative    EXAM: CT HEAD W/O CONTRAST, CTA HEAD NECK W CONTRAST  LOCATION: [facility]  DATE/TIME: 10/12/2022 12:43 AM    INDICATION: word finding difficulty, eval for bleed  COMPARISON: None  CONTRAST: 75mL isovue 370  TECHNIQUE: Head and neck CT angiogram with IV contrast. Noncontrast head CT followed by axial helical CT images of the head and neck vessels obtained during the arterial phase of intravenous contrast administration. Axial 2D reconstructed images and   multiplanar 3D MIP reconstructed images of the head and neck vessels were performed by the technologist. Dose reduction techniques were used. All stenosis measurements made according to NASCET criteria unless otherwise specified.    FINDINGS:   NONCONTRAST HEAD CT:   INTRACRANIAL CONTENTS: No intracranial hemorrhage, extraaxial collection, or mass effect.  No CT evidence of acute infarct. Normal parenchymal attenuation. Normal ventricles and sulci.     VISUALIZED ORBITS/SINUSES/MASTOIDS: No intraorbital abnormality. No paranasal sinus mucosal disease. No middle ear or mastoid effusion.    BONES/SOFT TISSUES: No acute abnormality.    HEAD CTA:  ANTERIOR CIRCULATION: No stenosis/occlusion, aneurysm, or high flow vascular malformation. Standard [place] of Aj anatomy.    POSTERIOR CIRCULATION: No stenosis/occlusion, aneurysm, or high flow vascular malformation. Balanced vertebral arteries supply a normal basilar artery.     DURAL VENOUS SINUSES: Expected  enhancement of the major dural venous sinuses.    NECK CTA:  RIGHT CAROTID: No measurable stenosis or dissection.    LEFT CAROTID: No measurable stenosis or dissection.    VERTEBRAL ARTERIES: No focal stenosis or dissection. Balanced vertebral arteries.    AORTIC ARCH: Classic aortic arch anatomy with no significant stenosis at the origin of the great vessels.    NONVASCULAR STRUCTURES: Disc prostheses at C5-C6 and C6-C7.      Impression    IMPRESSION:   HEAD CT:  1.  No acute intracranial abnormality.    HEAD CTA:   1.  Normal CTA [place] of [place].    NECK CTA:  1.  Normal neck CTA.

## 2022-10-12 NOTE — PLAN OF CARE
Occupational Therapy: Orders received. Chart reviewed and discussed with care team.? Occupational Therapy not indicated due to pt up independently, heading outside on encounter. No deficits in ADL as pt has been up ind in room. May consider OP SLP if speech deficits continue.? Defer discharge recommendations to medical team.? Will complete orders.

## 2022-10-12 NOTE — PHARMACY-ADMISSION MEDICATION HISTORY
Admission Medication History Completed by Pharmacy    See [facility] Admission Navigator for allergy information, preferred outpatient pharmacy, prior to admission medications and immunization status.     Medication History Sources:     Patient    Pharmacy fill history via [employer]    Changes made to PTA medication list (reason):    Added: None    Deleted: acetaminophen, aspirin (not taking per pt)    Changed: None    Additional Information:    Pt reported being out of diazepam, needs to see PCP for refills. Per fill hx/ last filled 3/27/22 for #15    Prior to Admission medications    Medication Sig Last Dose Taking? Auth Provider Long Term End Date   pantoprazole (PROTONIX) 40 MG EC tablet TAKE 1 TABLET BY MOUTH EVERY DAY 10/11/2022 Yes Rajwinder Gutierrez, APRN CNP     diazepam (VALIUM) 5 MG tablet Take 1 tablet (5 mg) by mouth nightly as needed for anxiety  Patient not taking: Reported on 10/11/2022 Not Taking  Christiano Palacios MD         Date completed: 10/12/22    Medication history completed by: Shaila Ramirez [facility]

## 2022-10-12 NOTE — PLAN OF CARE
[id number]:  A&Ox4. Pt has moderate aphasia, slow speech with difficulty finding words. C/o pain in right neck that he reports is chronic -- applied heat packs with mild relief. Independent in room, ambulating in hallways with wife. Left PIV infusing NS at 75mL/hr. Regular diet, good appetite. Voiding without difficulty. Pt scheduled to complete MRI this evening. Continue POC.

## 2022-10-12 NOTE — H&P
"[facility]    History and Physical - Hospitalist Service       Date of Admission:  10/11/2022    Assessment & Plan      Tonio Alarcon is a 41 year old man with history of EtOH use disorder, depression, C5-C7 anterior cervical arthroplasty in 2019 complicated by right neck hematoma 2/2 right vertebral artery defect s/p Goretex graft repair with 35-40% stenosis of R V1/V2 junction who presented with word finding difficulties.     Aphasia  CT head/CTA head/neck negative for acute findings. ER MD discussed with neuro stroke team. MRI requested but not able to be performed tonight as ER MD and Neuro not sure if previous surgical hardware/graft is MRI compatible. Given long duration since onset, no acute intervention possible/indicated.   Neuro recommendations include:  - monitor closely overnight. Neurochecks Q4H  - Plan for reassessment by neurology in the morning after he has had time to sober up  - Reassess feasibility of MRI in am  - Plan for TTE with bubble study in am  - Lipid panel in am    Alcohol intoxication  Alcohol abuse   Drinks daily, drink of choice is will. Per his wife, he gets pretty agitated if he abstains for more than a day or two.   - CIWA with diazepam  - MVI, folate, thiamine      GERD  - Continue protonix       Diet:   NPO  DVT Prophylaxis: Enoxaparin (Lovenox) SQ  Powell Catheter: Not present  Central Lines: None  Cardiac Monitoring: None  Code Status:   Full    Clinically Significant Risk Factors Present on Admission                    # Obesity: Estimated body mass index is 31.38 kg/m  as calculated from the following:    Height as of this encounter: 1.803 m (5' 11\").    Weight as of this encounter: 102.1 kg (225 lb).        Disposition Plan      Expected Discharge Date: 10/14/2022                The patient's care was discussed with the Patient.    Edgar Nair MD  Hospitalist Service  [facility]  Securely message with the [employer] Web Console (learn more here)  Text page via [facility] Paging/Directory         ______________________________________________________________________    Chief Complaint   Aphasia    History is obtained from the patient and the patient's wife    History of Present Illness   Tonio Alarcon is a 41 year old man with history of EtOH use disorder, depression, C5-C7 anterior cervical arthroplasty in 2019 complicated by right neck hematoma 2/2 right vertebral artery defect s/p Goretex graft repair with 35-40% stenosis of R V1/V2 junction who presented with word finding difficulties. Last known normal around 8 am. Wife noticed that he was stuttering, repeating himself. When she returned home, she found that he had worsened and was having difficulty getting words out, so she brought him to the ER. He had been drinking for most of the day at home. He is unable to quantify how much he drank.     Review of Systems    The 10 point Review of Systems is negative other than noted in the HPI or here.     Past Medical History    I have reviewed this patient's medical history and updated it with pertinent information if needed.   Past Medical History:   Diagnosis Date     Depressive disorder      Ulcer, gastric, acute        Past Surgical History   I have reviewed this patient's surgical history and updated it with pertinent information if needed.  Past Surgical History:   Procedure Laterality Date     ABDOMEN SURGERY  10/2017    Perforated peptic ulcer     GI SURGERY      Hemorrhoids      HEMORRHOID SURGERY  2015     INCISION AND DRAINAGE PERINEAL, COMBINED N/A 06/08/2018    Procedure: COMBINED INCISION AND DRAINAGE PERINEAL;   Examination of perineal abcess under anethesia, Incision and Drainage Perineal Abscess;  Surgeon: Solo Valero MD;  Location: [facility] OR     INJECT EPIDURAL TRANSFORAMINAL LUMBAR / SACRAL EA ADDITIONAL LEVEL N/A 03/04/2019    Procedure: Cervical  Epidural Steroid Injection, Cervical 7-Thoracic 1 Interlaminar;  Surgeon: Nolan Quiroz MD;  Location: [facility] OR     LAPAROSCOPIC HERNIORRHAPHY EPIGASTRIC N/A 10/11/2017    Procedure: LAPAROSCOPIC HERNIORRHAPHY EPIGASTRIC;  Exploratory Laparoscopy, Exploratory Laparotomy, EGD, Omental Patch, Upper Endoscopy;  Surgeon: Celio Keyes MD;  Location: [facility] OR     NECK SURGERY  06/2019    Neck hematoma related to vertebral artery injury from cervical disk replacement     REPLACE DISK LUMBAR N/A 06/25/2019    Procedure: CERVICAL 5-7 ARTHROPLASTY;  Surgeon: Aaron Craft MD;  Location:  OR       Social History   I have reviewed this patient's social history and updated it with pertinent information if needed.  Social History     Tobacco Use     Smoking status: Every Day     Packs/day: 1.00     Years: 25.00     Pack years: 25.00     Types: Cigarettes     Smokeless tobacco: Never   Substance Use Topics     Alcohol use: Yes     Comment: heavy drinking about 2 x per week     Drug use: Not Currently     Types: Marijuana, Oxycodone, MDMA (Ecstasy)       Family History   I have reviewed this patient's family history and updated it with pertinent information if needed.  Family History   Problem Relation Age of Onset     Diabetes Mother      Breast Cancer Mother         Dx at 61 years aol     Coronary Artery Disease Mother         3v CABG     Cancer Father         Stomach ca - Dx at 57 years old and passed away from this     Cancer - colorectal Maternal Grandmother      Diabetes Brother        Prior to Admission Medications   Prior to Admission Medications   Prescriptions Last Dose Informant Patient Reported? Taking?   Acetaminophen (TYLENOL ARTHRITIS PAIN PO)   Yes No   aspirin (ASA) 81 MG tablet Not Taking  Yes No   Sig: Take 81 mg by mouth 2 times daily   Patient not taking: Reported on 10/11/2022   diazepam (VALIUM) 5 MG tablet Not Taking  No No   Sig: Take 1 tablet (5 mg) by mouth nightly as needed for anxiety    Patient not taking: Reported on 10/11/2022   pantoprazole (PROTONIX) 40 MG EC tablet 10/11/2022  No Yes   Sig: TAKE 1 TABLET BY MOUTH EVERY DAY      Facility-Administered Medications: None     Allergies   Allergies   Allergen Reactions     Seasonal Allergies Other (See Comments)     Clogged sinuses       Physical Exam   Vital Signs: Temp: 99.2  F (37.3  C) Temp src: Oral BP: (!) 140/90 Pulse: 97   Resp: 16 SpO2: 97 % O2 Device: None (Room air)    Weight: 225 lbs 0 oz    General: Alert and oriented, well nourished, in NAD  Head: normocephalic, atraumatic,   Eyes: PERRL, EOMI, corneas and conjunctivae clear, no scleral icterus  ENT: mucus membranes moist, no exudates, no erythema.  Neck: no tenderness, supple, full AROM, no adenopathy  Chest/Pulmonary: CTAB, moving air well, no rales or wheezes, no tachypnea   Cardiovascular: S1, S2 normal, regular rate and rhythm and no murmur, no JVD. Distal pulses 2+. No edema  Abdomen: NABS, soft, non-tender, non-distended, no guarding, no rebound, no masses palpable and no hepatomegaly  Musculoskel/Extremities: no erythema or warmth of major joints  Skin: no diaphoresis, skin color normal  Neuro: severe aphasia, able to speak some words but gets frustrated that he cannot get words out, receptive language appears to be intact. CN 2-12 intact. Strength 5/5 and symmetric in all ext, reflexes 1+ and symmetric, sensation intact to light touch.   Psychiatric: affect/mood normal, cooperative, memory appears to be intact

## 2022-10-12 NOTE — PLAN OF CARE
"BP (!) 148/101 (BP Location: Right arm, Patient Position: Sitting)   Pulse 99   Temp 98.7  F (37.1  C) (Oral)   Resp 22   Ht 1.803 m (5' 11\")   Wt 102.1 kg (225 lb)   SpO2 100%   BMI 31.38 kg/m      A&O X4. Lung sounds clear. Elevated BP (provider notified). Denies chest pain, SOB.  Patient is on tele. Normal sinus/sinus tachy intermittent.  CIWA scorin (headache), 3 (headache). No valium needed/given.  Patient rates headache at a 6/10. Patient states pain is on right side of neck and also around his left eye. (writer paged provider). One time dose of Tylenol given.  Wears glasses.  Independent in room.  No skin issues.  Patient has trouble with word-finding and speech is slow. Patient is aware of this. Based on the report from the ED nurse, patients speech seems to be improving. If he talks slow and really focuses on what he wants to say, his speech is easier to comprehend.  Echo done this afternoon.  MRI scheduled today for 5pm.   Patient walked around hospital with wife a few times during the day. Writer educated patient that staying on the unit is best so staff can provide care in a timely manner. Patient agreed to stay on unit. * Patient did leave the unit around 1500 with wife to get lunch.    Regular diet. * patient was NPO this morning due to scheduled ECHO. Patent c/o feeling dehydrated, so provider ordered 0.9%NS infusion.     Continue with POC.      "

## 2022-10-12 NOTE — PHARMACY-CONSULT NOTE
10/11@2325: Stroke code called. Upon pharmacy arrival, code de-escalated. No pharmacy assistance required.  Christiano Gaitan, BillD

## 2022-10-12 NOTE — ED NOTES
Pt repeatedly asking for something to drink or ice chips.  Pt has been told several times MD will have to clear him prior to receiving anything orally.  Pt on way to CT with ERT.

## 2022-10-12 NOTE — UTILIZATION REVIEW
Admission Status; Secondary Review Determination       Under the authority of the Utilization Management Committee, the utilization review process indicated a secondary review on the above patient. The review outcome is based on review of the medical records, discussions with staff, and applying clinical experience noted on the date of the review.     (x) Inpatient Status Appropriate - This patient's medical care is consistent with medical management for inpatient care and reasonable inpatient medical practice.     RATIONALE FOR DETERMINATION   41-year-old male with alcohol dependence ,depression, C5-C7 anterior cervical arthroplasty in 2019 complicated by right neck hematoma 2/2 right vertebral artery defect s/p Goretex graft repair with 35-40% stenosis of R V1/V2 junction  who presented with aphasia.  Head CT and neck CT as well as CTA were negative for any acute findings.  Stroke neurology contacted.  There has been a delay in MRI because of the clarification needed on the graft repair outlined above.    Per chart notes he appears to still have aphasia so has had it for more than 24 hours.  Not stable for discharge today so will need at least another night in the hospital.    If brain MRI is abnormal and shows evidence for acute stroke, inpatient admission status is recommended and appropriate.    At the time of admission with the information available to the attending physician more than 2 nights hospital complex care was anticipated, based on patient risk of adverse outcome if treated as outpatient and complex care required. Inpatient admission is appropriate based on the Medicare guidelines.     This document was produced using voice recognition software.    The information on this document is developed by the utilization review team in order for the business office to ensure compliance. This only denotes the appropriateness of proper admission status and does not reflect the quality of care rendered.   The  definitions of Inpatient Status and Observation Status used in making the determination above are those provided in the CMS Coverage Manual, Chapter 1 and Chapter 6, section 70.4.     Sincerely,   Sanaz Peraza MD  Utilization Review  Physician Advisor  [facility].

## 2022-10-12 NOTE — ED TRIAGE NOTES
Pt BIB spouse due to increasing confusion, finding difficulty speaking.  Pt appears to be intoxicated.     Triage Assessment     Row Name 10/11/22 4267       Triage Assessment (Adult)    Airway WDL WDL       Respiratory WDL    Respiratory WDL WDL       Skin Circulation/Temperature WDL    Skin Circulation/Temperature WDL WDL       Cardiac WDL    Cardiac WDL WDL       Peripheral/Neurovascular WDL    Peripheral Neurovascular WDL WDL       Cognitive/Neuro/Behavioral WDL    Cognitive/Neuro/Behavioral WDL WDL;X;speech    Speech garbled;word-finding difficulty       [place] Coma Scale    Best Eye Response 4-->(E4) spontaneous    Best Motor Response 6-->(M6) obeys commands    Best Verbal Response 5-->(V5) oriented    [place] Coma Scale Score 15

## 2022-10-12 NOTE — ED NOTES
[facility]   ED Nurse to Floor Handoff     Tonio Alarcon is a 41 year old male who speaks English and lives with a spouse,  in a home  They arrived in the ED by car from home    ED Chief Complaint: Aphasia (Pt having difficulty finding words only able to answer yes and no questions clearly) and Mental Health Problem    ED Dx;   Final diagnoses:   Word finding difficulty   Alcoholic intoxication with complication (H)         Needed?: No    Allergies:   Allergies   Allergen Reactions    Seasonal Allergies Other (See Comments)     Clogged sinuses   .  Past Medical Hx:   Past Medical History:   Diagnosis Date    Depressive disorder     Ulcer, gastric, acute       Baseline Mental status: WDL  Current Mental Status changes: at basesline    Infection present or suspected this encounter: no  Sepsis suspected: No  Isolation type: No active isolations  Patient tested for COVID 19 prior to admission: NO     Activity level - Baseline/Home:  Independent  Activity Level - Current:   Independent    Bariatric equipment needed?: No    In the ED these meds were given:   Medications   thiamine (B-1) tablet 100 mg (100 mg Oral Given 10/12/22 0753)   folic acid (FOLVITE) tablet 1 mg (1 mg Oral Given 10/12/22 0753)   multivitamin w/minerals (THERA-VIT-M) tablet 1 tablet (1 tablet Oral Given 10/12/22 0753)   pantoprazole (PROTONIX) EC tablet 40 mg (40 mg Oral Given 10/12/22 0753)   ondansetron (ZOFRAN ODT) ODT tab 4 mg (has no administration in time range)     Or   ondansetron (ZOFRAN) injection 4 mg (has no administration in time range)   OLANZapine zydis (zyPREXA) ODT tab 5-10 mg (has no administration in time range)     Or   haloperidol lactate (HALDOL) injection 2.5-5 mg (has no administration in time range)   flumazenil (ROMAZICON) injection 0.2 mg (has no administration in time range)   melatonin tablet 5 mg (has no administration in time range)   diazepam (VALIUM)  tablet 10 mg (has no administration in time range)     Or   diazepam (VALIUM) injection 5-10 mg (has no administration in time range)   enoxaparin ANTICOAGULANT (LOVENOX) injection 40 mg (has no administration in time range)   acetaminophen (TYLENOL) tablet 1,000 mg (1,000 mg Oral Not Given 10/12/22 0755)   iopamidol (ISOVUE-370) solution 100 mL (75 mLs Intravenous Given 10/12/22 0027)   sodium chloride 0.9 % bag 100mL (80 mLs Intravenous Given 10/12/22 0038)   multivitamin, therapeutic (THERA-VIT) tablet 1 tablet (1 tablet Oral Given 10/12/22 0256)   folic acid (FOLVITE) tablet 1 mg (1 mg Oral Given 10/12/22 0256)   thiamine (B-1) tablet 100 mg (100 mg Oral Given 10/12/22 0257)   magnesium oxide (MAG-OX) tablet 800 mg (800 mg Oral Given 10/12/22 0256)       Drips running?  No    Home pump  No    Current LDAs  Peripheral IV 10/12/22 Anterior;Left Lower forearm (Active)   Site Assessment WDL 10/12/22 0015   Line Status Saline locked 10/12/22 0015   Phlebitis Scale 0-->no symptoms 10/12/22 0015   Infiltration Scale 0 10/12/22 0015   Number of days: 0       Incision/Surgical Site 06/25/19 Anterior Neck (Active)   Number of days: 1205       Labs results:   Labs Ordered and Resulted from Time of ED Arrival to Time of ED Departure   COMPREHENSIVE METABOLIC PANEL - Abnormal       Result Value    Sodium 140      Potassium 3.7      Chloride 107      Carbon Dioxide (CO2) 23      Anion Gap 10      Urea Nitrogen 11      Creatinine 0.92      Calcium 9.1      Glucose 114 (*)     Alkaline Phosphatase 89      AST 42      ALT 50      Protein Total 8.7      Albumin 4.8      Bilirubin Total 0.9      GFR Estimate >90     CBC WITH PLATELETS AND DIFFERENTIAL - Abnormal    WBC Count 8.7      RBC Count 6.36 (*)     Hemoglobin 16.3      Hematocrit 47.9      MCV 75 (*)     MCH 25.6 (*)     MCHC 34.0      RDW 17.1 (*)     Platelet Count 268      % Neutrophils 42      % Lymphocytes 45      % Monocytes 10      % Eosinophils 2      % Basophils 1       % Immature Granulocytes 0      NRBCs per 100 WBC 0      Absolute Neutrophils 3.6      Absolute Lymphocytes 4.0      Absolute Monocytes 0.8      Absolute Eosinophils 0.2      Absolute Basophils 0.0      Absolute Immature Granulocytes 0.0      Absolute NRBCs 0.0     GLUCOSE BY METER - Abnormal    GLUCOSE BY METER POCT 114 (*)    LIPID REFLEX TO DIRECT LDL PANEL - Abnormal    Cholesterol 200 (*)     Triglycerides 293 (*)     Direct Measure HDL 43      LDL Cholesterol Calculated 98      Non HDL Cholesterol 157 (*)    CBC WITH PLATELETS - Abnormal    WBC Count 6.7      RBC Count 6.25 (*)     Hemoglobin 16.0      Hematocrit 47.4      MCV 76 (*)     MCH 25.6 (*)     MCHC 33.8      RDW 16.3 (*)     Platelet Count 241     DRUG ABUSE SCREEN 1 URINE (ED) - Abnormal    Amphetamines Urine Screen Negative      Barbiturates Urine Screen Negative      Benzodiazepines Urine Screen Negative      Cannabinoids Urine Screen Positive (*)     Cocaine Urine Screen Positive (*)     Opiates Urine Screen Negative     ALCOHOL BREATH TEST POCT - Abnormal    Alcohol Breath Test 0.145 (*)    ISTAT CREATININE POCT - Normal    Creatinine POCT 1.1      GFR, ESTIMATED POCT >60     COVID-19 VIRUS (CORONAVIRUS) BY PCR - Normal    SARS CoV2 PCR Negative     MAGNESIUM - Normal    Magnesium 2.3     PHOSPHORUS - Normal    Phosphorus 2.8     BASIC METABOLIC PANEL - Normal    Sodium 139      Potassium 3.8      Chloride 106      Carbon Dioxide (CO2) 23      Anion Gap 10      Urea Nitrogen 10      Creatinine 0.88      Calcium 9.0      Glucose 97      GFR Estimate >90     GLUCOSE MONITOR NURSING POCT   ISTAT CREATININE POCT       Imaging Studies:   Recent Results (from the past 24 hour(s))   CTA Head Neck with Contrast    Narrative    EXAM: CT HEAD W/O CONTRAST, CTA HEAD NECK W CONTRAST  LOCATION: [facility]  DATE/TIME: 10/12/2022 12:43 AM    INDICATION: word finding difficulty, eval for bleed  COMPARISON:  None  CONTRAST: 75mL isovue 370  TECHNIQUE: Head and neck CT angiogram with IV contrast. Noncontrast head CT followed by axial helical CT images of the head and neck vessels obtained during the arterial phase of intravenous contrast administration. Axial 2D reconstructed images and   multiplanar 3D MIP reconstructed images of the head and neck vessels were performed by the technologist. Dose reduction techniques were used. All stenosis measurements made according to NASCET criteria unless otherwise specified.    FINDINGS:   NONCONTRAST HEAD CT:   INTRACRANIAL CONTENTS: No intracranial hemorrhage, extraaxial collection, or mass effect.  No CT evidence of acute infarct. Normal parenchymal attenuation. Normal ventricles and sulci.     VISUALIZED ORBITS/SINUSES/MASTOIDS: No intraorbital abnormality. No paranasal sinus mucosal disease. No middle ear or mastoid effusion.    BONES/SOFT TISSUES: No acute abnormality.    HEAD CTA:  ANTERIOR CIRCULATION: No stenosis/occlusion, aneurysm, or high flow vascular malformation. Standard [place] anatomy.    POSTERIOR CIRCULATION: No stenosis/occlusion, aneurysm, or high flow vascular malformation. Balanced vertebral arteries supply a normal basilar artery.     DURAL VENOUS SINUSES: Expected enhancement of the major dural venous sinuses.    NECK CTA:  RIGHT CAROTID: No measurable stenosis or dissection.    LEFT CAROTID: No measurable stenosis or dissection.    VERTEBRAL ARTERIES: No focal stenosis or dissection. Balanced vertebral arteries.    AORTIC ARCH: Classic aortic arch anatomy with no significant stenosis at the origin of the great vessels.    NONVASCULAR STRUCTURES: Disc prostheses at C5-C6 and C6-C7.      Impression    IMPRESSION:   HEAD CT:  1.  No acute intracranial abnormality.    HEAD CTA:   1.  Normal CTA [place].    NECK CTA:  1.  Normal neck CTA.   Head CT w/o contrast    Narrative    EXAM: CT HEAD W/O CONTRAST, CTA HEAD NECK W CONTRAST  LOCATION: [facility]  DATE/TIME: 10/12/2022 12:43 AM    INDICATION: word finding difficulty, eval for bleed  COMPARISON: None  CONTRAST: 75mL isovue 370  TECHNIQUE: Head and neck CT angiogram with IV contrast. Noncontrast head CT followed by axial helical CT images of the head and neck vessels obtained during the arterial phase of intravenous contrast administration. Axial 2D reconstructed images and   multiplanar 3D MIP reconstructed images of the head and neck vessels were performed by the technologist. Dose reduction techniques were used. All stenosis measurements made according to NASCET criteria unless otherwise specified.    FINDINGS:   NONCONTRAST HEAD CT:   INTRACRANIAL CONTENTS: No intracranial hemorrhage, extraaxial collection, or mass effect.  No CT evidence of acute infarct. Normal parenchymal attenuation. Normal ventricles and sulci.     VISUALIZED ORBITS/SINUSES/MASTOIDS: No intraorbital abnormality. No paranasal sinus mucosal disease. No middle ear or mastoid effusion.    BONES/SOFT TISSUES: No acute abnormality.    HEAD CTA:  ANTERIOR CIRCULATION: No stenosis/occlusion, aneurysm, or high flow vascular malformation. Standard [place] anatomy.    POSTERIOR CIRCULATION: No stenosis/occlusion, aneurysm, or high flow vascular malformation. Balanced vertebral arteries supply a normal basilar artery.     DURAL VENOUS SINUSES: Expected enhancement of the major dural venous sinuses.    NECK CTA:  RIGHT CAROTID: No measurable stenosis or dissection.    LEFT CAROTID: No measurable stenosis or dissection.    VERTEBRAL ARTERIES: No focal stenosis or dissection. Balanced vertebral arteries.    AORTIC ARCH: Classic aortic arch anatomy with no significant stenosis at the origin of the great vessels.    NONVASCULAR STRUCTURES: Disc prostheses at C5-C6 and C6-C7.      Impression    IMPRESSION:   HEAD CT:  1.  No acute intracranial abnormality.    HEAD CTA:   1.  Normal CTA  "[place] of [place].    NECK CTA:  1.  Normal neck CTA.       Recent vital signs:   /87   Pulse 98   Temp 98.2  F (36.8  C) (Oral)   Resp 25   Ht 1.803 m (5' 11\")   Wt 102.1 kg (225 lb)   SpO2 99%   BMI 31.38 kg/m      [place] Coma Scale Score: 15 (10/12/22 0550)  [place] Coma Scale Score: 15 (10/12/22 0121)       Cardiac Rhythm: Other  Pt needs tele? No  Skin/wound Issues: None    Code Status: Full Code    Pain control: good    Nausea control: pt had none    Abnormal labs/tests/findings requiring intervention: none    Family present during ED course? Yes   Family Comments/Social Situation comments: none    Tasks needing completion: None    Becca Acuna RN  [facility] --   [phone] [place] [facility]  [phone] [facility] ED      "

## 2022-10-12 NOTE — PLAN OF CARE
Physical Therapy: Orders received. Chart reviewed and discussed with care team.? Physical Therapy not indicated due to pt is independent with mobility.? Defer discharge recommendations to medical team.? Will complete orders.

## 2022-10-12 NOTE — PROGRESS NOTES
"      [facility]    Stroke Progress Note    Interval EventsCalled by Dr. Roche. Overnight stroke fellow had taken a call regarding this patient. Pt no longer intoxicated and still seems to be having aphasia. Also having pain behind the L eye.    Recommendations:  MRI brain w/wo contrast if able (need to verify compatibility with R vertebral artery gortex graft)  If unable to get MRI would recommend repeat head CT  If patient has a stroke will likely need to be transferred to [place] for further evaluation    Discussed with stroke attending Dr. Tsang.    Janet Azul PA-C  Vascular Neurology    10/12/2022 11:53 AM  To page me or covering stroke neurology team member, click here: AMCOM   Choose \"On Call\" tab at top, then search dropdown box for \"Neurology Adult\", select location, press Enter, then look for stroke/neuro ICU/telestroke.    "

## 2022-10-13 ENCOUNTER — ANCILLARY PROCEDURE (OUTPATIENT)
Dept: NEUROLOGY | Facility: CLINIC | Age: 42
End: 2022-10-13
Attending: PSYCHIATRY & NEUROLOGY
Payer: COMMERCIAL

## 2022-10-13 VITALS
TEMPERATURE: 98.9 F | DIASTOLIC BLOOD PRESSURE: 105 MMHG | WEIGHT: 225 LBS | BODY MASS INDEX: 31.5 KG/M2 | SYSTOLIC BLOOD PRESSURE: 163 MMHG | RESPIRATION RATE: 17 BRPM | OXYGEN SATURATION: 99 % | HEART RATE: 103 BPM | HEIGHT: 71 IN

## 2022-10-13 PROCEDURE — 99254 IP/OBS CNSLTJ NEW/EST MOD 60: CPT | Performed by: PSYCHIATRY & NEUROLOGY

## 2022-10-13 PROCEDURE — 99239 HOSP IP/OBS DSCHRG MGMT >30: CPT | Performed by: INTERNAL MEDICINE

## 2022-10-13 PROCEDURE — 95816 EEG AWAKE AND DROWSY: CPT | Mod: 26 | Performed by: PSYCHIATRY & NEUROLOGY

## 2022-10-13 PROCEDURE — 95816 EEG AWAKE AND DROWSY: CPT

## 2022-10-13 PROCEDURE — 250N000011 HC RX IP 250 OP 636: Performed by: INTERNAL MEDICINE

## 2022-10-13 PROCEDURE — 250N000013 HC RX MED GY IP 250 OP 250 PS 637: Performed by: INTERNAL MEDICINE

## 2022-10-13 PROCEDURE — 258N000003 HC RX IP 258 OP 636: Performed by: INTERNAL MEDICINE

## 2022-10-13 RX ORDER — MULTIPLE VITAMINS W/ MINERALS TAB 9MG-400MCG
1 TAB ORAL DAILY
Qty: 30 TABLET | Refills: 0
Start: 2022-10-14

## 2022-10-13 RX ADMIN — ENOXAPARIN SODIUM 40 MG: 40 INJECTION SUBCUTANEOUS at 08:03

## 2022-10-13 RX ADMIN — THIAMINE HCL TAB 100 MG 100 MG: 100 TAB at 08:03

## 2022-10-13 RX ADMIN — PANTOPRAZOLE SODIUM 40 MG: 40 TABLET, DELAYED RELEASE ORAL at 08:03

## 2022-10-13 RX ADMIN — SODIUM CHLORIDE: 9 INJECTION, SOLUTION INTRAVENOUS at 07:55

## 2022-10-13 RX ADMIN — MULTIPLE VITAMINS W/ MINERALS TAB 1 TABLET: TAB at 08:03

## 2022-10-13 RX ADMIN — FOLIC ACID 1 MG: 1 TABLET ORAL at 08:03

## 2022-10-13 ASSESSMENT — ACTIVITIES OF DAILY LIVING (ADL)
ADLS_ACUITY_SCORE: 21

## 2022-10-13 NOTE — CONSULTS
[facility]  Neurology Consultation    Patient Name:  Tonio Alarcon  MRN:  [id number]    :  1980  Date of Service:  2022  Primary care provider:  Christiano Palacios      Neurology consultation service was asked to see Tonio Alarcon by Dr. Roche to evaluate speech disturbance.    Chief Complaint:  Trouble getting words out    History of Present Illness:   Tonio Alarcon is a 41 year old male with history of alcohol use disorder, polysubstance abuse, depression, vertebral artery defect who presents with word finding difficulties.  They state 3 days ago he was in his normal state of health and had no difficulties with speaking or with neurologic status.  2 days ago wife notes that he seemed normal in the morning.  She spoke to him on the phone and noticed that he seemed to have trouble getting words out and was stuttering.  When she got home, she noticed his speech was much worse.  Patient did admit to drinking all day, with more heavy drinking over the weekend.  He also admits to daily cannabis use, and recent ecstasy use.  He denied other acute drug use.  He was admitted, and monitored for possible alcohol withdrawal.  Primary team spoke with stroke team who recommended an MRI, but it was unclear if he could get MRI due to his history of surgical hardware.  Ultimately an MRI was obtained and showed susceptibility changes over the right frontal lobe which prompted a neurology consult.    Patient denies any history of similar speech problems.  He denies any weakness, numbness, diplopia, dysarthria.  He states it was strictly getting up.  He did think he had to write a few things and did not notice this was bothersome.  He denies any history of stroke.  Both patient and wife at bedside state his speech was abnormal all day yesterday.  Today they note significant improvement.  He does report a significant left frontal headache.  This  is unusual for him and states he only has 1-2 headaches a year.  He states it was very severe when he presented.  He states he had a history of much more frequent headaches when he was a teenager.  He is not sure if he is on any medications for this.  He denies any neurologic features, but does note that he had a large work-up including thinking he had an EEG done in the past.  Currently denies any chest pain, shortness of office, chills, and feels a speech is improving although still feels he needs to speak slower.  Of note urine drug screen was positive for cannabis and cocaine.    ROS  A comprehensive ROS was performed and pertinent findings were included in HPI.     PMH  Past Medical History:   Diagnosis Date     Depressive disorder      Ulcer, gastric, acute    Polysubstance abuse  Past Surgical History:   Procedure Laterality Date     ABDOMEN SURGERY  10/2017    Perforated peptic ulcer     GI SURGERY      Hemorrhoids      HEMORRHOID SURGERY  2015     INCISION AND DRAINAGE PERINEAL, COMBINED N/A 06/08/2018    Procedure: COMBINED INCISION AND DRAINAGE PERINEAL;   Examination of perineal abcess under anethesia, Incision and Drainage Perineal Abscess;  Surgeon: Solo Valero MD;  Location: [facility] OR     INJECT EPIDURAL TRANSFORAMINAL LUMBAR / SACRAL EA ADDITIONAL LEVEL N/A 03/04/2019    Procedure: Cervical Epidural Steroid Injection, Cervical 7-Thoracic 1 Interlaminar;  Surgeon: Nolan Quiroz MD;  Location: [facility] OR     LAPAROSCOPIC HERNIORRHAPHY EPIGASTRIC N/A 10/11/2017    Procedure: LAPAROSCOPIC HERNIORRHAPHY EPIGASTRIC;  Exploratory Laparoscopy, Exploratory Laparotomy, EGD, Omental Patch, Upper Endoscopy;  Surgeon: Celio Keyes MD;  Location: [facility] OR     NECK SURGERY  06/2019    Neck hematoma related to vertebral artery injury from cervical disk replacement     REPLACE DISK LUMBAR N/A 06/25/2019    Procedure: CERVICAL 5-7 ARTHROPLASTY;  Surgeon: Aaron Craft MD;  Location:  OR  "      Medications   I have personally reviewed the patient's medication list.     Allergies  I have personally reviewed the patient's allergy list.     Social History  Is a daily smoker.  Admits to frequent alcohol use including heavy use.  Is a daily cannabis use.  Admits to infrequent other drug use.  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    Family History    States his mother recently had a stroke.  Denies other family history of stroke.  Denies any family history complex migraine or similar recurrent neurologic symptoms.      Physical Examination   Vitals: BP (!) 144/96 (BP Location: Right arm, Patient Position: Semi-Diaz's, Cuff Size: Adult Regular)   Pulse 104   Temp 98.3  F (36.8  C) (Oral)   Resp 20   Ht 1.803 m (5' 11\")   Wt 102.1 kg (225 lb)   SpO2 100%   BMI 31.38 kg/m    General: Lying in bed, NAD  Head: NC/AT  Eyes: no icterus, op pink and moist  Cardiac: RRR. Extremities warm, no edema.  No murmurs  Respiratory: non-labored on RA, no wheezing or rhonchi  GI: S/NT/ND  Skin: No rash or lesion on exposed skin  Psych: Mood pleasant, affect congruent  Neuro:  Mental status: Awake, alert, attentive, oriented to self, time, place, and circumstance. Language is  coherent with intact comprehension of complex commands, naming and repetition.  He is able to name low-frequency words.  He is able to write a sentence without difficulty.  His speed of speech is slow, and he seems to have some word finding if he is forced to speak fast.  Cranial nerves: VFF, PERRL, conjugate gaze, EOMI, facial sensation intact, face symmetric, shoulder shrug strong, tongue/uvula midline, no dysarthria.   Motor: Normal bulk and tone. No abnormal movements. 5/5 strength bilaterally in deltoids, biceps, triceps, hand , hip flexors, hip extensors, knee flexion, knee extension, plantarflexion, dorsiflexion.   Reflexes: Deep tendon reflexes are hypoactive but present in upper extremities.  1+ and patellas.  Absent Achilles.  Toes are " downgoing.  Negative Ana's.  Sensory: Intact to light touch throughout upper and lower extremities.  Romberg negative.    Coordination: FNF and HS without ataxia or dysmetria. Rapid alternating movements intact.   Gait: Normal width, stride length, turn, and arm swing. Station normal.  tandem walk intact.    Investigations   I have personally reviewed pertinent labs, tests, and radiological imaging. Discussion of notable findings is included under Impression.     Was patient transferred from outside hospital?   No    Impression  #Complex Migraine  #Superficial siderosis  #Polysubstance abuse      Mr. Alarcon is a 41-year-old male who presents with a spell of difficulty speaking.  Fortunately this is improved although is not entirely back to baseline.  His MRI and remainder of neurologic exam are quite reassuring.  I discussed that I do not think his MRI changes are related to his current presentation.  He does not note that he has a history of many falls including hitting his head when he was younger.  With what was described as significant loss of speech for greater than 24 hours I do not think this was a TIA.  I think the most likely explanation is complex migraine.  He does admit to left frontal headache.  He also states he has a history of headaches as a adolescent for which she had a large neurologic work-up including an EEG in the past, but is unable to provide more specific history.  We will obtain an EEG, but if this is not overly concerning I do not think additional inpatient work-up is needed.  It may require time and to see if event recurs to definitively determine if this is complex migraine.      Regarding his MRI, I suspect this is related to a more remote fall.  As he does not have specific recollection of any severe head trauma, would be reasonable to repeat MRI in approximately 6 months to ensure there is no other evolving changes such as can be seen in amyloid, but I think this is much less  likely.  Would perform with contrast at that time.  I also had a discussion about avoiding alcohol, drug use as this can certainly cloud his memory of event in question for which the diagnosis is still not entirely clear.  Patient states that he is planning on remaining sober going forward.    Recommendations  -EEG  -MRI of the brain with and without contrast in 3 to 6 months  -Counseled to abstain from alcohol and drug use.    Thank you for involving Neurology in the care of Tonio Trevon Alarcon.     Aron Arellano, DO

## 2022-10-13 NOTE — DISCHARGE SUMMARY
[facility]  Hospitalist Discharge Summary      Date of Admission:  10/11/2022  Date of Discharge: 10/13/2022    Discharging Provider: Hannah Roche MD  Discharge Service: Hospitalist Service, GOLD TEAM 18    Discharge Diagnoses   Complex Migraine  Superficial siderosis  Polysubstance abuse  GERD     Follow-ups Needed After Discharge   Follow-up Appointments     Adult [facility] Follow-up and recommended labs and tests      Follow up with primary care provider, Christiano Palacios, within 7   days for hospital follow- up.  Possible migrain treatment Also you need a   repeat brain MRI in 1 month to follow up  on abnormality noted 10/12      Appointments on [place] and/or [facility] (with [facility] or [facility]   provider or service). Call [phone] if you haven't heard regarding   these appointments within 7 days of discharge.             Unresulted Labs Ordered in the Past 30 Days of this Admission     No orders found from 9/11/2022 to 10/12/2022.          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 Last known normal around 8 am. Wife noticed that he was stuttering, repeating himself. When she returned home, she found that he had worsened and was having difficulty getting words out, so she brought him to the ER. He had been drinking for most of the day at home. He is unable to quantify how much he drank.         Aphasia  - had CTA  10/11/22 that showed normal  neck CTA as well as [place]   -10/13 speech better especially when speaks slower  - MRI of brain 10/12 showed no stroke ,  possible  "bleed vs superficial hemosiderosis from prior SAH, however patient  Denies any previous bleeds . 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   - no history of head trauma, he denies any knowledge of brain /head bleed , asking neurology to see  - patient  Still with aphasia  But much better and now can get words sentences out when speaks slowly , asked neuro to see   -  EKG on admission was sinus    - sinus on  telemetry   - echo 10/11  EF 60-65%  Negative bubble study   - lipid panel done      Addendum:   - discussed with  Neurology, likely has complex migraine, history migraine as a child  - follow up  With speech therapy if still has ongoing issues  - EEG  did not show seizure activity   - needs follow up  Brain MRI in a month      History C5-C7 anterior cervical arthroplasty  6/2019  Complicated by right sided neck hematoma  Right vertebral artery defect  And  Was \"  repaired primarily and reinforced using a Goretex graft secured by a clip   .... The edges of the Goretex wrapping were then secured with a right-angle Sugita T2 aneurysm clip...  \"            Alcohol intoxication  Polysubstance , Alcohol abuse   Drinks daily, drink of choice is will. Per his wife, he gets pretty agitated if he abstains for more than a day or two.   - has had low CIWA scores ,  has not received diazepam  - MVI, folate, thiamine   - tox screen  + cocaine and cannabinoids         GERD  - Continue protonix           Consultations This Hospital Stay   PHYSICAL THERAPY ADULT IP CONSULT  OCCUPATIONAL THERAPY ADULT IP CONSULT  NEUROLOGY GENERAL ADULT IP CONSULT    Code Status   Full Code    Time Spent on this Encounter   IHannah MD, personally saw the patient today and spent greater than 30 minutes discharging this patient.       Hannah Roche MD  [facility] MED SURG  [address] [place] [id number]  Phone: [phone]  Fax: " [phone]  ______________________________________________________________________    Physical Exam   Vital Signs: Temp: 98.3  F (36.8  C) Temp src: Oral BP: (!) 144/96 Pulse: 104   Resp: 20 SpO2: 100 % O2 Device: None (Room air)    Weight: 225 lbs 0 oz     General appearence: awake alert  In  no apparent distress     HEENT: EOMI, PEARLA, sclera nonicteric,  moist,  mucus membranes,   NECK : supple  RESPIRATORY: lungs clear to auscultation bilateral,   CARDIOVASCULAR:S1 S2 regular rate and rhythm, no rubs gallops or murmurs appreciated  GASTROINTESTINAL:soft, non-distended , non-tender , + bowel sounds, no masses felt   SKIN: warm and dry, no mottling noted   NEUROLOGIC; awake alert, now speech much more confluent when talks  slow , normal finger to nose bilateral , no pronator drift , 5/5 = strength in all extremities , sensation intact   EXTREMITIES: no clubbing, cyanosis or edema , moves all extremity, good pedal pulses   MUSCULOSKELETAL: without deformity          Primary Care Physician   Christiano Palacios    Discharge Orders      Speech Therapy Referral      Reason for your hospital stay    Difficulty speaking, aphasia     Activity    Your activity upon discharge: as tolerated     Adult [facility] Follow-up and recommended labs and tests    Follow up with primary care provider, Christiano Palacios, within 7 days for hospital follow- up.  Possible migrain treatment Also you need a repeat brain MRI in 1 month to follow up  on abnormality noted 10/12      Appointments on [place] and/or [facility] (with [facility] or [facility] provider or service). Call [phone] if you haven't heard regarding these appointments within 7 days of discharge.     Diet    Follow this diet upon discharge: as tolerated       Significant Results and Procedures   Most Recent 3 CBC's:Recent Labs   Lab Test 10/12/22  0614 10/12/22  0007 03/14/22  1132   WBC 6.7 8.7 6.6   HGB 16.0 16.3 14.4   MCV 76* 75* 77*    268 274     Most  Recent 3 BMP's:Recent Labs   Lab Test 10/12/22  0614 10/12/22  0009 10/12/22  0007 10/12/22  0005 03/14/22  1132     --  140  --  143   POTASSIUM 3.8  --  3.7  --  4.2   CHLORIDE 106  --  107  --  113*   CO2 23  --  23  --  22   BUN 10  --  11  --  15   CR 0.88 1.1 0.92  --  1.08   ANIONGAP 10  --  10  --  8   MIMI 9.0  --  9.1  --  9.3   GLC 97  --  114* 114* 101*     Most Recent 2 LFT's:Recent Labs   Lab Test 10/12/22  0007 03/14/22  1132   AST 42 16   ALT 50 44   ALKPHOS 89 67   BILITOTAL 0.9 0.3   ,   Results for orders placed or performed during the hospital encounter of 10/11/22   Head CT w/o contrast    Narrative    EXAM: CT HEAD W/O CONTRAST, CTA HEAD NECK W CONTRAST  LOCATION: [facility]  DATE/TIME: 10/12/2022 12:43 AM    INDICATION: word finding difficulty, eval for bleed  COMPARISON: None  CONTRAST: 75mL isovue 370  TECHNIQUE: Head and neck CT angiogram with IV contrast. Noncontrast head CT followed by axial helical CT images of the head and neck vessels obtained during the arterial phase of intravenous contrast administration. Axial 2D reconstructed images and   multiplanar 3D MIP reconstructed images of the head and neck vessels were performed by the technologist. Dose reduction techniques were used. All stenosis measurements made according to NASCET criteria unless otherwise specified.    FINDINGS:   NONCONTRAST HEAD CT:   INTRACRANIAL CONTENTS: No intracranial hemorrhage, extraaxial collection, or mass effect.  No CT evidence of acute infarct. Normal parenchymal attenuation. Normal ventricles and sulci.     VISUALIZED ORBITS/SINUSES/MASTOIDS: No intraorbital abnormality. No paranasal sinus mucosal disease. No middle ear or mastoid effusion.    BONES/SOFT TISSUES: No acute abnormality.    HEAD CTA:  ANTERIOR CIRCULATION: No stenosis/occlusion, aneurysm, or high flow vascular malformation. Standard [place] anatomy.    POSTERIOR CIRCULATION: No  stenosis/occlusion, aneurysm, or high flow vascular malformation. Balanced vertebral arteries supply a normal basilar artery.     DURAL VENOUS SINUSES: Expected enhancement of the major dural venous sinuses.    NECK CTA:  RIGHT CAROTID: No measurable stenosis or dissection.    LEFT CAROTID: No measurable stenosis or dissection.    VERTEBRAL ARTERIES: No focal stenosis or dissection. Balanced vertebral arteries.    AORTIC ARCH: Classic aortic arch anatomy with no significant stenosis at the origin of the great vessels.    NONVASCULAR STRUCTURES: Disc prostheses at C5-C6 and C6-C7.      Impression    IMPRESSION:   HEAD CT:  1.  No acute intracranial abnormality.    HEAD CTA:   1.  Normal CTA [place].    NECK CTA:  1.  Normal neck CTA.   CTA Head Neck with Contrast    Narrative    EXAM: CT HEAD W/O CONTRAST, CTA HEAD NECK W CONTRAST  LOCATION: [facility]  DATE/TIME: 10/12/2022 12:43 AM    INDICATION: word finding difficulty, eval for bleed  COMPARISON: None  CONTRAST: 75mL isovue 370  TECHNIQUE: Head and neck CT angiogram with IV contrast. Noncontrast head CT followed by axial helical CT images of the head and neck vessels obtained during the arterial phase of intravenous contrast administration. Axial 2D reconstructed images and   multiplanar 3D MIP reconstructed images of the head and neck vessels were performed by the technologist. Dose reduction techniques were used. All stenosis measurements made according to NASCET criteria unless otherwise specified.    FINDINGS:   NONCONTRAST HEAD CT:   INTRACRANIAL CONTENTS: No intracranial hemorrhage, extraaxial collection, or mass effect.  No CT evidence of acute infarct. Normal parenchymal attenuation. Normal ventricles and sulci.     VISUALIZED ORBITS/SINUSES/MASTOIDS: No intraorbital abnormality. No paranasal sinus mucosal disease. No middle ear or mastoid effusion.    BONES/SOFT TISSUES: No acute abnormality.    HEAD  CTA:  ANTERIOR CIRCULATION: No stenosis/occlusion, aneurysm, or high flow vascular malformation. Standard [place] anatomy.    POSTERIOR CIRCULATION: No stenosis/occlusion, aneurysm, or high flow vascular malformation. Balanced vertebral arteries supply a normal basilar artery.     DURAL VENOUS SINUSES: Expected enhancement of the major dural venous sinuses.    NECK CTA:  RIGHT CAROTID: No measurable stenosis or dissection.    LEFT CAROTID: No measurable stenosis or dissection.    VERTEBRAL ARTERIES: No focal stenosis or dissection. Balanced vertebral arteries.    AORTIC ARCH: Classic aortic arch anatomy with no significant stenosis at the origin of the great vessels.    NONVASCULAR STRUCTURES: Disc prostheses at C5-C6 and C6-C7.      Impression    IMPRESSION:   HEAD CT:  1.  No acute intracranial abnormality.    HEAD CTA:   1.  Normal CTA [place].    NECK CTA:  1.  Normal neck CTA.   MR Brain w/o Contrast    Narrative    EXAM: MR BRAIN W/O CONTRAST  10/12/2022 6:54 PM     HISTORY:  aphasia, has had vertebral artery repair in 6/2019 has  gortex graft and clip       COMPARISON:  Same day head head CT and head and neck CTA    TECHNIQUE: Sagittal and axial T1-weighted, coronal and axial  diffusion-weighted with ADC map, axial susceptibility weighted,   images of the brain were obtained without intravenous contrast    FINDINGS: On T2 gradient echo, there is pial susceptibility artifacts  at the high right frontal convexity such as seen on series 8 image 19  and 17.  There is no mass effect, midline shift. The ventricles are  proportionate to the cerebral sulci. Diffusion weighted images are  negative for acute/focal abnormality. Major intracranial vascular  structures are within normal limits. Scattered a few subcortical T2  hyperintense foci in the bilateral frontal white matter, likely  representing nonspecific gliosis.    No suspicious abnormality of the skull marrow signal. Clear paranasal  sinuses.  Effusion in the right petrous apex air cells and in a lesser  degree in the left petrous apex air cells correlated with prior head  CT. Remainder of the mastoid air cells are clear. Normal soft tissues.  Orbits are normal. Mild mucosal thickening in the paranasal sinuses.      Impression    IMPRESSION:  1.Pial susceptibility artifacts in the right frontal convexity. These  may represent acute subarachnoid hemorrhage  or superficial  hemosiderosis from prior subarachnoid hemorrhage.  2. Nonspecific subcortical bilateral frontal white matter  hyperintensities, differential includes  T2 hyperintensities of  migraine headaches, underlying vasculopathy or sequela of prior  infection/inflammation.    I have personally reviewed the examination and initial interpretation  and I agree with the findings.    JAMES WELLS MD         SYSTEM ID:  [id number]   CT Head w/o Contrast    Narrative    CT HEAD W/O CONTRAST 10/12/2022 8:23 PM    History: Brain MRI with Pial susceptibility artifacts in the right  frontal convexity c/f SAH vs prior SAH. Eval for acute changes from  prior HCT   ICD-10:    Comparison: CT head from same date at 0019 hours. MR brain from same  day at 1841 hours.    Technique: Using multidetector thin collimation helical acquisition  technique, axial, coronal and sagittal CT images from the skull base  to the vertex were obtained without intravenous contrast.   (topogram) image(s) also obtained and reviewed.    Findings:  No abnormal findings in the high right frontal convexity to correspond  with the finding seen on the comparison MRI. No new findings since  comparison CT earlier same day. There is no intracranial hemorrhage,  mass effect, or midline shift within limits of CT. Gray/white matter  differentiation in both cerebral hemispheres is preserved. Ventricles  are proportionate to the cerebral sulci. The basal cisterns are clear.  The orbits are grossly normal.    The bony calvaria and the bones of  the skull base are normal. The  visualized portions of the paranasal sinuses and mastoid air cells are  clear.      Impression    Impression:  No acute intracranial findings to correspond with the susceptibility  artifact seen in the high right frontal convexity on the comparison  MRI. Those findings likely represent superficial hemosiderosis from a  prior subarachnoid hemorrhage.    I have personally reviewed the examination and initial interpretation  and I agree with the findings.    JAMES WELLS MD         SYSTEM ID:  [id number]   Echo Complete     Value    LVEF  60-65%    Narrative    [id number]  [facility]  [id number]  [id number]^KASI^PAULA^ARLENE     [facility]  Echocardiography Laboratory  [address] [place] [address]     Name: BREANA HAY  MRN: [id number]  : 1980  Study Date: 10/12/2022 12:53 PM  Age: 41 yrs  Gender: Male  Patient Location: [facility]  Reason For Study: Other, Please Specify in Comments  Ordering Physician: PAULA VILLASEÑOR  Performed By: Stephanie Alejo RDCS     BSA: 2.2 m2  Height: 71 in  Weight: 225 lb  HR: 95  BP: 140/90 mmHg  ______________________________________________________________________________  Procedure  Bubble Echocardiogram with two-dimensional, color and spectral Doppler  performed.  ______________________________________________________________________________  Interpretation Summary  Global and regional left ventricular function is normal with an EF of 60-65%.  Right ventricular function, chamber size, wall motion, and thickness are  normal.  Negative bubble study.  The inferior vena cava is normal.  No pericardial effusion is present.  ______________________________________________________________________________  Left Ventricle  Global and regional left ventricular function is normal with an EF of 60-65%.     Right Ventricle  Right ventricular function, chamber size, wall motion, and thickness are  normal.      Atria  Both atria appear normal. Negative bubble study.     Tricuspid Valve  The tricuspid valve is normal. Pulmonary artery systolic pressure cannot be  assessed.     Vessels  The inferior vena cava is normal.     Pericardium  No pericardial effusion is present.  ______________________________________________________________________________  MMode/2D Measurements & Calculations  LVOT diam: 2.3 cm  LVOT area: 4.2 cm2     Doppler Measurements & Calculations  MV E max balbir: 42.9 cm/sec  MV A max balbir: 65.6 cm/sec  MV E/A: 0.65  E/E' av.4  Lateral E/e': 4.1  Medial E/e': 4.7     ______________________________________________________________________________  Report approved by: Helio Fox 10/12/2022 03:30 PM             CLINICAL-TECHNICAL SUMMARY:      This inpatient routine EEG recording was performed in evaluation of seizures and encephalopathy in Tonio Alarcon, who is a 41-year-old man, using 23 scalp electrodes in 10-20 system placements.  Qualified technicians attached EEG electrodes, set up the study, monitored and reviewed EEG recordings and disconnected EEG electrodes as appropriate.  The patient was reported to have received no anti-seizure or sedating medications at the time of this recording.     WAKING AND SLEEP EEG ACTIVITIES:    During maximal wakefulness, there was a symmetric, moderate amplitude, well-modulated, approximately 10 Hz posterior dominant rhythm, which was blocked on eye opening.  Faster activities predominated in a symmetric fashion anteriorly.  Drowsiness was manifested by dropout of the posterior dominant rhythm with buildup of diffuse, centrally predominant, semi-rhythmic theta activities, in association with slow lateral eye movements.  Symmetric sleep spindles, K complexes, and vertex sharp waves were seen during stage II sleep.    Photic stimulation induced symmetric driving at multiple frequencies of stimulation.       No interictal epileptiform abnormalities and no  electrographic seizures were recorded.       SUMMARY:  This EEG recording was normal in waking, drowsiness and sleep.  No pathological slowing, no interictal epileptiform activities, and no electrographic seizures were seen.  Clinical correlation is recommended.  Antonio Sanchez M.D., Professor of Neurology                               Discharge Medications   Current Discharge Medication List      START taking these medications    Details   multivitamin w/minerals (THERA-VIT-M) tablet Take 1 tablet by mouth daily  Qty: 30 tablet, Refills: 0    Associated Diagnoses: Alcoholic intoxication with complication (H)         CONTINUE these medications which have NOT CHANGED    Details   pantoprazole (PROTONIX) 40 MG EC tablet TAKE 1 TABLET BY MOUTH EVERY DAY  Qty: 90 tablet, Refills: 0    Associated Diagnoses: Perforated peptic ulcer (H)         STOP taking these medications       diazepam (VALIUM) 5 MG tablet Comments:   Reason for Stopping:             Allergies   Allergies   Allergen Reactions     Seasonal Allergies Other (See Comments)     Clogged sinuses

## 2022-10-13 NOTE — PROGRESS NOTES
Cross Cover Note:    Following up on brain MRI as ordered by primary team. Brain MRI notes pial susceptibility artifacts in R frontal convexity which may represent acute subarachnoid hemorrhage or superficial hemosiderosis from prior subarachnoid hemorrhage. Writer discussed findings with staff neurologist Dr. Arellano. Given prior head CT no acute findings, he suggested repeat head CT. If head CT is negative, the findings represent an old SAH. If new changes on head CT, higher concern for acute SAH  - Stat head CT ordered, will follow results  - Please contact medicine with any clinical changes in the mean time  ** Addendum: Head CT no acute intracranial findings to correspond with susceptibility artifact seen in the high right frontal convexity on the comparison MRI. Those findings likely represent superficial hemosiderosis from a prior subarachnoid hemorrhage.  - Continue cares as written in progress note. No changes to be made at this time. Contact medicine with changes or concerns    Yoli Quintero PA-C  Internal Medicine [facility]  [phone]

## 2022-10-13 NOTE — PROGRESS NOTES
Alert and oriented time four. Able to use call light and make needs known. 75ml continuous NS. Ate 100% of breakfast and lunch. Continent of bowel and bladder. On telemetry. Calm and cooperative.

## 2022-10-13 NOTE — PLAN OF CARE
"BP (!) 144/96 (BP Location: Right arm, Patient Position: Semi-Diaz's, Cuff Size: Adult Regular)   Pulse 104   Temp 98.3  F (36.8  C) (Oral)   Resp 20   Ht 1.803 m (5' 11\")   Wt 102.1 kg (225 lb)   SpO2 100%   BMI 31.38 kg/m      A&Ox4.   Q4 neuro checks. Slow speech pattern. Mild aphasia/word finding. Mild facial paralysis of L side when smiling.   L PIV infusing NS.   LBM 10/12 per pt report.   IND in room.   Pt c/o neck pain overnight - heat packs offered pt declined.    Tele on - NSR.   CIWA scoring - not withdrawing.       "

## 2022-10-13 NOTE — PLAN OF CARE
"[id number]  Pt came back from MRI at around 7 pm. At around 8 pm pt's head CT completed. Pt still c/o of moderate aphasia and has difficulty finding words. Alert and oriented. Pain 7/10 neck pain, stating that \"this is normal for me.\" pt denies SOB or chest pain. Telemetry monitoring, HR slight elevated (90's-109), denies symptoms. Skin intact. Voiding okay, LBM 10/11. L PIV infusing NS at 75 ml/hr. No changes at this moment.      "

## 2022-10-13 NOTE — PROGRESS NOTES
MRI reviewed and negative for stroke. Would recommend discussion with general neurology. Please contact stroke team if any concerns.

## 2022-10-13 NOTE — PROGRESS NOTES
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 Last known normal around 8 am. Wife noticed that he was stuttering, repeating himself. When she returned home, she found that he had worsened and was having difficulty getting words out, so she brought him to the ER. He had been drinking for most of the day at home. He is unable to quantify how much he drank.        Aphasia  - had CTA  10/11/22 that showed normal  neck CTA as well as [place] of Aj   -10/13 speech better especially when speaks slower  - MRI of brain 10/12 showed no stroke ,  possible bleed vs superficial hemosiderosis from prior SAH, however patient  Denies any previous bleeds . 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   - no history of head trauma, he denies any knowledge of brain /head bleed , asking neurology to see  - patient  Still with aphasia  But much better and now can get words sentences out when speaks slowly , asked neuro to see   - - EKG on admission was sinus    - sinus om  telemetry   - echo 10/11  EF 60-65%  Negative bubble study   - lipid panel done     Addendum:   - discussed with  Neurology, likely has complex migraine, history migraine as a child  - follow up  With speech therapy if still has ongoing issues  - EEG  Being completed and if negative then can discharge  hoem with out patient  Follow up    - needs follow up  Brain MRI in month     History C5-C7 anterior cervical arthroplasty  6/2019  Complicated by right sided neck hematoma  Right vertebral  "artery defect  And  Was \"  repaired primarily and reinforced using a Goretex graft secured by a clip   .... The edges of the Goretex wrapping were then secured with a right-angle Sugita T2 aneurysm clip...  \"          Alcohol intoxication  Polysubstance , Alcohol abuse   Drinks daily, drink of choice is will. Per his wife, he gets pretty agitated if he abstains for more than a day or two.   - has had low CIWA scores ,  has not received diazepam  - MVI, folate, thiamine   - tox screen  + cocaine and cannabinoids        GERD  - Continue protonix         Diet: Regular Diet Adult    DVT Prophylaxis: patient  Ambulating   Powell Catheter: Not present  Central Lines: None  Cardiac Monitoring: ACTIVE order. Indication: Stroke, acute (48 hours)  Code Status: Full Code      Disposition Plan    awaiting neuro input, possibles discharge  Today after neuro eval         The patient's care was discussed with neurology and the care team .     Hannah Roche MD  Hospitalist Service, [address]  Securely message with the Vocera Web Console (learn more here)  Text page via [employer] Paging/Directory   Please see signed in provider for up to date coverage information      Clinically Significant Risk Factors Present on Admission                # Obesity: Estimated body mass index is 31.38 kg/m  as calculated from the following:    Height as of this encounter: 1.803 m (5' 11\").    Weight as of this encounter: 102.1 kg (225 lb).        ______________________________________________________________________    Interval History      Patient  Is better, can talk better but still some issues when tries to talk fast       Data reviewed today: I reviewed all medications, new labs and imaging results over the last 24 hours.   IPhysical Exam   Vital Signs: Temp: 98.3  F (36.8  C) Temp src: Oral BP: (!) 144/96 Pulse: 104   Resp: 20 SpO2: 100 % O2 Device: None (Room air)    Weight: 225 lbs " 0 oz     General appearence: awake alert  In  no apparent distress    HEENT: EOMI, PEARLA, sclera nonicteric,  moist,  mucus membranes,   NECK : supple  RESPIRATORY: lungs clear to auscultation bilateral,   CARDIOVASCULAR:S1 S2 regular rate and rhythm, no rubs gallops or murmurs appreciated  GASTROINTESTINAL:soft, non-distended , non-tender , + bowel sounds, no masses felt   SKIN: warm and dry, no mottling noted   NEUROLOGIC; awake alert, now speech much more confluent when talks  slow , normal finger to nose bilateral , no pronator drift , 5/5 = strength in all extremities , sensation intact   EXTREMITIES: no clubbing, cyanosis or edema , moves all extremity, good pedal pulses   MUSCULOSKELETAL: without deformity       Data   Recent Labs   Lab 10/12/22  0614 10/12/22  0009 10/12/22  0007 10/12/22  0005   WBC 6.7  --  8.7  --    HGB 16.0  --  16.3  --    MCV 76*  --  75*  --      --  268  --      --  140  --    POTASSIUM 3.8  --  3.7  --    CHLORIDE 106  --  107  --    CO2 23  --  23  --    BUN 10  --  11  --    CR 0.88 1.1 0.92  --    ANIONGAP 10  --  10  --    MIMI 9.0  --  9.1  --    GLC 97  --  114* 114*   ALBUMIN  --   --  4.8  --    PROTTOTAL  --   --  8.7  --    BILITOTAL  --   --  0.9  --    ALKPHOS  --   --  89  --    ALT  --   --  50  --    AST  --   --  42  --      Recent Results (from the past 24 hour(s))   Echo Complete   Result Value    LVEF  60-65%    Narrative    [id number]  [id number]  [id number]  [id number]^KASI^PAULA^ARLENE     [facility],[place]  Echocardiography Laboratory  [address] [place] [id number]     Name: BREANA HAY  MRN: [id number]  : 1980  Study Date: 10/12/2022 12:53 PM  Age: 41 yrs  Gender: Male  Patient Location: [facility]  Reason For Study: Other, Please Specify in Comments  Ordering Physician: PAULA VILLASEÑOR  Performed By: Stephanie Alejo RDCS     BSA: 2.2 m2  Height: 71 in  Weight: 225 lb  HR: 95  BP: 140/90  mmHg  ______________________________________________________________________________  Procedure  Bubble Echocardiogram with two-dimensional, color and spectral Doppler  performed.  ______________________________________________________________________________  Interpretation Summary  Global and regional left ventricular function is normal with an EF of 60-65%.  Right ventricular function, chamber size, wall motion, and thickness are  normal.  Negative bubble study.  The inferior vena cava is normal.  No pericardial effusion is present.  ______________________________________________________________________________  Left Ventricle  Global and regional left ventricular function is normal with an EF of 60-65%.     Right Ventricle  Right ventricular function, chamber size, wall motion, and thickness are  normal.     Atria  Both atria appear normal. Negative bubble study.     Tricuspid Valve  The tricuspid valve is normal. Pulmonary artery systolic pressure cannot be  assessed.     Vessels  The inferior vena cava is normal.     Pericardium  No pericardial effusion is present.  ______________________________________________________________________________  MMode/2D Measurements & Calculations  LVOT diam: 2.3 cm  LVOT area: 4.2 cm2     Doppler Measurements & Calculations  MV E max balbir: 42.9 cm/sec  MV A max balbir: 65.6 cm/sec  MV E/A: 0.65  E/E' av.4  Lateral E/e': 4.1  Medial E/e': 4.7     ______________________________________________________________________________  Report approved by: Helio Fox 10/12/2022 03:30 PM         MR Brain w/o Contrast    Narrative    EXAM: MR BRAIN W/O CONTRAST  10/12/2022 6:54 PM     HISTORY:  aphasia, has had vertebral artery repair in 2019 has  gortex graft and clip       COMPARISON:  Same day head head CT and head and neck CTA    TECHNIQUE: Sagittal and axial T1-weighted, coronal and axial  diffusion-weighted with ADC map, axial susceptibility weighted,   images of the brain  were obtained without intravenous contrast    FINDINGS: On T2 gradient echo, there is pial susceptibility artifacts  at the high right frontal convexity such as seen on series 8 image 19  and 17.  There is no mass effect, midline shift. The ventricles are  proportionate to the cerebral sulci. Diffusion weighted images are  negative for acute/focal abnormality. Major intracranial vascular  structures are within normal limits. Scattered a few subcortical T2  hyperintense foci in the bilateral frontal white matter, likely  representing nonspecific gliosis.    No suspicious abnormality of the skull marrow signal. Clear paranasal  sinuses. Effusion in the right petrous apex air cells and in a lesser  degree in the left petrous apex air cells correlated with prior head  CT. Remainder of the mastoid air cells are clear. Normal soft tissues.  Orbits are normal. Mild mucosal thickening in the paranasal sinuses.      Impression    IMPRESSION:  1.Pial susceptibility artifacts in the right frontal convexity. These  may represent acute subarachnoid hemorrhage  or superficial  hemosiderosis from prior subarachnoid hemorrhage.  2. Nonspecific subcortical bilateral frontal white matter  hyperintensities, differential includes  T2 hyperintensities of  migraine headaches, underlying vasculopathy or sequela of prior  infection/inflammation.    I have personally reviewed the examination and initial interpretation  and I agree with the findings.    JAMES WELLS MD         SYSTEM ID:  [id number]   CT Head w/o Contrast    Narrative    CT HEAD W/O CONTRAST 10/12/2022 8:23 PM    History: Brain MRI with Pial susceptibility artifacts in the right  frontal convexity c/f SAH vs prior SAH. Eval for acute changes from  prior HCT   ICD-10:    Comparison: CT head from same date at 0019 hours. MR brain from same  day at 1841 hours.    Technique: Using multidetector thin collimation helical acquisition  technique, axial, coronal and sagittal CT images  from the skull base  to the vertex were obtained without intravenous contrast.   (topogram) image(s) also obtained and reviewed.    Findings:  No abnormal findings in the high right frontal convexity to correspond  with the finding seen on the comparison MRI. No new findings since  comparison CT earlier same day. There is no intracranial hemorrhage,  mass effect, or midline shift within limits of CT. Gray/white matter  differentiation in both cerebral hemispheres is preserved. Ventricles  are proportionate to the cerebral sulci. The basal cisterns are clear.  The orbits are grossly normal.    The bony calvaria and the bones of the skull base are normal. The  visualized portions of the paranasal sinuses and mastoid air cells are  clear.      Impression    Impression:  No acute intracranial findings to correspond with the susceptibility  artifact seen in the high right frontal convexity on the comparison  MRI. Those findings likely represent superficial hemosiderosis from a  prior subarachnoid hemorrhage.    I have personally reviewed the examination and initial interpretation  and I agree with the findings.    JAMES WELLS MD         SYSTEM ID:  [id number]

## 2022-10-14 LAB — LVEF ECHO: NORMAL

## 2022-10-14 NOTE — PLAN OF CARE
Patient is A&O x4. Able to make needs known.   On TELE.   RA.   Independent.   CIWA scored - not withdrawing.   LBM 10/13 per pt report  Pt c/o of neck pain. Heat packs offered. Patient declined.      Discussed discharge paperwork with patient and family. Patient verbalized understanding. All questions answered.   Patient discharged from unit at 1800 with all personal belongings.

## 2022-11-02 ENCOUNTER — TELEPHONE (OUTPATIENT)
Dept: FAMILY MEDICINE | Facility: CLINIC | Age: 42
End: 2022-11-02

## 2022-11-02 NOTE — TELEPHONE ENCOUNTER
Reason for Call:  Other appointment    Detailed comments: hospital discharge wanted Ray to get a MRI within a month of discharge needs a referal for MRI and a sooner appointment for follow up.    Phone Number Patient can be reached at: [phone]      Best Time: any    Can we leave a detailed message on this number? YES    Call taken on 11/2/2022 at 10:06 AM by Abby Us

## 2022-11-02 NOTE — TELEPHONE ENCOUNTER
I reviewed the formal neurology consult and they recommended an MRI with/without contrast in 3-6 months for follow up.    Please see if patient can get in with me sooner (he no showed for his 10/25 visit), ok to use a same day or next day slot.    Christiano Palacios MD

## 2022-11-02 NOTE — TELEPHONE ENCOUNTER
10-11 discharge note:  Follow-up Appointments     Adult [facility] Follow-up and recommended labs and tests      Follow up with primary care provider, Christiano Palacios, within 7   days for hospital follow- up.  Possible migrain treatment Also you need a   repeat brain MRI in 1 month to follow up  on abnormality noted 10/12       Appointments on [place] and/or [facility] (with [facility]   provider or service). Call [phone] if you haven't heard regarding   these appointments within 7 days of discharge.      Do you want to see pt before ordering?     Jenna GOMEZ RN

## 2022-11-30 ENCOUNTER — TELEPHONE (OUTPATIENT)
Dept: FAMILY MEDICINE | Facility: CLINIC | Age: 42
End: 2022-11-30

## 2022-11-30 DIAGNOSIS — K27.5 PERFORATED PEPTIC ULCER (H): ICD-10-CM

## 2022-11-30 RX ORDER — PANTOPRAZOLE SODIUM 40 MG/1
40 TABLET, DELAYED RELEASE ORAL DAILY
Qty: 90 TABLET | Refills: 3 | Status: SHIPPED | OUTPATIENT
Start: 2022-11-30 | End: 2024-07-29

## 2022-11-30 NOTE — TELEPHONE ENCOUNTER
Attempted to call patient to let him know that prescription had been sent, LMOM requesting a call back.     Vi Ribera RN  [facility]

## 2022-11-30 NOTE — TELEPHONE ENCOUNTER
Refill sent to [facility] [place].  Please call to let them know.    Christiano Palacios MD

## 2022-11-30 NOTE — TELEPHONE ENCOUNTER
Patient's wife calling to say patient would like a refill of his Protonix.      Last ordered 6/28/21. Seen by Dr. Wilder 3/14/22.     Order t'd up in appropriate.     Vi Ribera RN  [facility]

## 2022-11-30 NOTE — TELEPHONE ENCOUNTER
2:23 PM    Attempted to call patient again. No answer/busy.     Vi Ribera RN on 11/30/2022 at 4:07 PM

## 2023-05-10 NOTE — OP NOTE
DATE OF PROCEDURE:  10/11/2017      PREOPERATIVE DIAGNOSIS:  Perforated peptic ulcer.      POSTOPERATIVE DIAGNOSIS:  Perforated peptic ulcer.      PROCEDURES:  Exploratory Laparoscopy, Exploratory Laparotomy, EGD, Omental Patch    SURGEON: Celio Keyes MD   ASSISTANT SURGEON: Aleshia Somers MD  ASSISTANT SURGEON: Mervin Minor MD  ASSISTANT SURGEON: Danelle Valadez MD      INDICATION FOR PROCEDURE:  Tonio Alarcon is a 36-year-old male who presented with severe abnormal pain to the emergency room.  The patient was complaining of right upper quadrant abdominal pain; however, no specific diagnosis was made.  The patient returned home; however, he continued to have severe upper abdominal pain.  The pain progressed to the point he returned to the emergency room.  A CT scan was performed and multiple bouts of free air were noted consistent with a perforated peptic ulcer.  The patient was given full informed consent regarding the risks and benefits of surgery including the potential for injury to intraabdominal organs, bleeding, need for second surgery and all the complications associated with general anesthetic including myocardial infarction, pulmonary emboli, stroke, even death in the OR. The patient understood the risks and wished to proceed.      DESCRIPTION OF PROCEDURE:  Mr. Tonio Alarcon was taken to the main operating room under general endotracheal anesthesia.  The patient was prepped and draped in sterile fashion.  Following appropriate timeout procedure, a 1 cm incision was made at the level of umbilicus, taken down to the level of the midline fascia with blunt dissection.  The fascia was incised with a 1 mm cut using 11 blade and the Veress needle was introduced into the abdominal cavity.  A pneumoperitoneum was achieved.  The midline fascia was carefully inspected and a 12-mm laparoscopic port was placed in the umbilicus.  The patient's abdominal cavity was noted to have erythema in the area of  the upper abdomen; however, no perforation site could be identified due to this and the need to establish a diagnosis and to provide surgical coverage for this perforated lesion.  A generous upper midline incision was made with a #11 blade.  Pneumoperitoneum was allowed to resolve and the vascular Omni was used to provide traction.  The abdomen was explored carefully and the only abnormality noted was a dense adhesion and thickening in the area of the peptic channel without perforation at this time.  The Cattell-Braasch maneuver was performed and the duodenum was mobilized medially.  The area of the stomach was carefully dissected free such that we were able to take down the gastrocolic ligament and looked behind the stomach.  The gastrohepatic ligament was also taken down.  Excellent observation of the stomach was identified.  The pancreas appeared normal.  The stomach showed no additional perforation site.  The abdomen was carefully inspected and an EGD was performed to provide potential for identifying the perforation site.  This did not demonstrate any perforation.  The stomach was then filled with a saturated solution of methylene blue and additionally 200 mL of sterile saline were also introduced to again attempt to find the perforation site and the peptic channel.  No perforation site was identified.  Nonetheless, a tongue of omentum was brought up and sutured into place using 3-0 silk suture.  This omentum was meant to patch the area of induration and possible previous perforation in the peptic channel at the stomach that was demonstrated as erythematous and appeared to have recently sealed over.  At this point, the abdomen was carefully irrigated with sterile normal saline, which returned clear.  The midline fascia was approximated with running 0-looped PDS and the skin edges were then reapproximated.  The 10 cm port sites were closed using the 0-Vicryl suture in direct suture fashion.  Skin edges were  [Time Spent: ___ minutes] : I have spent [unfilled] minutes of time on the encounter. reapproximated with surgical clips.  The patient tolerated the procedure well.  Needle and sponge count were correct x2 and the patient was returned to postanesthesia recovery in good condition.         YUNIER ANTUNEZ MD       D: 10/14/2017 22:22   T: 10/15/2017 07:18   MT: TD      Name:     BREANA HAY   MRN:      [id number]        Account:        [id number]   :      1980           Procedure Date: 10/11/2017      Document: [id number]       cc: [facility] Surgery Billing

## 2024-03-04 ENCOUNTER — HOSPITAL ENCOUNTER (EMERGENCY)
Facility: CLINIC | Age: 44
Discharge: HOME | End: 2024-03-04
Attending: EMERGENCY MEDICINE | Admitting: EMERGENCY MEDICINE
Payer: COMMERCIAL

## 2024-03-04 ENCOUNTER — APPOINTMENT (OUTPATIENT)
Dept: GENERAL RADIOLOGY | Facility: CLINIC | Age: 44
End: 2024-03-04
Attending: EMERGENCY MEDICINE
Payer: COMMERCIAL

## 2024-03-04 VITALS
WEIGHT: 205 LBS | RESPIRATION RATE: 18 BRPM | OXYGEN SATURATION: 100 % | HEIGHT: 71 IN | SYSTOLIC BLOOD PRESSURE: 150 MMHG | TEMPERATURE: 99.4 F | DIASTOLIC BLOOD PRESSURE: 90 MMHG | BODY MASS INDEX: 28.7 KG/M2 | HEART RATE: 103 BPM

## 2024-03-04 DIAGNOSIS — J10.1 INFLUENZA A: ICD-10-CM

## 2024-03-04 LAB
ACANTHOCYTES BLD QL SMEAR: ABNORMAL
ALBUMIN SERPL BCG-MCNC: 4.6 G/DL (ref 3.5–5.2)
ALP SERPL-CCNC: 69 U/L (ref 40–150)
ALT SERPL W P-5'-P-CCNC: 40 U/L (ref 0–70)
ANION GAP SERPL CALCULATED.3IONS-SCNC: 13 MMOL/L (ref 7–15)
AST SERPL W P-5'-P-CCNC: 34 U/L (ref 0–45)
ATRIAL RATE - MUSE: 86 BPM
AUER BODIES BLD QL SMEAR: ABNORMAL
BASO STIPL BLD QL SMEAR: ABNORMAL
BASOPHILS # BLD AUTO: 0 10E3/UL (ref 0–0.2)
BASOPHILS NFR BLD AUTO: 1 %
BILIRUB SERPL-MCNC: 0.5 MG/DL
BITE CELLS BLD QL SMEAR: ABNORMAL
BLISTER CELLS BLD QL SMEAR: ABNORMAL
BUN SERPL-MCNC: 11.7 MG/DL (ref 6–20)
BURR CELLS BLD QL SMEAR: SLIGHT
CALCIUM SERPL-MCNC: 9.3 MG/DL (ref 8.6–10)
CHLORIDE SERPL-SCNC: 98 MMOL/L (ref 98–107)
CREAT SERPL-MCNC: 1.03 MG/DL (ref 0.67–1.17)
DACRYOCYTES BLD QL SMEAR: ABNORMAL
DEPRECATED HCO3 PLAS-SCNC: 22 MMOL/L (ref 22–29)
DIASTOLIC BLOOD PRESSURE - MUSE: NORMAL MMHG
EGFRCR SERPLBLD CKD-EPI 2021: >90 ML/MIN/1.73M2
ELLIPTOCYTES BLD QL SMEAR: ABNORMAL
EOSINOPHIL # BLD AUTO: 0 10E3/UL (ref 0–0.7)
EOSINOPHIL NFR BLD AUTO: 1 %
ERYTHROCYTE [DISTWIDTH] IN BLOOD BY AUTOMATED COUNT: 17.4 % (ref 10–15)
FLUAV RNA SPEC QL NAA+PROBE: POSITIVE
FLUBV RNA RESP QL NAA+PROBE: NEGATIVE
FRAGMENTS BLD QL SMEAR: ABNORMAL
GLUCOSE SERPL-MCNC: 118 MG/DL (ref 70–99)
HCT VFR BLD AUTO: 50.1 % (ref 40–53)
HGB BLD-MCNC: 16.9 G/DL (ref 13.3–17.7)
HGB C CRYSTALS: ABNORMAL
HOWELL-JOLLY BOD BLD QL SMEAR: ABNORMAL
IMM GRANULOCYTES # BLD: 0 10E3/UL
IMM GRANULOCYTES NFR BLD: 0 %
INTERPRETATION ECG - MUSE: NORMAL
LYMPHOCYTES # BLD AUTO: 1.9 10E3/UL (ref 0.8–5.3)
LYMPHOCYTES NFR BLD AUTO: 44 %
MAGNESIUM SERPL-MCNC: 2.1 MG/DL (ref 1.7–2.3)
MCH RBC QN AUTO: 25.6 PG (ref 26.5–33)
MCHC RBC AUTO-ENTMCNC: 33.7 G/DL (ref 31.5–36.5)
MCV RBC AUTO: 76 FL (ref 78–100)
MONOCYTES # BLD AUTO: 0.9 10E3/UL (ref 0–1.3)
MONOCYTES NFR BLD AUTO: 22 %
NEUTROPHILS # BLD AUTO: 1.3 10E3/UL (ref 1.6–8.3)
NEUTROPHILS NFR BLD AUTO: 32 %
NEUTS HYPERSEG BLD QL SMEAR: ABNORMAL
NRBC # BLD AUTO: 0 10E3/UL
NRBC BLD AUTO-RTO: 0 /100
P AXIS - MUSE: 6 DEGREES
PLAT MORPH BLD: ABNORMAL
PLATELET # BLD AUTO: 210 10E3/UL (ref 150–450)
POLYCHROMASIA BLD QL SMEAR: SLIGHT
POTASSIUM SERPL-SCNC: 3.7 MMOL/L (ref 3.4–5.3)
PR INTERVAL - MUSE: 168 MS
PROCALCITONIN SERPL IA-MCNC: 0.12 NG/ML
PROT SERPL-MCNC: 8 G/DL (ref 6.4–8.3)
QRS DURATION - MUSE: 94 MS
QT - MUSE: 370 MS
QTC - MUSE: 442 MS
R AXIS - MUSE: 108 DEGREES
RBC # BLD AUTO: 6.59 10E6/UL (ref 4.4–5.9)
RBC AGGLUT BLD QL: ABNORMAL
RBC MORPH BLD: ABNORMAL
ROULEAUX BLD QL SMEAR: ABNORMAL
RSV RNA SPEC NAA+PROBE: NEGATIVE
SARS-COV-2 RNA RESP QL NAA+PROBE: NEGATIVE
SICKLE CELLS BLD QL SMEAR: ABNORMAL
SMUDGE CELLS BLD QL SMEAR: ABNORMAL
SODIUM SERPL-SCNC: 133 MMOL/L (ref 135–145)
SPHEROCYTES BLD QL SMEAR: ABNORMAL
STOMATOCYTES BLD QL SMEAR: ABNORMAL
SYSTOLIC BLOOD PRESSURE - MUSE: NORMAL MMHG
T AXIS - MUSE: 54 DEGREES
TARGETS BLD QL SMEAR: ABNORMAL
TOXIC GRANULES BLD QL SMEAR: ABNORMAL
TROPONIN T SERPL HS-MCNC: <6 NG/L
VARIANT LYMPHS BLD QL SMEAR: ABNORMAL
VENTRICULAR RATE- MUSE: 86 BPM
WBC # BLD AUTO: 4.2 10E3/UL (ref 4–11)

## 2024-03-04 PROCEDURE — 82040 ASSAY OF SERUM ALBUMIN: CPT | Performed by: EMERGENCY MEDICINE

## 2024-03-04 PROCEDURE — 99285 EMERGENCY DEPT VISIT HI MDM: CPT | Mod: 25 | Performed by: EMERGENCY MEDICINE

## 2024-03-04 PROCEDURE — 36415 COLL VENOUS BLD VENIPUNCTURE: CPT | Performed by: EMERGENCY MEDICINE

## 2024-03-04 PROCEDURE — 99284 EMERGENCY DEPT VISIT MOD MDM: CPT | Mod: 25 | Performed by: EMERGENCY MEDICINE

## 2024-03-04 PROCEDURE — 87637 SARSCOV2&INF A&B&RSV AMP PRB: CPT | Performed by: EMERGENCY MEDICINE

## 2024-03-04 PROCEDURE — 93005 ELECTROCARDIOGRAM TRACING: CPT | Performed by: EMERGENCY MEDICINE

## 2024-03-04 PROCEDURE — 93010 ELECTROCARDIOGRAM REPORT: CPT | Performed by: EMERGENCY MEDICINE

## 2024-03-04 PROCEDURE — 83735 ASSAY OF MAGNESIUM: CPT | Performed by: EMERGENCY MEDICINE

## 2024-03-04 PROCEDURE — 84145 PROCALCITONIN (PCT): CPT | Performed by: EMERGENCY MEDICINE

## 2024-03-04 PROCEDURE — 85025 COMPLETE CBC W/AUTO DIFF WBC: CPT | Performed by: EMERGENCY MEDICINE

## 2024-03-04 PROCEDURE — 71046 X-RAY EXAM CHEST 2 VIEWS: CPT

## 2024-03-04 PROCEDURE — 84484 ASSAY OF TROPONIN QUANT: CPT | Performed by: EMERGENCY MEDICINE

## 2024-03-04 ASSESSMENT — COLUMBIA-SUICIDE SEVERITY RATING SCALE - C-SSRS
6. HAVE YOU EVER DONE ANYTHING, STARTED TO DO ANYTHING, OR PREPARED TO DO ANYTHING TO END YOUR LIFE?: NO
2. HAVE YOU ACTUALLY HAD ANY THOUGHTS OF KILLING YOURSELF IN THE PAST MONTH?: NO
1. IN THE PAST MONTH, HAVE YOU WISHED YOU WERE DEAD OR WISHED YOU COULD GO TO SLEEP AND NOT WAKE UP?: NO

## 2024-03-04 ASSESSMENT — ACTIVITIES OF DAILY LIVING (ADL)
ADLS_ACUITY_SCORE: 35
ADLS_ACUITY_SCORE: 35

## 2024-03-04 NOTE — ED PROVIDER NOTES
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  Patient endorses generalized body aches, cough and chest pain associated with cough, generalized fatigue, and fevers at home without measuring specific temperature.  He is not sure about exposure to sick contacts.  He did get COVID vaccination, did not get flu shot this year.  No nausea, vomiting, abdominal pain, other complaints at this time.    Past Medical History  Past Medical History:   Diagnosis Date    Depressive disorder     Ulcer, gastric, acute      Past Surgical History:   Procedure Laterality Date    ABDOMEN SURGERY  10/2017    Perforated peptic ulcer    GI SURGERY      Hemorrhoids     HEMORRHOID SURGERY  2015    INCISION AND DRAINAGE PERINEAL, COMBINED N/A 06/08/2018    Procedure: COMBINED INCISION AND DRAINAGE PERINEAL;   Examination of perineal abcess under anethesia, Incision and Drainage Perineal Abscess;  Surgeon: Solo Valero MD;  Location: [facility] OR    INJECT EPIDURAL TRANSFORAMINAL LUMBAR / SACRAL EA ADDITIONAL LEVEL N/A 03/04/2019    Procedure: Cervical Epidural Steroid Injection, Cervical 7-Thoracic 1 Interlaminar;  Surgeon: Nolan Quiroz MD;  Location: [facility] OR    LAPAROSCOPIC HERNIORRHAPHY EPIGASTRIC N/A 10/11/2017    Procedure: LAPAROSCOPIC HERNIORRHAPHY EPIGASTRIC;  Exploratory Laparoscopy, Exploratory Laparotomy, EGD, Omental Patch, Upper Endoscopy;  Surgeon: Celio Keyes MD;  Location: [facility] OR    NECK SURGERY  06/2019    Neck hematoma related to vertebral artery injury from cervical disk replacement    REPLACE DISK  "LUMBAR N/A 06/25/2019    Procedure: CERVICAL 5-7 ARTHROPLASTY;  Surgeon: Aaron Craft MD;  Location:  OR     multivitamin w/minerals (THERA-VIT-M) tablet  pantoprazole (PROTONIX) 40 MG EC tablet      Allergies   Allergen Reactions    Seasonal Allergies Other (See Comments)     Clogged sinuses     Family History  Family History   Problem Relation Age of Onset    Diabetes Mother     Breast Cancer Mother         Dx at 61 years aol    Coronary Artery Disease Mother         3v CABG    Cancer Father         Stomach ca - Dx at 57 years old and passed away from this    Cancer - colorectal Maternal Grandmother     Diabetes Brother      Social History   Social History     Tobacco Use    Smoking status: Every Day     Packs/day: 1.00     Years: 25.00     Additional pack years: 0.00     Total pack years: 25.00     Types: Cigarettes    Smokeless tobacco: Never   Substance Use Topics    Alcohol use: Yes     Comment: socially    Drug use: Yes     Types: Marijuana         A complete review of systems was performed with pertinent positives and negatives noted in the HPI, and all other systems negative.    Physical Exam   BP: (!) 145/93  Pulse: 112  Temp: 99.4  F (37.4  C)  Resp: 18  Height: 180.3 cm (5' 11\")  Weight: 93 kg (205 lb)  SpO2: 100 %  Physical Exam  Vitals and nursing note reviewed.   Constitutional:       General: He is not in acute distress.     Appearance: Normal appearance. He is not toxic-appearing.   HENT:      Head: Normocephalic and atraumatic.   Eyes:      General: No scleral icterus.     Conjunctiva/sclera: Conjunctivae normal.   Cardiovascular:      Rate and Rhythm: Regular rhythm. Tachycardia present.      Heart sounds: Normal heart sounds.   Pulmonary:      Effort: Pulmonary effort is normal. No accessory muscle usage or respiratory distress.      Breath sounds: Normal breath sounds.   Abdominal:      Palpations: Abdomen is soft.      Tenderness: There is no guarding or rebound.   Musculoskeletal:    "      General: No deformity.      Cervical back: Normal range of motion and neck supple.   Skin:     General: Skin is warm and dry.   Neurological:      General: No focal deficit present.      Mental Status: He is alert.   Psychiatric:         Mood and Affect: Mood normal.         Behavior: Behavior normal.           ED Course, Procedures, & Data      Procedures            EKG Interpretation:      Interpreted by Eron Canales MD  Time reviewed: 17:15  Symptoms at time of EKG: chest pain   Rhythm: normal sinus   Rate: normal  Axis: right  Ectopy: none  Conduction: normal  ST Segments/ T Waves: No ST-T wave abnormalities  Q Waves: none  Comparison to prior: Unchanged from 10/11/22    Clinical Impression: normal EKG         Results for orders placed or performed during the hospital encounter of 03/04/24   Chest XR,  PA & LAT     Status: None    Narrative    EXAM: XR CHEST 2 VIEWS  LOCATION: [facility]  DATE: 3/4/2024    INDICATION: chest pain  COMPARISON: Chest radiograph 03/28/2019      Impression    IMPRESSION: Negative chest.   Symptomatic Influenza A/B, RSV, & SARS-CoV2 PCR (COVID-19) Nasopharyngeal     Status: Abnormal    Specimen: Nasopharyngeal; Swab   Result Value Ref Range    Influenza A PCR Positive (A) Negative    Influenza B PCR Negative Negative    RSV PCR Negative Negative    SARS CoV2 PCR Negative Negative    Narrative    Testing was performed using the Xpert Xpress CoV2/Flu/RSV Assay on the Cepheid GeneXpert Instrument. This test should be ordered for the detection of SARS-CoV-2, influenza, and RSV viruses in individuals who meet clinical and/or epidemiological criteria. Test performance is unknown in asymptomatic patients. This test is for in vitro diagnostic use under the FDA EUA for laboratories certified under CLIA to perform high or moderate complexity testing. This test has not been FDA cleared or approved. A negative result does not rule out the  presence of PCR inhibitors in the specimen or target RNA in concentration below the limit of detection for the assay. If only one viral target is positive but coinfection with multiple targets is suspected, the sample should be re-tested with another FDA cleared, approved, or authorized test, if coinfection would change clinical management. This test was validated by the [facility]. These laboratories are certified under the Clinical Laboratory Improvement Amendments of 1988 (CLIA-88) as qualified to perform high complexity laboratory testing.   Comprehensive metabolic panel     Status: Abnormal   Result Value Ref Range    Sodium 133 (L) 135 - 145 mmol/L    Potassium 3.7 3.4 - 5.3 mmol/L    Carbon Dioxide (CO2) 22 22 - 29 mmol/L    Anion Gap 13 7 - 15 mmol/L    Urea Nitrogen 11.7 6.0 - 20.0 mg/dL    Creatinine 1.03 0.67 - 1.17 mg/dL    GFR Estimate >90 >60 mL/min/1.73m2    Calcium 9.3 8.6 - 10.0 mg/dL    Chloride 98 98 - 107 mmol/L    Glucose 118 (H) 70 - 99 mg/dL    Alkaline Phosphatase 69 40 - 150 U/L    AST 34 0 - 45 U/L    ALT 40 0 - 70 U/L    Protein Total 8.0 6.4 - 8.3 g/dL    Albumin 4.6 3.5 - 5.2 g/dL    Bilirubin Total 0.5 <=1.2 mg/dL   Magnesium     Status: Normal   Result Value Ref Range    Magnesium 2.1 1.7 - 2.3 mg/dL   Troponin T, High Sensitivity     Status: Normal   Result Value Ref Range    Troponin T, High Sensitivity <6 <=22 ng/L   Procalcitonin     Status: Normal   Result Value Ref Range    Procalcitonin 0.12 <0.50 ng/mL   CBC with platelets and differential     Status: Abnormal   Result Value Ref Range    WBC Count 4.2 4.0 - 11.0 10e3/uL    RBC Count 6.59 (H) 4.40 - 5.90 10e6/uL    Hemoglobin 16.9 13.3 - 17.7 g/dL    Hematocrit 50.1 40.0 - 53.0 %    MCV 76 (L) 78 - 100 fL    MCH 25.6 (L) 26.5 - 33.0 pg    MCHC 33.7 31.5 - 36.5 g/dL    RDW 17.4 (H) 10.0 - 15.0 %    Platelet Count 210 150 - 450 10e3/uL    % Neutrophils 32 %    % Lymphocytes 44 %    % Monocytes 22 %    %  Eosinophils 1 %    % Basophils 1 %    % Immature Granulocytes 0 %    NRBCs per 100 WBC 0 <1 /100    Absolute Neutrophils 1.3 (L) 1.6 - 8.3 10e3/uL    Absolute Lymphocytes 1.9 0.8 - 5.3 10e3/uL    Absolute Monocytes 0.9 0.0 - 1.3 10e3/uL    Absolute Eosinophils 0.0 0.0 - 0.7 10e3/uL    Absolute Basophils 0.0 0.0 - 0.2 10e3/uL    Absolute Immature Granulocytes 0.0 <=0.4 10e3/uL    Absolute NRBCs 0.0 10e3/uL   RBC and Platelet Morphology     Status: Abnormal   Result Value Ref Range    Platelet Assessment  Automated Count Confirmed. Platelet morphology is normal.     Automated Count Confirmed. Platelet morphology is normal.    Acanthocytes      Rachel Rods      Basophilic Stippling      Bite Cells      Blister Cells      Ramon Cells Slight (A) None Seen    Elliptocytes      Hgb C Crystals      Perez-Jolly Bodies      Hypersegmented Neutrophils      Polychromasia Slight (A) None Seen    RBC agglutination      RBC Fragments      Reactive Lymphocytes      Rouleaux      Sickle Cells      Smudge Cells      Spherocytes      Stomatocytes      Target Cells      Teardrop Cells      Toxic Neutrophils      RBC Morphology Confirmed RBC Indices    EKG 12 lead     Status: None (Preliminary result)   Result Value Ref Range    Systolic Blood Pressure  mmHg    Diastolic Blood Pressure  mmHg    Ventricular Rate 86 BPM    Atrial Rate 86 BPM    CA Interval 168 ms    QRS Duration 94 ms     ms    QTc 442 ms    P Axis 6 degrees    R AXIS 108 degrees    T Axis 54 degrees    Interpretation ECG Sinus rhythm  Rightward axis  Borderline ECG      CBC with Platelets & Differential     Status: Abnormal    Narrative    The following orders were created for panel order CBC with Platelets & Differential.  Procedure                               Abnormality         Status                     ---------                               -----------         ------                     CBC with platelets and d...[[id number]]  Abnormal            Final result                RBC and Platelet Morphology[[id number]]  Abnormal            Final result                 Please view results for these tests on the individual orders.     Medications - No data to display  Labs Ordered and Resulted from Time of ED Arrival to Time of ED Departure   INFLUENZA A/B, RSV, & SARS-COV2 PCR - Abnormal       Result Value    Influenza A PCR Positive (*)     Influenza B PCR Negative      RSV PCR Negative      SARS CoV2 PCR Negative     COMPREHENSIVE METABOLIC PANEL - Abnormal    Sodium 133 (*)     Potassium 3.7      Carbon Dioxide (CO2) 22      Anion Gap 13      Urea Nitrogen 11.7      Creatinine 1.03      GFR Estimate >90      Calcium 9.3      Chloride 98      Glucose 118 (*)     Alkaline Phosphatase 69      AST 34      ALT 40      Protein Total 8.0      Albumin 4.6      Bilirubin Total 0.5     CBC WITH PLATELETS AND DIFFERENTIAL - Abnormal    WBC Count 4.2      RBC Count 6.59 (*)     Hemoglobin 16.9      Hematocrit 50.1      MCV 76 (*)     MCH 25.6 (*)     MCHC 33.7      RDW 17.4 (*)     Platelet Count 210      % Neutrophils 32      % Lymphocytes 44      % Monocytes 22      % Eosinophils 1      % Basophils 1      % Immature Granulocytes 0      NRBCs per 100 WBC 0      Absolute Neutrophils 1.3 (*)     Absolute Lymphocytes 1.9      Absolute Monocytes 0.9      Absolute Eosinophils 0.0      Absolute Basophils 0.0      Absolute Immature Granulocytes 0.0      Absolute NRBCs 0.0     RBC AND PLATELET MORPHOLOGY - Abnormal    Platelet Assessment        Value: Automated Count Confirmed. Platelet morphology is normal.    Acanthocytes        Rachel Rods        Basophilic Stippling        Bite Cells        Blister Cells        [place] Cells Slight (*)     Elliptocytes        Hgb C Crystals        Perez-Jolly Bodies        Hypersegmented Neutrophils        Polychromasia Slight (*)     RBC agglutination        RBC Fragments        Reactive Lymphocytes        Rouleaux        Sickle Cells        Smudge Cells         Spherocytes        Stomatocytes        Target Cells        Teardrop Cells        Toxic Neutrophils        RBC Morphology Confirmed RBC Indices     MAGNESIUM - Normal    Magnesium 2.1     TROPONIN T, HIGH SENSITIVITY - Normal    Troponin T, High Sensitivity <6     PROCALCITONIN - Normal    Procalcitonin 0.12     TROPONIN T, HIGH SENSITIVITY     Chest XR,  PA & LAT   Final Result   IMPRESSION: Negative chest.             Critical care was not performed.     Medical Decision Making  The patient's presentation was of moderate complexity (an acute complicated injury).    The patient's evaluation involved:  review of external note(s) from 1 sources (see separate area of note for details)  ordering and/or review of 3+ test(s) in this encounter (see separate area of note for details)    The patient's management necessitated only low risk treatment.    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  Patient is mildly tachycardic and mildly hypertensive on arrival, temperature at 99.4 Fahrenheit.  He is nontoxic-appearing overall.  COVID, influenza, and RSV swabs obtained and positive for influenza A.  This likely explains symptoms.  Chest x-ray obtained and negative for infiltrate on my read.  EKG obtained and nonischemic, no ST elevations or depressions, showed normal sinus rhythm and is consistent with prior EKG from October 2022.  Patient overall with reassuring evaluation, diagnosed with influenza A, discharged with outpatient follow-up and return precautions.    I have reviewed the nursing notes. I have reviewed the findings, diagnosis, plan and need for follow up with the patient.    New Prescriptions    No medications on file       Final diagnoses:   Influenza A       Eron AGUILAR  Parker MATOS  [facility] EMERGENCY DEPARTMENT  3/4/2024        Eron Canales MD  03/04/24 [id number]

## 2024-03-04 NOTE — ED TRIAGE NOTES
Pt.  has been sick since Friday with cough, fever,  slight shortness of breath and fatigue.  Pt.  today noticed some chest heaviness.  No nausea, vomiting or diarrhea.     Triage Assessment (Adult)       Row Name 03/04/24 [id number]          Triage Assessment    Airway WDL WDL        Respiratory WDL    Respiratory WDL WDL        Skin Circulation/Temperature WDL    Skin Circulation/Temperature WDL WDL        Cardiac WDL    Cardiac WDL WDL        Peripheral/Neurovascular WDL    Peripheral Neurovascular WDL WDL        Cognitive/Neuro/Behavioral WDL    Cognitive/Neuro/Behavioral WDL WDL

## 2024-03-04 NOTE — LETTER
March 4, 2024      To Whom It May Concern:      Tonio Alarcon was seen in our Emergency Department today, 03/04/24.  I expect his condition to improve over the next 5 days.  He may return to work/school when improved.    Sincerely,        Charo Ibanez RN

## 2024-07-29 ENCOUNTER — OFFICE VISIT (OUTPATIENT)
Dept: FAMILY MEDICINE | Facility: CLINIC | Age: 44
End: 2024-07-29
Payer: COMMERCIAL

## 2024-07-29 VITALS
OXYGEN SATURATION: 100 % | HEART RATE: 85 BPM | RESPIRATION RATE: 16 BRPM | WEIGHT: 219 LBS | TEMPERATURE: 97 F | BODY MASS INDEX: 31.35 KG/M2 | HEIGHT: 70 IN | DIASTOLIC BLOOD PRESSURE: 88 MMHG | SYSTOLIC BLOOD PRESSURE: 126 MMHG

## 2024-07-29 DIAGNOSIS — E83.19 HEMOSIDEROSIS: ICD-10-CM

## 2024-07-29 DIAGNOSIS — Z00.00 ROUTINE GENERAL MEDICAL EXAMINATION AT A HEALTH CARE FACILITY: Primary | ICD-10-CM

## 2024-07-29 DIAGNOSIS — M54.12 CERVICAL RADICULOPATHY: ICD-10-CM

## 2024-07-29 DIAGNOSIS — G43.409 HEMIPLEGIC MIGRAINE WITHOUT STATUS MIGRAINOSUS, NOT INTRACTABLE: ICD-10-CM

## 2024-07-29 DIAGNOSIS — K21.9 GASTROESOPHAGEAL REFLUX DISEASE WITHOUT ESOPHAGITIS: ICD-10-CM

## 2024-07-29 DIAGNOSIS — Z23 NEED FOR TDAP VACCINATION: ICD-10-CM

## 2024-07-29 PROBLEM — F10.929 ALCOHOLIC INTOXICATION WITH COMPLICATION (H): Status: RESOLVED | Noted: 2022-10-12 | Resolved: 2024-07-29

## 2024-07-29 LAB
ANION GAP SERPL CALCULATED.3IONS-SCNC: 9 MMOL/L (ref 7–15)
BUN SERPL-MCNC: 17 MG/DL (ref 6–20)
CALCIUM SERPL-MCNC: 9.5 MG/DL (ref 8.8–10.4)
CHLORIDE SERPL-SCNC: 103 MMOL/L (ref 98–107)
CHOLEST SERPL-MCNC: 211 MG/DL
CREAT SERPL-MCNC: 1.12 MG/DL (ref 0.67–1.17)
EGFRCR SERPLBLD CKD-EPI 2021: 83 ML/MIN/1.73M2
FASTING STATUS PATIENT QL REPORTED: YES
FASTING STATUS PATIENT QL REPORTED: YES
GLUCOSE SERPL-MCNC: 93 MG/DL (ref 70–99)
HCO3 SERPL-SCNC: 26 MMOL/L (ref 22–29)
HDLC SERPL-MCNC: 40 MG/DL
LDLC SERPL CALC-MCNC: 133 MG/DL
NONHDLC SERPL-MCNC: 171 MG/DL
POTASSIUM SERPL-SCNC: 4.1 MMOL/L (ref 3.4–5.3)
PSA SERPL DL<=0.01 NG/ML-MCNC: 2.97 NG/ML (ref 0–2.5)
SODIUM SERPL-SCNC: 138 MMOL/L (ref 135–145)
TRIGL SERPL-MCNC: 189 MG/DL

## 2024-07-29 PROCEDURE — 99213 OFFICE O/P EST LOW 20 MIN: CPT | Mod: 25 | Performed by: FAMILY MEDICINE

## 2024-07-29 PROCEDURE — 90715 TDAP VACCINE 7 YRS/> IM: CPT | Performed by: FAMILY MEDICINE

## 2024-07-29 PROCEDURE — 36415 COLL VENOUS BLD VENIPUNCTURE: CPT | Performed by: FAMILY MEDICINE

## 2024-07-29 PROCEDURE — 99396 PREV VISIT EST AGE 40-64: CPT | Mod: 25 | Performed by: FAMILY MEDICINE

## 2024-07-29 PROCEDURE — G0103 PSA SCREENING: HCPCS | Performed by: FAMILY MEDICINE

## 2024-07-29 PROCEDURE — 80061 LIPID PANEL: CPT | Performed by: FAMILY MEDICINE

## 2024-07-29 PROCEDURE — 90471 IMMUNIZATION ADMIN: CPT | Performed by: FAMILY MEDICINE

## 2024-07-29 PROCEDURE — 80048 BASIC METABOLIC PNL TOTAL CA: CPT | Performed by: FAMILY MEDICINE

## 2024-07-29 RX ORDER — TIZANIDINE 2 MG/1
2 TABLET ORAL 3 TIMES DAILY PRN
Qty: 90 TABLET | Refills: 1 | Status: SHIPPED | OUTPATIENT
Start: 2024-07-29

## 2024-07-29 RX ORDER — PANTOPRAZOLE SODIUM 40 MG/1
40 TABLET, DELAYED RELEASE ORAL DAILY
Qty: 90 TABLET | Refills: 3 | Status: SHIPPED | OUTPATIENT
Start: 2024-07-29

## 2024-07-29 SDOH — HEALTH STABILITY: PHYSICAL HEALTH: ON AVERAGE, HOW MANY DAYS PER WEEK DO YOU ENGAGE IN MODERATE TO STRENUOUS EXERCISE (LIKE A BRISK WALK)?: 2 DAYS

## 2024-07-29 SDOH — HEALTH STABILITY: PHYSICAL HEALTH: ON AVERAGE, HOW MANY MINUTES DO YOU ENGAGE IN EXERCISE AT THIS LEVEL?: 30 MIN

## 2024-07-29 ASSESSMENT — PAIN SCALES - GENERAL: PAINLEVEL: SEVERE PAIN (7)

## 2024-07-29 ASSESSMENT — PATIENT HEALTH QUESTIONNAIRE - PHQ9
10. IF YOU CHECKED OFF ANY PROBLEMS, HOW DIFFICULT HAVE THESE PROBLEMS MADE IT FOR YOU TO DO YOUR WORK, TAKE CARE OF THINGS AT HOME, OR GET ALONG WITH OTHER PEOPLE: NOT DIFFICULT AT ALL
SUM OF ALL RESPONSES TO PHQ QUESTIONS 1-9: 6
SUM OF ALL RESPONSES TO PHQ QUESTIONS 1-9: 6

## 2024-07-29 ASSESSMENT — SOCIAL DETERMINANTS OF HEALTH (SDOH): HOW OFTEN DO YOU GET TOGETHER WITH FRIENDS OR RELATIVES?: ONCE A WEEK

## 2024-07-29 NOTE — PROGRESS NOTES
Preventive Care Visit  [facility] INTEGRATED PRIMARY CARE  Christiano Palacios MD, Family Medicine  Jul 29, 2024      Assessment & Plan     Routine general medical examination at a health care facility  Routine health issues appropriate for age reviewed.  Laboratory testing was ordered as noted below.  Smoking and alcohol cessation were discussed with the patient.  Follow-up is recommended in 1 year for wellness visit.  - REVIEW OF HEALTH MAINTENANCE PROTOCOL ORDERS  - Basic metabolic panel  (Ca, Cl, CO2, Creat, Gluc, K, Na, BUN); Future  - Lipid panel reflex to direct LDL Fasting; Future  - PSA, screen; Future  - Basic metabolic panel  (Ca, Cl, CO2, Creat, Gluc, K, Na, BUN)  - Lipid panel reflex to direct LDL Fasting  - PSA, screen    Gastroesophageal reflux disease without esophagitis  Currently using pantoprazole on an intermittent basis.  Finds this helpful for upper abdominal discomfort that he gets so it was refilled today.  No current alarm symptoms.  - pantoprazole (PROTONIX) 40 MG EC tablet; Take 1 tablet (40 mg) by mouth daily    Hemosiderosis  Was noted to possibly have had a complex migraine in October 2022 associated with some speech issues as well as muscle weakness.  Those symptoms have resolved but he had some hemosiderosis suggestive of previous brain injury noted on MRI of his brain and follow-up was recommended.  He never followed through with that so I have put in a follow-up appointment today.  - MR Brain w/o Contrast; Future    Hemiplegic migraine without status migrainosus, not intractable  Has not had any recurrent symptoms since the October 2022 incident that was felt to be complex migraine.  - MR Brain w/o Contrast; Future    Need for Tdap vaccination  Tdap vaccine was provided today  - TDAP 10-64Y (ADACEL,BOOSTRIX)    Cervical radiculopathy  Has symptoms suggestive of possible cervical disc disease or less likely recurrent cervical disc herniation on the left side instead  "of the right side.  Suggested tizanidine instead of diazepam for pain control and muscle relaxation.  Referral was placed to the spine medicine team.  I deferred imaging at this time given his previous fusion and not sure what type of imaging would be most appropriate.  - Spine  Referral; Future  - tiZANidine (ZANAFLEX) 2 MG tablet; Take 1 tablet (2 mg) by mouth 3 times daily as needed for muscle spasms    Patient has been advised of split billing requirements and indicates understanding: Yes        Nicotine/Tobacco Cessation  He reports that he has been smoking cigarettes. He has a 25 pack-year smoking history. He has never used smokeless tobacco.  Nicotine/Tobacco Cessation Plan  Information offered: Patient not interested at this time      BMI  Estimated body mass index is 31.18 kg/m  as calculated from the following:    Height as of this encounter: 1.785 m (5' 10.28\").    Weight as of this encounter: 99.3 kg (219 lb).   Weight management plan: Discussed healthy diet and exercise guidelines    Counseling  Appropriate preventive services were addressed with this patient via screening, questionnaire, or discussion as appropriate for fall prevention, nutrition, physical activity, Tobacco-use cessation, weight loss and cognition.  Checklist reviewing preventive services available has been given to the patient.  Reviewed patient's diet, addressing concerns and/or questions.   He is at risk for lack of exercise and has been provided with information to increase physical activity for the benefit of his well-being.   The patient was instructed to see the dentist every 6 months.   He is at risk for psychosocial distress and has been provided with information to reduce risk.   The patient's PHQ-9 score is consistent with mild depression. He was provided with information regarding depression.       FUTURE APPOINTMENTS:       - Follow-up for annual visit or as needed    Izabella   Chetan is a 43 year old, presenting " for the following:  Physical        7/29/2024    10:05 AM   Additional Questions   Roomed by Matt RODGERS   Accompanied by Wife        Health Care Directive  Patient does not have a Health Care Directive or Living Will: Discussed advance care planning with patient; information given to patient to review.    HPI        Patient is here today for routine physical.  He has a couple concerns regarding some symptoms in the left arm that are been present now for more than a year.  They are similar to radicular symptoms that he had in his right arm that ultimately led to a anterior cervical discectomy and fusion.  This time the left arm symptoms radiate from the neck down to the shoulder and occasionally into the upper arm.  Not really any symptoms below the elbow or into the fingers.  No injury is recalled.    Patient uses pantoprazole on an intermittent basis for GERD type symptoms.  Has a history of perforated peptic ulcer related to NSAID use.  He did ask about medical cannabis briefly today.  He uses recreational cannabis and finds it helpful for relief of symptoms around anxiety but not so much for pain.  He did ask about applying for Social Security disability.  He is currently unemployed and feels that he may not be employable due to the issues with his neck and some chronic pain    He is accompanied today by his spouse.        7/29/2024   General Health   How would you rate your overall physical health? (!) POOR   Feel stress (tense, anxious, or unable to sleep) Only a little      (!) STRESS CONCERN      7/29/2024   Nutrition   Three or more servings of calcium each day? Yes   Diet: Regular (no restrictions)   How many servings of fruit and vegetables per day? (!) 0-1   How many sweetened beverages each day? 0-1            7/29/2024   Exercise   Days per week of moderate/strenous exercise 2 days   Average minutes spent exercising at this level 30 min      (!) EXERCISE CONCERN      7/29/2024   Social Factors   Frequency  of gathering with friends or relatives Once a week   Worry food won't last until get money to buy more Yes   Food not last or not have enough money for food? Yes   Do you have housing? (Housing is defined as stable permanent housing and does not include staying ouside in a car, in a tent, in an abandoned building, in an overnight shelter, or couch-surfing.) No   Are you worried about losing your housing? No   Lack of transportation? No   Unable to get utilities (heat,electricity)? No   Want help with housing or utility concern? No      (!) FOOD SECURITY CONCERN PRESENT(!) HOUSING CONCERN PRESENT      7/29/2024   Dental   Dentist two times every year? (!) NO            7/29/2024   TB Screening   Were you born outside of the [place]? No          Today's PHQ-9 Score:       7/29/2024     9:55 AM   PHQ-9 SCORE   PHQ-9 Total Score MyChart 6 (Mild depression)   PHQ-9 Total Score 6         7/29/2024   Substance Use   If I could quit smoking, I would Somewhat agree   I want to quit somking, worry about health affects Completely agree   Willing to make a plan to quit smoking Neutral   Willing to cut down before quitting Somewhat agree   Alcohol more than 3/day or more than 7/wk No   Do you use any other substances recreationally? (!) CANNABIS PRODUCTS        Social History     Tobacco Use    Smoking status: Every Day     Current packs/day: 1.00     Average packs/day: 1 pack/day for 25.0 years (25.0 ttl pk-yrs)     Types: Cigarettes    Smokeless tobacco: Never   Vaping Use    Vaping status: Never Used   Substance Use Topics    Alcohol use: Yes     Comment: 2 times per week, doesnt count drinks    Drug use: Yes     Frequency: 7.0 times per week     Types: Marijuana           7/29/2024   STI Screening   New sexual partner(s) since last STI/HIV test? No      ASCVD Risk   The 10-year ASCVD risk score (Evert KELLEY, et al., 2019) is: 6.8%    Values used to calculate the score:      Age: 43 years      Sex: Male      Is Non-  : Yes      Diabetic: No      Tobacco smoker: Yes      Systolic Blood Pressure: 126 mmHg      Is BP treated: No      HDL Cholesterol: 43 mg/dL      Total Cholesterol: 200 mg/dL        7/29/2024   Contraception/Family Planning   Questions about contraception or family planning No           Reviewed and updated as needed this visit by Provider   Tobacco  Allergies  Meds  Problems  Med Hx  Surg Hx  Fam Hx  Soc   Hx Sexual Activity          Patient Active Problem List   Diagnosis    CARDIOVASCULAR SCREENING; LDL GOAL LESS THAN 160    Chronic low back pain    Neck pain    Hematoma of neck, initial encounter    Word finding difficulty    Gastroesophageal reflux disease without esophagitis     Past Surgical History:   Procedure Laterality Date    ABDOMEN SURGERY  10/2017    Perforated peptic ulcer    HEMORRHOID SURGERY  2015    INCISION AND DRAINAGE PERINEAL, COMBINED N/A 06/08/2018    Procedure: COMBINED INCISION AND DRAINAGE PERINEAL;   Examination of perineal abcess under anethesia, Incision and Drainage Perineal Abscess;  Surgeon: oSlo Valero MD;  Location: [facility] OR    INJECT EPIDURAL TRANSFORAMINAL LUMBAR / SACRAL EA ADDITIONAL LEVEL N/A 03/04/2019    Procedure: Cervical Epidural Steroid Injection, Cervical 7-Thoracic 1 Interlaminar;  Surgeon: Nolan Quiroz MD;  Location: [facility] OR    LAPAROSCOPIC HERNIORRHAPHY EPIGASTRIC N/A 10/11/2017    Procedure: LAPAROSCOPIC HERNIORRHAPHY EPIGASTRIC;  Exploratory Laparoscopy, Exploratory Laparotomy, EGD, Omental Patch, Upper Endoscopy;  Surgeon: Celio Keyes MD;  Location: [facility] OR    NECK SURGERY  06/2019    Neck hematoma related to vertebral artery injury from cervical disk replacement    REPLACE DISK LUMBAR N/A 06/25/2019    Procedure: CERVICAL 5-7 ARTHROPLASTY;  Surgeon: Aaron Craft MD;  Location:  OR       Social History     Tobacco Use    Smoking status: Every Day     Current packs/day: 1.00     Average packs/day: 1 pack/day  "for 25.0 years (25.0 ttl pk-yrs)     Types: Cigarettes    Smokeless tobacco: Never   Substance Use Topics    Alcohol use: Yes     Comment: 2 times per week, doesnt count drinks     Family History   Problem Relation Age of Onset    Diabetes Mother     Breast Cancer Mother         Dx at 61 years aol    Coronary Artery Disease Mother         3v CABG    Cancer Father         Stomach ca - Dx at 57 years old and passed away from this    Diabetes Brother     Cancer - colorectal Maternal Grandmother          Current Outpatient Medications   Medication Sig Dispense Refill    multivitamin w/minerals (THERA-VIT-M) tablet Take 1 tablet by mouth daily 30 tablet 0    pantoprazole (PROTONIX) 40 MG EC tablet Take 1 tablet (40 mg) by mouth daily 90 tablet 3    tiZANidine (ZANAFLEX) 2 MG tablet Take 1 tablet (2 mg) by mouth 3 times daily as needed for muscle spasms 90 tablet 1     Allergies   Allergen Reactions    Seasonal Allergies Other (See Comments)     Clogged sinuses         Review of Systems  Constitutional, HEENT, cardiovascular, pulmonary, GI, , musculoskeletal, neuro, skin, endocrine and psych systems are negative, except as otherwise noted.     Objective    Exam  /88 (BP Location: Left arm, Patient Position: Sitting, Cuff Size: Adult Large)   Pulse 85   Temp 97  F (36.1  C) (Temporal)   Resp 16   Ht 1.785 m (5' 10.28\")   Wt 99.3 kg (219 lb)   SpO2 100%   BMI 31.18 kg/m     Estimated body mass index is 31.18 kg/m  as calculated from the following:    Height as of this encounter: 1.785 m (5' 10.28\").    Weight as of this encounter: 99.3 kg (219 lb).    Physical Exam  GENERAL: alert and no distress  EYES: Eyes grossly normal to inspection, PERRL and conjunctivae and sclerae normal  HENT: ear canals and TM's normal, nose and mouth without ulcers or lesions  NECK: no adenopathy, no asymmetry, masses, or scars  RESP: lungs clear to auscultation - no rales, rhonchi or wheezes  CV: regular rate and rhythm, normal S1 " S2, no S3 or S4, no murmur, click or rub, no peripheral edema  ABDOMEN: soft, nontender, no hepatosplenomegaly, no masses and bowel sounds normal  MS: no gross musculoskeletal defects noted, no edema  SKIN: no suspicious lesions or rashes  NEURO: Normal strength and tone, mentation intact and speech normal  NEURO: Normal strength and tone, sensory exam grossly normal, mentation intact, and strength in the upper and lower extremities is 5/5 and symmetric with a little bit of guarding on the left and then may be 5-/5 for the pectoral muscle on the left.  Deep tendon reflexes were symmetric in the upper and lower extremities without any hyperreflexia.  PSYCH: mentation  appears normal, affect normal/bright        Signed Electronically by: Christiano Palacios MD

## 2024-07-29 NOTE — PATIENT INSTRUCTIONS
Patient Education   Preventive Care Advice   This is general advice given by our system to help you stay healthy. However, your care team may have specific advice just for you. Please talk to your care team about your preventive care needs.  Nutrition  Eat 5 or more servings of fruits and vegetables each day.  Try wheat bread, brown rice and whole grain pasta (instead of white bread, rice, and pasta).  Get enough calcium and vitamin D. Check the label on foods and aim for 100% of the RDA (recommended daily allowance).  Lifestyle  Exercise at least 150 minutes each week  (30 minutes a day, 5 days a week).  Do muscle strengthening activities 2 days a week. These help control your weight and prevent disease.  No smoking.  Wear sunscreen to prevent skin cancer.  Have a dental exam and cleaning every 6 months.  Yearly exams  See your health care team every year to talk about:  Any changes in your health.  Any medicines your care team has prescribed.  Preventive care, family planning, and ways to prevent chronic diseases.  Shots (vaccines)   HPV shots (up to age 26), if you've never had them before.  Hepatitis B shots (up to age 59), if you've never had them before.  COVID-19 shot: Get this shot when it's due.  Flu shot: Get a flu shot every year.  Tetanus shot: Get a tetanus shot every 10 years.  Pneumococcal, hepatitis A, and RSV shots: Ask your care team if you need these based on your risk.  Shingles shot (for age 50 and up)  General health tests  Diabetes screening:  Starting at age 35, Get screened for diabetes at least every 3 years.  If you are younger than age 35, ask your care team if you should be screened for diabetes.  Cholesterol test: At age 39, start having a cholesterol test every 5 years, or more often if advised.  Bone density scan (DEXA): At age 50, ask your care team if you should have this scan for osteoporosis (brittle bones).  Hepatitis C: Get tested at least once in your life.  STIs (sexually  transmitted infections)  Before age 24: Ask your care team if you should be screened for STIs.  After age 24: Get screened for STIs if you're at risk. You are at risk for STIs (including HIV) if:  You are sexually active with more than one person.  You don't use condoms every time.  You or a partner was diagnosed with a sexually transmitted infection.  If you are at risk for HIV, ask about PrEP medicine to prevent HIV.  Get tested for HIV at least once in your life, whether you are at risk for HIV or not.  Cancer screening tests  Cervical cancer screening: If you have a cervix, begin getting regular cervical cancer screening tests starting at age 21.  Breast cancer scan (mammogram): If you've ever had breasts, begin having regular mammograms starting at age 40. This is a scan to check for breast cancer.  Colon cancer screening: It is important to start screening for colon cancer at age 45.  Have a colonoscopy test every 10 years (or more often if you're at risk) Or, ask your provider about stool tests like a FIT test every year or Cologuard test every 3 years.  To learn more about your testing options, visit:   .  For help making a decision, visit:   https://bit.ly/qr75209.  Prostate cancer screening test: If you have a prostate, ask your care team if a prostate cancer screening test (PSA) at age 55 is right for you.  Lung cancer screening: If you are a current or former smoker ages 50 to 80, ask your care team if ongoing lung cancer screenings are right for you.  For informational purposes only. Not to replace the advice of your health care provider. Copyright   2023 [facility]. All rights reserved. Clinically reviewed by the [facility] Transitions Program. [place] [id number] - REV 01/24.  Learning About Depression Screening  What is depression screening?  Depression screening is a way to see if you have depression symptoms. It may be done by a doctor or counselor. It's often part of a routine  "checkup. That's because your mental health is just as important as your physical health.  Depression is a mental health condition that affects how you feel, think, and act. You may:  Have less energy.  Lose interest in your daily activities.  Feel sad and grouchy for a long time.  Depression is very common. It affects people of all ages.  Many things can lead to depression. Some people become depressed after they have a stroke or find out they have a major illness like cancer or heart disease. The death of a loved one or a breakup may lead to depression. It can run in families. Most experts believe that a combination of inherited genes and stressful life events can cause it.  What happens during screening?  You may be asked to fill out a form about your depression symptoms. You and the doctor will discuss your answers. The doctor may ask you more questions to learn more about how you think, act, and feel.  What happens after screening?  If you have symptoms of depression, your doctor will talk to you about your options.  Doctors usually treat depression with medicines or counseling. Often, combining the two works best. Many people don't get help because they think that they'll get over the depression on their own. But people with depression may not get better unless they get treatment.  The cause of depression is not well understood. There may be many factors involved. But if you have depression, it's not your fault.  A serious symptom of depression is thinking about death or suicide. If you or someone you care about talks about this or about feeling hopeless, get help right away.  It's important to know that depression can be treated. Medicine, counseling, and self-care may help.  Where can you learn more?  Go to https://www.healthwise.net/patiented  Enter T185 in the search box to learn more about \"Learning About Depression Screening.\"  Current as of: June 24, 2023               Content Version: 14.0    8367-3753 " Healthwise, [employer].   Care instructions adapted under license by your healthcare professional. If you have questions about a medical condition or this instruction, always ask your healthcare professional. [employer] disclaims any warranty or liability for your use of this information.      Substance Use Disorder: Care Instructions  Overview     You can improve your life and health by stopping your use of alcohol or drugs. When you don't drink or use drugs, you may feel and sleep better. You may get along better with your family, friends, and coworkers. There are medicines and programs that can help with substance use disorder.  How can you care for yourself at home?  Here are some ways to help you stay sober and prevent relapse.  If you have been given medicine to help keep you sober or reduce your cravings, be sure to take it exactly as prescribed.  Talk to your doctor about programs that can help you stop using drugs or drinking alcohol.  Do not keep alcohol or drugs in your home.  Plan ahead. Think about what you'll say if other people ask you to drink or use drugs. Try not to spend time with people who drink or use drugs.  Use the time and money spent on drinking or drugs to do something that's important to you.  Preventing a relapse  Have a plan to deal with relapse. Learn to recognize changes in your thinking that lead you to drink or use drugs. Get help before you start to drink or use drugs again.  Try to stay away from situations, friends, or places that may lead you to drink or use drugs.  If you feel the need to drink alcohol or use drugs again, seek help right away. Call a trusted friend or family member. Some people get support from organizations such as Narcotics Anonymous or [employer] or from treatment facilities.  If you relapse, get help as soon as you can. Some people make a plan with another person that outlines what they want that person to do for them if they relapse.  The plan usually includes how to handle the relapse and who to notify in case of relapse.  Don't give up. Remember that a relapse doesn't mean that you have failed. Use the experience to learn the triggers that lead you to drink or use drugs. Then quit again. Recovery is a lifelong process. Many people have several relapses before they are able to quit for good.  Follow-up care is a key part of your treatment and safety. Be sure to make and go to all appointments, and call your doctor if you are having problems. It's also a good idea to know your test results and keep a list of the medicines you take.  When should you call for help?   Call 911  anytime you think you may need emergency care. For example, call if you or someone else:    Has overdosed or has withdrawal signs. Be sure to tell the emergency workers that you are or someone else is using or trying to quit using drugs. Overdose or withdrawal signs may include:  Losing consciousness.  Seizure.  Seeing or hearing things that aren't there (hallucinations).     Is thinking or talking about suicide or harming others.   Where to get help 24 hours a day, 7 days a week   If you or someone you know talks about suicide, self-harm, a mental health crisis, a substance use crisis, or any other kind of emotional distress, get help right away. You can:    Call the Suicide and Crisis Lifeline at On license of [facility].     Call 5-747-356-KPBK (1-444.906.4174).     Text HOME to 088365 to access the Crisis Text Line.   Consider saving these numbers in your phone.  Go to [employer] for more information or to chat online.  Call your doctor now or seek immediate medical care if:    You are having withdrawal symptoms. These may include nausea or vomiting, sweating, shakiness, and anxiety.   Watch closely for changes in your health, and be sure to contact your doctor if:    You have a relapse.     You need more help or support to stop.   Where can you learn more?  Go to  "https://www.health[employer]CI.net/patiented  Enter H573 in the search box to learn more about \"Substance Use Disorder: Care Instructions.\"  Current as of: November 15, 2023               Content Version: 14.0    6251-9619 Healthwise, [employer].   Care instructions adapted under license by your healthcare professional. If you have questions about a medical condition or this instruction, always ask your healthcare professional. Healthwise, [employer] disclaims any warranty or liability for your use of this information.         "

## 2025-07-07 ENCOUNTER — PATIENT OUTREACH (OUTPATIENT)
Dept: CARE COORDINATION | Facility: CLINIC | Age: 45
End: 2025-07-07
Payer: COMMERCIAL

## 2025-07-21 ENCOUNTER — PATIENT OUTREACH (OUTPATIENT)
Dept: CARE COORDINATION | Facility: CLINIC | Age: 45
End: 2025-07-21
Payer: COMMERCIAL

## 2025-07-29 NOTE — PROGRESS NOTES
SUBJECTIVE:   Tonio Alarcon is a 37 year old male who presents to clinic today for the following health issues:      ED/UC Followup:    Facility:  [facility]    Date of visit: 5/07 and 5/13/18  Reason for visit: left side neck pain and back pain   Current Status: fair     Will need a work note       Patient is here for follow-up of a number of recent emergency room visits.  He had initially gone in with some abdominal pain but that has improved and it was felt to be primarily gastric related.  The other concern is about a 1 month history of left-sided neck pain radiating down the left arm all the way into the fingers.  It radiates primarily into fingers suggesting a C5-C7 distribution.  He has no bowel or bladder incontinence.  He has no weakness in the legs.  He describes the pain as achy and tingling.  It seems to be bad all the time and he has not noticed any significant improvement with use of the Medrol Dosepak.  X-ray imaging is the only imaging done to this point in time and was significant for mild degenerative changes at C5 through C7, reviewed with the patient in the office today.  He does not recall any particular injury.  Movement makes the symptoms worse.  He has a difficult time sleeping because of the pain.  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  He is requesting a note for work.  The pain will radiate to the right side of the neck but not down the right arm as well as into the left occiput.        Problem list and histories reviewed & adjusted, as indicated.  Additional history: as documented    Patient Active Problem List   Diagnosis     CARDIOVASCULAR SCREENING; LDL GOAL LESS THAN 160     Chronic low back pain     Past Surgical History:   Procedure Laterality Date     GI SURGERY      Hemorrhoids      HEMORRHOID SURGERY  2015     LAPAROSCOPIC HERNIORRHAPHY EPIGASTRIC N/A 10/11/2017    Procedure: LAPAROSCOPIC HERNIORRHAPHY EPIGASTRIC;  Exploratory Laparoscopy, Exploratory  Patient came to clinic for anticoagulation monitoring.    Referring: Maura Jerez       Renewal:7/15/2026  DX:  PAF/A. Flutter                           Goal 2.0-3.0  Last INR on 7/25 was 1.7.  Dose maintained.     Today's INR is 2.3 and is within goal range.    Current warfarin total weekly dose of 42.5 mg verified.  Informed the INR result is within therapeutic range and instructed to maintain current dose per protocol.     Discussed dose and return date of 8/12 for next INR.   Waiting on Eliquis shipment     See Anticoagulation flowsheet.    Kita Strange NP is in the office today supervising the treatment.    Instructed to contact the clinic with any unusual bleeding or bruising, any changes in medications, diet, health status, lifestyle, or any other changes, questions or concerns. Verbalized understanding of all discussed.      "Laparotomy, EGD, Omental Patch, Upper Endoscopy;  Surgeon: Celio Keyes MD;  Location: [facility] OR       Social History   Substance Use Topics     Smoking status: Current Every Day Smoker     Packs/day: 0.50     Years: 25.00     Smokeless tobacco: Never Used     Alcohol use Yes      Comment: heavy drinking about 2 x per week     Family History   Problem Relation Age of Onset     DIABETES Mother      Breast Cancer Mother      Dx at 61 years aol     Coronary Artery Disease Mother      3v CABG     CANCER Father      Stomach ca - Dx at 57 years old and passed away from this     Cancer - colorectal Maternal Grandmother      DIABETES Brother          Current Outpatient Prescriptions   Medication Sig Dispense Refill     dexamethasone (DECADRON) 2 MG tablet Take 1 tablet (2 mg) by mouth 3 times daily for 7 days 21 tablet 0     gabapentin (NEURONTIN) 300 MG capsule Take 1 capsule (300 mg) by mouth 3 times daily 90 capsule 1     oxyCODONE IR (ROXICODONE) 5 MG tablet Take 1 tablet (5 mg) by mouth every 6 hours as needed for pain 12 tablet 0     pantoprazole (PROTONIX) 40 MG EC tablet Take 1 tablet (40 mg) by mouth daily Take 30-60 minutes before a meal. 90 tablet 3     ranitidine (ZANTAC) 150 MG tablet Take 1 tablet (150 mg) by mouth At Bedtime for 15 days 15 tablet 0     sennosides (SENOKOT) 8.6 MG tablet Take 1 tablet by mouth 2 times daily 60 tablet 0     vitamin D (ERGOCALCIFEROL) 23873 UNIT capsule TAKE 1 CAPSULE (50,000 UNITS) BY MOUTH EVERY 7 DAYS FOR 8 DOSES  0     No Known Allergies    Reviewed and updated as needed this visit by clinical staff  Tobacco  Allergies  Meds       Reviewed and updated as needed this visit by Provider         ROS:  Constitutional, HEENT, cardiovascular, pulmonary, gi and gu systems are negative, except as otherwise noted.    OBJECTIVE:     /60 (BP Location: Right arm, Patient Position: Chair, Cuff Size: Adult Large)  Pulse 92  Temp 98.8  F (37.1  C) (Oral)  Resp 22  Ht 5' 11\" " (1.803 m)  Wt 186 lb (84.4 kg)  SpO2 100%  BMI 25.94 kg/m2  Body mass index is 25.94 kg/(m^2).  GENERAL: healthy, alert, no distress and uncomfortable with a stiff neck  EYES: Eyes grossly normal to inspection, PERRL and conjunctivae and sclerae normal  NECK: no adenopathy, no asymmetry, masses, or scars and thyroid normal to palpation  MS: no gross musculoskeletal defects noted, no edema  SKIN: no suspicious lesions or rashes  NEURO: Normal strength and tone, mentation intact and speech normal  NEURO: Normal strength and tone, sensory exam grossly normal, mentation intact and DTR's normal and symmetric 2+  PSYCH: mentation appears normal, affect normal/bright    Diagnostic Test Results:  none     ASSESSMENT/PLAN:             1. Cervical radiculopathy  By history he has symptoms consistent with a cervical radiculopathy.  He was diffusely weak in the left arm today but I think that is probably related to guarding due to the pain.  Reflexes were symmetric in the upper and lower extremities.  Given the persistence of symptoms and failure to improve with medication management I think an MRI of his cervical spine is reasonable.  Short-term refill of the oxycodone today as well as prescriptions for dexamethasone and gabapentin were issued to see if these are helpful with the symptoms.  Side effects were reviewed with the patient.  I would like to see him back after completion of the MRI.  - MR Cervical Spine w/o Contrast; Future  - gabapentin (NEURONTIN) 300 MG capsule; Take 1 capsule (300 mg) by mouth 3 times daily  Dispense: 90 capsule; Refill: 1  - dexamethasone (DECADRON) 2 MG tablet; Take 1 tablet (2 mg) by mouth 3 times daily for 7 days  Dispense: 21 tablet; Refill: 0  - oxyCODONE IR (ROXICODONE) 5 MG tablet; Take 1 tablet (5 mg) by mouth every 6 hours as needed for pain  Dispense: 12 tablet; Refill: 0    FUTURE APPOINTMENTS:       - Follow-up visit in after mri completion.    Christiano Palacios MD  [place]  [facility]

## (undated) DEVICE — SYR BULB IRRIG 50ML LATEX FREE 0035280

## (undated) DEVICE — CATH TRAY FOLEY SURESTEP 16FR W/URNE MTR STLK LATEX A303316A

## (undated) DEVICE — PACK LARGE SPINE SNE15LSFSE

## (undated) DEVICE — SUCTION TIP YANKAUER W/O VENT K86

## (undated) DEVICE — ESU LIGASURE IMPACT OPEN SEALER/DVDR CVD LG JAW LF4418

## (undated) DEVICE — SU VICRYL 0 UR-6 27" J603H

## (undated) DEVICE — MIDAS REX DISSECTING TOOL 03MM  9MH30

## (undated) DEVICE — ESU GROUND PAD UNIVERSAL W/O CORD

## (undated) DEVICE — GLOVE PROTEXIS BLUE W/NEU-THERA 8.5  2D73EB85

## (undated) DEVICE — PREP CHLORAPREP 26ML TINTED ORANGE  260815

## (undated) DEVICE — DEVICE SUTURE GRASPER TROCAR CLOSURE 14GA PMITCSG

## (undated) DEVICE — SPONGE LAP 18X18" X8435

## (undated) DEVICE — HYDROGEN PEROXIDE 3% 16OZ D0012

## (undated) DEVICE — SUCTION FRAZIER 12FR W/HANDLE K73

## (undated) DEVICE — ENDO TROCAR 05MM VERSAONE BLADELESS W/STD FIX CAN NONB5STF

## (undated) DEVICE — ENDO SYSTEM WATER BOTTLE & TUBING W/CO2 FILTER 00711549

## (undated) DEVICE — LINEN TOWEL PACK X30 5481

## (undated) DEVICE — SU ETHILON 2-0 FS 18" 664H

## (undated) DEVICE — DRSG PRIMAPORE 02X3" 7133

## (undated) DEVICE — Device

## (undated) DEVICE — SOL ADH LIQUID BENZOIN SWAB 0.6ML C1544

## (undated) DEVICE — GLOVE PROTEXIS BLUE W/NEU-THERA 8.0  2D73EB80

## (undated) DEVICE — NDL SPINAL 25GA 3.5" QUINCKE 405180

## (undated) DEVICE — TUBING SUCTION MEDI-VAC 1/4"X20' N620A

## (undated) DEVICE — LINEN GOWN X4 5410

## (undated) DEVICE — SU MONOCRYL 4-0 PS-2 18" UND Y496G

## (undated) DEVICE — GLOVE PROTEXIS W/NEU-THERA 8.5  2D73TE85

## (undated) DEVICE — SPONGE SURGIFOAM 01GM POWDER 1978

## (undated) DEVICE — JELLY LUBRICATING SURGILUBE 2OZ TUBE 0281-0205-02

## (undated) DEVICE — PREP POVIDONE IODINE SOLUTION 10% 120ML

## (undated) DEVICE — SOL WATER IRRIG 1000ML BOTTLE 07139-09

## (undated) DEVICE — PAD CHUX UNDERPAD 30X30"

## (undated) DEVICE — LINEN LEG DRAPE 5457

## (undated) DEVICE — BASIN SET MAJOR

## (undated) DEVICE — PREP CHLORAPREP W/ORANGE TINT 10.5ML 260715

## (undated) DEVICE — SU PDS II 0 TP-1 60" Z991G

## (undated) DEVICE — CLIP HORIZON LG ORANGE 004200

## (undated) DEVICE — GLOVE PROTEXIS POWDER FREE 6.0 ORTHOPEDIC 2D73ET60

## (undated) DEVICE — SU SILK 2-0 TIE 12X30" A305H

## (undated) DEVICE — GLOVE PROTEXIS W/NEU-THERA 8.0  2D73TE80

## (undated) DEVICE — SOL WATER IRRIG 1000ML BOTTLE 2F7114

## (undated) DEVICE — SUCTION TIP POOLE K770

## (undated) DEVICE — SU SILK 3-0 TIE 12X30" A304H

## (undated) DEVICE — PACK SET-UP STD 9102

## (undated) DEVICE — LIGHT HANDLE X1 31140133

## (undated) DEVICE — DRAPE LAP W/ARMBOARD 29410

## (undated) DEVICE — COVER CAMERA IN-LIGHT DISP LT-C02

## (undated) DEVICE — DRAPE VARI-LENS II MICROSCOPE 52X150" 6120VL2

## (undated) DEVICE — LINEN GOWN XLG 5407

## (undated) DEVICE — BLADE KNIFE SURG 10 371110

## (undated) DEVICE — SPONGE SURGIFOAM 100 1974

## (undated) DEVICE — LINEN TOWEL PACK X5 5464

## (undated) DEVICE — SU ETHILON 3-0 PS-1 18" 1663H

## (undated) DEVICE — NDL BLUNT 18GA 1" W/O FILTER 305181

## (undated) DEVICE — GOWN XLG DISP 9545

## (undated) DEVICE — PREP SKIN SCRUB TRAY 4461A

## (undated) DEVICE — ESU PENCIL W/COATED BLADE E2450H

## (undated) DEVICE — TAPE MEDIPORE 2"X2YD 2962S

## (undated) DEVICE — ADH LIQUID MASTISOL TOPICAL VIAL 2-3ML 0523-48

## (undated) DEVICE — CLIP HORIZON MED BLUE 002200

## (undated) DEVICE — SU SILK 3-0 SH 30" K832H

## (undated) DEVICE — SOL NACL 0.9% IRRIG 1000ML BOTTLE 2F7124

## (undated) DEVICE — ESU ELEC BLADE 6" COATED E1450-6

## (undated) DEVICE — DRSG MEDIPORE 3 1/2X13 3/4" 3573

## (undated) DEVICE — ENDO TROCAR 12MM VERSAONE BLADELESS W/STD FIX CAN NONB12STF

## (undated) DEVICE — SU VICRYL 3-0 SH 27" J316H

## (undated) DEVICE — PANTIES MESH LG/XLG 2PK 706M2

## (undated) DEVICE — DRAIN JACKSON PRATT ROUND SIL 19FR W/TROCAR LF JP-2232

## (undated) DEVICE — TUBING SUCTION MEDI-VAC SOFT 3/16"X20' N520A

## (undated) DEVICE — SU SILK 0 TIE 6X30" A306H

## (undated) DEVICE — DRAPE MAYO STAND 23X54 8337

## (undated) DEVICE — TUBING SUCTION 10'X3/16" N510

## (undated) DEVICE — ESU ELEC BLADE 2.75" COATED/INSULATED E1455

## (undated) DEVICE — RETR RING LONE STAR 28.3X18.3CM W/CATH CLIPSX2 3308G

## (undated) DEVICE — SYR EAR BULB 3OZ 0035830

## (undated) DEVICE — LINEN ORTHO PACK 5446

## (undated) DEVICE — DRAIN JACKSON PRATT RESERVOIR 100ML SU130-1305

## (undated) DEVICE — NDL INSUFFLATION 150MM VERRES 172016

## (undated) DEVICE — GLOVE PROTEXIS BLUE W/NEU-THERA 6.5  2D73EB65

## (undated) DEVICE — SUCTION MANIFOLD DORNOCH ULTRA CART UL-CL500

## (undated) DEVICE — ANTIFOG SOLUTION W/FOAM PAD 31142527

## (undated) DEVICE — TRAY PAIN INJECTION 97A 640

## (undated) DEVICE — LINEN TOWEL PACK X6 WHITE 5487

## (undated) DEVICE — SYR 10ML LL W/O NDL

## (undated) DEVICE — ENDO CANNULA 05MM VERSAONE UNIVERSAL UNVCA5STF

## (undated) DEVICE — SU VICRYL 3-0 SH 27" UND J416H

## (undated) DEVICE — BLADE KNIFE SURG 11 371111

## (undated) DEVICE — MANIFOLD NEPTUNE 4 PORT 700-20

## (undated) DEVICE — STRAP KNEE/BODY 31143004

## (undated) DEVICE — ESU GROUND PAD ADULT W/CORD E7507

## (undated) DEVICE — GLOVE PROTEXIS W/NEU-THERA 7.5  2D73TE75

## (undated) DEVICE — TUBING SUCTION SOFT 20'X3/16" 0036570

## (undated) DEVICE — GLOVE PROTEXIS POWDER FREE SMT 8.0  2D72PT80X

## (undated) DEVICE — SU VICRYL 3-0 SH CR 8X18" J774

## (undated) DEVICE — CLIP HORIZON SM RED WIDE SLOT 001201

## (undated) DEVICE — ESU ELEC NDL 1" COATED/INSULATED E1465

## (undated) DEVICE — TAPE CLOTH 3" CARDINAL 3TRCL03

## (undated) DEVICE — MIDAS REX DISSECTING TOOL  14MH30

## (undated) DEVICE — DRAPE GYN/UROLOGY FLUID POUCH TUR 29455

## (undated) DEVICE — DECANTER VIAL 2006S

## (undated) RX ORDER — DEXAMETHASONE SODIUM PHOSPHATE 4 MG/ML
INJECTION, SOLUTION INTRA-ARTICULAR; INTRALESIONAL; INTRAMUSCULAR; INTRAVENOUS; SOFT TISSUE
Status: DISPENSED
Start: 2019-06-25

## (undated) RX ORDER — FENTANYL CITRATE 50 UG/ML
INJECTION, SOLUTION INTRAMUSCULAR; INTRAVENOUS
Status: DISPENSED
Start: 2019-06-25

## (undated) RX ORDER — LIDOCAINE HYDROCHLORIDE 20 MG/ML
INJECTION, SOLUTION EPIDURAL; INFILTRATION; INTRACAUDAL; PERINEURAL
Status: DISPENSED
Start: 2018-06-08

## (undated) RX ORDER — DEXAMETHASONE SODIUM PHOSPHATE 4 MG/ML
INJECTION, SOLUTION INTRA-ARTICULAR; INTRALESIONAL; INTRAMUSCULAR; INTRAVENOUS; SOFT TISSUE
Status: DISPENSED
Start: 2018-06-08

## (undated) RX ORDER — HYDROMORPHONE HYDROCHLORIDE 1 MG/ML
INJECTION, SOLUTION INTRAMUSCULAR; INTRAVENOUS; SUBCUTANEOUS
Status: DISPENSED
Start: 2017-10-12

## (undated) RX ORDER — ONDANSETRON 2 MG/ML
INJECTION INTRAMUSCULAR; INTRAVENOUS
Status: DISPENSED
Start: 2018-06-08

## (undated) RX ORDER — BUPIVACAINE HYDROCHLORIDE AND EPINEPHRINE 2.5; 5 MG/ML; UG/ML
INJECTION, SOLUTION EPIDURAL; INFILTRATION; INTRACAUDAL; PERINEURAL
Status: DISPENSED
Start: 2019-06-25

## (undated) RX ORDER — HYDROXYZINE HYDROCHLORIDE 25 MG/1
TABLET, FILM COATED ORAL
Status: DISPENSED
Start: 2019-06-25

## (undated) RX ORDER — KETAMINE HCL IN NACL, ISO-OSM 100MG/10ML
SYRINGE (ML) INJECTION
Status: DISPENSED
Start: 2019-06-25

## (undated) RX ORDER — HYDROMORPHONE HYDROCHLORIDE 1 MG/ML
INJECTION, SOLUTION INTRAMUSCULAR; INTRAVENOUS; SUBCUTANEOUS
Status: DISPENSED
Start: 2019-06-25

## (undated) RX ORDER — EPINEPHRINE 1 MG/ML
INJECTION, SOLUTION INTRAMUSCULAR; SUBCUTANEOUS
Status: DISPENSED
Start: 2019-06-25

## (undated) RX ORDER — FENTANYL CITRATE 50 UG/ML
INJECTION, SOLUTION INTRAMUSCULAR; INTRAVENOUS
Status: DISPENSED
Start: 2017-10-12

## (undated) RX ORDER — ONDANSETRON 2 MG/ML
INJECTION INTRAMUSCULAR; INTRAVENOUS
Status: DISPENSED
Start: 2019-06-25

## (undated) RX ORDER — CEFAZOLIN SODIUM 2 G/100ML
INJECTION, SOLUTION INTRAVENOUS
Status: DISPENSED
Start: 2019-06-25

## (undated) RX ORDER — FENTANYL CITRATE 50 UG/ML
INJECTION, SOLUTION INTRAMUSCULAR; INTRAVENOUS
Status: DISPENSED
Start: 2018-06-08

## (undated) RX ORDER — DOBUTAMINE HYDROCHLORIDE 200 MG/100ML
INJECTION INTRAVENOUS
Status: DISPENSED
Start: 2017-04-22

## (undated) RX ORDER — PROPOFOL 10 MG/ML
INJECTION, EMULSION INTRAVENOUS
Status: DISPENSED
Start: 2019-06-25

## (undated) RX ORDER — HYDROMORPHONE HYDROCHLORIDE 1 MG/ML
INJECTION, SOLUTION INTRAMUSCULAR; INTRAVENOUS; SUBCUTANEOUS
Status: DISPENSED
Start: 2018-06-08

## (undated) RX ORDER — LIDOCAINE HYDROCHLORIDE 20 MG/ML
INJECTION, SOLUTION EPIDURAL; INFILTRATION; INTRACAUDAL; PERINEURAL
Status: DISPENSED
Start: 2017-10-12

## (undated) RX ORDER — LIDOCAINE HYDROCHLORIDE 20 MG/ML
INJECTION, SOLUTION EPIDURAL; INFILTRATION; INTRACAUDAL; PERINEURAL
Status: DISPENSED
Start: 2019-06-25

## (undated) RX ORDER — BETAMETHASONE SODIUM PHOSPHATE AND BETAMETHASONE ACETATE 3; 3 MG/ML; MG/ML
INJECTION, SUSPENSION INTRA-ARTICULAR; INTRALESIONAL; INTRAMUSCULAR; SOFT TISSUE
Status: DISPENSED
Start: 2019-03-04

## (undated) RX ORDER — METOPROLOL TARTRATE 1 MG/ML
INJECTION, SOLUTION INTRAVENOUS
Status: DISPENSED
Start: 2017-04-22

## (undated) RX ORDER — OXYCODONE HYDROCHLORIDE 5 MG/1
TABLET ORAL
Status: DISPENSED
Start: 2018-06-08

## (undated) RX ORDER — NEOSTIGMINE METHYLSULFATE 1 MG/ML
VIAL (ML) INJECTION
Status: DISPENSED
Start: 2019-06-25

## (undated) RX ORDER — ESMOLOL HYDROCHLORIDE 10 MG/ML
INJECTION INTRAVENOUS
Status: DISPENSED
Start: 2017-10-12

## (undated) RX ORDER — PROPOFOL 10 MG/ML
INJECTION, EMULSION INTRAVENOUS
Status: DISPENSED
Start: 2017-10-12

## (undated) RX ORDER — BUPIVACAINE HYDROCHLORIDE 5 MG/ML
INJECTION, SOLUTION EPIDURAL; INTRACAUDAL
Status: DISPENSED
Start: 2019-06-25

## (undated) RX ORDER — ACETAMINOPHEN 325 MG/1
TABLET ORAL
Status: DISPENSED
Start: 2019-06-25

## (undated) RX ORDER — PROPOFOL 10 MG/ML
INJECTION, EMULSION INTRAVENOUS
Status: DISPENSED
Start: 2018-06-08

## (undated) RX ORDER — GLYCOPYRROLATE 0.2 MG/ML
INJECTION, SOLUTION INTRAMUSCULAR; INTRAVENOUS
Status: DISPENSED
Start: 2019-06-25

## (undated) RX ORDER — OXYCODONE HYDROCHLORIDE 5 MG/1
TABLET ORAL
Status: DISPENSED
Start: 2019-06-25

## (undated) RX ORDER — SODIUM CHLORIDE, SODIUM LACTATE, POTASSIUM CHLORIDE, CALCIUM CHLORIDE 600; 310; 30; 20 MG/100ML; MG/100ML; MG/100ML; MG/100ML
INJECTION, SOLUTION INTRAVENOUS
Status: DISPENSED
Start: 2017-10-12

## (undated) RX ORDER — VECURONIUM BROMIDE 1 MG/ML
INJECTION, POWDER, LYOPHILIZED, FOR SOLUTION INTRAVENOUS
Status: DISPENSED
Start: 2019-06-25

## (undated) RX ORDER — ONDANSETRON 2 MG/ML
INJECTION INTRAMUSCULAR; INTRAVENOUS
Status: DISPENSED
Start: 2017-10-12

## (undated) RX ORDER — GINSENG 100 MG
CAPSULE ORAL
Status: DISPENSED
Start: 2019-06-25